# Patient Record
Sex: MALE | Employment: OTHER | URBAN - METROPOLITAN AREA
[De-identification: names, ages, dates, MRNs, and addresses within clinical notes are randomized per-mention and may not be internally consistent; named-entity substitution may affect disease eponyms.]

---

## 2018-05-15 ENCOUNTER — HOSPITAL ENCOUNTER (OUTPATIENT)
Age: 68
Discharge: HOME | End: 2018-05-17
Payer: MEDICARE

## 2018-05-15 PROCEDURE — 88342 IMHCHEM/IMCYTCHM 1ST ANTB: CPT

## 2018-05-15 PROCEDURE — 88305 TISSUE EXAM BY PATHOLOGIST: CPT

## 2018-08-10 ENCOUNTER — OFFICE VISIT (OUTPATIENT)
Dept: ORTHOPEDIC SURGERY | Age: 68
End: 2018-08-10
Payer: MEDICARE

## 2018-08-10 VITALS
WEIGHT: 250 LBS | TEMPERATURE: 97.9 F | DIASTOLIC BLOOD PRESSURE: 85 MMHG | SYSTOLIC BLOOD PRESSURE: 139 MMHG | HEART RATE: 69 BPM | HEIGHT: 72 IN | BODY MASS INDEX: 33.86 KG/M2

## 2018-08-10 DIAGNOSIS — M17.12 OSTEOARTHRITIS OF LEFT KNEE, UNSPECIFIED OSTEOARTHRITIS TYPE: Primary | ICD-10-CM

## 2018-08-10 PROCEDURE — G8427 DOCREV CUR MEDS BY ELIG CLIN: HCPCS | Performed by: ORTHOPAEDIC SURGERY

## 2018-08-10 PROCEDURE — G8417 CALC BMI ABV UP PARAM F/U: HCPCS | Performed by: ORTHOPAEDIC SURGERY

## 2018-08-10 PROCEDURE — 20610 DRAIN/INJ JOINT/BURSA W/O US: CPT | Performed by: ORTHOPAEDIC SURGERY

## 2018-08-10 PROCEDURE — 3017F COLORECTAL CA SCREEN DOC REV: CPT | Performed by: ORTHOPAEDIC SURGERY

## 2018-08-10 PROCEDURE — 4040F PNEUMOC VAC/ADMIN/RCVD: CPT | Performed by: ORTHOPAEDIC SURGERY

## 2018-08-10 PROCEDURE — 1101F PT FALLS ASSESS-DOCD LE1/YR: CPT | Performed by: ORTHOPAEDIC SURGERY

## 2018-08-10 PROCEDURE — 99203 OFFICE O/P NEW LOW 30 MIN: CPT | Performed by: ORTHOPAEDIC SURGERY

## 2018-08-10 PROCEDURE — 1123F ACP DISCUSS/DSCN MKR DOCD: CPT | Performed by: ORTHOPAEDIC SURGERY

## 2018-08-10 PROCEDURE — 1036F TOBACCO NON-USER: CPT | Performed by: ORTHOPAEDIC SURGERY

## 2018-08-10 RX ORDER — LIDOCAINE HYDROCHLORIDE 10 MG/ML
2 INJECTION, SOLUTION INFILTRATION; PERINEURAL ONCE
Status: COMPLETED | OUTPATIENT
Start: 2018-08-10 | End: 2018-08-10

## 2018-08-10 RX ORDER — BUPIVACAINE HYDROCHLORIDE 2.5 MG/ML
2 INJECTION, SOLUTION INFILTRATION; PERINEURAL ONCE
Status: COMPLETED | OUTPATIENT
Start: 2018-08-10 | End: 2018-08-10

## 2018-08-10 RX ORDER — TRIAMCINOLONE ACETONIDE 40 MG/ML
40 INJECTION, SUSPENSION INTRA-ARTICULAR; INTRAMUSCULAR ONCE
Status: COMPLETED | OUTPATIENT
Start: 2018-08-10 | End: 2018-08-10

## 2018-08-10 RX ORDER — OMEPRAZOLE 40 MG/1
CAPSULE, DELAYED RELEASE ORAL
Refills: 1 | COMMUNITY
Start: 2018-06-21 | End: 2019-11-20 | Stop reason: SDUPTHER

## 2018-08-10 RX ORDER — MONTELUKAST SODIUM 10 MG/1
TABLET ORAL
Refills: 1 | COMMUNITY
Start: 2018-06-21 | End: 2019-12-11 | Stop reason: SDUPTHER

## 2018-08-10 RX ORDER — OLMESARTAN MEDOXOMIL 40 MG/1
TABLET ORAL
Refills: 1 | COMMUNITY
Start: 2018-07-19 | End: 2019-11-20 | Stop reason: SDUPTHER

## 2018-08-10 RX ORDER — CARVEDILOL 6.25 MG/1
TABLET ORAL
Refills: 1 | COMMUNITY
Start: 2018-06-21 | End: 2018-12-18

## 2018-08-10 RX ORDER — MELOXICAM 7.5 MG/1
TABLET ORAL
Refills: 2 | COMMUNITY
Start: 2018-08-03 | End: 2019-12-11

## 2018-08-10 RX ADMIN — LIDOCAINE HYDROCHLORIDE 2 ML: 10 INJECTION, SOLUTION INFILTRATION; PERINEURAL at 10:31

## 2018-08-10 RX ADMIN — TRIAMCINOLONE ACETONIDE 40 MG: 40 INJECTION, SUSPENSION INTRA-ARTICULAR; INTRAMUSCULAR at 10:31

## 2018-08-10 RX ADMIN — BUPIVACAINE HYDROCHLORIDE 5 MG: 2.5 INJECTION, SOLUTION INFILTRATION; PERINEURAL at 10:31

## 2018-08-10 NOTE — PROGRESS NOTES
Assisted Dr. Jamal Cameron with injection of 2 cc marcaine/2 cc lidocaine/1 cc kenalog in L knee. Patient tolerated procedure well. Verbal instructions were given.
the left knee, there is point tenderness along the medial joint line. Range of motion is about 5-100° with pain at extremes. The knee is grossly stable to varus and valgus stress     Procedure Note: Knee Cortisone injection     The left knee was identified as the injection site. The risk and benefits of a cortisone injection were explained and the patient consented to the injection. Under sterile conditions, the knee was injected with a mixture of 40 mg of Kenalog and 2 mL of 0.25% Marcaine and 2 mL of 1% Lidocaine without complication. A sterile bandage was applied. Administrations This Visit     bupivacaine (MARCAINE) 0.25 % injection 5 mg     Admin Date  08/10/2018 Action  Given Dose  5 mg Route  Intra-articular Administered By  Jose Schmidt RN          lidocaine 1 % injection 2 mL     Admin Date  08/10/2018 Action  Given Dose  2 mL Route  Intra-articular Administered By  Jose Schmidt RN          triamcinolone acetonide VIA [facility]) injection 40 mg     Admin Date  08/10/2018 Action  Given Dose  40 mg Route  Intra-articular Administered By  Jose Schmidt RN                         IMAGING:    XR: 4 views of the left Knee reviewed on a disc from [facility] show mild evidence of patellofemoral and probable medial and lateral compartment degenerative changes with some bone spurring. Joint space is grossly maintained      ASSESSMENT  Left knee Osteoarthritis    PLAN  We discussed his knee pain today. I offered him a steroid shot into his joint. He accepted. We will get him going on some physical therapy. We will see him back in about 6 weeks to reassess. We also discussed continuation of his anti-inflammatories.         Chiquita Bah MD  Orthopaedic Surgery   8/10/18  10:30 AM

## 2018-09-21 ENCOUNTER — OFFICE VISIT (OUTPATIENT)
Dept: ORTHOPEDIC SURGERY | Age: 68
End: 2018-09-21
Payer: MEDICARE

## 2018-09-21 VITALS
SYSTOLIC BLOOD PRESSURE: 117 MMHG | HEART RATE: 70 BPM | WEIGHT: 250 LBS | HEIGHT: 72 IN | DIASTOLIC BLOOD PRESSURE: 74 MMHG | BODY MASS INDEX: 33.86 KG/M2

## 2018-09-21 DIAGNOSIS — M17.12 OSTEOARTHRITIS OF LEFT KNEE, UNSPECIFIED OSTEOARTHRITIS TYPE: Primary | ICD-10-CM

## 2018-09-21 PROCEDURE — 1123F ACP DISCUSS/DSCN MKR DOCD: CPT | Performed by: ORTHOPAEDIC SURGERY

## 2018-09-21 PROCEDURE — 1101F PT FALLS ASSESS-DOCD LE1/YR: CPT | Performed by: ORTHOPAEDIC SURGERY

## 2018-09-21 PROCEDURE — 3017F COLORECTAL CA SCREEN DOC REV: CPT | Performed by: ORTHOPAEDIC SURGERY

## 2018-09-21 PROCEDURE — 99213 OFFICE O/P EST LOW 20 MIN: CPT | Performed by: ORTHOPAEDIC SURGERY

## 2018-09-21 PROCEDURE — G8417 CALC BMI ABV UP PARAM F/U: HCPCS | Performed by: ORTHOPAEDIC SURGERY

## 2018-09-21 PROCEDURE — 1036F TOBACCO NON-USER: CPT | Performed by: ORTHOPAEDIC SURGERY

## 2018-09-21 PROCEDURE — G8427 DOCREV CUR MEDS BY ELIG CLIN: HCPCS | Performed by: ORTHOPAEDIC SURGERY

## 2018-09-21 PROCEDURE — 4040F PNEUMOC VAC/ADMIN/RCVD: CPT | Performed by: ORTHOPAEDIC SURGERY

## 2018-09-25 ENCOUNTER — HOSPITAL ENCOUNTER (OUTPATIENT)
Dept: MRI IMAGING | Age: 68
Discharge: HOME | End: 2018-09-27
Payer: MEDICARE

## 2018-09-25 DIAGNOSIS — M17.12 OSTEOARTHRITIS OF LEFT KNEE, UNSPECIFIED OSTEOARTHRITIS TYPE: ICD-10-CM

## 2018-09-25 PROCEDURE — 73721 MRI JNT OF LWR EXTRE W/O DYE: CPT

## 2018-10-08 ENCOUNTER — OFFICE VISIT (OUTPATIENT)
Dept: ORTHOPEDIC SURGERY | Age: 68
End: 2018-10-08
Payer: MEDICARE

## 2018-10-08 VITALS
WEIGHT: 255 LBS | HEART RATE: 79 BPM | BODY MASS INDEX: 34.54 KG/M2 | SYSTOLIC BLOOD PRESSURE: 130 MMHG | TEMPERATURE: 97 F | HEIGHT: 72 IN | DIASTOLIC BLOOD PRESSURE: 73 MMHG

## 2018-10-08 DIAGNOSIS — M17.12 OSTEOARTHRITIS OF LEFT KNEE, UNSPECIFIED OSTEOARTHRITIS TYPE: Primary | ICD-10-CM

## 2018-10-08 PROCEDURE — 4040F PNEUMOC VAC/ADMIN/RCVD: CPT | Performed by: ORTHOPAEDIC SURGERY

## 2018-10-08 PROCEDURE — G8417 CALC BMI ABV UP PARAM F/U: HCPCS | Performed by: ORTHOPAEDIC SURGERY

## 2018-10-08 PROCEDURE — 1036F TOBACCO NON-USER: CPT | Performed by: ORTHOPAEDIC SURGERY

## 2018-10-08 PROCEDURE — G8427 DOCREV CUR MEDS BY ELIG CLIN: HCPCS | Performed by: ORTHOPAEDIC SURGERY

## 2018-10-08 PROCEDURE — 99213 OFFICE O/P EST LOW 20 MIN: CPT | Performed by: ORTHOPAEDIC SURGERY

## 2018-10-08 PROCEDURE — G8484 FLU IMMUNIZE NO ADMIN: HCPCS | Performed by: ORTHOPAEDIC SURGERY

## 2018-10-08 PROCEDURE — 20610 DRAIN/INJ JOINT/BURSA W/O US: CPT | Performed by: ORTHOPAEDIC SURGERY

## 2018-10-08 PROCEDURE — 1101F PT FALLS ASSESS-DOCD LE1/YR: CPT | Performed by: ORTHOPAEDIC SURGERY

## 2018-10-08 PROCEDURE — 1123F ACP DISCUSS/DSCN MKR DOCD: CPT | Performed by: ORTHOPAEDIC SURGERY

## 2018-10-08 PROCEDURE — 3017F COLORECTAL CA SCREEN DOC REV: CPT | Performed by: ORTHOPAEDIC SURGERY

## 2018-10-08 RX ORDER — TRIAMCINOLONE ACETONIDE 40 MG/ML
40 INJECTION, SUSPENSION INTRA-ARTICULAR; INTRAMUSCULAR ONCE
Status: COMPLETED | OUTPATIENT
Start: 2018-10-08 | End: 2018-10-08

## 2018-10-08 RX ORDER — BUPIVACAINE HYDROCHLORIDE 2.5 MG/ML
2 INJECTION, SOLUTION INFILTRATION; PERINEURAL ONCE
Status: COMPLETED | OUTPATIENT
Start: 2018-10-08 | End: 2018-10-08

## 2018-10-08 RX ORDER — LIDOCAINE HYDROCHLORIDE 10 MG/ML
2 INJECTION, SOLUTION INFILTRATION; PERINEURAL ONCE
Status: COMPLETED | OUTPATIENT
Start: 2018-10-08 | End: 2018-10-08

## 2018-10-08 RX ADMIN — TRIAMCINOLONE ACETONIDE 40 MG: 40 INJECTION, SUSPENSION INTRA-ARTICULAR; INTRAMUSCULAR at 14:21

## 2018-10-08 RX ADMIN — BUPIVACAINE HYDROCHLORIDE 5 MG: 2.5 INJECTION, SOLUTION INFILTRATION; PERINEURAL at 14:20

## 2018-10-08 RX ADMIN — LIDOCAINE HYDROCHLORIDE 2 ML: 10 INJECTION, SOLUTION INFILTRATION; PERINEURAL at 14:21

## 2018-10-08 NOTE — PROGRESS NOTES
Assisted Dr. Angel Carlson with injection of 2 cc marcaine/2 cc lidocaine/1 cc kenalog in L knee. Patient tolerated procedure well. Verbal instructions were given.

## 2018-10-11 ENCOUNTER — TELEPHONE (OUTPATIENT)
Dept: ORTHOPEDIC SURGERY | Age: 68
End: 2018-10-11

## 2018-11-06 ENCOUNTER — OFFICE VISIT (OUTPATIENT)
Dept: ORTHOPEDIC SURGERY | Age: 68
End: 2018-11-06
Payer: MEDICARE

## 2018-11-06 VITALS
WEIGHT: 255 LBS | DIASTOLIC BLOOD PRESSURE: 93 MMHG | HEART RATE: 69 BPM | BODY MASS INDEX: 34.54 KG/M2 | TEMPERATURE: 97.9 F | SYSTOLIC BLOOD PRESSURE: 147 MMHG | HEIGHT: 72 IN

## 2018-11-06 DIAGNOSIS — M17.12 OSTEOARTHRITIS OF LEFT KNEE, UNSPECIFIED OSTEOARTHRITIS TYPE: Primary | ICD-10-CM

## 2018-11-06 PROCEDURE — G8427 DOCREV CUR MEDS BY ELIG CLIN: HCPCS | Performed by: ORTHOPAEDIC SURGERY

## 2018-11-06 PROCEDURE — 1123F ACP DISCUSS/DSCN MKR DOCD: CPT | Performed by: ORTHOPAEDIC SURGERY

## 2018-11-06 PROCEDURE — 4040F PNEUMOC VAC/ADMIN/RCVD: CPT | Performed by: ORTHOPAEDIC SURGERY

## 2018-11-06 PROCEDURE — 99213 OFFICE O/P EST LOW 20 MIN: CPT | Performed by: ORTHOPAEDIC SURGERY

## 2018-11-06 PROCEDURE — 1036F TOBACCO NON-USER: CPT | Performed by: ORTHOPAEDIC SURGERY

## 2018-11-06 PROCEDURE — 1101F PT FALLS ASSESS-DOCD LE1/YR: CPT | Performed by: ORTHOPAEDIC SURGERY

## 2018-11-06 PROCEDURE — G8484 FLU IMMUNIZE NO ADMIN: HCPCS | Performed by: ORTHOPAEDIC SURGERY

## 2018-11-06 PROCEDURE — 20610 DRAIN/INJ JOINT/BURSA W/O US: CPT | Performed by: ORTHOPAEDIC SURGERY

## 2018-11-06 PROCEDURE — G8417 CALC BMI ABV UP PARAM F/U: HCPCS | Performed by: ORTHOPAEDIC SURGERY

## 2018-11-06 PROCEDURE — 3017F COLORECTAL CA SCREEN DOC REV: CPT | Performed by: ORTHOPAEDIC SURGERY

## 2018-11-06 RX ORDER — FAMOTIDINE 20 MG
TABLET ORAL
Refills: 0 | COMMUNITY
Start: 2018-10-22 | End: 2019-12-11

## 2018-11-06 NOTE — PROGRESS NOTES
Follow Up Visit for Injection    Tad Callaway returns for follow up visit for Orthovisc injection 1. He reports not changed pain in the knee. Physical Exam: unchanged from previous    Imaging: Reviewed     Procedure Note: Knee viscosupplementation injection     The left knee was identified as the injection site. The risk and benefits of injection were explained and the patient consented to the injection. Under sterile conditions, the knee was injected with a full dose of Orthovisc. A sterile bandage was applied.     Administrations This Visit     hyaluronan (ORTHOVISC) 30 MG/2ML injection 30 mg     Admin Date  11/06/2018 Action  Given Dose  30 mg Route  Intra-articular Administered By  Kylee Prescott RN                  Assessment/Plan: S/p left knee Orthovisc injection #1  -Continue activity modification/NSAIDS/ICE prn  -f/u next week for Orthovisc injection #2    Maribel Fall MD  Orthopaedic Surgery   11/6/18  9:39 AM

## 2018-11-13 ENCOUNTER — OFFICE VISIT (OUTPATIENT)
Dept: ORTHOPEDIC SURGERY | Age: 68
End: 2018-11-13
Payer: MEDICARE

## 2018-11-13 VITALS
HEART RATE: 73 BPM | SYSTOLIC BLOOD PRESSURE: 140 MMHG | DIASTOLIC BLOOD PRESSURE: 85 MMHG | WEIGHT: 255 LBS | HEIGHT: 72 IN | BODY MASS INDEX: 34.54 KG/M2

## 2018-11-13 DIAGNOSIS — M17.12 OSTEOARTHRITIS OF LEFT KNEE, UNSPECIFIED OSTEOARTHRITIS TYPE: Primary | ICD-10-CM

## 2018-11-13 PROCEDURE — G8427 DOCREV CUR MEDS BY ELIG CLIN: HCPCS | Performed by: ORTHOPAEDIC SURGERY

## 2018-11-13 PROCEDURE — G8484 FLU IMMUNIZE NO ADMIN: HCPCS | Performed by: ORTHOPAEDIC SURGERY

## 2018-11-13 PROCEDURE — 20610 DRAIN/INJ JOINT/BURSA W/O US: CPT | Performed by: ORTHOPAEDIC SURGERY

## 2018-11-13 PROCEDURE — 1123F ACP DISCUSS/DSCN MKR DOCD: CPT | Performed by: ORTHOPAEDIC SURGERY

## 2018-11-13 PROCEDURE — 4040F PNEUMOC VAC/ADMIN/RCVD: CPT | Performed by: ORTHOPAEDIC SURGERY

## 2018-11-13 PROCEDURE — 99999 PR OFFICE/OUTPT VISIT,PROCEDURE ONLY: CPT | Performed by: ORTHOPAEDIC SURGERY

## 2018-11-13 PROCEDURE — G8417 CALC BMI ABV UP PARAM F/U: HCPCS | Performed by: ORTHOPAEDIC SURGERY

## 2018-11-13 PROCEDURE — 3017F COLORECTAL CA SCREEN DOC REV: CPT | Performed by: ORTHOPAEDIC SURGERY

## 2018-11-13 PROCEDURE — 1101F PT FALLS ASSESS-DOCD LE1/YR: CPT | Performed by: ORTHOPAEDIC SURGERY

## 2018-11-13 PROCEDURE — 1036F TOBACCO NON-USER: CPT | Performed by: ORTHOPAEDIC SURGERY

## 2018-11-13 RX ORDER — COVID-19 ANTIGEN TEST
KIT MISCELLANEOUS
COMMUNITY
End: 2019-12-11

## 2018-11-20 ENCOUNTER — OFFICE VISIT (OUTPATIENT)
Dept: ORTHOPEDIC SURGERY | Age: 68
End: 2018-11-20
Payer: MEDICARE

## 2018-11-20 VITALS
DIASTOLIC BLOOD PRESSURE: 85 MMHG | BODY MASS INDEX: 34.54 KG/M2 | SYSTOLIC BLOOD PRESSURE: 144 MMHG | WEIGHT: 255 LBS | TEMPERATURE: 97.7 F | HEART RATE: 70 BPM | HEIGHT: 72 IN

## 2018-11-20 DIAGNOSIS — M17.12 OSTEOARTHRITIS OF LEFT KNEE, UNSPECIFIED OSTEOARTHRITIS TYPE: Primary | ICD-10-CM

## 2018-11-20 PROCEDURE — 1101F PT FALLS ASSESS-DOCD LE1/YR: CPT | Performed by: ORTHOPAEDIC SURGERY

## 2018-11-20 PROCEDURE — G8484 FLU IMMUNIZE NO ADMIN: HCPCS | Performed by: ORTHOPAEDIC SURGERY

## 2018-11-20 PROCEDURE — 1036F TOBACCO NON-USER: CPT | Performed by: ORTHOPAEDIC SURGERY

## 2018-11-20 PROCEDURE — 3017F COLORECTAL CA SCREEN DOC REV: CPT | Performed by: ORTHOPAEDIC SURGERY

## 2018-11-20 PROCEDURE — 20610 DRAIN/INJ JOINT/BURSA W/O US: CPT | Performed by: ORTHOPAEDIC SURGERY

## 2018-11-20 PROCEDURE — G8427 DOCREV CUR MEDS BY ELIG CLIN: HCPCS | Performed by: ORTHOPAEDIC SURGERY

## 2018-11-20 PROCEDURE — 99999 PR OFFICE/OUTPT VISIT,PROCEDURE ONLY: CPT | Performed by: ORTHOPAEDIC SURGERY

## 2018-11-20 PROCEDURE — 1123F ACP DISCUSS/DSCN MKR DOCD: CPT | Performed by: ORTHOPAEDIC SURGERY

## 2018-11-20 PROCEDURE — 4040F PNEUMOC VAC/ADMIN/RCVD: CPT | Performed by: ORTHOPAEDIC SURGERY

## 2018-11-20 PROCEDURE — G8417 CALC BMI ABV UP PARAM F/U: HCPCS | Performed by: ORTHOPAEDIC SURGERY

## 2018-12-18 ENCOUNTER — OFFICE VISIT (OUTPATIENT)
Dept: ORTHOPEDIC SURGERY | Age: 68
End: 2018-12-18
Payer: MEDICARE

## 2018-12-18 VITALS
HEIGHT: 72 IN | TEMPERATURE: 98.2 F | DIASTOLIC BLOOD PRESSURE: 88 MMHG | WEIGHT: 260 LBS | HEART RATE: 75 BPM | BODY MASS INDEX: 35.21 KG/M2 | SYSTOLIC BLOOD PRESSURE: 130 MMHG

## 2018-12-18 DIAGNOSIS — M17.12 OSTEOARTHRITIS OF LEFT KNEE, UNSPECIFIED OSTEOARTHRITIS TYPE: Primary | ICD-10-CM

## 2018-12-18 PROCEDURE — 3017F COLORECTAL CA SCREEN DOC REV: CPT | Performed by: ORTHOPAEDIC SURGERY

## 2018-12-18 PROCEDURE — G8417 CALC BMI ABV UP PARAM F/U: HCPCS | Performed by: ORTHOPAEDIC SURGERY

## 2018-12-18 PROCEDURE — 4040F PNEUMOC VAC/ADMIN/RCVD: CPT | Performed by: ORTHOPAEDIC SURGERY

## 2018-12-18 PROCEDURE — 1101F PT FALLS ASSESS-DOCD LE1/YR: CPT | Performed by: ORTHOPAEDIC SURGERY

## 2018-12-18 PROCEDURE — G8427 DOCREV CUR MEDS BY ELIG CLIN: HCPCS | Performed by: ORTHOPAEDIC SURGERY

## 2018-12-18 PROCEDURE — G8484 FLU IMMUNIZE NO ADMIN: HCPCS | Performed by: ORTHOPAEDIC SURGERY

## 2018-12-18 PROCEDURE — 1123F ACP DISCUSS/DSCN MKR DOCD: CPT | Performed by: ORTHOPAEDIC SURGERY

## 2018-12-18 PROCEDURE — 99213 OFFICE O/P EST LOW 20 MIN: CPT | Performed by: ORTHOPAEDIC SURGERY

## 2018-12-18 PROCEDURE — 20610 DRAIN/INJ JOINT/BURSA W/O US: CPT | Performed by: ORTHOPAEDIC SURGERY

## 2018-12-18 PROCEDURE — 1036F TOBACCO NON-USER: CPT | Performed by: ORTHOPAEDIC SURGERY

## 2018-12-18 RX ORDER — TRIAMCINOLONE ACETONIDE 40 MG/ML
40 INJECTION, SUSPENSION INTRA-ARTICULAR; INTRAMUSCULAR ONCE
Status: COMPLETED | OUTPATIENT
Start: 2018-12-18 | End: 2018-12-18

## 2018-12-18 RX ORDER — BUPIVACAINE HYDROCHLORIDE 2.5 MG/ML
2 INJECTION, SOLUTION INFILTRATION; PERINEURAL ONCE
Status: COMPLETED | OUTPATIENT
Start: 2018-12-18 | End: 2018-12-18

## 2018-12-18 RX ORDER — CARVEDILOL 12.5 MG/1
TABLET ORAL
Refills: 1 | COMMUNITY
Start: 2018-12-01 | End: 2019-12-11 | Stop reason: SDUPTHER

## 2018-12-18 RX ORDER — LIDOCAINE HYDROCHLORIDE 10 MG/ML
2 INJECTION, SOLUTION INFILTRATION; PERINEURAL ONCE
Status: COMPLETED | OUTPATIENT
Start: 2018-12-18 | End: 2018-12-18

## 2018-12-18 RX ADMIN — LIDOCAINE HYDROCHLORIDE 2 ML: 10 INJECTION, SOLUTION INFILTRATION; PERINEURAL at 09:28

## 2018-12-18 RX ADMIN — TRIAMCINOLONE ACETONIDE 40 MG: 40 INJECTION, SUSPENSION INTRA-ARTICULAR; INTRAMUSCULAR at 09:28

## 2018-12-18 RX ADMIN — BUPIVACAINE HYDROCHLORIDE 5 MG: 2.5 INJECTION, SOLUTION INFILTRATION; PERINEURAL at 09:27

## 2019-04-16 ENCOUNTER — OFFICE VISIT (OUTPATIENT)
Dept: ORTHOPEDIC SURGERY | Age: 69
End: 2019-04-16
Payer: MEDICARE

## 2019-04-16 VITALS
WEIGHT: 255 LBS | HEIGHT: 72 IN | HEART RATE: 75 BPM | DIASTOLIC BLOOD PRESSURE: 87 MMHG | SYSTOLIC BLOOD PRESSURE: 136 MMHG | BODY MASS INDEX: 34.54 KG/M2

## 2019-04-16 DIAGNOSIS — M17.12 OSTEOARTHRITIS OF LEFT KNEE, UNSPECIFIED OSTEOARTHRITIS TYPE: Primary | ICD-10-CM

## 2019-04-16 PROCEDURE — 3017F COLORECTAL CA SCREEN DOC REV: CPT | Performed by: ORTHOPAEDIC SURGERY

## 2019-04-16 PROCEDURE — 4040F PNEUMOC VAC/ADMIN/RCVD: CPT | Performed by: ORTHOPAEDIC SURGERY

## 2019-04-16 PROCEDURE — 1036F TOBACCO NON-USER: CPT | Performed by: ORTHOPAEDIC SURGERY

## 2019-04-16 PROCEDURE — G8417 CALC BMI ABV UP PARAM F/U: HCPCS | Performed by: ORTHOPAEDIC SURGERY

## 2019-04-16 PROCEDURE — 99213 OFFICE O/P EST LOW 20 MIN: CPT | Performed by: ORTHOPAEDIC SURGERY

## 2019-04-16 PROCEDURE — 1123F ACP DISCUSS/DSCN MKR DOCD: CPT | Performed by: ORTHOPAEDIC SURGERY

## 2019-04-16 PROCEDURE — G8428 CUR MEDS NOT DOCUMENT: HCPCS | Performed by: ORTHOPAEDIC SURGERY

## 2019-04-19 LAB
CHOLESTEROL, TOTAL: NORMAL MG/DL
CHOLESTEROL/HDL RATIO: NORMAL
CREATININE: NORMAL MG/DL
HDLC SERPL-MCNC: NORMAL MG/DL (ref 35–70)
LDL CHOLESTEROL CALCULATED: NORMAL MG/DL (ref 0–160)
POTASSIUM (K+): NORMAL
TRIGL SERPL-MCNC: NORMAL MG/DL
VLDLC SERPL CALC-MCNC: NORMAL MG/DL

## 2019-04-22 ENCOUNTER — TELEPHONE (OUTPATIENT)
Dept: ORTHOPEDIC SURGERY | Age: 69
End: 2019-04-22

## 2019-04-22 NOTE — TELEPHONE ENCOUNTER
No pre Kathrine Cast is required for outpt L knee arthroscopy w/ partial medial meniscectomy. ([id number]) Follow Medicare guidelines.

## 2019-04-22 NOTE — TELEPHONE ENCOUNTER
Spoke w Garth in [address] [place] [address] scheduling to schedule outpt procedure for 05/02/19 in [address] [place] [address].

## 2019-04-25 NOTE — PROGRESS NOTES
[facility] PRE-ADMISSION TESTING INSTRUCTIONS    The Preadmission Testing patient is instructed accordingly using the following criteria (check applicable):    ARRIVAL INSTRUCTIONS:  [x] Parking the day of Surgery is located in the Main Entrance lot. Upon entering the door, make an immediate right to the surgery reception desk    [] [id number] is available Monday through Friday 6 am to 6 pm    [x] Bring photo ID and insurance card    [] Bring in a copy of Living will or Durable Power of  papers. [x] Please be sure to arrange for responsible adult to provide transportation to and from the hospital    [x] Please arrange for responsible adult to be with you for the 24 hour period post procedure due to having anesthesia      GENERAL INSTRUCTIONS:    [x] Nothing by mouth after midnight, including gum, candy, mints or water    [x] You may brush your teeth, but do not swallow any water    [x] Take medications as instructed with 1-2 oz of water    [x] Stop herbal supplements and vitamins 5 days prior to procedure    [x] Follow preop dosing of blood thinners per physician instructions    [] Take 1/2 dose of evening insulin, but no insulin after midnight    [] No oral diabetic medications after midnight    [] If diabetic and have low blood sugar or feel symptomatic, take 1-2oz apple juice only    [] Bring inhalers day of surgery    [] Bring C-PAP/ Bi-Pap day of surgery    [] Bring urine specimen day of surgery    [x] Shower or bath with soap, lather and rinse well, AM of Surgery, no lotion, powders or creams to surgical site    [] Follow bowel prep as instructed per surgeon    [] No tobacco products within 24 hours of surgery     [x] No alcohol or illegal drug use within 24 hours of surgery.     [x] Jewelry, body piercing's, eyeglasses, contact lenses and dentures are not permitted into surgery (bring cases)      [] Please do not wear any nail polish, make up or hair products on the day of surgery    [x] If not already done, you can expect a call from registration    [x] You can expect a call the business day prior to procedure to notify you if your arrival time changes    [x] If you receive a survey after surgery we would greatly appreciate your comments    [] Parent/guardian of a minor must accompany their child and remain on the premises  the entire time they are under our care     [] Pediatric patients may bring favorite toy, blanket or comfort item with them    [] A caregiver or family member must remain with the patient during their stay if they are mentally handicapped, have dementia, disoriented or unable to use a call light or would be a safety concern if left unattended    [x] Please notify surgeon if you develop any illness between now and time of surgery (cold, cough, sore throat, fever, nausea, vomiting) or any signs of infections  including skin, wounds, and dental.    [x]  The Outpatient Pharmacy is available to fill your prescription here on your day of surgery, ask your preop nurse for details    [] Other instructions  EDUCATIONAL MATERIALS PROVIDED:    [] PAT Preoperative Education Packet/Booklet     [] Medication List    [] Fluoroscopy Information Pamphlet    [] Transfusion bracelet applied with instructions    [] Joint replacement video reviewed    [] Shower with soap, lather and rinse well, and use CHG wipes provided the evening before surgery as instructed

## 2019-04-30 ENCOUNTER — OFFICE VISIT (OUTPATIENT)
Dept: ORTHOPEDIC SURGERY | Age: 69
End: 2019-04-30
Payer: MEDICARE

## 2019-04-30 VITALS
HEART RATE: 66 BPM | DIASTOLIC BLOOD PRESSURE: 91 MMHG | HEIGHT: 72 IN | TEMPERATURE: 97.8 F | SYSTOLIC BLOOD PRESSURE: 147 MMHG | BODY MASS INDEX: 34.54 KG/M2 | WEIGHT: 255 LBS

## 2019-04-30 DIAGNOSIS — Z01.818 PRE-OP EXAM: Primary | ICD-10-CM

## 2019-04-30 DIAGNOSIS — S83.242A TEAR OF MEDIAL MENISCUS OF LEFT KNEE, UNSPECIFIED TEAR TYPE, UNSPECIFIED WHETHER OLD OR CURRENT TEAR, INITIAL ENCOUNTER: ICD-10-CM

## 2019-04-30 PROCEDURE — 1123F ACP DISCUSS/DSCN MKR DOCD: CPT | Performed by: ORTHOPAEDIC SURGERY

## 2019-04-30 PROCEDURE — 1036F TOBACCO NON-USER: CPT | Performed by: ORTHOPAEDIC SURGERY

## 2019-04-30 PROCEDURE — G8427 DOCREV CUR MEDS BY ELIG CLIN: HCPCS | Performed by: ORTHOPAEDIC SURGERY

## 2019-04-30 PROCEDURE — G8417 CALC BMI ABV UP PARAM F/U: HCPCS | Performed by: ORTHOPAEDIC SURGERY

## 2019-04-30 PROCEDURE — 4040F PNEUMOC VAC/ADMIN/RCVD: CPT | Performed by: ORTHOPAEDIC SURGERY

## 2019-04-30 PROCEDURE — 3017F COLORECTAL CA SCREEN DOC REV: CPT | Performed by: ORTHOPAEDIC SURGERY

## 2019-04-30 PROCEDURE — 99213 OFFICE O/P EST LOW 20 MIN: CPT | Performed by: ORTHOPAEDIC SURGERY

## 2019-04-30 RX ORDER — HYDROCODONE BITARTRATE AND ACETAMINOPHEN 5; 325 MG/1; MG/1
1 TABLET ORAL EVERY 6 HOURS PRN
Qty: 20 TABLET | Refills: 0 | Status: SHIPPED | OUTPATIENT
Start: 2019-04-30 | End: 2019-05-05

## 2019-04-30 RX ORDER — KETOROLAC TROMETHAMINE 10 MG/1
10 TABLET, FILM COATED ORAL EVERY 6 HOURS PRN
Qty: 8 TABLET | Refills: 0 | Status: SHIPPED | OUTPATIENT
Start: 2019-04-30 | End: 2019-05-10

## 2019-04-30 NOTE — PROGRESS NOTES
Department of Orthopedic Surgery  Attending Pre-operative History and Physical        DIAGNOSIS:  Left knee degenerative medial meniscus tear     INDICATION:  Failed conservative management     PROCEDURE:  LEFT KNEE ARTHROSCOPY WITH PARTIAL MEDIAL MENISECTOMY     CHIEF COMPLAINT:  Left knee pain    History Obtained From:  patient    HISTORY OF PRESENT ILLNESS:      The patient is a 78 y.o. male  Who has suffered from left knee pain for over one year. He has been treated conservatively including injections of steroid and Orthovisc, as well as physical therapy and activity modification. His pain has progressed. It is mainly along the medial joint line. He does have x-rays showing maintained joint space and MRI showing a degenerative medial meniscus tear with only minimal signs of chondral malacia of the knee. For these reasons he is interested in surgical management. We discussed that this would involve left knee arthroscopy, partial medial meniscectomy. We discussed risks of surgery including but not limited to infection, neurovascular injury, persistent pain, more extensive arthritis than noted on imaging, postoperative stiffness, DVT/pulmonary embolus, unforeseen risks. He understands and would like To proceed    Past Medical History:        Diagnosis Date    Acid reflux     Arthritis     Cancer ([facility] [address] 11/2018    skin    Hypertension     Left knee pain     for OR 5/2/2019       Past Surgical History:        Procedure Laterality Date    BACK SURGERY      COLONOSCOPY  2018    HERNIA REPAIR      SKIN BIOPSY  11/2018    UPPER GASTROINTESTINAL ENDOSCOPY  2018       Medications Prior to Admission:   Not in a hospital admission. Allergies:  Augmentin [amoxicillin-pot clavulanate]    History of allergic reaction to anesthesia:  No    Social History:   TOBACCO:   reports that he has never smoked. He has never used smokeless tobacco.  ETOH:   reports that he drinks alcohol.   DRUGS:   reports that he does not use drugs. Family History:       Problem Relation Age of Onset    Stroke Mother     Heart Disease Father         Bypass Surgery, Pacemaker, Carotids       REVIEW OF SYSTEMS:  CONSTITUTIONAL:  negative  RESPIRATORY:  negative  CARDIOVASCULAR:  negative  MUSCULOSKELETAL:  negative except for  HPI  NEUROLOGICAL:  negative    PHYSICAL EXAM:     VITALS:  BP (!) 147/91 (Site: Right Upper Arm, Position: Sitting, Cuff Size: Medium Adult) Comment: no s/s, did not sleep well  Pulse 66   Temp 97.8 °F (36.6 °C) (Oral)   Ht 6' (1.829 m)   Wt 255 lb (115.7 kg)   BMI 34.58 kg/m²     Gen: AOx3, NAD    Heart:  normal apical pulses, normal S1 and S2 and no edema    Lungs:  No increased work of breathing, good air exchange     Abdomen:  no scars, non-distended and non-tender    Extremities:  No clubbing, cyanosis, or edema    Musculoskeletal:  On exam of the knee, there is full range of motion. There is tenderness along the medial joint line. Robert's is positive for pain. Lachman and posterior drawer stable. The knee is stable to varus and valgus stress throughout range of motion      DATA:  CBC: No results found for: WBC, RBC, HGB, HCT, MCV, MCH, MCHC, RDW, PLT, MPV  BMP:  No results found for: NA, K, CL, CO2, BUN, LABALBU, CREATININE, CALCIUM, GFRAA, LABGLOM, GLUCOSE    Radiology Review:  X-rays and MRI reviewed. MRI shows medial meniscus degenerative tear    ASSESSMENT AND PLAN:    1. Patient is a 78 y.o. male with above specified procedure planned Left knee arthroscopy, partial medial meniscectomy with anesthesia    2. Procedure options, risks and benefits reviewed with patient. Patient expresses understanding and has signed consent form to proceed.     3.  Patient and family were provided with pre-op and post-op instructions, prescription medications, and any other supplied needed post op (slings, braces, etc.)    Controlled Substances Monitoring:            Salome Cheng MD  Orthopaedic Surgery 4/30/19  8:54 AM

## 2019-05-02 ENCOUNTER — ANESTHESIA (OUTPATIENT)
Dept: OPERATING ROOM | Age: 69
End: 2019-05-02
Payer: MEDICARE

## 2019-05-02 ENCOUNTER — ANESTHESIA EVENT (OUTPATIENT)
Dept: OPERATING ROOM | Age: 69
End: 2019-05-02
Payer: MEDICARE

## 2019-05-02 ENCOUNTER — HOSPITAL ENCOUNTER (OUTPATIENT)
Age: 69
Setting detail: OUTPATIENT SURGERY
Discharge: HOME | End: 2019-05-02
Attending: ORTHOPAEDIC SURGERY | Admitting: ORTHOPAEDIC SURGERY
Payer: MEDICARE

## 2019-05-02 VITALS
HEIGHT: 72 IN | SYSTOLIC BLOOD PRESSURE: 148 MMHG | OXYGEN SATURATION: 97 % | WEIGHT: 255 LBS | DIASTOLIC BLOOD PRESSURE: 74 MMHG | RESPIRATION RATE: 20 BRPM | BODY MASS INDEX: 34.54 KG/M2 | HEART RATE: 59 BPM | TEMPERATURE: 97.1 F

## 2019-05-02 VITALS
OXYGEN SATURATION: 99 % | SYSTOLIC BLOOD PRESSURE: 89 MMHG | DIASTOLIC BLOOD PRESSURE: 54 MMHG | RESPIRATION RATE: 1 BRPM

## 2019-05-02 PROCEDURE — 6370000000 HC RX 637 (ALT 250 FOR IP)

## 2019-05-02 PROCEDURE — 7100000010 HC PHASE II RECOVERY - FIRST 15 MIN: Performed by: ORTHOPAEDIC SURGERY

## 2019-05-02 PROCEDURE — 7100000000 HC PACU RECOVERY - FIRST 15 MIN: Performed by: ORTHOPAEDIC SURGERY

## 2019-05-02 PROCEDURE — 2500000003 HC RX 250 WO HCPCS: Performed by: ORTHOPAEDIC SURGERY

## 2019-05-02 PROCEDURE — 2720000010 HC SURG SUPPLY STERILE: Performed by: ORTHOPAEDIC SURGERY

## 2019-05-02 PROCEDURE — 2709999900 HC NON-CHARGEABLE SUPPLY: Performed by: ORTHOPAEDIC SURGERY

## 2019-05-02 PROCEDURE — 2580000003 HC RX 258: Performed by: NURSE ANESTHETIST, CERTIFIED REGISTERED

## 2019-05-02 PROCEDURE — 7100000001 HC PACU RECOVERY - ADDTL 15 MIN: Performed by: ORTHOPAEDIC SURGERY

## 2019-05-02 PROCEDURE — 3700000000 HC ANESTHESIA ATTENDED CARE: Performed by: ORTHOPAEDIC SURGERY

## 2019-05-02 PROCEDURE — 7100000011 HC PHASE II RECOVERY - ADDTL 15 MIN: Performed by: ORTHOPAEDIC SURGERY

## 2019-05-02 PROCEDURE — 3700000001 HC ADD 15 MINUTES (ANESTHESIA): Performed by: ORTHOPAEDIC SURGERY

## 2019-05-02 PROCEDURE — 29881 ARTHRS KNE SRG MNISECTMY M/L: CPT | Performed by: ORTHOPAEDIC SURGERY

## 2019-05-02 PROCEDURE — 3600000014 HC SURGERY LEVEL 4 ADDTL 15MIN: Performed by: ORTHOPAEDIC SURGERY

## 2019-05-02 PROCEDURE — 29875 ARTHRS KNEE SURG SYNVCT LMTD: CPT | Performed by: ORTHOPAEDIC SURGERY

## 2019-05-02 PROCEDURE — G0289 ARTHRO, LOOSE BODY + CHONDRO: HCPCS | Performed by: ORTHOPAEDIC SURGERY

## 2019-05-02 PROCEDURE — 6360000002 HC RX W HCPCS: Performed by: NURSE ANESTHETIST, CERTIFIED REGISTERED

## 2019-05-02 PROCEDURE — 3600000004 HC SURGERY LEVEL 4 BASE: Performed by: ORTHOPAEDIC SURGERY

## 2019-05-02 PROCEDURE — 6360000002 HC RX W HCPCS: Performed by: ANESTHESIOLOGY

## 2019-05-02 PROCEDURE — 2500000003 HC RX 250 WO HCPCS: Performed by: NURSE ANESTHETIST, CERTIFIED REGISTERED

## 2019-05-02 RX ORDER — SODIUM CHLORIDE 0.9 % (FLUSH) 0.9 %
10 SYRINGE (ML) INJECTION PRN
Status: DISCONTINUED | OUTPATIENT
Start: 2019-05-02 | End: 2019-05-02 | Stop reason: HOSPADM

## 2019-05-02 RX ORDER — FENTANYL CITRATE 50 UG/ML
25 INJECTION, SOLUTION INTRAMUSCULAR; INTRAVENOUS EVERY 5 MIN PRN
Status: DISCONTINUED | OUTPATIENT
Start: 2019-05-02 | End: 2019-05-02 | Stop reason: HOSPADM

## 2019-05-02 RX ORDER — HYDROCODONE BITARTRATE AND ACETAMINOPHEN 10; 325 MG/1; MG/1
1 TABLET ORAL EVERY 6 HOURS PRN
Status: DISCONTINUED | OUTPATIENT
Start: 2019-05-02 | End: 2019-05-02

## 2019-05-02 RX ORDER — SODIUM CHLORIDE 0.9 % (FLUSH) 0.9 %
10 SYRINGE (ML) INJECTION EVERY 12 HOURS SCHEDULED
Status: DISCONTINUED | OUTPATIENT
Start: 2019-05-02 | End: 2019-05-02 | Stop reason: SDUPTHER

## 2019-05-02 RX ORDER — FENTANYL CITRATE 50 UG/ML
50 INJECTION, SOLUTION INTRAMUSCULAR; INTRAVENOUS EVERY 5 MIN PRN
Status: DISCONTINUED | OUTPATIENT
Start: 2019-05-02 | End: 2019-05-02 | Stop reason: HOSPADM

## 2019-05-02 RX ORDER — SODIUM CHLORIDE 9 MG/ML
INJECTION, SOLUTION INTRAVENOUS CONTINUOUS
Status: DISCONTINUED | OUTPATIENT
Start: 2019-05-02 | End: 2019-05-02 | Stop reason: SDUPTHER

## 2019-05-02 RX ORDER — MIDAZOLAM HYDROCHLORIDE 1 MG/ML
INJECTION INTRAMUSCULAR; INTRAVENOUS PRN
Status: DISCONTINUED | OUTPATIENT
Start: 2019-05-02 | End: 2019-05-02 | Stop reason: SDUPTHER

## 2019-05-02 RX ORDER — HYDROCODONE BITARTRATE AND ACETAMINOPHEN 5; 325 MG/1; MG/1
1 TABLET ORAL EVERY 6 HOURS PRN
Status: CANCELLED | OUTPATIENT
Start: 2019-05-02

## 2019-05-02 RX ORDER — HYDROCODONE BITARTRATE AND ACETAMINOPHEN 5; 325 MG/1; MG/1
TABLET ORAL
Status: COMPLETED
Start: 2019-05-02 | End: 2019-05-02

## 2019-05-02 RX ORDER — GLYCOPYRROLATE 1 MG/5 ML
SYRINGE (ML) INTRAVENOUS PRN
Status: DISCONTINUED | OUTPATIENT
Start: 2019-05-02 | End: 2019-05-02 | Stop reason: SDUPTHER

## 2019-05-02 RX ORDER — SODIUM CHLORIDE 0.9 % (FLUSH) 0.9 %
10 SYRINGE (ML) INJECTION PRN
Status: DISCONTINUED | OUTPATIENT
Start: 2019-05-02 | End: 2019-05-02 | Stop reason: SDUPTHER

## 2019-05-02 RX ORDER — SODIUM CHLORIDE 9 MG/ML
INJECTION, SOLUTION INTRAVENOUS CONTINUOUS PRN
Status: DISCONTINUED | OUTPATIENT
Start: 2019-05-02 | End: 2019-05-02 | Stop reason: SDUPTHER

## 2019-05-02 RX ORDER — DEXAMETHASONE SODIUM PHOSPHATE 4 MG/ML
INJECTION, SOLUTION INTRA-ARTICULAR; INTRALESIONAL; INTRAMUSCULAR; INTRAVENOUS; SOFT TISSUE PRN
Status: DISCONTINUED | OUTPATIENT
Start: 2019-05-02 | End: 2019-05-02 | Stop reason: SDUPTHER

## 2019-05-02 RX ORDER — FENTANYL CITRATE 50 UG/ML
INJECTION, SOLUTION INTRAMUSCULAR; INTRAVENOUS PRN
Status: DISCONTINUED | OUTPATIENT
Start: 2019-05-02 | End: 2019-05-02 | Stop reason: SDUPTHER

## 2019-05-02 RX ORDER — MEPERIDINE HYDROCHLORIDE 25 MG/ML
12.5 INJECTION INTRAMUSCULAR; INTRAVENOUS; SUBCUTANEOUS EVERY 5 MIN PRN
Status: DISCONTINUED | OUTPATIENT
Start: 2019-05-02 | End: 2019-05-02 | Stop reason: HOSPADM

## 2019-05-02 RX ORDER — ONDANSETRON 2 MG/ML
INJECTION INTRAMUSCULAR; INTRAVENOUS PRN
Status: DISCONTINUED | OUTPATIENT
Start: 2019-05-02 | End: 2019-05-02 | Stop reason: SDUPTHER

## 2019-05-02 RX ORDER — ONDANSETRON 2 MG/ML
4 INJECTION INTRAMUSCULAR; INTRAVENOUS
Status: DISCONTINUED | OUTPATIENT
Start: 2019-05-02 | End: 2019-05-02 | Stop reason: HOSPADM

## 2019-05-02 RX ORDER — SODIUM CHLORIDE 9 MG/ML
INJECTION, SOLUTION INTRAVENOUS CONTINUOUS
Status: DISCONTINUED | OUTPATIENT
Start: 2019-05-02 | End: 2019-05-02 | Stop reason: HOSPADM

## 2019-05-02 RX ORDER — PROPOFOL 10 MG/ML
INJECTION, EMULSION INTRAVENOUS PRN
Status: DISCONTINUED | OUTPATIENT
Start: 2019-05-02 | End: 2019-05-02 | Stop reason: SDUPTHER

## 2019-05-02 RX ORDER — SODIUM CHLORIDE 0.9 % (FLUSH) 0.9 %
10 SYRINGE (ML) INJECTION EVERY 12 HOURS SCHEDULED
Status: DISCONTINUED | OUTPATIENT
Start: 2019-05-02 | End: 2019-05-02 | Stop reason: HOSPADM

## 2019-05-02 RX ADMIN — ONDANSETRON HYDROCHLORIDE 4 MG: 2 INJECTION, SOLUTION INTRAMUSCULAR; INTRAVENOUS at 08:02

## 2019-05-02 RX ADMIN — SODIUM CHLORIDE: 9 INJECTION, SOLUTION INTRAVENOUS at 07:54

## 2019-05-02 RX ADMIN — FENTANYL CITRATE 100 MCG: 50 INJECTION, SOLUTION INTRAMUSCULAR; INTRAVENOUS at 07:58

## 2019-05-02 RX ADMIN — MIDAZOLAM HYDROCHLORIDE 2 MG: 1 INJECTION, SOLUTION INTRAMUSCULAR; INTRAVENOUS at 07:54

## 2019-05-02 RX ADMIN — HYDROCODONE BITARTRATE AND ACETAMINOPHEN 1 TABLET: 5; 325 TABLET ORAL at 10:47

## 2019-05-02 RX ADMIN — PROPOFOL 200 MG: 10 INJECTION, EMULSION INTRAVENOUS at 07:58

## 2019-05-02 RX ADMIN — FENTANYL CITRATE 50 MCG: 50 INJECTION, SOLUTION INTRAMUSCULAR; INTRAVENOUS at 08:56

## 2019-05-02 RX ADMIN — FENTANYL CITRATE 50 MCG: 50 INJECTION, SOLUTION INTRAMUSCULAR; INTRAVENOUS at 08:15

## 2019-05-02 RX ADMIN — DEXAMETHASONE SODIUM PHOSPHATE 10 MG: 4 INJECTION, SOLUTION INTRAMUSCULAR; INTRAVENOUS at 08:02

## 2019-05-02 RX ADMIN — Medication 0.2 MG: at 08:06

## 2019-05-02 ASSESSMENT — PAIN DESCRIPTION - PAIN TYPE
TYPE: SURGICAL PAIN

## 2019-05-02 ASSESSMENT — PULMONARY FUNCTION TESTS
PIF_VALUE: 5
PIF_VALUE: 15
PIF_VALUE: 12
PIF_VALUE: 14
PIF_VALUE: 4
PIF_VALUE: 15
PIF_VALUE: 17
PIF_VALUE: 4
PIF_VALUE: 5
PIF_VALUE: 4
PIF_VALUE: 11
PIF_VALUE: 15
PIF_VALUE: 18
PIF_VALUE: 12
PIF_VALUE: 15
PIF_VALUE: 23
PIF_VALUE: 14
PIF_VALUE: 16
PIF_VALUE: 16
PIF_VALUE: 3
PIF_VALUE: 11
PIF_VALUE: 17
PIF_VALUE: 0
PIF_VALUE: 16
PIF_VALUE: 1
PIF_VALUE: 17
PIF_VALUE: 11
PIF_VALUE: 11
PIF_VALUE: 17
PIF_VALUE: 15
PIF_VALUE: 15
PIF_VALUE: 11
PIF_VALUE: 15
PIF_VALUE: 2
PIF_VALUE: 18
PIF_VALUE: 2
PIF_VALUE: 17
PIF_VALUE: 1
PIF_VALUE: 11
PIF_VALUE: 13
PIF_VALUE: 0
PIF_VALUE: 5

## 2019-05-02 ASSESSMENT — PAIN DESCRIPTION - PROGRESSION
CLINICAL_PROGRESSION: GRADUALLY IMPROVING
CLINICAL_PROGRESSION: GRADUALLY IMPROVING
CLINICAL_PROGRESSION: GRADUALLY WORSENING

## 2019-05-02 ASSESSMENT — PAIN DESCRIPTION - ORIENTATION
ORIENTATION: LEFT

## 2019-05-02 ASSESSMENT — PAIN SCALES - GENERAL
PAINLEVEL_OUTOF10: 0
PAINLEVEL_OUTOF10: 3
PAINLEVEL_OUTOF10: 2
PAINLEVEL_OUTOF10: 0
PAINLEVEL_OUTOF10: 7
PAINLEVEL_OUTOF10: 4
PAINLEVEL_OUTOF10: 3

## 2019-05-02 ASSESSMENT — PAIN DESCRIPTION - LOCATION
LOCATION: KNEE

## 2019-05-02 ASSESSMENT — PAIN DESCRIPTION - ONSET
ONSET: ON-GOING
ONSET: ON-GOING

## 2019-05-02 ASSESSMENT — PAIN DESCRIPTION - DESCRIPTORS
DESCRIPTORS: ACHING
DESCRIPTORS: DISCOMFORT

## 2019-05-02 NOTE — H&P
Department of Orthopedic Surgery  Attending Pre-operative History and Physical           DIAGNOSIS:  Left knee degenerative medial meniscus tear      INDICATION:  Failed conservative management      PROCEDURE:  LEFT KNEE ARTHROSCOPY WITH PARTIAL MEDIAL MENISECTOMY      CHIEF COMPLAINT:  Left knee pain     History Obtained From:  patient     HISTORY OF PRESENT ILLNESS:       The patient is a 78 y.o. male  Who has suffered from left knee pain for over one year. He has been treated conservatively including injections of steroid and Orthovisc, as well as physical therapy and activity modification. His pain has progressed. It is mainly along the medial joint line. He does have x-rays showing maintained joint space and MRI showing a degenerative medial meniscus tear with only minimal signs of chondral malacia of the knee. For these reasons he is interested in surgical management. We discussed that this would involve left knee arthroscopy, partial medial meniscectomy. We discussed risks of surgery including but not limited to infection, neurovascular injury, persistent pain, more extensive arthritis than noted on imaging, postoperative stiffness, DVT/pulmonary embolus, unforeseen risks.    He understands and would like To proceed     Past Medical History:    Past Medical History             Diagnosis Date    Acid reflux      Arthritis      Cancer ([facility] [address] 11/2018     skin    Hypertension      Left knee pain       for OR 5/2/2019            Past Surgical History:    Past Surgical History             Procedure Laterality Date    BACK SURGERY        COLONOSCOPY   2018    HERNIA REPAIR        SKIN BIOPSY   11/2018    UPPER GASTROINTESTINAL ENDOSCOPY   2018            Medications Prior to Admission:   [place]   Not in a hospital admission.        Allergies:  Augmentin [amoxicillin-pot clavulanate]     History of allergic reaction to anesthesia:  No     Social History:   TOBACCO:   reports

## 2019-05-02 NOTE — ANESTHESIA POSTPROCEDURE EVALUATION
Department of Anesthesiology  Postprocedure Note    Patient: Lorne Bamberger  MRN: [id number]  YOB: 1950  Date of evaluation: 5/2/2019  Time:  12:19 PM     Procedure Summary     Date:  05/02/19 Room / Location:  [facility] OR  / [facility] OR    Anesthesia Start:  0985 Anesthesia Stop:  5207    Procedure:  LEFT KNEE ARTHROSCOPY WITH PARTIAL MEDIAL MENISECTOMY (Left ) Diagnosis:  (LEFT MEDIAL MENISCUS TEAR)    Surgeon:  Arianna Gonzalez MD Responsible Provider:  Dee Burnett MD    Anesthesia Type:  general ASA Status:  3          Anesthesia Type: general    Kane Phase I: Kane Score: 10    Kane Phase II: Kane Score: 10    Last vitals: Reviewed and per EMR flowsheets.        Anesthesia Post Evaluation    Patient location during evaluation: PACU  Patient participation: complete - patient participated  Level of consciousness: awake  Pain score: 3  Airway patency: patent  Nausea & Vomiting: no nausea  Complications: no  Cardiovascular status: blood pressure returned to baseline  Respiratory status: acceptable  Hydration status: euvolemic

## 2019-05-02 NOTE — OP NOTE
was placed. A pad was placed below the non operative leg. The leg was then prepped and draped in sterile fashion. Prior to incision, it was confirmed that 2 g Ancef was given for prophylactic antibiotics. A time out was performed involving surgeon, anesthesia team, and operating room nurses and techs confirming the patient, procedure, and site of surgery. Planned surgical incisions and portals were then again pre-injected. The knee was flexed approximately 45 degrees. A 15 blade was used to make the lateral portal and medial portal incisions in a lateral parapatellar location, just inferior to the patella and just lateral to the patellar tendon. A hemostat was used to open up the portal, and the knee was extended as the scope trocar was inserted into the suprapatellar pouch. The trocar was removed and the cameral inserted. Diagnostic arthroscopy ensued: The patella was notable for some global grade 2 changes which were stable    The trochlea was notable for some central grade 4 changes    The medial gutter was free of debris     The Medial meniscus was remarkable for a complex tear involving zone 2/3. This was debrided with arthroscopic biters and motorized shaver back to a stable edge. The meniscus was probed following this and found to be stable. The medial femoral condyle was notable for global grade 3 changes    The medial tibial plateau notable for some focal grade 4 changes at the medial aspect underneath the meniscus. Articular cartilage defects of the medial compartment were debrided with motorized shaver to stable edges    The ACL was intact    The PCL was intact.       The lateral meniscus was unremarkable     The lateral femoral condyle was notable for a full-thickness fissure at the central portion with small area of grade 4 changes which was debrided with motorized shaver    The lateral tibial plateau was notable for a small area of full-thickness defect in the central plateau debrided with motorized shaver    The lateral gutter was free of debris. There was a large synovial plica running across the patellofemoral joint which was debrided with motorized shaver which cleared out the gutters. The knee was then thoroughly irrigated until irrigant was free of debris and was clear. Canulas were then removed from the knee, as was excess fluid. The portals were closed with 4-O nylon. A mixture of ropivicaine and toradol was injected into the knee joint. Leftover local anesthetic from pre-injection was injected around the incisions. Steri strips were applied to all wounds, and a sterile dressing and macy hose were applied. The patient was awoken from anesthesia and transferred to the hospital bed. He was taken to the recovery room in stable condition. IMPLANTS: none    TOURNIQUET TIME: n/a     ESTIMATED BLOOD LOSS: 10 mL    COMPLICATIONS: none    POST OPERATIVE PLAN: The patient will be discharged home from same day surgery was stable. He will ice and take prescribed medications this evening. Weight bearing will be as tolerated with crutches. Dressing is to remain on. He will be seen for post operative visit POD 1 or 2.          Rhonda Conrad MD  Orthopaedic Surgery   5/2/19  8:42 AM

## 2019-05-02 NOTE — ANESTHESIA PRE PROCEDURE
Department of Anesthesiology  Preprocedure Note       Name:  Tiny Soto   Age:  78 y.o.  :  1950                                          MRN:  [id number]         Date:  2019      Surgeon: Brittney Alcala):  Belkis Faulkner MD    Procedure: LEFT KNEE ARTHROSCOPY WITH PARTIAL MEDIAL MENISECTOMY (Left )    Medications prior to admission:   Prior to Admission medications    Medication Sig Start Date End Date Taking? Authorizing Provider   carvedilol (COREG) 12.5 MG tablet TK 1 T PO  BID    take am dos 18  Yes Historical Provider, MD   Naproxen Sodium (ALEVE) 220 MG CAPS Take by mouth   Yes Historical Provider, MD   olmesartan (BENICAR) 40 MG tablet TK 1 T PO QD 18  Yes Historical Provider, MD   omeprazole (PRILOSEC) 40 MG delayed release capsule TK 1 C PO QD    take am dos. 18  Yes Historical Provider, MD   meloxicam (MOBIC) 7.5 MG tablet TK 1 T PO QD WF PRF PAIN    patient checking if to hold. 8/3/18  Yes Historical Provider, MD   montelukast (SINGULAIR) 10 MG tablet TK 1 T PO  QHS   takes prn 18  Yes Historical Provider, MD   aspirin 81 MG tablet Take 81 mg by mouth daily No hold/Dr Yashira Newton   Yes Historical Provider, MD   Omega-3 Fatty Acids (FISH OIL PO) Take by mouth daily   Yes Historical Provider, MD   Multiple Vitamin (MULTI-VITAMIN DAILY PO) Take by mouth daily   Yes Historical Provider, MD   HYDROcodone-acetaminophen (NORCO) 5-325 MG per tablet Take 1 tablet by mouth every 6 hours as needed for Pain for up to 5 days.  19  Belkis Faulkner MD   ketorolac (TORADOL) 10 MG tablet Take 1 tablet by mouth every 6 hours as needed for Pain 19  Belkis Faulkner MD   WAL-FINATE 4 MG tablet TK 1 T PO  Q 8 H PRF SINUS DRAINAGE 10/22/18   Historical Provider, MD       Current medications:    Current Facility-Administered Medications   Medication Dose Route Frequency Provider Last Rate Last Dose    0.9 % sodium chloride infusion   Intravenous Continuous Vicente Gauze Fernandez Ball MD        sodium chloride flush 0.9 % injection 10 mL  10 mL Intravenous 2 times per day Omari Beard MD        sodium chloride flush 0.9 % injection 10 mL  10 mL Intravenous PRN Omari Beard MD        ceFAZolin (ANCEF) 2 g in dextrose 5 % 100 mL IVPB  2 g Intravenous On Call to MD Velma        CEFAZOLIN 2 G D5W 100 ML IVPB IVPB                Allergies:     Allergies   Allergen Reactions    Augmentin [Amoxicillin-Pot Clavulanate] Diarrhea       Problem List:    Patient Active Problem List   Diagnosis Code    Osteoarthritis of left knee M17.12       Past Medical History:        Diagnosis Date    Acid reflux     Arthritis     Cancer ([address] 11/2018    skin    Hypertension     Left knee pain     for OR 5/2/2019       Past Surgical History:        Procedure Laterality Date    BACK SURGERY      COLONOSCOPY  2018    HERNIA REPAIR      SKIN BIOPSY  11/2018    UPPER GASTROINTESTINAL ENDOSCOPY  2018       Social History:    Social History     Tobacco Use    Smoking status: Never Smoker    Smokeless tobacco: Never Used   Substance Use Topics    Alcohol use: Yes     Comment: 3 times weekly                                Counseling given: Not Answered      Vital Signs (Current):   Vitals:    04/25/19 1521 05/02/19 0625   BP:  (!) 140/79   Pulse:  70   Resp:  16   Temp:  97 °F (36.1 °C)   TempSrc:  Temporal   SpO2:  95%   Weight: 255 lb (115.7 kg)    Height: 6' (1.829 m)                                               BP Readings from Last 3 Encounters:   05/02/19 (!) 140/79   04/30/19 (!) 147/91   04/16/19 136/87       NPO Status: Time of last liquid consumption: 2200                        Time of last solid consumption: 2000                        Date of last liquid consumption: 05/01/19                        Date of last solid food consumption: 05/01/19    BMI:   Wt Readings from Last 3 Encounters:   04/25/19 255 lb (115.7 kg)   04/30/19 255 lb (115.7 kg)   04/16/19

## 2019-05-03 ENCOUNTER — OFFICE VISIT (OUTPATIENT)
Dept: ORTHOPEDIC SURGERY | Age: 69
End: 2019-05-03

## 2019-05-03 VITALS
BODY MASS INDEX: 34.54 KG/M2 | DIASTOLIC BLOOD PRESSURE: 88 MMHG | TEMPERATURE: 97.4 F | SYSTOLIC BLOOD PRESSURE: 171 MMHG | HEART RATE: 67 BPM | WEIGHT: 255 LBS | HEIGHT: 72 IN

## 2019-05-03 DIAGNOSIS — Z98.890 STATUS POST ARTHROSCOPIC SURGERY OF LEFT KNEE: ICD-10-CM

## 2019-05-03 DIAGNOSIS — S83.242A TEAR OF MEDIAL MENISCUS OF LEFT KNEE, UNSPECIFIED TEAR TYPE, UNSPECIFIED WHETHER OLD OR CURRENT TEAR, INITIAL ENCOUNTER: Primary | ICD-10-CM

## 2019-05-03 PROCEDURE — 99024 POSTOP FOLLOW-UP VISIT: CPT | Performed by: ORTHOPAEDIC SURGERY

## 2019-05-03 NOTE — PROGRESS NOTES
Surgical dressings were removed and incision sites were cleaned with alcohol prep pads. Minimal swelling and bleeding near scope sites. Steri stripes and Tegaderm placed over the area to cover surgical sites x 1 week.

## 2019-05-03 NOTE — PROGRESS NOTES
Post Operative Visit     Surgery:  1. Medial meniscus tear, Left knee. 2. Synovial plica, left knee  3. Osteoarthritis, left knee   Date: 5/2/19    Subjective:    Devon Diaz is here for follow up visit s/p above procedure. He is doing well. He has been icing and taking medications. He reports minimal pain    Controlled Substances Monitoring:        Physical Exam:    Height: 6' (1.829 m), Weight: 255 lb (115.7 kg), BP: (!) 148/74    On exam, his incisions are intact. There is a small amount of bruising. There is minimal effusion. Range of motion is near full      Imaging: No new imaging today    Assessment and Plan:  Status post above procedure  He is doing well. He will begin basic exercises. He can ride a stationary bike. Sterile dressing was placed.   We will see him back next week to remove sutures    Gema Thompson MD  Orthopaedic Surgery   5/3/19  10:27 AM

## 2019-05-10 ENCOUNTER — OFFICE VISIT (OUTPATIENT)
Dept: ORTHOPEDIC SURGERY | Age: 69
End: 2019-05-10

## 2019-05-10 VITALS
DIASTOLIC BLOOD PRESSURE: 79 MMHG | SYSTOLIC BLOOD PRESSURE: 127 MMHG | HEART RATE: 67 BPM | BODY MASS INDEX: 34.54 KG/M2 | HEIGHT: 72 IN | WEIGHT: 255 LBS

## 2019-05-10 DIAGNOSIS — M17.12 OSTEOARTHRITIS OF LEFT KNEE, UNSPECIFIED OSTEOARTHRITIS TYPE: ICD-10-CM

## 2019-05-10 DIAGNOSIS — S83.242A TEAR OF MEDIAL MENISCUS OF LEFT KNEE, UNSPECIFIED TEAR TYPE, UNSPECIFIED WHETHER OLD OR CURRENT TEAR, INITIAL ENCOUNTER: ICD-10-CM

## 2019-05-10 DIAGNOSIS — Z98.890 STATUS POST ARTHROSCOPIC SURGERY OF LEFT KNEE: Primary | ICD-10-CM

## 2019-05-10 PROCEDURE — 99024 POSTOP FOLLOW-UP VISIT: CPT | Performed by: ORTHOPAEDIC SURGERY

## 2019-05-10 NOTE — PROGRESS NOTES
Sutures removed from left knee s/p 1. Medial meniscus tear, Left knee. 2. Synovial plica, left knee 3.  Osteoarthritis, left knee  Date: 5/2/19    EGG

## 2019-05-10 NOTE — PROGRESS NOTES
Post Operative Visit     Surgery:  1. Medial meniscus tear, Left knee. 2. Synovial plica, left knee  3. Osteoarthritis, left knee   Date: 5/2/19    Subjective:    Sheron Kelsey is here for follow up visit s/p above procedure. He is doing well. He has been working on basic exercises. His pain is improved    Controlled Substances Monitoring:        Physical Exam:    Height: 6' (1.829 m), Weight: 255 lb (115.7 kg), BP: 127/79    On exam, his incisions are intact. There is no swelling. Range of motion is 0-130°. Knee is stable      Imaging: No new imaging today    Assessment and Plan:  Status post left knee arthroscopy, partial medial meniscectomy  He is doing well. He will continue to progress with activities. We will see him back in 6 weeks.     Avinash Paredes MD  Orthopaedic Surgery   5/10/19  10:16 AM

## 2019-06-18 ENCOUNTER — OFFICE VISIT (OUTPATIENT)
Dept: ORTHOPEDIC SURGERY | Age: 69
End: 2019-06-18

## 2019-06-18 VITALS
WEIGHT: 255 LBS | DIASTOLIC BLOOD PRESSURE: 79 MMHG | SYSTOLIC BLOOD PRESSURE: 144 MMHG | HEART RATE: 63 BPM | HEIGHT: 72 IN | BODY MASS INDEX: 34.54 KG/M2

## 2019-06-18 DIAGNOSIS — S83.242A TEAR OF MEDIAL MENISCUS OF LEFT KNEE, UNSPECIFIED TEAR TYPE, UNSPECIFIED WHETHER OLD OR CURRENT TEAR, INITIAL ENCOUNTER: ICD-10-CM

## 2019-06-18 DIAGNOSIS — M17.12 OSTEOARTHRITIS OF LEFT KNEE, UNSPECIFIED OSTEOARTHRITIS TYPE: ICD-10-CM

## 2019-06-18 DIAGNOSIS — Z98.890 STATUS POST ARTHROSCOPIC SURGERY OF LEFT KNEE: Primary | ICD-10-CM

## 2019-06-18 PROCEDURE — 99024 POSTOP FOLLOW-UP VISIT: CPT | Performed by: ORTHOPAEDIC SURGERY

## 2019-06-18 RX ORDER — SODIUM, POTASSIUM,MAG SULFATES 17.5-3.13G
SOLUTION, RECONSTITUTED, ORAL ORAL
Refills: 0 | COMMUNITY
Start: 2019-06-13 | End: 2019-12-11

## 2019-06-18 NOTE — PROGRESS NOTES
Post Operative Visit     Surgery: Left knee arthroscopy, partial medial meniscectomy, resection of synovial plica, chondroplasty medial femoral condyle, medial tibial plateau, lateral tibial plateau, lateral femoral condyle, trochlea  Date: 5/2/19    Subjective:    Alfonzo Jerry is here for follow up visit s/p above procedure. He is doing well. He has been back to normal activities. He reports he is about 90% improved    Controlled Substances Monitoring:        Physical Exam:    No data recorded    On exam, his incisions are healed. He has no swelling. He has full range of motion. There is very mild tenderness along the medial joint line      Imaging: No new imaging today. Previous images reviewed    Assessment and Plan: He is about 6 weeks out from left knee arthroscopy, partial medial meniscectomy  We discussed his knee today. He will continue to progress with activities. We will see him back in early August to reassess.   We discussed potential injection if his pain returns, as it may due to the early degenerative changes noted at time of arthroscopy    Maame Agee MD  Orthopaedic Surgery   6/18/19  9:38 AM

## 2019-07-24 ENCOUNTER — HOSPITAL ENCOUNTER (OUTPATIENT)
Age: 69
Discharge: HOME | End: 2019-07-26

## 2019-07-24 PROCEDURE — 88305 TISSUE EXAM BY PATHOLOGIST: CPT

## 2019-08-19 ENCOUNTER — OFFICE VISIT (OUTPATIENT)
Dept: FAMILY MEDICINE CLINIC | Age: 69
End: 2019-08-19
Payer: MEDICARE

## 2019-08-19 VITALS
WEIGHT: 252.13 LBS | HEART RATE: 68 BPM | BODY MASS INDEX: 35.3 KG/M2 | HEIGHT: 71 IN | SYSTOLIC BLOOD PRESSURE: 130 MMHG | TEMPERATURE: 97.9 F | OXYGEN SATURATION: 94 % | DIASTOLIC BLOOD PRESSURE: 70 MMHG

## 2019-08-19 DIAGNOSIS — S61.201A OPEN WOUND OF LEFT INDEX FINGER WITHOUT DAMAGE TO NAIL, INITIAL ENCOUNTER: Primary | ICD-10-CM

## 2019-08-19 PROCEDURE — 99213 OFFICE O/P EST LOW 20 MIN: CPT | Performed by: FAMILY MEDICINE

## 2019-08-19 ASSESSMENT — ENCOUNTER SYMPTOMS
COUGH: 0
DIARRHEA: 0
SINUS PAIN: 0
VOMITING: 0
SHORTNESS OF BREATH: 0
NAUSEA: 0
WHEEZING: 0
ABDOMINAL PAIN: 0
CONSTIPATION: 0
EYE PAIN: 0
SORE THROAT: 0
BACK PAIN: 0

## 2019-08-19 NOTE — PROGRESS NOTES
8/19/19    Name: Doreen Daniels  EX:3/96/2075   Sex:male   Age:69 y.o. Subjective:  No chief complaint on file. Patient cut left middle finger this morning when cutting a piece of leather. Last tetanus was in 2015. About 1/2\" laceration on tip of left middle finger. Review of Systems   Constitutional: Negative for appetite change, fatigue and fever. HENT: Negative for congestion, ear pain, hearing loss, sinus pain and sore throat. Eyes: Negative for pain. Respiratory: Negative for cough, shortness of breath and wheezing. Cardiovascular: Negative for chest pain and leg swelling. Gastrointestinal: Negative for abdominal pain, constipation, diarrhea, nausea and vomiting. Endocrine: Negative for cold intolerance and heat intolerance. Genitourinary: Negative for difficulty urinating, hematuria, scrotal swelling, testicular pain and urgency. Musculoskeletal: Negative for arthralgias, back pain, joint swelling and myalgias. Skin: Positive for wound. Negative for rash. Neurological: Negative for dizziness, syncope and light-headedness. Hematological: Negative for adenopathy. Psychiatric/Behavioral: Negative for confusion and sleep disturbance. The patient is not nervous/anxious.             Current Outpatient Medications:     SUPREP BOWEL PREP KIT 17.5-3.13-1.6 GM/177ML SOLN, MX AND DRK UTD, Disp: , Rfl: 0    carvedilol (COREG) 12.5 MG tablet, TK 1 T PO  BID    take am dos, Disp: , Rfl: 1    Naproxen Sodium (ALEVE) 220 MG CAPS, Take by mouth, Disp: , Rfl:     WAL-FINATE 4 MG tablet, TK 1 T PO  Q 8 H PRF SINUS DRAINAGE, Disp: , Rfl: 0    olmesartan (BENICAR) 40 MG tablet, TK 1 T PO QD, Disp: , Rfl: 1    omeprazole (PRILOSEC) 40 MG delayed release capsule, TK 1 C PO QD    take am dos., Disp: , Rfl: 1    meloxicam (MOBIC) 7.5 MG tablet, TK 1 T PO QD WF PRF PAIN    patient checking if to hold., Disp: , Rfl: 2    montelukast (SINGULAIR) 10 MG tablet, TK 1 T PO  QHS   takes prn, Disp: ,

## 2019-10-11 ENCOUNTER — OFFICE VISIT (OUTPATIENT)
Dept: FAMILY MEDICINE CLINIC | Age: 69
End: 2019-10-11
Payer: MEDICARE

## 2019-10-11 VITALS
SYSTOLIC BLOOD PRESSURE: 120 MMHG | HEART RATE: 72 BPM | DIASTOLIC BLOOD PRESSURE: 78 MMHG | HEIGHT: 71 IN | WEIGHT: 250 LBS | BODY MASS INDEX: 35 KG/M2 | TEMPERATURE: 98 F | OXYGEN SATURATION: 95 %

## 2019-10-11 DIAGNOSIS — R09.82 POSTNASAL DRIP: ICD-10-CM

## 2019-10-11 DIAGNOSIS — J20.9 ACUTE BRONCHITIS, UNSPECIFIED ORGANISM: Primary | ICD-10-CM

## 2019-10-11 DIAGNOSIS — J01.90 ACUTE NON-RECURRENT SINUSITIS, UNSPECIFIED LOCATION: ICD-10-CM

## 2019-10-11 PROCEDURE — 1123F ACP DISCUSS/DSCN MKR DOCD: CPT | Performed by: PHYSICIAN ASSISTANT

## 2019-10-11 PROCEDURE — 4040F PNEUMOC VAC/ADMIN/RCVD: CPT | Performed by: PHYSICIAN ASSISTANT

## 2019-10-11 PROCEDURE — G8417 CALC BMI ABV UP PARAM F/U: HCPCS | Performed by: PHYSICIAN ASSISTANT

## 2019-10-11 PROCEDURE — 99213 OFFICE O/P EST LOW 20 MIN: CPT | Performed by: PHYSICIAN ASSISTANT

## 2019-10-11 PROCEDURE — G8484 FLU IMMUNIZE NO ADMIN: HCPCS | Performed by: PHYSICIAN ASSISTANT

## 2019-10-11 PROCEDURE — G8427 DOCREV CUR MEDS BY ELIG CLIN: HCPCS | Performed by: PHYSICIAN ASSISTANT

## 2019-10-11 PROCEDURE — 1036F TOBACCO NON-USER: CPT | Performed by: PHYSICIAN ASSISTANT

## 2019-10-11 PROCEDURE — 3017F COLORECTAL CA SCREEN DOC REV: CPT | Performed by: PHYSICIAN ASSISTANT

## 2019-10-11 RX ORDER — CLARITHROMYCIN 250 MG/1
500 TABLET, FILM COATED ORAL 2 TIMES DAILY
Qty: 40 TABLET | Refills: 0 | Status: SHIPPED | OUTPATIENT
Start: 2019-10-11 | End: 2019-10-21

## 2019-11-20 RX ORDER — OLMESARTAN MEDOXOMIL 40 MG/1
TABLET ORAL
Qty: 30 TABLET | Refills: 0 | Status: SHIPPED | OUTPATIENT
Start: 2019-11-20 | End: 2019-11-20 | Stop reason: SDUPTHER

## 2019-11-20 RX ORDER — OMEPRAZOLE 40 MG/1
CAPSULE, DELAYED RELEASE ORAL
Qty: 30 CAPSULE | Refills: 0 | Status: SHIPPED | OUTPATIENT
Start: 2019-11-20 | End: 2019-11-20 | Stop reason: SDUPTHER

## 2019-11-21 RX ORDER — OLMESARTAN MEDOXOMIL 40 MG/1
TABLET ORAL
Qty: 90 TABLET | Refills: 0 | Status: SHIPPED | OUTPATIENT
Start: 2019-11-21 | End: 2019-12-11 | Stop reason: SDUPTHER

## 2019-11-21 RX ORDER — OMEPRAZOLE 40 MG/1
CAPSULE, DELAYED RELEASE ORAL
Qty: 90 CAPSULE | Refills: 0 | Status: SHIPPED | OUTPATIENT
Start: 2019-11-21 | End: 2019-12-11 | Stop reason: SDUPTHER

## 2019-12-11 ENCOUNTER — OFFICE VISIT (OUTPATIENT)
Dept: FAMILY MEDICINE CLINIC | Age: 69
End: 2019-12-11
Payer: MEDICARE

## 2019-12-11 VITALS
WEIGHT: 252.2 LBS | DIASTOLIC BLOOD PRESSURE: 86 MMHG | BODY MASS INDEX: 34.16 KG/M2 | HEART RATE: 69 BPM | OXYGEN SATURATION: 95 % | HEIGHT: 72 IN | TEMPERATURE: 97.3 F | SYSTOLIC BLOOD PRESSURE: 132 MMHG

## 2019-12-11 DIAGNOSIS — Z23 IMMUNIZATION DUE: Primary | ICD-10-CM

## 2019-12-11 DIAGNOSIS — J01.90 ACUTE BACTERIAL SINUSITIS: ICD-10-CM

## 2019-12-11 DIAGNOSIS — I10 ESSENTIAL HYPERTENSION: ICD-10-CM

## 2019-12-11 DIAGNOSIS — I05.9 MITRAL VALVE DISORDER: ICD-10-CM

## 2019-12-11 DIAGNOSIS — K21.00 GASTROESOPHAGEAL REFLUX DISEASE WITH ESOPHAGITIS: ICD-10-CM

## 2019-12-11 DIAGNOSIS — B96.89 ACUTE BACTERIAL SINUSITIS: ICD-10-CM

## 2019-12-11 DIAGNOSIS — N42.9 DISORDER OF PROSTATE: ICD-10-CM

## 2019-12-11 DIAGNOSIS — M17.12 OSTEOARTHRITIS OF LEFT KNEE, UNSPECIFIED OSTEOARTHRITIS TYPE: ICD-10-CM

## 2019-12-11 PROCEDURE — 4040F PNEUMOC VAC/ADMIN/RCVD: CPT | Performed by: FAMILY MEDICINE

## 2019-12-11 PROCEDURE — G8427 DOCREV CUR MEDS BY ELIG CLIN: HCPCS | Performed by: FAMILY MEDICINE

## 2019-12-11 PROCEDURE — 96372 THER/PROPH/DIAG INJ SC/IM: CPT | Performed by: FAMILY MEDICINE

## 2019-12-11 PROCEDURE — 1123F ACP DISCUSS/DSCN MKR DOCD: CPT | Performed by: FAMILY MEDICINE

## 2019-12-11 PROCEDURE — 90653 IIV ADJUVANT VACCINE IM: CPT | Performed by: FAMILY MEDICINE

## 2019-12-11 PROCEDURE — G8482 FLU IMMUNIZE ORDER/ADMIN: HCPCS | Performed by: FAMILY MEDICINE

## 2019-12-11 PROCEDURE — 1036F TOBACCO NON-USER: CPT | Performed by: FAMILY MEDICINE

## 2019-12-11 PROCEDURE — 99214 OFFICE O/P EST MOD 30 MIN: CPT | Performed by: FAMILY MEDICINE

## 2019-12-11 PROCEDURE — G0008 ADMIN INFLUENZA VIRUS VAC: HCPCS | Performed by: FAMILY MEDICINE

## 2019-12-11 PROCEDURE — G8417 CALC BMI ABV UP PARAM F/U: HCPCS | Performed by: FAMILY MEDICINE

## 2019-12-11 PROCEDURE — 3017F COLORECTAL CA SCREEN DOC REV: CPT | Performed by: FAMILY MEDICINE

## 2019-12-11 RX ORDER — GUAIFENESIN 600 MG/1
600 TABLET, EXTENDED RELEASE ORAL 2 TIMES DAILY
Qty: 30 TABLET | Refills: 0 | Status: SHIPPED | OUTPATIENT
Start: 2019-12-11 | End: 2019-12-26

## 2019-12-11 RX ORDER — OLMESARTAN MEDOXOMIL 40 MG/1
TABLET ORAL
Qty: 90 TABLET | Refills: 1 | Status: SHIPPED | OUTPATIENT
Start: 2019-12-11 | End: 2020-06-11 | Stop reason: SDUPTHER

## 2019-12-11 RX ORDER — METHYLPREDNISOLONE ACETATE 40 MG/ML
40 INJECTION, SUSPENSION INTRA-ARTICULAR; INTRALESIONAL; INTRAMUSCULAR; SOFT TISSUE ONCE
Status: COMPLETED | OUTPATIENT
Start: 2019-12-11 | End: 2019-12-11

## 2019-12-11 RX ORDER — CARVEDILOL 12.5 MG/1
TABLET ORAL
Qty: 180 TABLET | Refills: 1 | Status: SHIPPED | OUTPATIENT
Start: 2019-12-11 | End: 2020-06-11 | Stop reason: SDUPTHER

## 2019-12-11 RX ORDER — OMEPRAZOLE 40 MG/1
CAPSULE, DELAYED RELEASE ORAL
Qty: 90 CAPSULE | Refills: 1 | Status: SHIPPED | OUTPATIENT
Start: 2019-12-11 | End: 2020-06-11 | Stop reason: SDUPTHER

## 2019-12-11 RX ORDER — MONTELUKAST SODIUM 10 MG/1
TABLET ORAL
Qty: 90 TABLET | Refills: 1 | Status: SHIPPED | OUTPATIENT
Start: 2019-12-11 | End: 2020-06-11 | Stop reason: SDUPTHER

## 2019-12-11 RX ORDER — AZITHROMYCIN 250 MG/1
250 TABLET, FILM COATED ORAL SEE ADMIN INSTRUCTIONS
Qty: 6 TABLET | Refills: 0 | Status: SHIPPED | OUTPATIENT
Start: 2019-12-11 | End: 2019-12-16

## 2019-12-11 RX ADMIN — METHYLPREDNISOLONE ACETATE 40 MG: 40 INJECTION, SUSPENSION INTRA-ARTICULAR; INTRALESIONAL; INTRAMUSCULAR; SOFT TISSUE at 10:52

## 2019-12-11 SDOH — HEALTH STABILITY: MENTAL HEALTH: HOW OFTEN DO YOU HAVE A DRINK CONTAINING ALCOHOL?: 2-3 TIMES A WEEK

## 2019-12-11 SDOH — HEALTH STABILITY: MENTAL HEALTH: HOW MANY STANDARD DRINKS CONTAINING ALCOHOL DO YOU HAVE ON A TYPICAL DAY?: 1 OR 2

## 2019-12-11 ASSESSMENT — ENCOUNTER SYMPTOMS
SORE THROAT: 0
PHOTOPHOBIA: 0
SHORTNESS OF BREATH: 0
COUGH: 1
DIARRHEA: 0
SINUS PAIN: 1
BACK PAIN: 0
VOMITING: 0
WHEEZING: 0
CONSTIPATION: 0
TROUBLE SWALLOWING: 0
BLOOD IN STOOL: 0
SINUS PRESSURE: 1
EYE REDNESS: 0
ABDOMINAL PAIN: 0

## 2019-12-11 ASSESSMENT — PATIENT HEALTH QUESTIONNAIRE - PHQ9
SUM OF ALL RESPONSES TO PHQ9 QUESTIONS 1 & 2: 0
SUM OF ALL RESPONSES TO PHQ QUESTIONS 1-9: 0
2. FEELING DOWN, DEPRESSED OR HOPELESS: 0
SUM OF ALL RESPONSES TO PHQ QUESTIONS 1-9: 0
1. LITTLE INTEREST OR PLEASURE IN DOING THINGS: 0

## 2020-02-06 ENCOUNTER — OFFICE VISIT (OUTPATIENT)
Dept: FAMILY MEDICINE CLINIC | Age: 70
End: 2020-02-06
Payer: MEDICARE

## 2020-02-06 VITALS
OXYGEN SATURATION: 96 % | TEMPERATURE: 97.5 F | BODY MASS INDEX: 33.87 KG/M2 | HEIGHT: 72 IN | WEIGHT: 250.04 LBS | HEART RATE: 85 BPM | SYSTOLIC BLOOD PRESSURE: 132 MMHG | DIASTOLIC BLOOD PRESSURE: 78 MMHG

## 2020-02-06 PROCEDURE — 96372 THER/PROPH/DIAG INJ SC/IM: CPT | Performed by: NURSE PRACTITIONER

## 2020-02-06 PROCEDURE — 99213 OFFICE O/P EST LOW 20 MIN: CPT | Performed by: NURSE PRACTITIONER

## 2020-02-06 RX ORDER — METHYLPREDNISOLONE ACETATE 40 MG/ML
40 INJECTION, SUSPENSION INTRA-ARTICULAR; INTRALESIONAL; INTRAMUSCULAR; SOFT TISSUE ONCE
Status: COMPLETED | OUTPATIENT
Start: 2020-02-06 | End: 2020-02-06

## 2020-02-06 RX ORDER — METHYLPREDNISOLONE ACETATE 40 MG/ML
40 INJECTION, SUSPENSION INTRA-ARTICULAR; INTRALESIONAL; INTRAMUSCULAR; SOFT TISSUE ONCE
Qty: 1 ML | Refills: 0
Start: 2020-02-06 | End: 2020-02-06

## 2020-02-06 RX ORDER — AMOXICILLIN 500 MG/1
500 CAPSULE ORAL 3 TIMES DAILY
Qty: 30 CAPSULE | Refills: 0 | Status: SHIPPED | OUTPATIENT
Start: 2020-02-06 | End: 2020-02-16

## 2020-02-06 RX ADMIN — METHYLPREDNISOLONE ACETATE 40 MG: 40 INJECTION, SUSPENSION INTRA-ARTICULAR; INTRALESIONAL; INTRAMUSCULAR; SOFT TISSUE at 11:40

## 2020-02-06 NOTE — PROGRESS NOTES
History: family history includes Coronary Art Dis in his father; Heart Disease in his father; Other in his son; Stroke in his mother. Allergies: Augmentin [amoxicillin-pot clavulanate]    Physical Exam   Vital Signs:  /78   Pulse 85   Temp 97.5 °F (36.4 °C) (Temporal)   Ht 6' (1.829 m)   Wt 250 lb 0.6 oz (113.4 kg)   SpO2 96%   BMI 33.91 kg/m²    Oxygen Saturation Interpretation: Normal.    Constitutional:  Alert, development consistent with age. Head: Moderate TTP over the frontal and maxillary sinuses. Ears: Bilateral pinna normal. TMs are without erythema or perforation bilaterally. Canals normal bilaterally without swelling or exudate  Nose:  Mild congestion of the nasal mucosa. There is mild injection to middle turbinates bilaterally. Throat: There is posterior pharyngeal erythema with mild post nasal drip present. No exudate or tonsillar hypertrophy noted. Neck:  Supple. There is no anterior cervical adenopathy. Lungs: CTAB without wheezes, rales, or rhonchi  Heart:  Regular rate and rhythm, normal heart sounds, without pathological murmurs, ectopy, gallops, or rubs. Skin:  Normal turgor. Warm, dry, without visible rash. Neurological:  Alert and oriented. Motor functions intact. Responds to verbal commands. Test Results Section   (All laboratory and radiology results have been personally reviewed by myself)  Labs:  No results found for this visit on 02/06/20. Assessment / Plan     Impression(s):  Remi Fink was seen today for sinus problem. Diagnoses and all orders for this visit:    Acute non-recurrent pansinusitis  -     amoxicillin (AMOXIL) 500 MG capsule;  Take 1 capsule by mouth 3 times daily for 10 days  -     methylPREDNISolone acetate (DEPO-MEDROL) injection 40 mg    Administrations This Visit     methylPREDNISolone acetate (DEPO-MEDROL) injection 40 mg     Admin Date  02/06/2020 Action  Given Dose  40 mg Route  Intramuscular Administered By  Chantelle Aceves MA

## 2020-02-22 ENCOUNTER — OFFICE VISIT (OUTPATIENT)
Dept: FAMILY MEDICINE CLINIC | Age: 70
End: 2020-02-22
Payer: MEDICARE

## 2020-02-22 VITALS
HEART RATE: 62 BPM | SYSTOLIC BLOOD PRESSURE: 112 MMHG | OXYGEN SATURATION: 94 % | HEIGHT: 72 IN | WEIGHT: 250.6 LBS | TEMPERATURE: 97.5 F | BODY MASS INDEX: 33.94 KG/M2 | DIASTOLIC BLOOD PRESSURE: 60 MMHG

## 2020-02-22 PROCEDURE — 99213 OFFICE O/P EST LOW 20 MIN: CPT | Performed by: INTERNAL MEDICINE

## 2020-02-22 RX ORDER — DOXYCYCLINE HYCLATE 100 MG
100 TABLET ORAL 2 TIMES DAILY
Qty: 20 TABLET | Refills: 0 | Status: SHIPPED | OUTPATIENT
Start: 2020-02-22 | End: 2020-03-03

## 2020-02-22 ASSESSMENT — ENCOUNTER SYMPTOMS
SINUS PRESSURE: 1
COUGH: 1
CHEST TIGHTNESS: 0
SORE THROAT: 0
WHEEZING: 0
EYES NEGATIVE: 1
ABDOMINAL PAIN: 0
SHORTNESS OF BREATH: 0
SINUS PAIN: 0

## 2020-02-23 NOTE — PROGRESS NOTES
[address] [place]     20  Aubrie Jeffrey : 1950 Sex: male  Age: 78 y.o. Chief Complaint   Patient presents with    Cough    Congestion       HPI  Patient presents to express care today accompanied by his wife and basically states he came in only because he had to bring her in any way and just wanted to be checked. He was treated recently by 1 of the nurse practitioners with Augmentin for sinusitis and just finished. Within the past for 5 days. He is much better but still having some residual cough and little bit of right maxillary area discomfort. Taking nothing over-the-counter currently [place] any shortness of breath or chills. Review of Systems   Constitutional: Negative for chills and fever. HENT: Positive for congestion, postnasal drip and sinus pressure. Negative for ear pain, sinus pain and sore throat. Eyes: Negative. Respiratory: Positive for cough. Negative for chest tightness, shortness of breath and wheezing. Cardiovascular: Negative for chest pain. Gastrointestinal: Negative for abdominal pain. REST OF PERTINENT ROS GONE OVER AND WAS NEGATIVE.        Current Outpatient Medications:     doxycycline hyclate (VIBRA-TABS) 100 MG tablet, Take 1 tablet by mouth 2 times daily for 10 days, Disp: 20 tablet, Rfl: 0    montelukast (SINGULAIR) 10 MG tablet, TK 1 T PO  QHS   takes prn, Disp: 90 tablet, Rfl: 1    carvedilol (COREG) 12.5 MG tablet, TK 1 T PO  BID    take am dos, Disp: 180 tablet, Rfl: 1    omeprazole (PRILOSEC) 40 MG delayed release capsule, TAKE 1 CAPSULE BY MOUTH EVERY DAY, Disp: 90 capsule, Rfl: 1    olmesartan (BENICAR) 40 MG tablet, TAKE 1 TABLET BY MOUTH EVERY DAY, Disp: 90 tablet, Rfl: 1    aspirin 81 MG tablet, Take 81 mg by mouth daily No hold/Dr Aminah Rangel, Disp: , Rfl:     Omega-3 Fatty Acids (FISH OIL PO), Take by mouth daily, Disp: , Rfl:     Multiple Vitamin (MULTI-VITAMIN DAILY PO), Take by mouth daily, Disp: , Rfl:   Allergies   Allergen Reactions    Augmentin [Amoxicillin-Pot Clavulanate] Diarrhea       Past Medical History:   Diagnosis Date    Acid reflux     Arthritis     BPH (benign prostatic hyperplasia)     Cancer ([facility] [address] 11/2018    skin    Chest pain     Disorder of prostate     Diverticulosis     Heart murmur     Hypertension     Left knee pain     for OR 5/2/2019    Mitral valve disorder     Palpitations      Past Surgical History:   Procedure Laterality Date    BACK SURGERY      COLONOSCOPY  2018    HERNIA REPAIR      KNEE ARTHROSCOPY Left 5/2/2019    LEFT KNEE ARTHROSCOPY WITH PARTIAL MEDIAL MENISECTOMY performed by Jonnie Burnham MD at [address] [facility]  11/2018    UPPER GASTROINTESTINAL ENDOSCOPY  2018     Family History   Problem Relation Age of Onset    Stroke Mother     Heart Disease Father         Bypass Surgery, Pacemaker, Carotids    Coronary Art Dis Father     Other Son         procoagulant disorder     Social History     Socioeconomic History    Marital status:      Spouse name: Not on file    Number of children: Not on file    Years of education: Not on file    Highest education level: Not on file   Occupational History    Not on file   Social Needs    Financial resource strain: Not on file    Food insecurity:     Worry: Not on file     Inability: Not on file    Transportation needs:     Medical: Not on file     Non-medical: Not on file   Tobacco Use    Smoking status: Never Smoker    Smokeless tobacco: Never Used   Substance and Sexual Activity    Alcohol use: Yes     Frequency: 2-3 times a week     Drinks per session: 1 or 2     Binge frequency: Never     Comment: 3 times weekly    Drug use: No    Sexual activity: Yes     Partners: Female   Lifestyle    Physical activity:     Days per week: Not on file     Minutes per session: Not on file    Stress: Not on file   Relationships    Social connections:     Talks on phone: Not on file     Gets together: Not on file     Attends (VIBRA-TABS) 100 MG tablet; Take 1 tablet by mouth 2 times daily for 10 days    Plan: Doxycycline was given to the patient but I asked him to hold it. At this time he needs no further antibiotics. Told him some this residual cough may last for a little while yet did not look sick. If in the next 3 or 4 days he is not continue to improve we may start antibiotic. If he does he needs to use probiotic. I asked him to push fluids. Follow-up with his PCP. Notify us if not improving. Return fu pcp. Seen By:  Jessica Kimball MD      *Document was created using voice recognition software. Note was reviewed however may contain grammatical errors.

## 2020-06-11 ENCOUNTER — HOSPITAL ENCOUNTER (OUTPATIENT)
Age: 70
Discharge: HOME | End: 2020-06-13
Payer: MEDICARE

## 2020-06-11 ENCOUNTER — OFFICE VISIT (OUTPATIENT)
Dept: FAMILY MEDICINE CLINIC | Age: 70
End: 2020-06-11
Payer: MEDICARE

## 2020-06-11 VITALS
BODY MASS INDEX: 32.12 KG/M2 | HEART RATE: 76 BPM | WEIGHT: 236.8 LBS | OXYGEN SATURATION: 96 % | SYSTOLIC BLOOD PRESSURE: 138 MMHG | TEMPERATURE: 97.3 F | DIASTOLIC BLOOD PRESSURE: 84 MMHG

## 2020-06-11 LAB
ALBUMIN SERPL-MCNC: 4.3 G/DL (ref 3.5–5.2)
ALP BLD-CCNC: 63 U/L (ref 40–129)
ALT SERPL-CCNC: 22 U/L (ref 0–40)
ANION GAP SERPL CALCULATED.3IONS-SCNC: 8 MMOL/L (ref 7–16)
AST SERPL-CCNC: 25 U/L (ref 0–39)
BASOPHILS ABSOLUTE: 0.04 E9/L (ref 0–0.2)
BASOPHILS RELATIVE PERCENT: 1 % (ref 0–2)
BILIRUB SERPL-MCNC: 0.5 MG/DL (ref 0–1.2)
BILIRUBIN URINE: NEGATIVE
BLOOD, URINE: NEGATIVE
BUN BLDV-MCNC: 17 MG/DL (ref 8–23)
CALCIUM SERPL-MCNC: 10.1 MG/DL (ref 8.6–10.2)
CHLORIDE BLD-SCNC: 101 MMOL/L (ref 98–107)
CHOLESTEROL, TOTAL: 138 MG/DL (ref 0–199)
CLARITY: CLEAR
CO2: 26 MMOL/L (ref 22–29)
COLOR: YELLOW
CREAT SERPL-MCNC: 0.9 MG/DL (ref 0.7–1.2)
EOSINOPHILS ABSOLUTE: 0.32 E9/L (ref 0.05–0.5)
EOSINOPHILS RELATIVE PERCENT: 7.9 % (ref 0–6)
GFR AFRICAN AMERICAN: >60
GFR NON-AFRICAN AMERICAN: >60 ML/MIN/1.73
GLUCOSE BLD-MCNC: 137 MG/DL (ref 74–99)
GLUCOSE URINE: NEGATIVE MG/DL
HCT VFR BLD CALC: 44.2 % (ref 37–54)
HDLC SERPL-MCNC: 53 MG/DL
HEMOGLOBIN: 14.7 G/DL (ref 12.5–16.5)
IMMATURE GRANULOCYTES #: 0.01 E9/L
IMMATURE GRANULOCYTES %: 0.2 % (ref 0–5)
KETONES, URINE: NEGATIVE MG/DL
LDL CHOLESTEROL CALCULATED: 78 MG/DL (ref 0–99)
LEUKOCYTE ESTERASE, URINE: NEGATIVE
LYMPHOCYTES ABSOLUTE: 1.16 E9/L (ref 1.5–4)
LYMPHOCYTES RELATIVE PERCENT: 28.6 % (ref 20–42)
MCH RBC QN AUTO: 32.7 PG (ref 26–35)
MCHC RBC AUTO-ENTMCNC: 33.3 % (ref 32–34.5)
MCV RBC AUTO: 98.4 FL (ref 80–99.9)
MONOCYTES ABSOLUTE: 0.51 E9/L (ref 0.1–0.95)
MONOCYTES RELATIVE PERCENT: 12.6 % (ref 2–12)
NEUTROPHILS ABSOLUTE: 2.01 E9/L (ref 1.8–7.3)
NEUTROPHILS RELATIVE PERCENT: 49.7 % (ref 43–80)
NITRITE, URINE: NEGATIVE
PDW BLD-RTO: 13.4 FL (ref 11.5–15)
PH UA: 7 (ref 5–9)
PLATELET # BLD: 196 E9/L (ref 130–450)
PMV BLD AUTO: 9.8 FL (ref 7–12)
POTASSIUM SERPL-SCNC: 5.1 MMOL/L (ref 3.5–5)
PROSTATE SPECIFIC ANTIGEN: 0.36 NG/ML (ref 0–4)
PROTEIN UA: NEGATIVE MG/DL
RBC # BLD: 4.49 E12/L (ref 3.8–5.8)
SODIUM BLD-SCNC: 135 MMOL/L (ref 132–146)
SPECIFIC GRAVITY UA: 1.02 (ref 1–1.03)
TOTAL PROTEIN: 7.8 G/DL (ref 6.4–8.3)
TRIGL SERPL-MCNC: 35 MG/DL (ref 0–149)
TSH SERPL DL<=0.05 MIU/L-ACNC: 2.9 UIU/ML (ref 0.27–4.2)
UROBILINOGEN, URINE: 0.2 E.U./DL
VLDLC SERPL CALC-MCNC: 7 MG/DL
WBC # BLD: 4.1 E9/L (ref 4.5–11.5)

## 2020-06-11 PROCEDURE — 99214 OFFICE O/P EST MOD 30 MIN: CPT | Performed by: FAMILY MEDICINE

## 2020-06-11 PROCEDURE — 4040F PNEUMOC VAC/ADMIN/RCVD: CPT | Performed by: FAMILY MEDICINE

## 2020-06-11 PROCEDURE — G8417 CALC BMI ABV UP PARAM F/U: HCPCS | Performed by: FAMILY MEDICINE

## 2020-06-11 PROCEDURE — 36415 COLL VENOUS BLD VENIPUNCTURE: CPT

## 2020-06-11 PROCEDURE — 1036F TOBACCO NON-USER: CPT | Performed by: FAMILY MEDICINE

## 2020-06-11 PROCEDURE — 85025 COMPLETE CBC W/AUTO DIFF WBC: CPT

## 2020-06-11 PROCEDURE — 84443 ASSAY THYROID STIM HORMONE: CPT

## 2020-06-11 PROCEDURE — 3017F COLORECTAL CA SCREEN DOC REV: CPT | Performed by: FAMILY MEDICINE

## 2020-06-11 PROCEDURE — 93000 ELECTROCARDIOGRAM COMPLETE: CPT | Performed by: FAMILY MEDICINE

## 2020-06-11 PROCEDURE — 1123F ACP DISCUSS/DSCN MKR DOCD: CPT | Performed by: FAMILY MEDICINE

## 2020-06-11 PROCEDURE — G8427 DOCREV CUR MEDS BY ELIG CLIN: HCPCS | Performed by: FAMILY MEDICINE

## 2020-06-11 PROCEDURE — G0103 PSA SCREENING: HCPCS

## 2020-06-11 PROCEDURE — 80053 COMPREHEN METABOLIC PANEL: CPT

## 2020-06-11 PROCEDURE — 81003 URINALYSIS AUTO W/O SCOPE: CPT

## 2020-06-11 PROCEDURE — 80061 LIPID PANEL: CPT

## 2020-06-11 RX ORDER — OLMESARTAN MEDOXOMIL 40 MG/1
TABLET ORAL
Qty: 90 TABLET | Refills: 1 | Status: SHIPPED
Start: 2020-06-11 | End: 2020-12-01

## 2020-06-11 RX ORDER — CARVEDILOL 12.5 MG/1
TABLET ORAL
Qty: 180 TABLET | Refills: 1 | Status: SHIPPED
Start: 2020-06-11 | End: 2020-12-01

## 2020-06-11 RX ORDER — OMEPRAZOLE 40 MG/1
CAPSULE, DELAYED RELEASE ORAL
Qty: 90 CAPSULE | Refills: 1 | Status: SHIPPED
Start: 2020-06-11 | End: 2020-11-23

## 2020-06-11 RX ORDER — MONTELUKAST SODIUM 10 MG/1
TABLET ORAL
Qty: 90 TABLET | Refills: 1 | Status: SHIPPED
Start: 2020-06-11 | End: 2020-11-23

## 2020-06-11 ASSESSMENT — PATIENT HEALTH QUESTIONNAIRE - PHQ9
1. LITTLE INTEREST OR PLEASURE IN DOING THINGS: 0
SUM OF ALL RESPONSES TO PHQ QUESTIONS 1-9: 0
2. FEELING DOWN, DEPRESSED OR HOPELESS: 0
SUM OF ALL RESPONSES TO PHQ9 QUESTIONS 1 & 2: 0
SUM OF ALL RESPONSES TO PHQ QUESTIONS 1-9: 0

## 2020-06-11 ASSESSMENT — ENCOUNTER SYMPTOMS
SINUS PAIN: 0
BACK PAIN: 1
BLOOD IN STOOL: 0
CONSTIPATION: 0
SHORTNESS OF BREATH: 0
COUGH: 0
PHOTOPHOBIA: 0
EYE REDNESS: 0
VOMITING: 0
TROUBLE SWALLOWING: 0
SORE THROAT: 0
ABDOMINAL PAIN: 0
DIARRHEA: 0
WHEEZING: 0

## 2020-06-11 NOTE — PROGRESS NOTES
 Comprehensive Metabolic Panel     Standing Status:   Future     Number of Occurrences:   1     Standing Expiration Date:   6/11/2021    Lipid Panel     Standing Status:   Future     Number of Occurrences:   1     Standing Expiration Date:   6/11/2021     Order Specific Question:   Is Patient Fasting?/# of Hours     Answer:   fasting    TSH without Reflex     Standing Status:   Future     Number of Occurrences:   1     Standing Expiration Date:   6/11/2021    PSA SCREENING     Standing Status:   Future     Number of Occurrences:   1     Standing Expiration Date:   6/11/2021    Urinalysis     Standing Status:   Future     Number of Occurrences:   1     Standing Expiration Date:   6/11/2021     Order Specific Question:   SPECIFY(EX-CATH,MIDSTREAM,CYSTO,ETC)? Answer:   midstream    EKG 12 Lead     Standing Status:   Future     Number of Occurrences:   1     Standing Expiration Date:   6/11/2021     Order Specific Question:   Reason for Exam?     Answer:   Hypertension        EXAM   Physical Exam  Vitals signs and nursing note reviewed. Constitutional:       Appearance: Normal appearance. He is well-developed. Comments: overweight   HENT:      Right Ear: Tympanic membrane, ear canal and external ear normal.      Left Ear: Tympanic membrane, ear canal and external ear normal.      Nose: Nose normal. No congestion. Mouth/Throat:      Pharynx: Oropharynx is clear. Eyes:      General: No scleral icterus. Conjunctiva/sclera: Conjunctivae normal.      Pupils: Pupils are equal, round, and reactive to light. Neck:      Musculoskeletal: Normal range of motion and neck supple. Thyroid: No thyroid mass or thyromegaly. Vascular: No carotid bruit or JVD. Trachea: Trachea normal.   Cardiovascular:      Rate and Rhythm: Normal rate and regular rhythm. Heart sounds: Normal heart sounds. No murmur. No gallop.     Pulmonary:      Effort: Pulmonary effort is normal.      Breath sounds:

## 2020-10-05 ENCOUNTER — NURSE ONLY (OUTPATIENT)
Dept: FAMILY MEDICINE CLINIC | Age: 70
End: 2020-10-05
Payer: MEDICARE

## 2020-10-05 PROCEDURE — 90694 VACC AIIV4 NO PRSRV 0.5ML IM: CPT | Performed by: FAMILY MEDICINE

## 2020-10-05 PROCEDURE — G0008 ADMIN INFLUENZA VIRUS VAC: HCPCS | Performed by: FAMILY MEDICINE

## 2020-11-23 RX ORDER — MONTELUKAST SODIUM 10 MG/1
TABLET ORAL
Qty: 90 TABLET | Refills: 1 | Status: SHIPPED
Start: 2020-11-23 | End: 2020-12-14 | Stop reason: SDUPTHER

## 2020-11-23 RX ORDER — OMEPRAZOLE 40 MG/1
CAPSULE, DELAYED RELEASE ORAL
Qty: 90 CAPSULE | Refills: 1 | Status: SHIPPED
Start: 2020-11-23 | End: 2020-12-14 | Stop reason: SDUPTHER

## 2020-12-01 RX ORDER — CARVEDILOL 12.5 MG/1
TABLET ORAL
Qty: 180 TABLET | Refills: 1 | Status: SHIPPED
Start: 2020-12-01 | End: 2020-12-14 | Stop reason: SDUPTHER

## 2020-12-01 RX ORDER — OLMESARTAN MEDOXOMIL 40 MG/1
TABLET ORAL
Qty: 90 TABLET | Refills: 1 | Status: SHIPPED
Start: 2020-12-01 | End: 2020-12-14 | Stop reason: SDUPTHER

## 2020-12-01 NOTE — TELEPHONE ENCOUNTER
Last Appointment:  6/11/2020  Future Appointments   Date Time Provider Ashvin Paulino   12/14/2020  8:45 AM Darren Morales  [address]

## 2020-12-03 ENCOUNTER — OFFICE VISIT (OUTPATIENT)
Dept: FAMILY MEDICINE CLINIC | Age: 70
End: 2020-12-03
Payer: MEDICARE

## 2020-12-03 VITALS
SYSTOLIC BLOOD PRESSURE: 142 MMHG | OXYGEN SATURATION: 99 % | HEIGHT: 72 IN | WEIGHT: 248 LBS | HEART RATE: 81 BPM | DIASTOLIC BLOOD PRESSURE: 78 MMHG | TEMPERATURE: 97.2 F | BODY MASS INDEX: 33.59 KG/M2

## 2020-12-03 PROCEDURE — G8427 DOCREV CUR MEDS BY ELIG CLIN: HCPCS | Performed by: FAMILY MEDICINE

## 2020-12-03 PROCEDURE — 1123F ACP DISCUSS/DSCN MKR DOCD: CPT | Performed by: FAMILY MEDICINE

## 2020-12-03 PROCEDURE — 99213 OFFICE O/P EST LOW 20 MIN: CPT | Performed by: FAMILY MEDICINE

## 2020-12-03 PROCEDURE — 4040F PNEUMOC VAC/ADMIN/RCVD: CPT | Performed by: FAMILY MEDICINE

## 2020-12-03 PROCEDURE — 1036F TOBACCO NON-USER: CPT | Performed by: FAMILY MEDICINE

## 2020-12-03 PROCEDURE — 3017F COLORECTAL CA SCREEN DOC REV: CPT | Performed by: FAMILY MEDICINE

## 2020-12-03 PROCEDURE — G8484 FLU IMMUNIZE NO ADMIN: HCPCS | Performed by: FAMILY MEDICINE

## 2020-12-03 PROCEDURE — G8417 CALC BMI ABV UP PARAM F/U: HCPCS | Performed by: FAMILY MEDICINE

## 2020-12-03 RX ORDER — GUAIFENESIN AND DEXTROMETHORPHAN HYDROBROMIDE 1200; 60 MG/1; MG/1
1 TABLET, EXTENDED RELEASE ORAL 2 TIMES DAILY
Qty: 14 TABLET | Refills: 1 | Status: SHIPPED
Start: 2020-12-03 | End: 2021-07-10

## 2020-12-03 RX ORDER — CETIRIZINE HYDROCHLORIDE 10 MG/1
10 TABLET ORAL DAILY
Qty: 30 TABLET | Refills: 1 | Status: SHIPPED | OUTPATIENT
Start: 2020-12-03

## 2020-12-03 RX ORDER — DOXYCYCLINE HYCLATE 100 MG
100 TABLET ORAL 2 TIMES DAILY
Qty: 30 TABLET | Refills: 0 | Status: SHIPPED | OUTPATIENT
Start: 2020-12-03 | End: 2020-12-17

## 2020-12-03 ASSESSMENT — ENCOUNTER SYMPTOMS
COUGH: 1
EYES NEGATIVE: 1
SINUS PRESSURE: 1
SINUS PAIN: 1
CHOKING: 0
WHEEZING: 0
SORE THROAT: 0
RHINORRHEA: 1
SHORTNESS OF BREATH: 0
CHEST TIGHTNESS: 0

## 2020-12-03 NOTE — PROGRESS NOTES
12/3/20  Saloni Solis : 1950 Sex: male  Age: 69 y.o. Chief Complaint   Patient presents with    Sinus Problem     on and off for 2-3 days.  Chest Congestion    Drainage    Nasal Congestion       This patient is a 69-year-old white male with chief complaint of cough frontal sinus pain and headache no earache no fevers chills cough is minimal no shortness of breath no loss of taste or smell. Review of Systems   Constitutional: Negative. HENT: Positive for congestion, postnasal drip, rhinorrhea, sinus pressure, sinus pain and sneezing. Negative for sore throat. Eyes: Negative. Respiratory: Positive for cough. Negative for choking, chest tightness, shortness of breath and wheezing. Cardiovascular: Negative.           Current Outpatient Medications:     cetirizine (ZYRTEC) 10 MG tablet, Take 1 tablet by mouth daily, Disp: 30 tablet, Rfl: 1    Dextromethorphan-guaiFENesin (MUCINEX DM MAXIMUM STRENGTH)  MG TB12, Take 1 tablet by mouth 2 times daily, Disp: 14 tablet, Rfl: 1    doxycycline hyclate (VIBRA-TABS) 100 MG tablet, Take 1 tablet by mouth 2 times daily for 14 days, Disp: 30 tablet, Rfl: 0    olmesartan (BENICAR) 40 MG tablet, take 1 tablet by mouth once daily, Disp: 90 tablet, Rfl: 1    carvedilol (COREG) 12.5 MG tablet, take 1 tablet by mouth every morning then take 1 tablet every evening, Disp: 180 tablet, Rfl: 1    montelukast (SINGULAIR) 10 MG tablet, take 1 tablet by mouth at bedtime if needed, Disp: 90 tablet, Rfl: 1    omeprazole (PRILOSEC) 40 MG delayed release capsule, take 1 capsule by mouth once daily, Disp: 90 capsule, Rfl: 1    aspirin 81 MG tablet, Take 81 mg by mouth daily No hold/Dr Walt Rodriguez, Disp: , Rfl:     Omega-3 Fatty Acids (FISH OIL PO), Take by mouth daily, Disp: , Rfl:     Multiple Vitamin (MULTI-VITAMIN DAILY PO), Take by mouth daily, Disp: , Rfl:   Allergies   Allergen Reactions    Augmentin [Amoxicillin-Pot Clavulanate] Diarrhea Past Medical History:   Diagnosis Date    Acid reflux     Arthritis     BPH (benign prostatic hyperplasia)     Cancer ([address] 11/2018    skin    Chest pain     Disorder of prostate     Diverticulosis     Heart murmur     Hypertension     Left knee pain     for OR 5/2/2019    Mitral valve disorder     Palpitations      Past Surgical History:   Procedure Laterality Date    BACK SURGERY      COLONOSCOPY  2018    HERNIA REPAIR      KNEE ARTHROSCOPY Left 5/2/2019    LEFT KNEE ARTHROSCOPY WITH PARTIAL MEDIAL MENISECTOMY performed by Karina Nash MD at [address] [place] [address]  11/2018    UPPER GASTROINTESTINAL ENDOSCOPY  2018     Family History   Problem Relation Age of Onset    Stroke Mother     Heart Disease Father         Bypass Surgery, Pacemaker, Carotids    Coronary Art Dis Father     Other Son         procoagulant disorder     Social History     Tobacco Use    Smoking status: Never Smoker    Smokeless tobacco: Never Used   Substance Use Topics    Alcohol use: Yes     Frequency: 2-3 times a week     Drinks per session: 1 or 2     Binge frequency: Never     Comment: 3 times weekly    Drug use: No        Vitals:    12/03/20 0836   BP: (!) 142/78   Pulse: 81   Temp: 97.2 °F (36.2 °C)   TempSrc: Temporal   SpO2: 99%   Weight: 248 lb (112.5 kg)   Height: 6' (1.829 m)       Physical Exam  Vitals signs reviewed. Constitutional:       Appearance: Normal appearance. HENT:      Head: Normocephalic and atraumatic. Right Ear: Tympanic membrane, ear canal and external ear normal.      Left Ear: Tympanic membrane, ear canal and external ear normal. There is no impacted cerumen. Nose: Mucosal edema, congestion and rhinorrhea present. Right Turbinates: Enlarged and swollen. Left Turbinates: Swollen. Right Sinus: Frontal sinus tenderness present. Left Sinus: Frontal sinus tenderness present.       Mouth/Throat:      Mouth: Mucous membranes are moist.      Pharynx: No oropharyngeal exudate or posterior oropharyngeal erythema. Eyes:      Conjunctiva/sclera: Conjunctivae normal.   Neck:      Musculoskeletal: No muscular tenderness. Cardiovascular:      Rate and Rhythm: Regular rhythm. Heart sounds: No murmur. Pulmonary:      Effort: Pulmonary effort is normal.      Breath sounds: Normal breath sounds. Lymphadenopathy:      Cervical: No cervical adenopathy. Neurological:      Mental Status: He is alert. Assessment and Plan:  Angel Gastelum was seen today for sinus problem, chest congestion, drainage and nasal congestion. Diagnoses and all orders for this visit:    Acute non-recurrent frontal sinusitis    Other orders  -     cetirizine (ZYRTEC) 10 MG tablet; Take 1 tablet by mouth daily  -     Dextromethorphan-guaiFENesin (MUCINEX DM MAXIMUM STRENGTH)  MG TB12; Take 1 tablet by mouth 2 times daily  -     doxycycline hyclate (VIBRA-TABS) 100 MG tablet; Take 1 tablet by mouth 2 times daily for 14 days        Orders Placed This Encounter   Medications    cetirizine (ZYRTEC) 10 MG tablet     Sig: Take 1 tablet by mouth daily     Dispense:  30 tablet     Refill:  1    Dextromethorphan-guaiFENesin (MUCINEX DM MAXIMUM STRENGTH)  MG TB12     Sig: Take 1 tablet by mouth 2 times daily     Dispense:  14 tablet     Refill:  1    doxycycline hyclate (VIBRA-TABS) 100 MG tablet     Sig: Take 1 tablet by mouth 2 times daily for 14 days     Dispense:  30 tablet     Refill:  0        Patient advised to follow up with PCP as needed.         Seen By:  Carla Mondragon DO

## 2020-12-14 ENCOUNTER — OFFICE VISIT (OUTPATIENT)
Dept: FAMILY MEDICINE CLINIC | Age: 70
End: 2020-12-14
Payer: MEDICARE

## 2020-12-14 VITALS
OXYGEN SATURATION: 97 % | WEIGHT: 248.8 LBS | HEART RATE: 90 BPM | DIASTOLIC BLOOD PRESSURE: 78 MMHG | BODY MASS INDEX: 33.74 KG/M2 | TEMPERATURE: 97.8 F | SYSTOLIC BLOOD PRESSURE: 132 MMHG

## 2020-12-14 PROCEDURE — 4040F PNEUMOC VAC/ADMIN/RCVD: CPT | Performed by: FAMILY MEDICINE

## 2020-12-14 PROCEDURE — G8427 DOCREV CUR MEDS BY ELIG CLIN: HCPCS | Performed by: FAMILY MEDICINE

## 2020-12-14 PROCEDURE — 20610 DRAIN/INJ JOINT/BURSA W/O US: CPT | Performed by: FAMILY MEDICINE

## 2020-12-14 PROCEDURE — G8417 CALC BMI ABV UP PARAM F/U: HCPCS | Performed by: FAMILY MEDICINE

## 2020-12-14 PROCEDURE — 1123F ACP DISCUSS/DSCN MKR DOCD: CPT | Performed by: FAMILY MEDICINE

## 2020-12-14 PROCEDURE — 1036F TOBACCO NON-USER: CPT | Performed by: FAMILY MEDICINE

## 2020-12-14 PROCEDURE — 99214 OFFICE O/P EST MOD 30 MIN: CPT | Performed by: FAMILY MEDICINE

## 2020-12-14 PROCEDURE — G8484 FLU IMMUNIZE NO ADMIN: HCPCS | Performed by: FAMILY MEDICINE

## 2020-12-14 PROCEDURE — 3017F COLORECTAL CA SCREEN DOC REV: CPT | Performed by: FAMILY MEDICINE

## 2020-12-14 RX ORDER — CARVEDILOL 12.5 MG/1
TABLET ORAL
Qty: 180 TABLET | Refills: 1 | Status: SHIPPED | OUTPATIENT
Start: 2020-12-14 | End: 2021-06-15 | Stop reason: SDUPTHER

## 2020-12-14 RX ORDER — OMEPRAZOLE 40 MG/1
CAPSULE, DELAYED RELEASE ORAL
Qty: 90 CAPSULE | Refills: 1 | Status: SHIPPED | OUTPATIENT
Start: 2020-12-14 | End: 2021-06-15 | Stop reason: SDUPTHER

## 2020-12-14 RX ORDER — OLMESARTAN MEDOXOMIL 40 MG/1
TABLET ORAL
Qty: 90 TABLET | Refills: 1 | Status: SHIPPED | OUTPATIENT
Start: 2020-12-14 | End: 2021-06-15 | Stop reason: SDUPTHER

## 2020-12-14 RX ORDER — METHYLPREDNISOLONE ACETATE 40 MG/ML
40 INJECTION, SUSPENSION INTRA-ARTICULAR; INTRALESIONAL; INTRAMUSCULAR; SOFT TISSUE ONCE
Status: COMPLETED | OUTPATIENT
Start: 2020-12-14 | End: 2020-12-14

## 2020-12-14 RX ORDER — LIDOCAINE HYDROCHLORIDE 10 MG/ML
20 INJECTION, SOLUTION INFILTRATION; PERINEURAL ONCE
Status: COMPLETED | OUTPATIENT
Start: 2020-12-14 | End: 2020-12-14

## 2020-12-14 RX ORDER — MONTELUKAST SODIUM 10 MG/1
TABLET ORAL
Qty: 90 TABLET | Refills: 1 | Status: SHIPPED | OUTPATIENT
Start: 2020-12-14 | End: 2021-06-15 | Stop reason: SDUPTHER

## 2020-12-14 RX ADMIN — METHYLPREDNISOLONE ACETATE 40 MG: 40 INJECTION, SUSPENSION INTRA-ARTICULAR; INTRALESIONAL; INTRAMUSCULAR; SOFT TISSUE at 09:43

## 2020-12-14 RX ADMIN — LIDOCAINE HYDROCHLORIDE 20 ML: 10 INJECTION, SOLUTION INFILTRATION; PERINEURAL at 09:43

## 2020-12-14 ASSESSMENT — ENCOUNTER SYMPTOMS
TROUBLE SWALLOWING: 0
VOMITING: 0
SHORTNESS OF BREATH: 0
EYE REDNESS: 0
COUGH: 0
SINUS PRESSURE: 1
PHOTOPHOBIA: 0
WHEEZING: 0
ABDOMINAL PAIN: 0
SORE THROAT: 0
BACK PAIN: 0
SINUS PAIN: 0
BLOOD IN STOOL: 0
DIARRHEA: 0
CONSTIPATION: 0

## 2020-12-14 ASSESSMENT — PATIENT HEALTH QUESTIONNAIRE - PHQ9
SUM OF ALL RESPONSES TO PHQ QUESTIONS 1-9: 0
SUM OF ALL RESPONSES TO PHQ QUESTIONS 1-9: 0
SUM OF ALL RESPONSES TO PHQ9 QUESTIONS 1 & 2: 0
SUM OF ALL RESPONSES TO PHQ QUESTIONS 1-9: 0
2. FEELING DOWN, DEPRESSED OR HOPELESS: 0
1. LITTLE INTEREST OR PLEASURE IN DOING THINGS: 0

## 2020-12-14 NOTE — PROGRESS NOTES
OFFICE NOTE    12/14/20  Name: Anne Marie Maurice  QYS:[id number]   Sex:male   Age:70 y.o. SUBJECTIVE  Chief Complaint   Patient presents with    Hypertension       Patient presents for routine follow up. Denies new complaints or concerns. Requires routine medication refills which he would like printed. Feels he is slowing down some. Arthritis more significant. Review of Systems   Constitutional: Positive for fatigue. Negative for appetite change, fever and unexpected weight change. HENT: Positive for congestion, postnasal drip and sinus pressure. Negative for ear pain, hearing loss, sinus pain, sore throat and trouble swallowing. Eyes: Negative for photophobia, redness and visual disturbance. Respiratory: Negative for cough, shortness of breath and wheezing. Cardiovascular: Negative for chest pain, palpitations and leg swelling. Gastrointestinal: Negative for abdominal pain, blood in stool, constipation, diarrhea and vomiting. Endocrine: Negative for cold intolerance, polydipsia and polyuria. Genitourinary: Negative for difficulty urinating, genital sores, hematuria and urgency. Musculoskeletal: Positive for arthralgias. Negative for back pain and joint swelling. Allergic/Immunologic: Negative for food allergies. Neurological: Negative for dizziness, tremors, seizures, syncope, numbness and headaches. Hematological: Negative for adenopathy. Does not bruise/bleed easily. Psychiatric/Behavioral: Negative for agitation, dysphoric mood, hallucinations and sleep disturbance. The patient is not nervous/anxious. All other systems reviewed and are negative.            Current Outpatient Medications:     carvedilol (COREG) 12.5 MG tablet, take 1 tablet by mouth every morning then take 1 tablet every evening, Disp: 180 tablet, Rfl: 1    montelukast (SINGULAIR) 10 MG tablet, take 1 tablet by mouth at bedtime if needed, Disp: 90 tablet, Rfl: 1   olmesartan (BENICAR) 40 MG tablet, take 1 tablet by mouth once daily, Disp: 90 tablet, Rfl: 1    omeprazole (PRILOSEC) 40 MG delayed release capsule, take 1 capsule by mouth once daily, Disp: 90 capsule, Rfl: 1    zoster recombinant adjuvanted vaccine (SHINGRIX) 50 MCG/0.5ML SUSR injection, Inject 0.5 mLs into the muscle once for 1 dose, Disp: 0.5 mL, Rfl: 0    cetirizine (ZYRTEC) 10 MG tablet, Take 1 tablet by mouth daily, Disp: 30 tablet, Rfl: 1    Dextromethorphan-guaiFENesin (MUCINEX DM MAXIMUM STRENGTH)  MG TB12, Take 1 tablet by mouth 2 times daily, Disp: 14 tablet, Rfl: 1    doxycycline hyclate (VIBRA-TABS) 100 MG tablet, Take 1 tablet by mouth 2 times daily for 14 days, Disp: 30 tablet, Rfl: 0    aspirin 81 MG tablet, Take 81 mg by mouth daily No hold/Dr Inessa Floyd, Disp: , Rfl:     Omega-3 Fatty Acids (FISH OIL PO), Take by mouth daily, Disp: , Rfl:     Multiple Vitamin (MULTI-VITAMIN DAILY PO), Take by mouth daily, Disp: , Rfl:   Allergies   Allergen Reactions    Augmentin [Amoxicillin-Pot Clavulanate] Diarrhea       Past Medical History:   Diagnosis Date    Acid reflux     Arthritis     BPH (benign prostatic hyperplasia)     Cancer ([facility] [address] 11/2018    skin    Chest pain     Disorder of prostate     Diverticulosis     Heart murmur     Hypertension     Left knee pain     for OR 5/2/2019    Mitral valve disorder     Palpitations      Past Surgical History:   Procedure Laterality Date    BACK SURGERY      COLONOSCOPY  2018    HERNIA REPAIR      KNEE ARTHROSCOPY Left 5/2/2019    LEFT KNEE ARTHROSCOPY WITH PARTIAL MEDIAL MENISECTOMY performed by Shaggy Sebastian MD at [address] [place] [address]  11/2018    UPPER GASTROINTESTINAL ENDOSCOPY  2018     Family History   Problem Relation Age of Onset    Stroke Mother     Heart Disease Father         Bypass Surgery, Pacemaker, Carotids    Coronary Art Dis Father     Other Son         procoagulant disorder     Social History Tobacco History     Smoking Status  Never Smoker    Smokeless Tobacco Use  Never Used          Alcohol History     Alcohol Use Status  Yes Comment  3 times weekly          Drug Use     Drug Use Status  No          Sexual Activity     Sexually Active  Yes Partners  Female              OBJECTIVE  Vitals:    12/14/20 0850   BP: 132/78   Site: Left Upper Arm   Position: Sitting   Pulse: 90   Temp: 97.8 °F (36.6 °C)   TempSrc: Temporal   SpO2: 97%   Weight: 248 lb 12.8 oz (112.9 kg)        Body mass index is 33.74 kg/m². Patient is obese    Orders Placed This Encounter   Procedures    [place] ARTHROCENTESIS ASPIR&/INJ MAJOR JT/BURSA W/O US        EXAM   Physical Exam  Vitals signs and nursing note reviewed. Constitutional:       Appearance: Normal appearance. He is well-developed. He is obese. HENT:      Right Ear: Tympanic membrane, ear canal and external ear normal.      Left Ear: Tympanic membrane, ear canal and external ear normal.      Nose: Congestion present. Mouth/Throat:      Pharynx: Oropharynx is clear. Posterior oropharyngeal erythema present. Eyes:      General: No scleral icterus. Conjunctiva/sclera: Conjunctivae normal.      Pupils: Pupils are equal, round, and reactive to light. Neck:      Musculoskeletal: Normal range of motion and neck supple. Thyroid: No thyroid mass or thyromegaly. Vascular: No carotid bruit or JVD. Trachea: Trachea normal.   Cardiovascular:      Rate and Rhythm: Normal rate and regular rhythm. Pulses: Normal pulses. Heart sounds: Normal heart sounds. No murmur. No gallop. Pulmonary:      Effort: Pulmonary effort is normal.      Breath sounds: Normal breath sounds. No wheezing, rhonchi or rales. Abdominal:      General: Bowel sounds are normal. There is no distension. Palpations: Abdomen is soft. There is no mass. Tenderness: There is no abdominal tenderness. There is no guarding. Hernia: No hernia is present. Musculoskeletal: Normal range of motion. General: No swelling or tenderness. Right lower leg: No edema. Left lower leg: No edema. Comments: Tender left trochanteric bursa, less so left SI joint   Lymphadenopathy:      Cervical: No cervical adenopathy. Skin:     General: Skin is warm and dry. Capillary Refill: Capillary refill takes less than 2 seconds. Coloration: Skin is not jaundiced. Findings: No bruising or rash. Neurological:      General: No focal deficit present. Mental Status: He is alert and oriented to person, place, and time. Sensory: No sensory deficit. Motor: No weakness or abnormal muscle tone. Coordination: Coordination normal.      Gait: Gait normal.   Psychiatric:         Mood and Affect: Mood normal.         Behavior: Behavior normal.           Joselyn Wheeler was seen today for hypertension. Diagnoses and all orders for this visit:    Bursitis of other bursa of left hip  -     [place] ARTHROCENTESIS ASPIR&/INJ MAJOR JT/BURSA W/O US  -     methylPREDNISolone acetate (DEPO-MEDROL) injection 40 mg  -     lidocaine 1 % injection 20 mL  Prepped and draped left greater trochanter with betadine and ETOH. injectd above solution into bursal area. No bleeding and bandaid applied  Essential hypertension  -     carvedilol (COREG) 12.5 MG tablet; take 1 tablet by mouth every morning then take 1 tablet every evening  -     olmesartan (BENICAR) 40 MG tablet; take 1 tablet by mouth once daily  Well controlled, no changes made  Gastroesophageal reflux disease with esophagitis  -     omeprazole (PRILOSEC) 40 MG delayed release capsule; take 1 capsule by mouth once daily    Immunization due  -     Cancel: Pneumococcal polysaccharide vaccine 23-valent greater than or equal to 3yo subcutaneous/IM  -     zoster recombinant adjuvanted vaccine (SHINGRIX) 50 MCG/0.5ML SUSR injection;  Inject 0.5 mLs into the muscle once for 1 dose Immunizations refreshed. Had had pneumovax within past 3 years  Other orders  -     montelukast (SINGULAIR) 10 MG tablet; take 1 tablet by mouth at bedtime if needed          No follow-ups on file. Electronically signed by Lance Vaughn MD on 12/14/20 at 9:04 AM EST    I have personally reviewed and updated the chief complaint, HPI, Past Medical, Family and Social History, as well as the above Review of Systems.

## 2020-12-31 ENCOUNTER — OFFICE VISIT (OUTPATIENT)
Dept: PRIMARY CARE CLINIC | Age: 70
End: 2020-12-31
Payer: MEDICARE

## 2020-12-31 VITALS
SYSTOLIC BLOOD PRESSURE: 128 MMHG | BODY MASS INDEX: 33.59 KG/M2 | TEMPERATURE: 97.6 F | HEIGHT: 72 IN | WEIGHT: 248 LBS | OXYGEN SATURATION: 98 % | HEART RATE: 73 BPM | DIASTOLIC BLOOD PRESSURE: 88 MMHG | RESPIRATION RATE: 18 BRPM

## 2020-12-31 DIAGNOSIS — Z20.822 COVID-19 RULED OUT BY LABORATORY TESTING: ICD-10-CM

## 2020-12-31 LAB
Lab: NORMAL
QC PASS/FAIL: NORMAL
SARS-COV-2, POC: NORMAL

## 2020-12-31 PROCEDURE — G8417 CALC BMI ABV UP PARAM F/U: HCPCS | Performed by: FAMILY MEDICINE

## 2020-12-31 PROCEDURE — 1036F TOBACCO NON-USER: CPT | Performed by: FAMILY MEDICINE

## 2020-12-31 PROCEDURE — G8427 DOCREV CUR MEDS BY ELIG CLIN: HCPCS | Performed by: FAMILY MEDICINE

## 2020-12-31 PROCEDURE — 4040F PNEUMOC VAC/ADMIN/RCVD: CPT | Performed by: FAMILY MEDICINE

## 2020-12-31 PROCEDURE — G8484 FLU IMMUNIZE NO ADMIN: HCPCS | Performed by: FAMILY MEDICINE

## 2020-12-31 PROCEDURE — 87426 SARSCOV CORONAVIRUS AG IA: CPT | Performed by: FAMILY MEDICINE

## 2020-12-31 PROCEDURE — 99213 OFFICE O/P EST LOW 20 MIN: CPT | Performed by: FAMILY MEDICINE

## 2020-12-31 PROCEDURE — 1123F ACP DISCUSS/DSCN MKR DOCD: CPT | Performed by: FAMILY MEDICINE

## 2020-12-31 PROCEDURE — 3017F COLORECTAL CA SCREEN DOC REV: CPT | Performed by: FAMILY MEDICINE

## 2020-12-31 NOTE — PROGRESS NOTES
Nemo Sterling presents to the office today for   Chief Complaint   Patient presents with    Dizziness    Concern For COVID-19     Son +.  Fatigue     Fatigue  Dizziness  Son is positive for COVID 1 week ago  He was with him a few days prior  Face flushed this morning  Goose bumps  Sinus congestion  No fever  No loss of taste or smell  No diarrhea  Admits significant anxiety about getting COVID as he cares for his wife and daughter with MS lives with him    Review of Systems     /88   Pulse 73   Temp 97.6 °F (36.4 °C)   Resp 18   Ht 6' (1.829 m)   Wt 248 lb (112.5 kg)   SpO2 98%   BMI 33.63 kg/m²   Physical Exam  Constitutional:       Appearance: Normal appearance. HENT:      Head: Normocephalic and atraumatic. Eyes:      Extraocular Movements: Extraocular movements intact. Conjunctiva/sclera: Conjunctivae normal.   Cardiovascular:      Rate and Rhythm: Normal rate. Heart sounds: Normal heart sounds. Pulmonary:      Effort: Pulmonary effort is normal.      Breath sounds: Normal breath sounds. Skin:     General: Skin is warm. Neurological:      Mental Status: He is alert and oriented to person, place, and time.    Psychiatric:         Mood and Affect: Mood normal.         Behavior: Behavior normal.            Current Outpatient Medications:     carvedilol (COREG) 12.5 MG tablet, take 1 tablet by mouth every morning then take 1 tablet every evening, Disp: 180 tablet, Rfl: 1    montelukast (SINGULAIR) 10 MG tablet, take 1 tablet by mouth at bedtime if needed, Disp: 90 tablet, Rfl: 1    olmesartan (BENICAR) 40 MG tablet, take 1 tablet by mouth once daily, Disp: 90 tablet, Rfl: 1    omeprazole (PRILOSEC) 40 MG delayed release capsule, take 1 capsule by mouth once daily, Disp: 90 capsule, Rfl: 1    cetirizine (ZYRTEC) 10 MG tablet, Take 1 tablet by mouth daily, Disp: 30 tablet, Rfl: 1   Dextromethorphan-guaiFENesin (MUCINEX DM MAXIMUM STRENGTH)  MG TB12, Take 1 tablet by mouth 2 times daily, Disp: 14 tablet, Rfl: 1    aspirin 81 MG tablet, Take 81 mg by mouth daily No hold/Dr Austin Burnham, Disp: , Rfl:     Omega-3 Fatty Acids (FISH OIL PO), Take by mouth daily, Disp: , Rfl:     Multiple Vitamin (MULTI-VITAMIN DAILY PO), Take by mouth daily, Disp: , Rfl:      Past Medical History:   Diagnosis Date    Acid reflux     Arthritis     BPH (benign prostatic hyperplasia)     Cancer ([facility] [address] 11/2018    skin    Chest pain     Disorder of prostate     Diverticulosis     Heart murmur     Hypertension     Left knee pain     for OR 5/2/2019    Mitral valve disorder     Palpitations        Marie Lantigua was seen today for dizziness, concern for covid-19 and fatigue. Diagnoses and all orders for this visit:    Other fatigue    COVID-19 ruled out by laboratory testing  -     POCT COVID-19, Antigen  -     COVID-19 Ambulatory;  Future       Send PCR  Reassurance  Unlikely COVID  Rest/fluids      Jessy Quintana MD

## 2021-01-01 LAB
SARS-COV-2: NOT DETECTED
SOURCE: NORMAL

## 2021-06-14 ASSESSMENT — LIFESTYLE VARIABLES
AUDIT TOTAL SCORE: 0
HOW OFTEN DO YOU HAVE SIX OR MORE DRINKS ON ONE OCCASION: NEVER
HOW MANY STANDARD DRINKS CONTAINING ALCOHOL DO YOU HAVE ON A TYPICAL DAY: ONE OR TWO
AUDIT-C TOTAL SCORE: 0
HAS A RELATIVE, FRIEND, DOCTOR, OR ANOTHER HEALTH PROFESSIONAL EXPRESSED CONCERN ABOUT YOUR DRINKING OR SUGGESTED YOU CUT DOWN: NO
HOW OFTEN DURING THE LAST YEAR HAVE YOU BEEN UNABLE TO REMEMBER WHAT HAPPENED THE NIGHT BEFORE BECAUSE YOU HAD BEEN DRINKING: 0
HAVE YOU OR SOMEONE ELSE BEEN INJURED AS A RESULT OF YOUR DRINKING: 0
HOW MANY STANDARD DRINKS CONTAINING ALCOHOL DO YOU HAVE ON A TYPICAL DAY: 0
HAVE YOU OR SOMEONE ELSE BEEN INJURED AS A RESULT OF YOUR DRINKING: NO
HOW OFTEN DURING THE LAST YEAR HAVE YOU FAILED TO DO WHAT WAS NORMALLY EXPECTED FROM YOU BECAUSE OF DRINKING: NEVER
HOW OFTEN DO YOU HAVE A DRINK CONTAINING ALCOHOL: FOUR OR MORE TIMES A WEEK
HAS A RELATIVE, FRIEND, DOCTOR, OR ANOTHER HEALTH PROFESSIONAL EXPRESSED CONCERN ABOUT YOUR DRINKING OR SUGGESTED YOU CUT DOWN: 0
HOW OFTEN DURING THE LAST YEAR HAVE YOU NEEDED AN ALCOHOLIC DRINK FIRST THING IN THE MORNING TO GET YOURSELF GOING AFTER A NIGHT OF HEAVY DRINKING: NEVER
HOW OFTEN DURING THE LAST YEAR HAVE YOU FOUND THAT YOU WERE NOT ABLE TO STOP DRINKING ONCE YOU HAD STARTED: NEVER
HOW OFTEN DO YOU HAVE SIX OR MORE DRINKS ON ONE OCCASION: 0
HOW OFTEN DURING THE LAST YEAR HAVE YOU HAD A FEELING OF GUILT OR REMORSE AFTER DRINKING: 0
HOW OFTEN DURING THE LAST YEAR HAVE YOU NEEDED AN ALCOHOLIC DRINK FIRST THING IN THE MORNING TO GET YOURSELF GOING AFTER A NIGHT OF HEAVY DRINKING: 0
AUDIT-C TOTAL SCORE: 4
HOW OFTEN DURING THE LAST YEAR HAVE YOU BEEN UNABLE TO REMEMBER WHAT HAPPENED THE NIGHT BEFORE BECAUSE YOU HAD BEEN DRINKING: NEVER
HOW OFTEN DURING THE LAST YEAR HAVE YOU FOUND THAT YOU WERE NOT ABLE TO STOP DRINKING ONCE YOU HAD STARTED: 0
AUDIT TOTAL SCORE: 4
HOW OFTEN DURING THE LAST YEAR HAVE YOU HAD A FEELING OF GUILT OR REMORSE AFTER DRINKING: NEVER
HOW OFTEN DURING THE LAST YEAR HAVE YOU FAILED TO DO WHAT WAS NORMALLY EXPECTED FROM YOU BECAUSE OF DRINKING: 0
HOW OFTEN DO YOU HAVE A DRINK CONTAINING ALCOHOL: 4

## 2021-06-14 ASSESSMENT — PATIENT HEALTH QUESTIONNAIRE - PHQ9
SUM OF ALL RESPONSES TO PHQ9 QUESTIONS 1 & 2: 0
SUM OF ALL RESPONSES TO PHQ QUESTIONS 1-9: 0
1. LITTLE INTEREST OR PLEASURE IN DOING THINGS: 0
2. FEELING DOWN, DEPRESSED OR HOPELESS: 0
SUM OF ALL RESPONSES TO PHQ QUESTIONS 1-9: 0
SUM OF ALL RESPONSES TO PHQ QUESTIONS 1-9: 0

## 2021-06-15 ENCOUNTER — OFFICE VISIT (OUTPATIENT)
Dept: FAMILY MEDICINE CLINIC | Age: 71
End: 2021-06-15
Payer: MEDICARE

## 2021-06-15 VITALS
TEMPERATURE: 97.4 F | SYSTOLIC BLOOD PRESSURE: 124 MMHG | BODY MASS INDEX: 33.18 KG/M2 | HEART RATE: 69 BPM | OXYGEN SATURATION: 97 % | RESPIRATION RATE: 20 BRPM | WEIGHT: 245 LBS | DIASTOLIC BLOOD PRESSURE: 80 MMHG | HEIGHT: 72 IN

## 2021-06-15 DIAGNOSIS — Z12.5 PROSTATE CANCER SCREENING: ICD-10-CM

## 2021-06-15 DIAGNOSIS — Z86.010 H/O COLONOSCOPY WITH POLYPECTOMY: ICD-10-CM

## 2021-06-15 DIAGNOSIS — Z98.890 H/O COLONOSCOPY WITH POLYPECTOMY: ICD-10-CM

## 2021-06-15 DIAGNOSIS — K21.00 GASTROESOPHAGEAL REFLUX DISEASE WITH ESOPHAGITIS WITHOUT HEMORRHAGE: ICD-10-CM

## 2021-06-15 DIAGNOSIS — Z00.00 ROUTINE GENERAL MEDICAL EXAMINATION AT A HEALTH CARE FACILITY: Primary | ICD-10-CM

## 2021-06-15 DIAGNOSIS — Z00.00 ROUTINE GENERAL MEDICAL EXAMINATION AT A HEALTH CARE FACILITY: ICD-10-CM

## 2021-06-15 DIAGNOSIS — M17.10 ARTHRITIS OF KNEE: ICD-10-CM

## 2021-06-15 DIAGNOSIS — I10 ESSENTIAL HYPERTENSION: ICD-10-CM

## 2021-06-15 PROBLEM — Z86.0100 H/O COLONOSCOPY WITH POLYPECTOMY: Status: ACTIVE | Noted: 2021-06-15

## 2021-06-15 LAB
ALBUMIN SERPL-MCNC: 4.4 G/DL (ref 3.5–5.2)
ALP BLD-CCNC: 50 U/L (ref 40–129)
ALT SERPL-CCNC: 24 U/L (ref 0–40)
ANION GAP SERPL CALCULATED.3IONS-SCNC: 12 MMOL/L (ref 7–16)
AST SERPL-CCNC: 26 U/L (ref 0–39)
BASOPHILS ABSOLUTE: 0.02 E9/L (ref 0–0.2)
BASOPHILS RELATIVE PERCENT: 0.5 % (ref 0–2)
BILIRUB SERPL-MCNC: 0.4 MG/DL (ref 0–1.2)
BILIRUBIN URINE: NEGATIVE
BLOOD, URINE: NEGATIVE
BUN BLDV-MCNC: 19 MG/DL (ref 6–23)
CALCIUM SERPL-MCNC: 10.3 MG/DL (ref 8.6–10.2)
CHLORIDE BLD-SCNC: 106 MMOL/L (ref 98–107)
CHOLESTEROL, TOTAL: 136 MG/DL (ref 0–199)
CLARITY: CLEAR
CO2: 21 MMOL/L (ref 22–29)
COLOR: YELLOW
CREAT SERPL-MCNC: 0.9 MG/DL (ref 0.7–1.2)
EOSINOPHILS ABSOLUTE: 0.18 E9/L (ref 0.05–0.5)
EOSINOPHILS RELATIVE PERCENT: 4.8 % (ref 0–6)
GFR AFRICAN AMERICAN: >60
GFR NON-AFRICAN AMERICAN: >60 ML/MIN/1.73
GLUCOSE BLD-MCNC: 127 MG/DL (ref 74–99)
GLUCOSE URINE: NEGATIVE MG/DL
HCT VFR BLD CALC: 42.8 % (ref 37–54)
HDLC SERPL-MCNC: 51 MG/DL
HEMOGLOBIN: 14.2 G/DL (ref 12.5–16.5)
IMMATURE GRANULOCYTES #: 0.01 E9/L
IMMATURE GRANULOCYTES %: 0.3 % (ref 0–5)
KETONES, URINE: NEGATIVE MG/DL
LDL CHOLESTEROL CALCULATED: 76 MG/DL (ref 0–99)
LEUKOCYTE ESTERASE, URINE: NEGATIVE
LYMPHOCYTES ABSOLUTE: 1.18 E9/L (ref 1.5–4)
LYMPHOCYTES RELATIVE PERCENT: 31.6 % (ref 20–42)
MCH RBC QN AUTO: 32.3 PG (ref 26–35)
MCHC RBC AUTO-ENTMCNC: 33.2 % (ref 32–34.5)
MCV RBC AUTO: 97.3 FL (ref 80–99.9)
MONOCYTES ABSOLUTE: 0.45 E9/L (ref 0.1–0.95)
MONOCYTES RELATIVE PERCENT: 12.1 % (ref 2–12)
NEUTROPHILS ABSOLUTE: 1.89 E9/L (ref 1.8–7.3)
NEUTROPHILS RELATIVE PERCENT: 50.7 % (ref 43–80)
NITRITE, URINE: NEGATIVE
PDW BLD-RTO: 13.2 FL (ref 11.5–15)
PH UA: 6.5 (ref 5–9)
PLATELET # BLD: 191 E9/L (ref 130–450)
PMV BLD AUTO: 9.4 FL (ref 7–12)
POTASSIUM SERPL-SCNC: 5.1 MMOL/L (ref 3.5–5)
PROSTATE SPECIFIC ANTIGEN: 0.4 NG/ML (ref 0–4)
PROTEIN UA: NEGATIVE MG/DL
RBC # BLD: 4.4 E12/L (ref 3.8–5.8)
SODIUM BLD-SCNC: 139 MMOL/L (ref 132–146)
SPECIFIC GRAVITY UA: 1.02 (ref 1–1.03)
TOTAL PROTEIN: 7.4 G/DL (ref 6.4–8.3)
TRIGL SERPL-MCNC: 44 MG/DL (ref 0–149)
TSH SERPL DL<=0.05 MIU/L-ACNC: 2.36 UIU/ML (ref 0.27–4.2)
UROBILINOGEN, URINE: 0.2 E.U./DL
VLDLC SERPL CALC-MCNC: 9 MG/DL
WBC # BLD: 3.7 E9/L (ref 4.5–11.5)

## 2021-06-15 PROCEDURE — 4040F PNEUMOC VAC/ADMIN/RCVD: CPT | Performed by: FAMILY MEDICINE

## 2021-06-15 PROCEDURE — G0439 PPPS, SUBSEQ VISIT: HCPCS | Performed by: FAMILY MEDICINE

## 2021-06-15 PROCEDURE — 3017F COLORECTAL CA SCREEN DOC REV: CPT | Performed by: FAMILY MEDICINE

## 2021-06-15 PROCEDURE — 1123F ACP DISCUSS/DSCN MKR DOCD: CPT | Performed by: FAMILY MEDICINE

## 2021-06-15 PROCEDURE — 20610 DRAIN/INJ JOINT/BURSA W/O US: CPT | Performed by: FAMILY MEDICINE

## 2021-06-15 PROCEDURE — 93000 ELECTROCARDIOGRAM COMPLETE: CPT | Performed by: FAMILY MEDICINE

## 2021-06-15 RX ORDER — OLMESARTAN MEDOXOMIL 40 MG/1
TABLET ORAL
Qty: 90 TABLET | Refills: 1 | Status: SHIPPED
Start: 2021-06-15 | End: 2021-12-15 | Stop reason: SDUPTHER

## 2021-06-15 RX ORDER — METHYLPREDNISOLONE ACETATE 40 MG/ML
40 INJECTION, SUSPENSION INTRA-ARTICULAR; INTRALESIONAL; INTRAMUSCULAR; SOFT TISSUE ONCE
Status: COMPLETED | OUTPATIENT
Start: 2021-06-15 | End: 2021-06-15

## 2021-06-15 RX ORDER — OMEPRAZOLE 40 MG/1
CAPSULE, DELAYED RELEASE ORAL
Qty: 90 CAPSULE | Refills: 1 | Status: SHIPPED
Start: 2021-06-15 | End: 2021-08-23

## 2021-06-15 RX ORDER — CARVEDILOL 12.5 MG/1
TABLET ORAL
Qty: 180 TABLET | Refills: 1 | Status: SHIPPED
Start: 2021-06-15 | End: 2021-12-15 | Stop reason: SDUPTHER

## 2021-06-15 RX ORDER — LIDOCAINE HYDROCHLORIDE 10 MG/ML
20 INJECTION, SOLUTION INFILTRATION; PERINEURAL ONCE
Status: COMPLETED | OUTPATIENT
Start: 2021-06-15 | End: 2021-06-15

## 2021-06-15 RX ORDER — MONTELUKAST SODIUM 10 MG/1
TABLET ORAL
Qty: 90 TABLET | Refills: 1 | Status: SHIPPED
Start: 2021-06-15 | End: 2021-12-15 | Stop reason: SDUPTHER

## 2021-06-15 RX ADMIN — METHYLPREDNISOLONE ACETATE 40 MG: 40 INJECTION, SUSPENSION INTRA-ARTICULAR; INTRALESIONAL; INTRAMUSCULAR; SOFT TISSUE at 10:01

## 2021-06-15 RX ADMIN — LIDOCAINE HYDROCHLORIDE 20 ML: 10 INJECTION, SOLUTION INFILTRATION; PERINEURAL at 10:01

## 2021-06-15 SDOH — ECONOMIC STABILITY: FOOD INSECURITY: WITHIN THE PAST 12 MONTHS, YOU WORRIED THAT YOUR FOOD WOULD RUN OUT BEFORE YOU GOT MONEY TO BUY MORE.: NEVER TRUE

## 2021-06-15 SDOH — ECONOMIC STABILITY: TRANSPORTATION INSECURITY
IN THE PAST 12 MONTHS, HAS LACK OF TRANSPORTATION KEPT YOU FROM MEETINGS, WORK, OR FROM GETTING THINGS NEEDED FOR DAILY LIVING?: NO

## 2021-06-15 SDOH — ECONOMIC STABILITY: TRANSPORTATION INSECURITY
IN THE PAST 12 MONTHS, HAS THE LACK OF TRANSPORTATION KEPT YOU FROM MEDICAL APPOINTMENTS OR FROM GETTING MEDICATIONS?: NO

## 2021-06-15 SDOH — ECONOMIC STABILITY: FOOD INSECURITY: WITHIN THE PAST 12 MONTHS, THE FOOD YOU BOUGHT JUST DIDN'T LAST AND YOU DIDN'T HAVE MONEY TO GET MORE.: NEVER TRUE

## 2021-06-15 ASSESSMENT — ENCOUNTER SYMPTOMS
TROUBLE SWALLOWING: 0
PHOTOPHOBIA: 0
SINUS PAIN: 0
SHORTNESS OF BREATH: 0
EYE REDNESS: 0
VOMITING: 0
SORE THROAT: 0
ABDOMINAL PAIN: 0
WHEEZING: 0
COUGH: 0
CONSTIPATION: 0
BLOOD IN STOOL: 0
DIARRHEA: 0
BACK PAIN: 0

## 2021-06-15 ASSESSMENT — SOCIAL DETERMINANTS OF HEALTH (SDOH): HOW HARD IS IT FOR YOU TO PAY FOR THE VERY BASICS LIKE FOOD, HOUSING, MEDICAL CARE, AND HEATING?: NOT HARD AT ALL

## 2021-06-15 NOTE — PATIENT INSTRUCTIONS
Personalized Preventive Plan for Yashira Terrell - 6/15/2021  Medicare offers a range of preventive health benefits. Some of the tests and screenings are paid in full while other may be subject to a deductible, co-insurance, and/or copay. Some of these benefits include a comprehensive review of your medical history including lifestyle, illnesses that may run in your family, and various assessments and screenings as appropriate. After reviewing your medical record and screening and assessments performed today your provider may have ordered immunizations, labs, imaging, and/or referrals for you. A list of these orders (if applicable) as well as your Preventive Care list are included within your After Visit Summary for your review. Other Preventive Recommendations:    · A preventive eye exam performed by an eye specialist is recommended every 1-2 years to screen for glaucoma; cataracts, macular degeneration, and other eye disorders. · A preventive dental visit is recommended every 6 months. · Try to get at least 150 minutes of exercise per week or 10,000 steps per day on a pedometer . · Order or download the FREE \"Exercise & Physical Activity: Your Everyday Guide\" from [employer]. Call 9-420.971.7896 or search [employer] online. · You need 4480-8288 mg of calcium and 5614-9269 IU of vitamin D per day. It is possible to meet your calcium requirement with diet alone, but a vitamin D supplement is usually necessary to meet this goal.  · When exposed to the sun, use a sunscreen that protects against both UVA and UVB radiation with an SPF of 30 or greater. Reapply every 2 to 3 hours or after sweating, drying off with a towel, or swimming. · Always wear a seat belt when traveling in a car. Always wear a helmet when riding a bicycle or motorcycle.

## 2021-06-15 NOTE — PROGRESS NOTES
Medicare Annual Wellness Visit  Name: Ileana Moncada Date: 6/15/2021   MRN: [id number] Sex: Male   Age: 66 y.o. Ethnicity: Non-/Non    : 1950 Race: Torie Lemons is here for Medicare AWV and Hypertension    Screenings for behavioral, psychosocial and functional/safety risks, and cognitive dysfunction are all negative except as indicated below. These results, as well as other patient data from the [address] form, are documented in Flowsheets linked to this Encounter. Allergies   Allergen Reactions    Augmentin [Amoxicillin-Pot Clavulanate] Diarrhea         Prior to Visit Medications    Medication Sig Taking?  Authorizing Provider   omeprazole (PRILOSEC) 40 MG delayed release capsule take 1 capsule by mouth once daily Yes Michelle Hernandez MD   olmesartan (BENICAR) 40 MG tablet take 1 tablet by mouth once daily Yes Michelle Hernandez MD   montelukast (SINGULAIR) 10 MG tablet take 1 tablet by mouth at bedtime if needed Yes Michelle Hernandez MD   carvedilol (COREG) 12.5 MG tablet take 1 tablet by mouth every morning then take 1 tablet every evening Yes Michelle Hernandez MD   cetirizine (ZYRTEC) 10 MG tablet Take 1 tablet by mouth daily Yes Roderick Shanks, DO   Dextromethorphan-guaiFENesin (MUCINEX DM MAXIMUM STRENGTH)  MG TB12 Take 1 tablet by mouth 2 times daily Yes Valerie Barriga, DO   aspirin 81 MG tablet Take 81 mg by mouth daily No hold/Dr Harriet Mims Yes Historical Provider, MD   Omega-3 Fatty Acids (FISH OIL PO) Take by mouth daily Yes Historical Provider, MD   Multiple Vitamin (MULTI-VITAMIN DAILY PO) Take by mouth daily Yes Historical Provider, MD         Past Medical History:   Diagnosis Date    Acid reflux     Arthritis     BPH (benign prostatic hyperplasia)     Cancer ([facility] [address] 2018    skin    Chest pain     Disorder of prostate     Diverticulosis     Heart murmur     Hypertension     Left knee pain     for OR 2019    Mitral valve disorder Living Will ACP-Advance Directive ACP-Power of     Not on File Not on File Not on File Not on File      General Health Risk Interventions:  · some stress related to wife's health and family issues   · Has workshop. Admits to drinking 2-3 beers per day encouraged to limit to 1-2, get exercise.  Contact his friends some    Health Habits/Nutrition:  Health Habits/Nutrition  Do you exercise for at least 20 minutes 2-3 times per week?: Yes  Have you lost any weight without trying in the past 3 months?: No  Do you eat only one meal per day?: (!) Yes  Have you seen the dentist within the past year?: Yes  Body mass index: (!) 33.22  Health Habits/Nutrition Interventions:  · encouraged to balance 2-3 meals rather than 1 per day   · Mildly overweight, down a few lbs over last 6 mos    Hearing/Vision:  No exam data present  Hearing/Vision  Do you or your family notice any trouble with your hearing that hasn't been managed with hearing aids?: (!) Yes  Do you have difficulty driving, watching TV, or doing any of your daily activities because of your eyesight?: No  Have you had an eye exam within the past year?: (!) No  Hearing/Vision Interventions:  · encouraged to see optometrist yearly    Safety:  Safety  Do you have working smoke detectors?: Yes  Have all throw rugs been removed or fastened?: Yes  Do you have non-slip mats or surfaces in all bathtubs/showers?: Yes  Do all of your stairways have a railing or banister?: Yes  Are your doorways, halls and stairs free of clutter?: Yes  Do you always fasten your seatbelt when you are in a car?: (!) No  Safety Interventions:  · Home safety tips provided     Personalized Preventive Plan   Current Health Maintenance Status  Immunization History   Administered Date(s) Administered    COVID-19, Moderna, PF, 100mcg/0.5mL 02/02/2021, 03/01/2021    Influenza Vaccine, unspecified formulation 09/20/2011, 09/17/2013, 09/12/2017    Influenza, High Dose (Fluzone 65 yrs and older) 09/28/2015, 08/29/2016, 09/12/2017, 11/21/2018    Influenza, [place], IM, (6 mo and older Fluzone, Flulaval, Fluarix and 3 yrs and older Afluria) 09/20/2011, 09/17/2013    Influenza, Quadv, adjuvanted, 65 yrs +, IM, PF (Fluad) 10/05/2020    Influenza, Triv, inactivated, subunit, adjuvanted, IM (Fluad 65 yrs and older) 12/11/2019    Pneumococcal Conjugate 13-valent ([id number]) 10/21/2015    Pneumococcal Polysaccharide ([id number]) 06/23/2003, 10/13/2010, 05/11/2017    Td vaccine (adult) 08/16/2005    Td, unspecified formulation 08/16/2005    Tdap (Boostrix, Adacel) 02/12/2015    Zoster Recombinant (Shingrix) 04/19/2021        Health Maintenance   Topic Date Due    Hepatitis C screen  Never done    Annual Wellness Visit (AWV)  Never done    Shingles Vaccine (2 of 2) 06/14/2021    Potassium monitoring  06/15/2022    Creatinine monitoring  06/15/2022    DTaP/Tdap/Td vaccine (2 - Td or Tdap) 02/12/2025    Lipid screen  06/15/2026    Colon cancer screen colonoscopy  07/24/2029    Flu vaccine  Completed    Pneumococcal 65+ years Vaccine  Completed    COVID-19 Vaccine  Completed    Hepatitis A vaccine  Aged Out    Hepatitis B vaccine  Aged Out    Hib vaccine  Aged Out    Meningococcal (ACWY) vaccine  Aged Out     Recommendations for Locationary Due: see orders and patient instructions/AVS.  . Recommended screening schedule for the next 5-10 years is provided to the patient in written form: see Patient Instructions/AVS.    Emma Chaney was seen today for medicare awv and hypertension. Diagnoses and all orders for this visit:    Routine general medical examination at a health care facility  -     CBC Auto Differential; Future  -     Comprehensive Metabolic Panel; Future  -     Lipid Panel; Future  -     TSH without Reflex; Future  -     PSA screening; Future  -     Urinalysis;  Future  -     EKG 12 lead    Gastroesophageal reflux disease with esophagitis without hemorrhage  -     omeprazole Laterality Date    BACK SURGERY      COLONOSCOPY  2018    HERNIA REPAIR      KNEE ARTHROSCOPY Left 5/2/2019    LEFT KNEE ARTHROSCOPY WITH PARTIAL MEDIAL MENISECTOMY performed by Antwan Matos MD at [address] [place] [address]  11/2018    UPPER GASTROINTESTINAL ENDOSCOPY  2018     Family History   Problem Relation Age of Onset    Stroke Mother     Heart Disease Father         Bypass Surgery, Pacemaker, Carotids    Coronary Art Dis Father     Other Son         procoagulant disorder     Social History     Tobacco History     Smoking Status  Never Smoker    Smokeless Tobacco Use  Never Used          Alcohol History     Alcohol Use Status  Yes Comment  3 times weekly          Drug Use     Drug Use Status  No          Sexual Activity     Sexually Active  Yes Partners  Female                OBJECTIVE  Vitals:    06/15/21 0832   BP: 124/80   Pulse: 69   Resp: 20   Temp: 97.4 °F (36.3 °C)   TempSrc: Temporal   SpO2: 97%   Weight: 245 lb (111.1 kg)   Height: 6' (1.829 m)        Body mass index is 33.23 kg/m².     Orders Placed This Encounter   Procedures    CBC Auto Differential     Standing Status:   Future     Number of Occurrences:   1     Standing Expiration Date:   6/15/2022    Comprehensive Metabolic Panel     Standing Status:   Future     Number of Occurrences:   1     Standing Expiration Date:   6/15/2022    Lipid Panel     Standing Status:   Future     Number of Occurrences:   1     Standing Expiration Date:   6/15/2022     Order Specific Question:   Is Patient Fasting?/# of Hours     Answer:   8    TSH without Reflex     Standing Status:   Future     Number of Occurrences:   1     Standing Expiration Date:   6/15/2022    PSA screening     Standing Status:   Future     Number of Occurrences:   1     Standing Expiration Date:   6/15/2022    Urinalysis     Standing Status:   Future     Number of Occurrences:   1     Standing Expiration Date:   6/15/2022     Order Specific Question: SPECIFY(EX-CATH,MIDSTREAM,CYSTO,ETC)? Answer:   midstream    EKG 12 lead     Order Specific Question:   Reason for Exam?     Answer:   Hypertension    FL ARTHROCENTESIS ASPIR&/INJ MAJOR JT/BURSA W/O US        EXAM   Physical Exam  Vitals and nursing note reviewed. Constitutional:       Appearance: Normal appearance. He is well-developed. He is obese. HENT:      Right Ear: Tympanic membrane, ear canal and external ear normal.      Left Ear: Tympanic membrane, ear canal and external ear normal.      Nose: Nose normal.      Mouth/Throat:      Pharynx: Oropharynx is clear. No posterior oropharyngeal erythema. Eyes:      General: No scleral icterus. Conjunctiva/sclera: Conjunctivae normal.      Pupils: Pupils are equal, round, and reactive to light. Neck:      Thyroid: No thyroid mass or thyromegaly. Vascular: No carotid bruit or JVD. Trachea: Trachea normal.   Cardiovascular:      Rate and Rhythm: Normal rate and regular rhythm. Pulses: Normal pulses. Heart sounds: Normal heart sounds. No murmur heard. No gallop. Pulmonary:      Effort: Pulmonary effort is normal.      Breath sounds: Normal breath sounds. No wheezing, rhonchi or rales. Abdominal:      General: Bowel sounds are normal. There is no distension. Palpations: Abdomen is soft. There is no mass. Tenderness: There is no abdominal tenderness. There is no guarding. Hernia: No hernia is present. Musculoskeletal:         General: Normal range of motion. Cervical back: Normal range of motion and neck supple. Comments: Some OA right hip and left ankle   Lymphadenopathy:      Cervical: No cervical adenopathy. Skin:     General: Skin is warm and dry. Capillary Refill: Capillary refill takes less than 2 seconds. Coloration: Skin is not jaundiced. Findings: No bruising or rash. Neurological:      General: No focal deficit present.       Mental Status: He is alert and oriented to person, place, and time. Sensory: No sensory deficit. Motor: No weakness or abnormal muscle tone. Coordination: Coordination normal.      Gait: Gait normal.   Psychiatric:         Mood and Affect: Mood normal.         Behavior: Behavior normal.           Syeda Rogel was seen today for medicare awv and hypertension. Diagnoses and all orders for this visit:    Routine general medical examination at a health care facility  -     CBC Auto Differential; Future  -     Comprehensive Metabolic Panel; Future  -     Lipid Panel; Future  -     TSH without Reflex; Future  -     PSA screening; Future  -     Urinalysis; Future  -     EKG 12 lead  AWV reviewed and smart blocks addressed  Gastroesophageal reflux disease with esophagitis without hemorrhage  -     omeprazole (PRILOSEC) 40 MG delayed release capsule; take 1 capsule by mouth once daily  Limit ETOH to 1 or 2 per day  Essential hypertension  -     olmesartan (BENICAR) 40 MG tablet; take 1 tablet by mouth once daily  -     carvedilol (COREG) 12.5 MG tablet; take 1 tablet by mouth every morning then take 1 tablet every evening  -     CBC Auto Differential; Future  -     Comprehensive Metabolic Panel; Future  -     Lipid Panel; Future  -     TSH without Reflex; Future  -     PSA screening; Future  -     Urinalysis; Future  -     EKG 12 lead  Normotensive, no changes made  Prostate cancer screening  PSA ordered  H/O colonoscopy with polypectomy  7/24/19. 3 years this time  Arthritis of knee  -     [place] ARTHROCENTESIS ASPIR&/INJ MAJOR JT/BURSA W/O US  Was c/o pain in right hip (not bursa}, right knee and left ankle  Knee  Flexed 90 degrees with foot planted, anterolateral portion prepped with betadine and injected anterolateral approach without complications.  Requested this said we had injected hip bursa couple of years back and he found that enormously helpful  Other orders  -     montelukast (SINGULAIR) 10 MG tablet; take 1 tablet by mouth at bedtime if needed  -

## 2021-07-02 ENCOUNTER — OFFICE VISIT (OUTPATIENT)
Dept: FAMILY MEDICINE CLINIC | Age: 71
End: 2021-07-02
Payer: MEDICARE

## 2021-07-02 VITALS
HEART RATE: 88 BPM | TEMPERATURE: 98.3 F | SYSTOLIC BLOOD PRESSURE: 162 MMHG | OXYGEN SATURATION: 98 % | DIASTOLIC BLOOD PRESSURE: 76 MMHG

## 2021-07-02 DIAGNOSIS — Z20.822 SUSPECTED COVID-19 VIRUS INFECTION: ICD-10-CM

## 2021-07-02 DIAGNOSIS — R50.9 FEVER, UNSPECIFIED FEVER CAUSE: ICD-10-CM

## 2021-07-02 DIAGNOSIS — R50.9 FEVER, UNSPECIFIED FEVER CAUSE: Primary | ICD-10-CM

## 2021-07-02 LAB
ALBUMIN SERPL-MCNC: 4.2 G/DL (ref 3.5–5.2)
ALP BLD-CCNC: 60 U/L (ref 40–129)
ALT SERPL-CCNC: 68 U/L (ref 0–40)
ANION GAP SERPL CALCULATED.3IONS-SCNC: 11 MMOL/L (ref 7–16)
AST SERPL-CCNC: 70 U/L (ref 0–39)
BASOPHILS ABSOLUTE: 0 E9/L (ref 0–0.2)
BASOPHILS RELATIVE PERCENT: 0.3 % (ref 0–2)
BILIRUB SERPL-MCNC: 0.6 MG/DL (ref 0–1.2)
BILIRUBIN, POC: NEGATIVE
BLOOD URINE, POC: NEGATIVE
BUN BLDV-MCNC: 14 MG/DL (ref 6–23)
CALCIUM SERPL-MCNC: 9.3 MG/DL (ref 8.6–10.2)
CHLORIDE BLD-SCNC: 104 MMOL/L (ref 98–107)
CLARITY, POC: CLEAR
CO2: 23 MMOL/L (ref 22–29)
COLOR, POC: ABNORMAL
CREAT SERPL-MCNC: 1.1 MG/DL (ref 0.7–1.2)
EOSINOPHILS ABSOLUTE: 0 E9/L (ref 0.05–0.5)
EOSINOPHILS RELATIVE PERCENT: 0 % (ref 0–6)
GFR AFRICAN AMERICAN: >60
GFR NON-AFRICAN AMERICAN: >60 ML/MIN/1.73
GLUCOSE BLD-MCNC: 106 MG/DL (ref 74–99)
GLUCOSE URINE, POC: NEGATIVE
HCT VFR BLD CALC: 42.4 % (ref 37–54)
HEMOGLOBIN: 14.2 G/DL (ref 12.5–16.5)
INFLUENZA A ANTIBODY: NEGATIVE
INFLUENZA B ANTIBODY: NEGATIVE
KETONES, POC: NEGATIVE
LEUKOCYTE EST, POC: NEGATIVE
LYMPHOCYTES ABSOLUTE: 0.27 E9/L (ref 1.5–4)
LYMPHOCYTES RELATIVE PERCENT: 7 % (ref 20–42)
Lab: NORMAL
MCH RBC QN AUTO: 32.6 PG (ref 26–35)
MCHC RBC AUTO-ENTMCNC: 33.5 % (ref 32–34.5)
MCV RBC AUTO: 97.5 FL (ref 80–99.9)
MONOCYTES ABSOLUTE: 0.23 E9/L (ref 0.1–0.95)
MONOCYTES RELATIVE PERCENT: 6.1 % (ref 2–12)
NEUTROPHILS ABSOLUTE: 3.39 E9/L (ref 1.8–7.3)
NEUTROPHILS RELATIVE PERCENT: 86.8 % (ref 43–80)
NITRITE, POC: NEGATIVE
PDW BLD-RTO: 13.6 FL (ref 11.5–15)
PERFORMING INSTRUMENT: NORMAL
PH, POC: 6.5
PLATELET # BLD: 126 E9/L (ref 130–450)
PMV BLD AUTO: 9.5 FL (ref 7–12)
POIKILOCYTES: ABNORMAL
POTASSIUM SERPL-SCNC: 4.3 MMOL/L (ref 3.5–5)
PROTEIN, POC: ABNORMAL
QC PASS/FAIL: NORMAL
RBC # BLD: 4.35 E12/L (ref 3.8–5.8)
SARS-COV-2, POC: NORMAL
SODIUM BLD-SCNC: 138 MMOL/L (ref 132–146)
SPECIFIC GRAVITY, POC: 1.02
TEAR DROP CELLS: ABNORMAL
TOTAL PROTEIN: 7.3 G/DL (ref 6.4–8.3)
UROBILINOGEN, POC: ABNORMAL
WBC # BLD: 3.9 E9/L (ref 4.5–11.5)

## 2021-07-02 PROCEDURE — 1123F ACP DISCUSS/DSCN MKR DOCD: CPT | Performed by: FAMILY MEDICINE

## 2021-07-02 PROCEDURE — 99213 OFFICE O/P EST LOW 20 MIN: CPT | Performed by: FAMILY MEDICINE

## 2021-07-02 PROCEDURE — 87804 INFLUENZA ASSAY W/OPTIC: CPT | Performed by: FAMILY MEDICINE

## 2021-07-02 PROCEDURE — G8427 DOCREV CUR MEDS BY ELIG CLIN: HCPCS | Performed by: FAMILY MEDICINE

## 2021-07-02 PROCEDURE — 3017F COLORECTAL CA SCREEN DOC REV: CPT | Performed by: FAMILY MEDICINE

## 2021-07-02 PROCEDURE — G8417 CALC BMI ABV UP PARAM F/U: HCPCS | Performed by: FAMILY MEDICINE

## 2021-07-02 PROCEDURE — 87426 SARSCOV CORONAVIRUS AG IA: CPT | Performed by: FAMILY MEDICINE

## 2021-07-02 PROCEDURE — 4040F PNEUMOC VAC/ADMIN/RCVD: CPT | Performed by: FAMILY MEDICINE

## 2021-07-02 PROCEDURE — 81002 URINALYSIS NONAUTO W/O SCOPE: CPT | Performed by: FAMILY MEDICINE

## 2021-07-02 PROCEDURE — 1036F TOBACCO NON-USER: CPT | Performed by: FAMILY MEDICINE

## 2021-07-02 ASSESSMENT — ENCOUNTER SYMPTOMS
PHOTOPHOBIA: 0
WHEEZING: 0
TROUBLE SWALLOWING: 0
SINUS PAIN: 0
EYE REDNESS: 0
COUGH: 1
BACK PAIN: 0
BLOOD IN STOOL: 0
ABDOMINAL PAIN: 0
CONSTIPATION: 0
VOMITING: 0
SINUS PRESSURE: 1
SORE THROAT: 0
DIARRHEA: 0
SHORTNESS OF BREATH: 0

## 2021-07-02 NOTE — PROGRESS NOTES
OFFICE NOTE    7/2/21  Name: Tucker Thompson  ZXQ:[id number]   Sex:male   Age:71 y.o. SUBJECTIVE  Chief Complaint   Patient presents with    Fever     onset 3 days    Chills    Generalized Body Aches       HPI intermittent fever up to 102 over past 3 days. No rash, productive cough, dysuria    Review of Systems   Constitutional: Positive for fatigue and fever. Negative for appetite change and unexpected weight change. HENT: Positive for congestion, postnasal drip and sinus pressure. Negative for ear pain, hearing loss, sinus pain, sore throat and trouble swallowing. Eyes: Negative for photophobia, redness and visual disturbance. Respiratory: Positive for cough. Negative for shortness of breath and wheezing. Cardiovascular: Negative for chest pain, palpitations and leg swelling. Gastrointestinal: Negative for abdominal pain, blood in stool, constipation, diarrhea and vomiting. Endocrine: Negative for cold intolerance, polydipsia and polyuria. Genitourinary: Negative for difficulty urinating, dysuria, genital sores, hematuria and urgency. Musculoskeletal: Positive for myalgias. Negative for arthralgias, back pain and joint swelling. Allergic/Immunologic: Negative for food allergies. Neurological: Negative for dizziness, tremors, syncope, light-headedness, numbness and headaches. Hematological: Negative for adenopathy. Does not bruise/bleed easily. Psychiatric/Behavioral: Negative for agitation, dysphoric mood, hallucinations and sleep disturbance. The patient is not nervous/anxious.              Current Outpatient Medications:     omeprazole (PRILOSEC) 40 MG delayed release capsule, take 1 capsule by mouth once daily, Disp: 90 capsule, Rfl: 1    olmesartan (BENICAR) 40 MG tablet, take 1 tablet by mouth once daily, Disp: 90 tablet, Rfl: 1    montelukast (SINGULAIR) 10 MG tablet, take 1 tablet by mouth at bedtime if needed, Disp: 90 tablet, Rfl: 1    carvedilol (COREG) 12.5 MG tablet, take 1 tablet by mouth every morning then take 1 tablet every evening, Disp: 180 tablet, Rfl: 1    cetirizine (ZYRTEC) 10 MG tablet, Take 1 tablet by mouth daily, Disp: 30 tablet, Rfl: 1    Dextromethorphan-guaiFENesin (MUCINEX DM MAXIMUM STRENGTH)  MG TB12, Take 1 tablet by mouth 2 times daily, Disp: 14 tablet, Rfl: 1    aspirin 81 MG tablet, Take 81 mg by mouth daily No hold/Dr Matthew Quintanilla, Disp: , Rfl:     Omega-3 Fatty Acids (FISH OIL PO), Take by mouth daily, Disp: , Rfl:     Multiple Vitamin (MULTI-VITAMIN DAILY PO), Take by mouth daily, Disp: , Rfl:   Allergies   Allergen Reactions    Augmentin [Amoxicillin-Pot Clavulanate] Diarrhea       Past Medical History:   Diagnosis Date    Acid reflux     Arthritis     BPH (benign prostatic hyperplasia)     Cancer ([facility] [address] 11/2018    skin    Chest pain     Disorder of prostate     Diverticulosis     Heart murmur     Hypertension     Left knee pain     for OR 5/2/2019    Mitral valve disorder     Palpitations      Past Surgical History:   Procedure Laterality Date    BACK SURGERY      COLONOSCOPY  2018    HERNIA REPAIR      KNEE ARTHROSCOPY Left 5/2/2019    LEFT KNEE ARTHROSCOPY WITH PARTIAL MEDIAL MENISECTOMY performed by Sonido Griffiths MD at [address] [place] [address]  11/2018    UPPER GASTROINTESTINAL ENDOSCOPY  2018     Family History   Problem Relation Age of Onset    Stroke Mother     Heart Disease Father         Bypass Surgery, Pacemaker, Carotids    Coronary Art Dis Father     Other Son         procoagulant disorder     Social History     Tobacco History     Smoking Status  Never Smoker    Smokeless Tobacco Use  Never Used          Alcohol History     Alcohol Use Status  Yes Comment  3 times weekly          Drug Use     Drug Use Status  No          Sexual Activity     Sexually Active  Yes Partners  Female                OBJECTIVE  Vitals:    07/02/21 1605   BP: (!) 162/76   Site: Left Upper Arm   Position: Sitting   Pulse: 88 There is no mass. Tenderness: There is no abdominal tenderness. Lymphadenopathy:      Cervical: No cervical adenopathy. Skin:     Coloration: Skin is not jaundiced. Findings: No bruising or rash. Neurological:      General: No focal deficit present. Mental Status: He is alert. Psychiatric:         Behavior: Behavior normal.           Renaldo Hdz was seen today for fever, chills and generalized body aches. Diagnoses and all orders for this visit:    Fever, unspecified fever cause  -     XR CHEST (2 VW); Future  -     POCT Urinalysis no Micro  -     POCT Influenza A/B  -     Culture, Urine; Future  -     CBC Auto Differential; Future  -     Comprehensive Metabolic Panel; Future    Suspected COVID-19 virus infection  -     POCT COVID-19, Antigen    Seemed odd and a bit concerning. Flu and covid test negative. Urine high urobilinogen, otherwise clean. CXR looked ok. Labs pending. Will go home check temps several times a day and hydrate. Will come in when I am in [facility] tomorrow if worse      Return if symptoms worsen or fail to improve.     Electronically signed by Connie Patrick MD on 7/2/21 at 4:20 PM EDT

## 2021-07-05 LAB — URINE CULTURE, ROUTINE: NORMAL

## 2021-07-06 DIAGNOSIS — J01.90 ACUTE BACTERIAL SINUSITIS: Primary | ICD-10-CM

## 2021-07-06 DIAGNOSIS — B96.89 ACUTE BACTERIAL SINUSITIS: Primary | ICD-10-CM

## 2021-07-06 RX ORDER — LEVOFLOXACIN 500 MG/1
500 TABLET, FILM COATED ORAL DAILY
Qty: 10 TABLET | Refills: 0 | Status: SHIPPED | OUTPATIENT
Start: 2021-07-06 | End: 2021-07-16

## 2021-07-10 ENCOUNTER — TELEPHONE (OUTPATIENT)
Dept: FAMILY MEDICINE CLINIC | Age: 71
End: 2021-07-10

## 2021-07-10 ENCOUNTER — OFFICE VISIT (OUTPATIENT)
Dept: FAMILY MEDICINE CLINIC | Age: 71
End: 2021-07-10
Payer: MEDICARE

## 2021-07-10 ENCOUNTER — HOSPITAL ENCOUNTER (OUTPATIENT)
Age: 71
Discharge: HOME | End: 2021-07-10
Payer: MEDICARE

## 2021-07-10 VITALS
BODY MASS INDEX: 33.59 KG/M2 | SYSTOLIC BLOOD PRESSURE: 156 MMHG | HEIGHT: 72 IN | HEART RATE: 71 BPM | WEIGHT: 248 LBS | TEMPERATURE: 98.9 F | OXYGEN SATURATION: 96 % | DIASTOLIC BLOOD PRESSURE: 84 MMHG

## 2021-07-10 DIAGNOSIS — R50.9 FEVER, UNSPECIFIED FEVER CAUSE: ICD-10-CM

## 2021-07-10 DIAGNOSIS — R51.9 NONINTRACTABLE HEADACHE, UNSPECIFIED CHRONICITY PATTERN, UNSPECIFIED HEADACHE TYPE: ICD-10-CM

## 2021-07-10 DIAGNOSIS — R61 NIGHT SWEATS: ICD-10-CM

## 2021-07-10 DIAGNOSIS — R50.9 FEVER, UNSPECIFIED FEVER CAUSE: Primary | ICD-10-CM

## 2021-07-10 LAB
ALBUMIN SERPL-MCNC: 3.5 G/DL (ref 3.5–5.2)
ALP BLD-CCNC: 151 U/L (ref 40–129)
ALT SERPL-CCNC: 119 U/L (ref 0–40)
ANION GAP SERPL CALCULATED.3IONS-SCNC: 10 MMOL/L (ref 7–16)
AST SERPL-CCNC: 40 U/L (ref 0–39)
BASOPHILS ABSOLUTE: 0.04 E9/L (ref 0–0.2)
BASOPHILS RELATIVE PERCENT: 0.9 % (ref 0–2)
BILIRUB SERPL-MCNC: 0.6 MG/DL (ref 0–1.2)
BUN BLDV-MCNC: 14 MG/DL (ref 6–23)
CALCIUM SERPL-MCNC: 9.4 MG/DL (ref 8.6–10.2)
CHLORIDE BLD-SCNC: 102 MMOL/L (ref 98–107)
CO2: 24 MMOL/L (ref 22–29)
CREAT SERPL-MCNC: 0.8 MG/DL (ref 0.7–1.2)
EOSINOPHILS ABSOLUTE: 0.04 E9/L (ref 0.05–0.5)
EOSINOPHILS RELATIVE PERCENT: 0.9 % (ref 0–6)
GFR AFRICAN AMERICAN: >60
GFR NON-AFRICAN AMERICAN: >60 ML/MIN/1.73
GLUCOSE BLD-MCNC: 119 MG/DL (ref 74–99)
HCT VFR BLD CALC: 34.6 % (ref 37–54)
HEMOGLOBIN: 11.6 G/DL (ref 12.5–16.5)
LYMPHOCYTES ABSOLUTE: 1.56 E9/L (ref 1.5–4)
LYMPHOCYTES RELATIVE PERCENT: 33.6 % (ref 20–42)
MCH RBC QN AUTO: 32.4 PG (ref 26–35)
MCHC RBC AUTO-ENTMCNC: 33.5 % (ref 32–34.5)
MCV RBC AUTO: 96.6 FL (ref 80–99.9)
MONOCYTES ABSOLUTE: 0.32 E9/L (ref 0.1–0.95)
MONOCYTES RELATIVE PERCENT: 6.9 % (ref 2–12)
NEUTROPHILS ABSOLUTE: 2.67 E9/L (ref 1.8–7.3)
NEUTROPHILS RELATIVE PERCENT: 57.7 % (ref 43–80)
NUCLEATED RED BLOOD CELLS: 0 /100 WBC
PDW BLD-RTO: 14.4 FL (ref 11.5–15)
PLATELET # BLD: 264 E9/L (ref 130–450)
PMV BLD AUTO: 9.2 FL (ref 7–12)
POLYCHROMASIA: ABNORMAL
POTASSIUM SERPL-SCNC: 4.7 MMOL/L (ref 3.5–5)
RBC # BLD: 3.58 E12/L (ref 3.8–5.8)
SEDIMENTATION RATE, ERYTHROCYTE: 86 MM/HR (ref 0–15)
SODIUM BLD-SCNC: 136 MMOL/L (ref 132–146)
TOTAL PROTEIN: 7.3 G/DL (ref 6.4–8.3)
WBC # BLD: 4.6 E9/L (ref 4.5–11.5)

## 2021-07-10 PROCEDURE — G8417 CALC BMI ABV UP PARAM F/U: HCPCS | Performed by: INTERNAL MEDICINE

## 2021-07-10 PROCEDURE — 3017F COLORECTAL CA SCREEN DOC REV: CPT | Performed by: INTERNAL MEDICINE

## 2021-07-10 PROCEDURE — 1036F TOBACCO NON-USER: CPT | Performed by: INTERNAL MEDICINE

## 2021-07-10 PROCEDURE — 99214 OFFICE O/P EST MOD 30 MIN: CPT | Performed by: INTERNAL MEDICINE

## 2021-07-10 PROCEDURE — 1123F ACP DISCUSS/DSCN MKR DOCD: CPT | Performed by: INTERNAL MEDICINE

## 2021-07-10 PROCEDURE — 85025 COMPLETE CBC W/AUTO DIFF WBC: CPT

## 2021-07-10 PROCEDURE — 86618 LYME DISEASE ANTIBODY: CPT

## 2021-07-10 PROCEDURE — 85651 RBC SED RATE NONAUTOMATED: CPT

## 2021-07-10 PROCEDURE — 4040F PNEUMOC VAC/ADMIN/RCVD: CPT | Performed by: INTERNAL MEDICINE

## 2021-07-10 PROCEDURE — G8427 DOCREV CUR MEDS BY ELIG CLIN: HCPCS | Performed by: INTERNAL MEDICINE

## 2021-07-10 PROCEDURE — 36415 COLL VENOUS BLD VENIPUNCTURE: CPT

## 2021-07-10 PROCEDURE — 87040 BLOOD CULTURE FOR BACTERIA: CPT

## 2021-07-10 PROCEDURE — 80053 COMPREHEN METABOLIC PANEL: CPT

## 2021-07-10 NOTE — TELEPHONE ENCOUNTER
[facility] seen in [facility] by Dr. Susan Metzger on Saturday 07/10. Dr. Susan Metzger advises that pt follow up with PCP within the next week. No availability, please advise. Headaches, night sweats, possible insect bite.

## 2021-07-11 ENCOUNTER — TELEPHONE (OUTPATIENT)
Dept: FAMILY MEDICINE CLINIC | Age: 71
End: 2021-07-11

## 2021-07-11 ASSESSMENT — ENCOUNTER SYMPTOMS
SORE THROAT: 0
CHEST TIGHTNESS: 0
ABDOMINAL PAIN: 0
COUGH: 0
NAUSEA: 0
SINUS PRESSURE: 0
PHOTOPHOBIA: 0
EYE PAIN: 0
DIARRHEA: 0
VOMITING: 0
SHORTNESS OF BREATH: 0
EYE REDNESS: 0
SINUS PAIN: 0
WHEEZING: 0
CONSTIPATION: 0

## 2021-07-11 NOTE — TELEPHONE ENCOUNTER
This patient was not rescheduled like I wanted at recent [facility] visit. He needs to see his PCP Dr. Geno Fox this week.

## 2021-07-11 NOTE — PROGRESS NOTES
[id number] [facility]     21  Conrad Vides : 1950 Sex: male  Age: 66 y.o. Chief Complaint   Patient presents with    Insect Bite     Possible insect bite - Spot on Right lower leg    Fever     1 week ago. Saw Dr. Janette Pool on [facility] and had multiple tests ran. Pt has been on levaquin    Headache       HPI  Patient presents to [facility] with several complaints. He saw Dr. Janette Pool his PCP about 8 days ago. I reviewed his note. Patient at that time was having fever chills body aches. He was tested for Covid and flu and was negative. Blood work revealed mildly elevated transaminases and mildly decreased white blood cell count  Presents today with ongoing symptoms including headache and had a small scabbed abrasive lesion to his right lower leg that he wanted me to look at. He was concerned about an insect bite. States his daughter was bitten by a tick within the past 2 weeks. He is outside on a regular basis in [facility] area. He had some concern about tick bite. In reviewing the chart Dr. Janette Pool did place him on Levaquin 4 days ago and patient does state that he is slightly improved from when he saw his PCP. He does describe chills and night sweats. No fever for the last couple days. Describes the headache he has is that toward the top of the head and back and neck area. Denies any temporal area pain. Chest x-ray that was done at last visit was unremarkable. Review of Systems   Constitutional: Positive for chills, diaphoresis, fatigue and fever. HENT: Negative for congestion, ear pain, postnasal drip, sinus pressure, sinus pain and sore throat. Eyes: Negative for photophobia, pain, redness and visual disturbance. Respiratory: Negative for cough, chest tightness, shortness of breath and wheezing. Cardiovascular: Negative for chest pain. Gastrointestinal: Negative for abdominal pain, constipation, diarrhea, nausea and vomiting.    Genitourinary: Negative for difficulty urinating, dysuria, flank pain, frequency and hematuria. Musculoskeletal: Positive for myalgias. Skin: Negative for rash. Neurological: Positive for headaches. Negative for dizziness, syncope, weakness and light-headedness. REST OF PERTINENT ROS GONE OVER AND WAS NEGATIVE.                Current Outpatient Medications:     levoFLOXacin (LEVAQUIN) 500 MG tablet, Take 1 tablet by mouth daily for 10 days, Disp: 10 tablet, Rfl: 0    omeprazole (PRILOSEC) 40 MG delayed release capsule, take 1 capsule by mouth once daily, Disp: 90 capsule, Rfl: 1    olmesartan (BENICAR) 40 MG tablet, take 1 tablet by mouth once daily, Disp: 90 tablet, Rfl: 1    montelukast (SINGULAIR) 10 MG tablet, take 1 tablet by mouth at bedtime if needed, Disp: 90 tablet, Rfl: 1    carvedilol (COREG) 12.5 MG tablet, take 1 tablet by mouth every morning then take 1 tablet every evening, Disp: 180 tablet, Rfl: 1    cetirizine (ZYRTEC) 10 MG tablet, Take 1 tablet by mouth daily, Disp: 30 tablet, Rfl: 1    aspirin 81 MG tablet, Take 81 mg by mouth daily No hold/Dr Kay Odor, Disp: , Rfl:     Omega-3 Fatty Acids (FISH OIL PO), Take by mouth daily, Disp: , Rfl:     Multiple Vitamin (MULTI-VITAMIN DAILY PO), Take by mouth daily, Disp: , Rfl:   Allergies   Allergen Reactions    Augmentin [Amoxicillin-Pot Clavulanate] Diarrhea       Past Medical History:   Diagnosis Date    Acid reflux     Arthritis     BPH (benign prostatic hyperplasia)     Cancer ([address] 11/2018    skin    Chest pain     Disorder of prostate     Diverticulosis     Heart murmur     Hypertension     Left knee pain     for OR 5/2/2019    Mitral valve disorder     Palpitations      Past Surgical History:   Procedure Laterality Date    BACK SURGERY      COLONOSCOPY  2018    HERNIA REPAIR      KNEE ARTHROSCOPY Left 5/2/2019    LEFT KNEE ARTHROSCOPY WITH PARTIAL MEDIAL MENISECTOMY performed by Lei Estes MD at [address] [place] [address]  11/2018    UPPER GASTROINTESTINAL ENDOSCOPY  2018     Family History   Problem Relation Age of Onset    Stroke Mother     Heart Disease Father         Bypass Surgery, Pacemaker, Carotids    Coronary Art Dis Father     Other Son         procoagulant disorder     Social History     Socioeconomic History    Marital status:      Spouse name: Not on file    Number of children: Not on file    Years of education: Not on file    Highest education level: Not on file   Occupational History    Not on file   Tobacco Use    Smoking status: Never Smoker    Smokeless tobacco: Never Used   Vaping Use    Vaping Use: Never used   Substance and Sexual Activity    Alcohol use: Yes     Comment: 3 times weekly    Drug use: No    Sexual activity: Yes     Partners: Female   Other Topics Concern    Not on file   Social History Narrative    Not on file     Social Determinants of Health     Financial Resource Strain: Low Risk     Difficulty of Paying Living Expenses: Not hard at all   Food Insecurity: No Food Insecurity    Worried About [employer] [employer] in the Last Year: Never true    [facility] in the Last Year: Never true   Transportation Needs: No Transportation Needs    Lack of Transportation (Medical): No    Lack of Transportation (Non-Medical):  No   Physical Activity:     Days of Exercise per Week:     Minutes of Exercise per Session:    Stress:     Feeling of Stress :    Social Connections:     Frequency of Communication with Friends and Family:     Frequency of Social Gatherings with Friends and Family:     Attends Jewish Services:     Active Member of Clubs or Organizations:     Attends Club or Organization Meetings:     Marital Status:    Intimate Partner Violence:     Fear of Current or Ex-Partner:     Emotionally Abused:     Physically Abused:     Sexually Abused:        Vitals:    07/10/21 0934   BP: (!) 156/84   Site: Right Upper Arm   Position: Sitting   Cuff Size: Large Adult   Pulse: 71   Temp: 98.9 °F (37.2 °C)   TempSrc: Temporal   SpO2: 96%   Weight: 248 lb (112.5 kg)   Height: 6' (1.829 m)       Physical Exam  Vitals and nursing note reviewed. Constitutional:       General: He is not in acute distress. Appearance: He is not toxic-appearing or diaphoretic. HENT:      Head: Normocephalic and atraumatic. Comments: No reproducible temporal area pain to palpation     Right Ear: Tympanic membrane, ear canal and external ear normal.      Left Ear: Tympanic membrane, ear canal and external ear normal.      Nose: Nose normal.      Mouth/Throat:      Mouth: Mucous membranes are moist.      Pharynx: Oropharynx is clear. No posterior oropharyngeal erythema. Comments: Clear mucus draining posterior pharynx  Eyes:      General: No scleral icterus. Extraocular Movements: Extraocular movements intact. Pupils: Pupils are equal, round, and reactive to light. Neck:      Vascular: No carotid bruit. Cardiovascular:      Rate and Rhythm: Normal rate and regular rhythm. Heart sounds: Murmur heard. Pulmonary:      Effort: Pulmonary effort is normal. No respiratory distress. Breath sounds: No wheezing, rhonchi or rales. Abdominal:      General: Bowel sounds are normal. There is no distension. Palpations: Abdomen is soft. There is no mass. Tenderness: There is no abdominal tenderness. There is no right CVA tenderness, left CVA tenderness, guarding or rebound. Musculoskeletal:         General: No tenderness. Normal range of motion. Cervical back: Normal range of motion and neck supple. No rigidity or tenderness. Lymphadenopathy:      Cervical: No cervical adenopathy. Skin:     General: Skin is warm and dry. Coloration: Skin is not jaundiced. Findings: No bruising or rash. Comments: Did have a small excoriated area to his right lower leg where it looks like he had scratched and scabbed the area. Does not look infected at this time. MD      *Document was created using voice recognition software. Note was reviewed however may contain grammatical errors.

## 2021-07-11 NOTE — TELEPHONE ENCOUNTER
[facility], see my note and labs on this gentleman from [facility]. He needs seen again this week.

## 2021-07-12 ENCOUNTER — TELEPHONE (OUTPATIENT)
Dept: FAMILY MEDICINE CLINIC | Age: 71
End: 2021-07-12

## 2021-07-12 NOTE — TELEPHONE ENCOUNTER
Patient has become anemic. Liver enzymes are up further. Sed rate is very high. He needs to see Dr. Yvan Kaur this week.

## 2021-07-13 ENCOUNTER — OFFICE VISIT (OUTPATIENT)
Dept: FAMILY MEDICINE CLINIC | Age: 71
End: 2021-07-13
Payer: MEDICARE

## 2021-07-13 ENCOUNTER — TELEPHONE (OUTPATIENT)
Dept: FAMILY MEDICINE CLINIC | Age: 71
End: 2021-07-13

## 2021-07-13 VITALS
OXYGEN SATURATION: 96 % | BODY MASS INDEX: 32.69 KG/M2 | WEIGHT: 241 LBS | DIASTOLIC BLOOD PRESSURE: 80 MMHG | HEART RATE: 103 BPM | TEMPERATURE: 97 F | SYSTOLIC BLOOD PRESSURE: 120 MMHG

## 2021-07-13 DIAGNOSIS — D47.9 LYMPHOPROLIFERATIVE DISORDER (HCC): Primary | ICD-10-CM

## 2021-07-13 LAB
LYME AB IGM BY WB:: POSITIVE
LYME, EIA: 3.76 LIV (ref 0–1.2)

## 2021-07-13 PROCEDURE — 99214 OFFICE O/P EST MOD 30 MIN: CPT | Performed by: FAMILY MEDICINE

## 2021-07-13 PROCEDURE — G8417 CALC BMI ABV UP PARAM F/U: HCPCS | Performed by: FAMILY MEDICINE

## 2021-07-13 PROCEDURE — 1123F ACP DISCUSS/DSCN MKR DOCD: CPT | Performed by: FAMILY MEDICINE

## 2021-07-13 PROCEDURE — 3017F COLORECTAL CA SCREEN DOC REV: CPT | Performed by: FAMILY MEDICINE

## 2021-07-13 PROCEDURE — 1036F TOBACCO NON-USER: CPT | Performed by: FAMILY MEDICINE

## 2021-07-13 PROCEDURE — 4040F PNEUMOC VAC/ADMIN/RCVD: CPT | Performed by: FAMILY MEDICINE

## 2021-07-13 PROCEDURE — G8427 DOCREV CUR MEDS BY ELIG CLIN: HCPCS | Performed by: FAMILY MEDICINE

## 2021-07-13 RX ORDER — DOXYCYCLINE HYCLATE 100 MG
100 TABLET ORAL 2 TIMES DAILY
Qty: 28 TABLET | Refills: 1 | Status: SHIPPED | OUTPATIENT
Start: 2021-07-13 | End: 2021-07-27

## 2021-07-13 ASSESSMENT — ENCOUNTER SYMPTOMS
CONSTIPATION: 0
SINUS PAIN: 0
ABDOMINAL PAIN: 0
PHOTOPHOBIA: 0
BLOOD IN STOOL: 0
COUGH: 0
WHEEZING: 0
SHORTNESS OF BREATH: 0
VOMITING: 0
SORE THROAT: 0
EYE REDNESS: 0
DIARRHEA: 0
TROUBLE SWALLOWING: 0
BACK PAIN: 0

## 2021-07-13 NOTE — PROGRESS NOTES
OFFICE NOTE    7/13/21  Name: Chiara Powell  VRR:4/96/0870   Sex:male   Age:71 y.o. SUBJECTIVE  Chief Complaint   Patient presents with    Results     discuss lab results       HPI Saw us 2 weeks ago for what sounded like viral illness, then Dr. Monica Lawler. BW done for him showed abnromalities. Ours 2 weeks before not so much. Fevers have resolved to be replaced by night sweats which are also getting better. Had apparent bite jazlyn on left lower leg with surrounding flare. Review of Systems   Constitutional: Positive for fatigue. Negative for appetite change, fever and unexpected weight change. HENT: Positive for congestion. Negative for ear pain, hearing loss, sinus pain, sore throat and trouble swallowing. Eyes: Negative for photophobia, redness and visual disturbance. Respiratory: Negative for cough, shortness of breath and wheezing. Cough had resolved   Cardiovascular: Negative for chest pain, palpitations and leg swelling. Gastrointestinal: Negative for abdominal pain, blood in stool, constipation, diarrhea and vomiting. Endocrine: Negative for cold intolerance, polydipsia and polyuria. Genitourinary: Positive for urgency. Negative for difficulty urinating, genital sores and hematuria. Musculoskeletal: Positive for arthralgias and myalgias. Negative for back pain and joint swelling. Allergic/Immunologic: Negative for food allergies. Neurological: Negative for dizziness, tremors, seizures, syncope, numbness and headaches. Hematological: Negative for adenopathy. Does not bruise/bleed easily. Psychiatric/Behavioral: Negative for agitation, dysphoric mood, hallucinations and sleep disturbance. The patient is nervous/anxious.              Current Outpatient Medications:     doxycycline hyclate (VIBRA-TABS) 100 MG tablet, Take 1 tablet by mouth 2 times daily for 14 days, Disp: 28 tablet, Rfl: 1    levoFLOXacin (LEVAQUIN) 500 MG tablet, Take 1 tablet by mouth daily for 10 days, Disp: 10 tablet, Rfl: 0    omeprazole (PRILOSEC) 40 MG delayed release capsule, take 1 capsule by mouth once daily, Disp: 90 capsule, Rfl: 1    olmesartan (BENICAR) 40 MG tablet, take 1 tablet by mouth once daily, Disp: 90 tablet, Rfl: 1    montelukast (SINGULAIR) 10 MG tablet, take 1 tablet by mouth at bedtime if needed, Disp: 90 tablet, Rfl: 1    carvedilol (COREG) 12.5 MG tablet, take 1 tablet by mouth every morning then take 1 tablet every evening, Disp: 180 tablet, Rfl: 1    cetirizine (ZYRTEC) 10 MG tablet, Take 1 tablet by mouth daily, Disp: 30 tablet, Rfl: 1    aspirin 81 MG tablet, Take 81 mg by mouth daily No hold/Dr Fannie Hernandez, Disp: , Rfl:     Omega-3 Fatty Acids (FISH OIL PO), Take by mouth daily, Disp: , Rfl:     Multiple Vitamin (MULTI-VITAMIN DAILY PO), Take by mouth daily, Disp: , Rfl:   Allergies   Allergen Reactions    Augmentin [Amoxicillin-Pot Clavulanate] Diarrhea       Past Medical History:   Diagnosis Date    Acid reflux     Arthritis     BPH (benign prostatic hyperplasia)     Cancer ([facility] [address] 11/2018    skin    Chest pain     Disorder of prostate     Diverticulosis     Heart murmur     Hypertension     Left knee pain     for OR 5/2/2019    Mitral valve disorder     Palpitations      Past Surgical History:   Procedure Laterality Date    BACK SURGERY      COLONOSCOPY  2018    HERNIA REPAIR      KNEE ARTHROSCOPY Left 5/2/2019    LEFT KNEE ARTHROSCOPY WITH PARTIAL MEDIAL MENISECTOMY performed by Florencio Oconnor MD at [address] [place] [address]  11/2018    UPPER GASTROINTESTINAL ENDOSCOPY  2018     Family History   Problem Relation Age of Onset    Stroke Mother     Heart Disease Father         Bypass Surgery, Pacemaker, Carotids    Coronary Art Dis Father     Other Son         procoagulant disorder     Social History     Tobacco History     Smoking Status  Never Smoker    Smokeless Tobacco Use  Never Used          Alcohol History     Alcohol Use Status  Yes Comment  3 times weekly          Drug Use     Drug Use Status  No          Sexual Activity     Sexually Active  Yes Partners  Female                OBJECTIVE  Vitals:    07/13/21 1400   BP: 120/80   Pulse: 103   Temp: 97 °F (36.1 °C)   SpO2: 96%   Weight: 241 lb (109.3 kg)        Body mass index is 32.69 kg/m². Orders Placed This Encounter   Procedures   Sav Thibodeaux MD, Hematology and Oncology, [facility]     Referral Priority:   Routine     Referral Type:   Eval and Treat     Referral Reason:   Specialty Services Required     Referred to Provider:   Shay Payne MD     Requested Specialty:   Hematology and Oncology     Number of Visits Requested:   1        EXAM   Physical Exam  Vitals and nursing note reviewed. Constitutional:       Appearance: Normal appearance. He is obese. HENT:      Right Ear: Tympanic membrane normal.      Left Ear: Tympanic membrane normal.      Nose: Congestion present. Mouth/Throat:      Pharynx: No posterior oropharyngeal erythema. Eyes:      General: No scleral icterus. Conjunctiva/sclera: Conjunctivae normal.      Pupils: Pupils are equal, round, and reactive to light. Neck:      Vascular: No carotid bruit. Cardiovascular:      Rate and Rhythm: Normal rate and regular rhythm. Pulses: Normal pulses. Heart sounds: No murmur heard. Pulmonary:      Effort: Pulmonary effort is normal.      Breath sounds: Normal breath sounds. No wheezing or rales. Abdominal:      General: Bowel sounds are normal.      Palpations: There is no mass. Tenderness: There is no abdominal tenderness. Musculoskeletal:         General: No swelling or tenderness. Right lower leg: No edema. Left lower leg: No edema. Lymphadenopathy:      Cervical: No cervical adenopathy. Skin:     Coloration: Skin is not jaundiced. Findings: No bruising. Neurological:      General: No focal deficit present.       Mental Status: He is alert and oriented to person, place, and time.      Sensory: No sensory deficit. Motor: No weakness. Coordination: Coordination normal.      Gait: Gait normal.   Psychiatric:         Mood and Affect: Mood normal.         Behavior: Behavior normal.           Siva Case was seen today for results. Diagnoses and all orders for this visit:    Lymphoproliferative disorder ([facility] [address]  -     Verla Eisenmenger, MD, Hematology and Oncology, [place] [facility]    Other orders  -     doxycycline hyclate (VIBRA-TABS) 100 MG tablet; Take 1 tablet by mouth 2 times daily for 14 days    Initial CBC showed neutropenia, thrombocytopenia, both mild. 2nd CBC less than 2 weeks later showed drop in HGB but normalization of WBC and pleatelets. Transaminase levels up both tests. ESR low 80,s  Acute Lyme test positive. ECHO was ordered, Insurance initially refused, still hasn't had. Will treat as acute Lyme but some of tjhis doesn't seem to fit. No evience on exam or BW of hematologic malignancy, but still possible as are several rheumatologic conditions      Return if symptoms worsen or fail to improve.     Electronically signed by Karol Brooks MD on 7/13/21 at 2:36 PM EDT

## 2021-07-13 NOTE — TELEPHONE ENCOUNTER
[facility] [employer] is aware of the positive Lyme study and is seeing patient today

## 2021-07-14 ENCOUNTER — TELEPHONE (OUTPATIENT)
Dept: FAMILY MEDICINE CLINIC | Age: 71
End: 2021-07-14

## 2021-07-14 NOTE — TELEPHONE ENCOUNTER
Quirino Billingsley called today about his visit with you yesterday. He knows it was discussed about the fever he got after a tooth cleaning. He just wanted to make sure that the antibiotics you have put him on will treat the kind of bacteria that developers after a tooth cleaning.

## 2021-07-14 NOTE — TELEPHONE ENCOUNTER
The danger with tooth cleaning is subacute bacterial endocarditis, picked up by ECHO which was never done due to obstructions from his insurance. I believe it there was any chance he had this I would of heard a murmur yesterday. In answer to his question, no doxy would be insufficient and he would need IV antibiotics. I am reasonably confident he does not have this but his insurance should not have interfered.  His confirmatory test for Lyme \"acute\" antibodies came back positive so I am confident he has Lyme disease, just not sure yet if it explains everything here

## 2021-07-14 NOTE — TELEPHONE ENCOUNTER
Was seen yesterday. Seems to be getting better. Switched to doxycycline 100 mg bid for 2 weeks with 1 refill. Puzzled by some aspects of this case consulted Dr Margo Valdez to offer another look.  Thanks for a good

## 2021-07-15 LAB — CULTURE, BLOOD 2: NORMAL

## 2021-08-22 DIAGNOSIS — K21.00 GASTROESOPHAGEAL REFLUX DISEASE WITH ESOPHAGITIS WITHOUT HEMORRHAGE: ICD-10-CM

## 2021-08-23 RX ORDER — OMEPRAZOLE 40 MG/1
CAPSULE, DELAYED RELEASE ORAL
Qty: 90 CAPSULE | Refills: 1 | Status: SHIPPED
Start: 2021-08-23 | End: 2021-12-15 | Stop reason: SDUPTHER

## 2021-08-23 NOTE — TELEPHONE ENCOUNTER
Last Appointment:  7/13/2021  Future Appointments   Date Time Provider Ashvin Paulino   12/15/2021  8:30 AM Nancy Crandall  [address]

## 2021-09-15 ENCOUNTER — NURSE ONLY (OUTPATIENT)
Dept: FAMILY MEDICINE CLINIC | Age: 71
End: 2021-09-15
Payer: MEDICARE

## 2021-09-15 DIAGNOSIS — Z23 IMMUNIZATION DUE: Primary | ICD-10-CM

## 2021-09-15 PROCEDURE — 90694 VACC AIIV4 NO PRSRV 0.5ML IM: CPT | Performed by: FAMILY MEDICINE

## 2021-09-15 PROCEDURE — G0008 ADMIN INFLUENZA VIRUS VAC: HCPCS | Performed by: FAMILY MEDICINE

## 2021-12-15 ENCOUNTER — OFFICE VISIT (OUTPATIENT)
Dept: FAMILY MEDICINE CLINIC | Age: 71
End: 2021-12-15
Payer: MEDICARE

## 2021-12-15 VITALS
WEIGHT: 252 LBS | HEART RATE: 81 BPM | BODY MASS INDEX: 34.18 KG/M2 | DIASTOLIC BLOOD PRESSURE: 78 MMHG | TEMPERATURE: 97.3 F | OXYGEN SATURATION: 97 % | SYSTOLIC BLOOD PRESSURE: 138 MMHG

## 2021-12-15 DIAGNOSIS — I10 ESSENTIAL HYPERTENSION: ICD-10-CM

## 2021-12-15 DIAGNOSIS — Z86.19 HISTORY OF LYME DISEASE: Primary | ICD-10-CM

## 2021-12-15 DIAGNOSIS — K21.00 GASTROESOPHAGEAL REFLUX DISEASE WITH ESOPHAGITIS WITHOUT HEMORRHAGE: ICD-10-CM

## 2021-12-15 DIAGNOSIS — I05.9 MITRAL VALVE DISORDER: ICD-10-CM

## 2021-12-15 DIAGNOSIS — R10.11 RUQ ABDOMINAL PAIN: ICD-10-CM

## 2021-12-15 DIAGNOSIS — M46.1 SACROILIITIS (HCC): ICD-10-CM

## 2021-12-15 PROCEDURE — G8417 CALC BMI ABV UP PARAM F/U: HCPCS | Performed by: FAMILY MEDICINE

## 2021-12-15 PROCEDURE — 1036F TOBACCO NON-USER: CPT | Performed by: FAMILY MEDICINE

## 2021-12-15 PROCEDURE — 3017F COLORECTAL CA SCREEN DOC REV: CPT | Performed by: FAMILY MEDICINE

## 2021-12-15 PROCEDURE — 4040F PNEUMOC VAC/ADMIN/RCVD: CPT | Performed by: FAMILY MEDICINE

## 2021-12-15 PROCEDURE — 99214 OFFICE O/P EST MOD 30 MIN: CPT | Performed by: FAMILY MEDICINE

## 2021-12-15 PROCEDURE — 27096 INJECT SACROILIAC JOINT: CPT | Performed by: FAMILY MEDICINE

## 2021-12-15 PROCEDURE — G8427 DOCREV CUR MEDS BY ELIG CLIN: HCPCS | Performed by: FAMILY MEDICINE

## 2021-12-15 PROCEDURE — 1123F ACP DISCUSS/DSCN MKR DOCD: CPT | Performed by: FAMILY MEDICINE

## 2021-12-15 PROCEDURE — G8484 FLU IMMUNIZE NO ADMIN: HCPCS | Performed by: FAMILY MEDICINE

## 2021-12-15 RX ORDER — OMEPRAZOLE 40 MG/1
40 CAPSULE, DELAYED RELEASE ORAL DAILY
Qty: 90 CAPSULE | Refills: 1 | Status: SHIPPED
Start: 2021-12-15 | End: 2022-06-22 | Stop reason: SDUPTHER

## 2021-12-15 RX ORDER — CARVEDILOL 12.5 MG/1
TABLET ORAL
Qty: 180 TABLET | Refills: 1 | Status: SHIPPED
Start: 2021-12-15 | End: 2022-06-22 | Stop reason: SDUPTHER

## 2021-12-15 RX ORDER — LIDOCAINE HYDROCHLORIDE 10 MG/ML
20 INJECTION, SOLUTION INFILTRATION; PERINEURAL ONCE
Status: SHIPPED | OUTPATIENT
Start: 2021-12-15

## 2021-12-15 RX ORDER — METHYLPREDNISOLONE ACETATE 80 MG/ML
40 INJECTION, SUSPENSION INTRA-ARTICULAR; INTRALESIONAL; INTRAMUSCULAR; SOFT TISSUE ONCE
Status: COMPLETED | OUTPATIENT
Start: 2021-12-15 | End: 2021-12-15

## 2021-12-15 RX ORDER — LIDOCAINE HYDROCHLORIDE 20 MG/ML
5 INJECTION, SOLUTION INFILTRATION; PERINEURAL ONCE
Status: COMPLETED | OUTPATIENT
Start: 2021-12-15 | End: 2021-12-15

## 2021-12-15 RX ORDER — MONTELUKAST SODIUM 10 MG/1
TABLET ORAL
Qty: 90 TABLET | Refills: 1 | Status: SHIPPED | OUTPATIENT
Start: 2021-12-15

## 2021-12-15 RX ORDER — OLMESARTAN MEDOXOMIL 40 MG/1
TABLET ORAL
Qty: 90 TABLET | Refills: 1 | Status: SHIPPED
Start: 2021-12-15 | End: 2022-06-22 | Stop reason: SDUPTHER

## 2021-12-15 RX ADMIN — METHYLPREDNISOLONE ACETATE 40 MG: 80 INJECTION, SUSPENSION INTRA-ARTICULAR; INTRALESIONAL; INTRAMUSCULAR; SOFT TISSUE at 10:30

## 2021-12-15 RX ADMIN — LIDOCAINE HYDROCHLORIDE 5 ML: 20 INJECTION, SOLUTION INFILTRATION; PERINEURAL at 15:27

## 2021-12-15 ASSESSMENT — ENCOUNTER SYMPTOMS
VOMITING: 0
DIARRHEA: 0
SORE THROAT: 0
BACK PAIN: 1
EYE REDNESS: 0
WHEEZING: 0
BLOOD IN STOOL: 0
TROUBLE SWALLOWING: 0
PHOTOPHOBIA: 0
ABDOMINAL PAIN: 1
SINUS PRESSURE: 1
SHORTNESS OF BREATH: 0
COUGH: 0
CONSTIPATION: 0
SINUS PAIN: 0

## 2021-12-15 NOTE — PROGRESS NOTES
OFFICE NOTE    12/15/21  Name: Amy Resendez  VZI:[id number]   Sex:male   Age:71 y.o. SUBJECTIVE  Chief Complaint   Patient presents with    Hypertension    Lyme Disease     follow up       HPI Had a list of issues he was concerned about. Wondered about adequacy of Lyme treatment. Did see Dr. Calixto Regan who ran some blood work and was pleased. He was treated for 1 mos with Doxycycline. Allergies bother him at this time of year. Takes Certrazine in AM along with montelukast at hs when this happens. Has an new dog which he feels he and his wife needed. Having RUQ pain which is persistent at times. And low back pain which sometimes wakes him up if he sleeps on his side    Review of Systems   Constitutional: Positive for fatigue. Negative for appetite change, fever and unexpected weight change. Rides horses sometimes   HENT: Positive for sinus pressure. Negative for congestion, ear pain, hearing loss, sinus pain, sore throat and trouble swallowing. Eyes: Negative for photophobia, redness and visual disturbance. Respiratory: Negative for cough, shortness of breath and wheezing. Cardiovascular: Negative for chest pain, palpitations and leg swelling. Gastrointestinal: Positive for abdominal pain. Negative for blood in stool, constipation, diarrhea and vomiting. Endocrine: Negative for cold intolerance, polydipsia and polyuria. Genitourinary: Positive for urgency. Negative for difficulty urinating, genital sores and hematuria. Musculoskeletal: Positive for back pain. Negative for arthralgias and joint swelling. Skin: Negative for pallor and rash. Allergic/Immunologic: Negative for food allergies. Neurological: Negative for dizziness, tremors, seizures, syncope, numbness and headaches. Hematological: Negative for adenopathy. Does not bruise/bleed easily. Psychiatric/Behavioral: Negative for agitation, dysphoric mood, hallucinations and sleep disturbance. The patient is nervous/anxious. All other systems reviewed and are negative.            Current Outpatient Medications:     carvedilol (COREG) 12.5 MG tablet, take 1 tablet by mouth every morning then take 1 tablet every evening, Disp: 180 tablet, Rfl: 1    montelukast (SINGULAIR) 10 MG tablet, take 1 tablet by mouth at bedtime if needed, Disp: 90 tablet, Rfl: 1    olmesartan (BENICAR) 40 MG tablet, take 1 tablet by mouth once daily, Disp: 90 tablet, Rfl: 1    omeprazole (PRILOSEC) 40 MG delayed release capsule, Take 1 capsule by mouth daily, Disp: 90 capsule, Rfl: 1    cetirizine (ZYRTEC) 10 MG tablet, Take 1 tablet by mouth daily, Disp: 30 tablet, Rfl: 1    aspirin 81 MG tablet, Take 81 mg by mouth daily No hold/Dr Mary Cardoso, Disp: , Rfl:     Omega-3 Fatty Acids (FISH OIL PO), Take by mouth daily, Disp: , Rfl:     Multiple Vitamin (MULTI-VITAMIN DAILY PO), Take by mouth daily, Disp: , Rfl:   Allergies   Allergen Reactions    Augmentin [Amoxicillin-Pot Clavulanate] Diarrhea       Past Medical History:   Diagnosis Date    Acid reflux     Arthritis     BPH (benign prostatic hyperplasia)     Cancer ([facility] [address] 11/2018    skin    Chest pain     Disorder of prostate     Diverticulosis     Heart murmur     Hypertension     Left knee pain     for OR 5/2/2019    Mitral valve disorder     Palpitations      Past Surgical History:   Procedure Laterality Date    BACK SURGERY      COLONOSCOPY  2018    HERNIA REPAIR      KNEE ARTHROSCOPY Left 5/2/2019    LEFT KNEE ARTHROSCOPY WITH PARTIAL MEDIAL MENISECTOMY performed by Olena Ugalde MD at [address] [place] [address]  11/2018    UPPER GASTROINTESTINAL ENDOSCOPY  2018     Family History   Problem Relation Age of Onset    Stroke Mother     Heart Disease Father         Bypass Surgery, Pacemaker, Carotids    Coronary Art Dis Father     Other Son         procoagulant disorder     Social History     Tobacco History     Smoking Status  Never Smoker    Smokeless Tobacco Use  Never Used Alcohol History     Alcohol Use Status  Yes Comment  3 times weekly          Drug Use     Drug Use Status  No          Sexual Activity     Sexually Active  Yes Partners  Female                OBJECTIVE  Vitals:    12/15/21 0836   BP: 138/78   Pulse: 81   Temp: 97.3 °F (36.3 °C)   TempSrc: Temporal   SpO2: 97%   Weight: 252 lb (114.3 kg)        Body mass index is 34.18 kg/m². Orders Placed This Encounter   Procedures    US GALLBLADDER RUQ     Standing Status:   Future     Standing Expiration Date:   12/15/2022    OK ARTHROCENTESIS ASPIR&/INJ INTERM JT/BURS W/O US        EXAM   Physical Exam  Vitals and nursing note reviewed. Constitutional:       Appearance: Normal appearance. He is well-developed. He is obese. HENT:      Right Ear: Tympanic membrane, ear canal and external ear normal.      Left Ear: Tympanic membrane, ear canal and external ear normal.      Nose: Congestion present. Mouth/Throat:      Pharynx: Oropharynx is clear. Posterior oropharyngeal erythema present. Eyes:      General: No scleral icterus. Conjunctiva/sclera: Conjunctivae normal.      Pupils: Pupils are equal, round, and reactive to light. Neck:      Thyroid: No thyroid mass or thyromegaly. Vascular: No carotid bruit or JVD. Trachea: Trachea normal.   Cardiovascular:      Rate and Rhythm: Normal rate and regular rhythm. Pulses: Normal pulses. Heart sounds: Murmur heard. No gallop. Pulmonary:      Effort: Pulmonary effort is normal.      Breath sounds: Normal breath sounds. No wheezing, rhonchi or rales. Abdominal:      General: Bowel sounds are normal. There is no distension. Palpations: Abdomen is soft. There is no mass. Tenderness: There is no abdominal tenderness. There is no guarding. Comments: Tender without guarding RUQ. Ortega's sign equivocal   Musculoskeletal:         General: No swelling or tenderness. Normal range of motion. Cervical back: Neck supple.       Right lower leg: No edema. Left lower leg: No edema. Comments: Right sided SI pain, moderate   Lymphadenopathy:      Cervical: No cervical adenopathy. Skin:     General: Skin is warm and dry. Capillary Refill: Capillary refill takes less than 2 seconds. Coloration: Skin is not jaundiced. Findings: No bruising or rash. Neurological:      General: No focal deficit present. Mental Status: He is alert and oriented to person, place, and time. Motor: No abnormal muscle tone. Psychiatric:         Mood and Affect: Mood normal.         Behavior: Behavior normal.           Leigh Mcpherson was seen today for hypertension and lyme disease. Diagnoses and all orders for this visit:    History of Lyme disease  Should consider himself cured at this time. Checks himself more frequently  Essential hypertension  -     carvedilol (COREG) 12.5 MG tablet; take 1 tablet by mouth every morning then take 1 tablet every evening  -     olmesartan (BENICAR) 40 MG tablet; take 1 tablet by mouth once daily  Well controlled no changes made  Gastroesophageal reflux disease with esophagitis without hemorrhage  -     omeprazole (PRILOSEC) 40 MG delayed release capsule; Take 1 capsule by mouth daily    Mitral valve disorder  Denies tachycardia, syncope, orthopnea. Appears mild at this time  RUQ abdominal pain  -     US GALLBLADDER RUQ; Future  EGD and colonoscopy would be considered current  Sacroiliitis (HCC)  -     AL ARTHROCENTESIS ASPIR&/INJ INTERM JT/BURS W/O US    Other orders  -     montelukast (SINGULAIR) 10 MG tablet; take 1 tablet by mouth at bedtime if needed  -     lidocaine 1 % injection 20 mL  -     methylPREDNISolone acetate (DEPO-MEDROL) injection 40 mg  -     lidocaine 2 % injection 5 mL  Pt seated and leaning over exam table. Right side of sacrum cleaned with betadine and ETOH. Injected lower half of right SI joint. Bandaid applied.  Wear salon pas patches during day for several days        Return in about 6 months (around 6/15/2022).     Electronically signed by Kate Strange MD on 12/15/21 at 9:23 AM EST

## 2022-01-03 ENCOUNTER — OFFICE VISIT (OUTPATIENT)
Dept: FAMILY MEDICINE CLINIC | Age: 72
End: 2022-01-03
Payer: MEDICARE

## 2022-01-03 VITALS
RESPIRATION RATE: 18 BRPM | SYSTOLIC BLOOD PRESSURE: 128 MMHG | BODY MASS INDEX: 34.01 KG/M2 | HEIGHT: 72 IN | DIASTOLIC BLOOD PRESSURE: 84 MMHG | WEIGHT: 251.1 LBS | HEART RATE: 59 BPM | OXYGEN SATURATION: 96 % | TEMPERATURE: 99.1 F

## 2022-01-03 DIAGNOSIS — B96.89 ACUTE BACTERIAL SINUSITIS: ICD-10-CM

## 2022-01-03 DIAGNOSIS — J01.90 ACUTE BACTERIAL SINUSITIS: Primary | ICD-10-CM

## 2022-01-03 DIAGNOSIS — J40 BRONCHITIS WITH WHEEZING: ICD-10-CM

## 2022-01-03 DIAGNOSIS — B96.89 ACUTE BACTERIAL SINUSITIS: Primary | ICD-10-CM

## 2022-01-03 DIAGNOSIS — J01.90 ACUTE BACTERIAL SINUSITIS: ICD-10-CM

## 2022-01-03 PROCEDURE — 1123F ACP DISCUSS/DSCN MKR DOCD: CPT | Performed by: FAMILY MEDICINE

## 2022-01-03 PROCEDURE — G8427 DOCREV CUR MEDS BY ELIG CLIN: HCPCS | Performed by: FAMILY MEDICINE

## 2022-01-03 PROCEDURE — G8484 FLU IMMUNIZE NO ADMIN: HCPCS | Performed by: FAMILY MEDICINE

## 2022-01-03 PROCEDURE — 4040F PNEUMOC VAC/ADMIN/RCVD: CPT | Performed by: FAMILY MEDICINE

## 2022-01-03 PROCEDURE — 96372 THER/PROPH/DIAG INJ SC/IM: CPT | Performed by: FAMILY MEDICINE

## 2022-01-03 PROCEDURE — 99213 OFFICE O/P EST LOW 20 MIN: CPT | Performed by: FAMILY MEDICINE

## 2022-01-03 PROCEDURE — 1036F TOBACCO NON-USER: CPT | Performed by: FAMILY MEDICINE

## 2022-01-03 PROCEDURE — G8417 CALC BMI ABV UP PARAM F/U: HCPCS | Performed by: FAMILY MEDICINE

## 2022-01-03 PROCEDURE — 3017F COLORECTAL CA SCREEN DOC REV: CPT | Performed by: FAMILY MEDICINE

## 2022-01-03 RX ORDER — METHYLPREDNISOLONE ACETATE 40 MG/ML
40 INJECTION, SUSPENSION INTRA-ARTICULAR; INTRALESIONAL; INTRAMUSCULAR; SOFT TISSUE ONCE
Status: COMPLETED | OUTPATIENT
Start: 2022-01-03 | End: 2022-01-03

## 2022-01-03 RX ORDER — ALBUTEROL SULFATE 90 UG/1
2 AEROSOL, METERED RESPIRATORY (INHALATION) 4 TIMES DAILY PRN
Qty: 18 G | Refills: 5 | Status: SHIPPED
Start: 2022-01-03 | End: 2022-06-22

## 2022-01-03 RX ORDER — DOXYCYCLINE HYCLATE 100 MG
100 TABLET ORAL 2 TIMES DAILY
Qty: 20 TABLET | Refills: 0 | Status: SHIPPED | OUTPATIENT
Start: 2022-01-03 | End: 2022-01-13

## 2022-01-03 RX ORDER — GUAIFENESIN 600 MG/1
600 TABLET, EXTENDED RELEASE ORAL 2 TIMES DAILY
Qty: 30 TABLET | Refills: 0 | Status: SHIPPED | OUTPATIENT
Start: 2022-01-03 | End: 2022-01-18

## 2022-01-03 RX ADMIN — METHYLPREDNISOLONE ACETATE 40 MG: 40 INJECTION, SUSPENSION INTRA-ARTICULAR; INTRALESIONAL; INTRAMUSCULAR; SOFT TISSUE at 08:46

## 2022-01-03 ASSESSMENT — ENCOUNTER SYMPTOMS
SHORTNESS OF BREATH: 1
ABDOMINAL PAIN: 0
ABDOMINAL DISTENTION: 0
WHEEZING: 1
COUGH: 1
SINUS PRESSURE: 1
BACK PAIN: 1

## 2022-01-03 NOTE — PROGRESS NOTES
OFFICE NOTE    1/3/22  Name: Eloy Shell  HZE:[id number]   Sex:male   Age:71 y.o. SUBJECTIVE  Chief Complaint   Patient presents with    Chest Congestion     x 3 days     Wheezing     x 3 days     Sinusitis     x 3 days     Cough     x 3 days     Nasal Congestion     x 3 days        HPI comes in to today for above problems. Denies fever  Review of Systems   Constitutional: Positive for appetite change and fatigue. HENT: Positive for congestion, postnasal drip and sinus pressure. Respiratory: Positive for cough, shortness of breath and wheezing. Cardiovascular: Negative for chest pain and palpitations. Gastrointestinal: Negative for abdominal distention and abdominal pain. Genitourinary: Negative for urgency. Musculoskeletal: Positive for back pain. Negative for arthralgias. Skin: Negative for pallor. Neurological: Negative for seizures and numbness. Psychiatric/Behavioral: The patient is nervous/anxious.              Current Outpatient Medications:     doxycycline hyclate (VIBRA-TABS) 100 MG tablet, Take 1 tablet by mouth 2 times daily for 10 days, Disp: 20 tablet, Rfl: 0    guaiFENesin (MUCINEX) 600 MG extended release tablet, Take 1 tablet by mouth 2 times daily for 15 days, Disp: 30 tablet, Rfl: 0    albuterol sulfate HFA (VENTOLIN HFA) 108 (90 Base) MCG/ACT inhaler, Inhale 2 puffs into the lungs 4 times daily as needed for Wheezing, Disp: 18 g, Rfl: 5    carvedilol (COREG) 12.5 MG tablet, take 1 tablet by mouth every morning then take 1 tablet every evening, Disp: 180 tablet, Rfl: 1    montelukast (SINGULAIR) 10 MG tablet, take 1 tablet by mouth at bedtime if needed, Disp: 90 tablet, Rfl: 1    olmesartan (BENICAR) 40 MG tablet, take 1 tablet by mouth once daily, Disp: 90 tablet, Rfl: 1    omeprazole (PRILOSEC) 40 MG delayed release capsule, Take 1 capsule by mouth daily, Disp: 90 capsule, Rfl: 1    cetirizine (ZYRTEC) 10 MG tablet, Take 1 tablet by mouth daily, Disp: 30 tablet, Rfl: 1    aspirin 81 MG tablet, Take 81 mg by mouth daily No hold/Dr Gee Sandhu, Disp: , Rfl:     Omega-3 Fatty Acids (FISH OIL PO), Take by mouth daily, Disp: , Rfl:     Multiple Vitamin (MULTI-VITAMIN DAILY PO), Take by mouth daily, Disp: , Rfl:   Allergies   Allergen Reactions    Augmentin [Amoxicillin-Pot Clavulanate] Diarrhea       Past Medical History:   Diagnosis Date    Acid reflux     Arthritis     BPH (benign prostatic hyperplasia)     Cancer ([address] 11/2018    skin    Chest pain     Disorder of prostate     Diverticulosis     Heart murmur     Hypertension     Left knee pain     for OR 5/2/2019    Mitral valve disorder     Palpitations      Past Surgical History:   Procedure Laterality Date    BACK SURGERY      COLONOSCOPY  2018    HERNIA REPAIR      KNEE ARTHROSCOPY Left 5/2/2019    LEFT KNEE ARTHROSCOPY WITH PARTIAL MEDIAL MENISECTOMY performed by Mary Schroeder MD at [place] [address] SKIN BIOPSY  11/2018    UPPER GASTROINTESTINAL ENDOSCOPY  2018     Family History   Problem Relation Age of Onset    Stroke Mother     Heart Disease Father         Bypass Surgery, Pacemaker, Carotids    Coronary Art Dis Father     Other Son         procoagulant disorder     Social History     Tobacco History     Smoking Status  Never Smoker    Smokeless Tobacco Use  Never Used          Alcohol History     Alcohol Use Status  Yes Comment  3 times weekly          Drug Use     Drug Use Status  No          Sexual Activity     Sexually Active  Yes Partners  Female                OBJECTIVE  Vitals:    01/03/22 0802   BP: 128/84   Pulse: 59   Resp: 18   Temp: 99.1 °F (37.3 °C)   TempSrc: Temporal   SpO2: 96%   Weight: 251 lb 1.6 oz (113.9 kg)   Height: 6' (1.829 m)        Body mass index is 34.06 kg/m².     Orders Placed This Encounter   Procedures    COVID-19 Ambulatory     Standing Status:   Future     Standing Expiration Date:   1/3/2023     Scheduling Instructions:      Saline media preferred given current shortage of viral transport media but both acceptable     Order Specific Question:   Is this test for diagnosis or screening? Answer:   Diagnosis of ill patient     Order Specific Question:   Symptomatic for COVID-19 as defined by CDC? Answer:   Yes     Order Specific Question:   Date of Symptom Onset     Answer:   1/30/2020     Order Specific Question:   Hospitalized for COVID-19? Answer:   No     Order Specific Question:   Admitted to ICU for COVID-19? Answer:   No     Order Specific Question:   Pregnant: Answer:   No     Order Specific Question:   Previously tested for COVID-19? Answer:   Yes        EXAM   Physical Exam  Vitals and nursing note reviewed. Constitutional:       Appearance: Normal appearance. He is obese. HENT:      Right Ear: Tympanic membrane normal.      Left Ear: Tympanic membrane normal.      Nose: No congestion. Mouth/Throat:      Pharynx: Oropharynx is clear. Posterior oropharyngeal erythema present. Eyes:      Conjunctiva/sclera: Conjunctivae normal.   Musculoskeletal:         General: Normal range of motion. Cervical back: Tenderness present. Right lower leg: No edema. Left lower leg: No edema. Lymphadenopathy:      Cervical: No cervical adenopathy. Skin:     Coloration: Skin is not jaundiced. Findings: No bruising or rash. Neurological:      General: No focal deficit present. Mental Status: He is alert and oriented to person, place, and time. Psychiatric:         Mood and Affect: Mood normal.         Behavior: Behavior normal.           Emma Chaney was seen today for chest congestion, wheezing, sinusitis, cough and nasal congestion. Diagnoses and all orders for this visit:    Acute bacterial sinusitis  -     doxycycline hyclate (VIBRA-TABS) 100 MG tablet; Take 1 tablet by mouth 2 times daily for 10 days  -     guaiFENesin (MUCINEX) 600 MG extended release tablet;  Take 1 tablet by mouth 2 times daily for 15 days  - albuterol sulfate HFA (VENTOLIN HFA) 108 (90 Base) MCG/ACT inhaler; Inhale 2 puffs into the lungs 4 times daily as needed for Wheezing  -     methylPREDNISolone acetate (DEPO-MEDROL) injection 40 mg  -     COVID-19 Ambulatory; Future    Bronchitis with wheezing  -     doxycycline hyclate (VIBRA-TABS) 100 MG tablet; Take 1 tablet by mouth 2 times daily for 10 days  -     guaiFENesin (MUCINEX) 600 MG extended release tablet; Take 1 tablet by mouth 2 times daily for 15 days  -     albuterol sulfate HFA (VENTOLIN HFA) 108 (90 Base) MCG/ACT inhaler; Inhale 2 puffs into the lungs 4 times daily as needed for Wheezing  -     methylPREDNISolone acetate (DEPO-MEDROL) injection 40 mg  -     COVID-19 Ambulatory; Future          Return if symptoms worsen or fail to improve.     Electronically signed by Ana Christy MD on 1/3/22 at 8:02 AM EST

## 2022-01-05 LAB
SARS-COV-2: NOT DETECTED
SOURCE: NORMAL

## 2022-03-25 ENCOUNTER — OFFICE VISIT (OUTPATIENT)
Dept: ORTHOPEDIC SURGERY | Age: 72
End: 2022-03-25
Payer: MEDICARE

## 2022-03-25 VITALS — WEIGHT: 250 LBS | BODY MASS INDEX: 33.86 KG/M2 | HEIGHT: 72 IN

## 2022-03-25 DIAGNOSIS — M25.562 LEFT KNEE PAIN, UNSPECIFIED CHRONICITY: ICD-10-CM
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
## 2022-03-25 PROCEDURE — 4040F PNEUMOC VAC/ADMIN/RCVD: CPT | Performed by: NURSE PRACTITIONER

## 2022-03-25 PROCEDURE — G8427 DOCREV CUR MEDS BY ELIG CLIN: HCPCS | Performed by: NURSE PRACTITIONER

## 2022-03-25 PROCEDURE — 1036F TOBACCO NON-USER: CPT | Performed by: NURSE PRACTITIONER

## 2022-03-25 PROCEDURE — 3017F COLORECTAL CA SCREEN DOC REV: CPT | Performed by: NURSE PRACTITIONER

## 2022-03-25 PROCEDURE — 1123F ACP DISCUSS/DSCN MKR DOCD: CPT | Performed by: NURSE PRACTITIONER

## 2022-03-25 PROCEDURE — 20610 DRAIN/INJ JOINT/BURSA W/O US: CPT | Performed by: NURSE PRACTITIONER

## 2022-03-25 PROCEDURE — G8484 FLU IMMUNIZE NO ADMIN: HCPCS | Performed by: NURSE PRACTITIONER

## 2022-03-25 PROCEDURE — 99213 OFFICE O/P EST LOW 20 MIN: CPT | Performed by: NURSE PRACTITIONER

## 2022-03-25 PROCEDURE — G8417 CALC BMI ABV UP PARAM F/U: HCPCS | Performed by: NURSE PRACTITIONER

## 2022-03-25 RX ORDER — LIDOCAINE HYDROCHLORIDE 10 MG/ML
4 INJECTION, SOLUTION INFILTRATION; PERINEURAL ONCE
Status: COMPLETED | OUTPATIENT
Start: 2022-03-25 | End: 2022-03-25

## 2022-03-25 RX ORDER — TRIAMCINOLONE ACETONIDE 40 MG/ML
40 INJECTION, SUSPENSION INTRA-ARTICULAR; INTRAMUSCULAR ONCE
Status: COMPLETED | OUTPATIENT
Start: 2022-03-25 | End: 2022-03-25

## 2022-03-25 RX ADMIN — TRIAMCINOLONE ACETONIDE 40 MG: 40 INJECTION, SUSPENSION INTRA-ARTICULAR; INTRAMUSCULAR at 10:08

## 2022-03-25 RX ADMIN — LIDOCAINE HYDROCHLORIDE 4 ML: 10 INJECTION, SOLUTION INFILTRATION; PERINEURAL at 10:07

## 2022-03-25 NOTE — PROGRESS NOTES
Follow Up Visit     Lisa Uriostegui returns today for follow up visit regarding left knee pain. Patient underwent left knee arthroscopic surgery in 2019 by Dr. Christen Hernandes. He reports symptoms have been improved until 1 month ago when he slipped and fell on ice and hyperflexed his knee. States he has had persistent pain to the medial aspect of his knee for the last month that has not improved with OTC medications. REVIEW OF SYSTEMS:     General: Negative Fever, chills, fatigue   Cardiovascular: Negative chest pain, palpitations  Respiratory: Equal chest expansion, negative shortness of breath   Skin: Negative rash, open wounds  Psych: Normal affect, mood stable  Neurologic: sensation grossly intact in extremities   Musculoskeletal: See HPI      Physical Exam:     Height: 6' (1.829 m), Weight: 250 lb (113.4 kg) (per pt)    General: Alert and oriented x3, no acute distress  Cardiovascular/pulmonary: No labored breathing, peripheral perfusion intact  Musculoskeletal:    Left knee exam range of motion 0-120, medial joint line tenderness with palpation. No swelling deformity or palpable effusion. Stable valgus and varus exams. Stable patella tracked midline    Controlled Substances Monitoring:      Imaging:  X-rays including 4 views of the left knee shows advanced degenerative changes bone-on-bone to the medial compartment    Impression: Left knee osteoarthritis    Procedure Note: Knee Cortisone injection     The left knee was identified as the injection site. The risk and benefits of a cortisone injection were explained and the patient consented to the injection. Under sterile conditions, the knee was injected with a mixture of 40 mg of Kenalog and 4 mL of 1% Lidocaine without complication. A sterile bandage was applied.     Administrations This Visit     lidocaine 1 % injection 4 mL     Admin Date  03/25/2022 Action  Given Dose  4 mL Route  Intra-artICUlar Administered By  Brigida Shelton, ATC          triamcinolone

## 2022-03-25 NOTE — PATIENT INSTRUCTIONS
Patient Education        Knee Arthritis: Exercises  Introduction  Here are some examples of exercises for you to try. The exercises may be suggested for a condition or for rehabilitation. Start each exercise slowly. Ease off the exercises if you start to have pain. You will be told when to start these exercises and which ones will work best for you. How to do the exercises  Knee flexion with heel slide    1. Lie on your back with your knees bent. 2. Slide your heel back by bending your affected knee as far as you can. Then hook your other foot around your ankle to help pull your heel even farther back. 3. Hold for about 6 seconds, then rest for up to 10 seconds. 4. Repeat 8 to 12 times. 5. Switch legs and repeat steps 1 through 4, even if only one knee is sore. Quad sets    1. Sit with your affected leg straight and supported on the floor or a firm bed. Place a small, rolled-up towel under your knee. Your other leg should be bent, with that foot flat on the floor. 2. Tighten the thigh muscles of your affected leg by pressing the back of your knee down into the towel. 3. Hold for about 6 seconds, then rest for up to 10 seconds. 4. Repeat 8 to 12 times. 5. Switch legs and repeat steps 1 through 4, even if only one knee is sore. Straight-leg raises to the front    1. Lie on your back with your good knee bent so that your foot rests flat on the floor. Your affected leg should be straight. Make sure that your low back has a normal curve. You should be able to slip your hand in between the floor and the small of your back, with your palm touching the floor and your back touching the back of your hand. 2. Tighten the thigh muscles in your affected leg by pressing the back of your knee flat down to the floor. Hold your knee straight. 3. Keeping the thigh muscles tight and your leg straight, lift your affected leg up so that your heel is about 12 inches off the floor.  Hold for about 6 seconds, then lower slowly. 4. Relax for up to 10 seconds between repetitions. 5. Repeat 8 to 12 times. 6. Switch legs and repeat steps 1 through 5, even if only one knee is sore. Active knee flexion    1. Lie on your stomach with your knees straight. If your kneecap is uncomfortable, roll up a washcloth and put it under your leg just above your kneecap. 2. Lift the foot of your affected leg by bending the knee so that you bring the foot up toward your buttock. If this motion hurts, try it without bending your knee quite as far. This may help you avoid any painful motion. 3. Slowly move your leg up and down. 4. Repeat 8 to 12 times. 5. Switch legs and repeat steps 1 through 4, even if only one knee is sore. Quadriceps stretch (facedown)    1. Lie flat on your stomach, and rest your face on the floor. 2. Wrap a towel or belt strap around the lower part of your affected leg. Then use the towel or belt strap to slowly pull your heel toward your buttock until you feel a stretch. 3. Hold for about 15 to 30 seconds, then relax your leg against the towel or belt strap. 4. Repeat 2 to 4 times. 5. Switch legs and repeat steps 1 through 4, even if only one knee is sore. Stationary exercise bike    1. If you do not have a stationary exercise bike at home, you can find one to ride at your local health club or community center. 2. Adjust the height of the bike seat so that your knee is slightly bent when your leg is extended downward. If your knee hurts when the pedal reaches the top, you can raise the seat so that your knee does not bend as much. 3. Start slowly. At first, try to do 5 to 10 minutes of cycling with little to no resistance. Then increase your time and the resistance bit by bit until you can do 20 to 30 minutes without pain. 4. If you start to have pain, rest your knee until your pain gets back to the level that is normal for you. Or cycle for less time or with less effort.   Follow-up care is a key part of your treatment and safety. Be sure to make and go to all appointments, and call your doctor if you are having problems. It's also a good idea to know your test results and keep a list of the medicines you take. Where can you learn more? Go to https://[email or web address].[email or web address]. org and sign in to your [employer] account. Enter C159 in the Moviestorm box to learn more about \"Knee Arthritis: Exercises. \"     If you do not have an account, please click on the \"Sign Up Now\" link. Current as of: July 1, 2021               Content Version: 13.1  © 7826-6429 [employer]. Care instructions adapted under license by [facility] ([facility]). If you have questions about a medical condition or this instruction, always ask your healthcare professional. [address] any warranty or liability for your use of this information.

## 2022-05-17 ENCOUNTER — OFFICE VISIT (OUTPATIENT)
Dept: FAMILY MEDICINE CLINIC | Age: 72
End: 2022-05-17
Payer: MEDICARE

## 2022-05-17 VITALS
BODY MASS INDEX: 34.13 KG/M2 | HEIGHT: 72 IN | RESPIRATION RATE: 18 BRPM | SYSTOLIC BLOOD PRESSURE: 138 MMHG | WEIGHT: 252 LBS | TEMPERATURE: 95.9 F | DIASTOLIC BLOOD PRESSURE: 84 MMHG | OXYGEN SATURATION: 97 % | HEART RATE: 65 BPM

## 2022-05-17 DIAGNOSIS — Z86.19 HISTORY OF LYME DISEASE: Primary | ICD-10-CM

## 2022-05-17 DIAGNOSIS — T78.40XA ALLERGIC REACTION, INITIAL ENCOUNTER: ICD-10-CM

## 2022-05-17 DIAGNOSIS — D47.9 LYMPHOPROLIFERATIVE DISORDER (HCC): ICD-10-CM

## 2022-05-17 DIAGNOSIS — M46.1 SACROILIITIS (HCC): ICD-10-CM

## 2022-05-17 DIAGNOSIS — Z86.19 HISTORY OF LYME DISEASE: ICD-10-CM

## 2022-05-17 DIAGNOSIS — I10 ESSENTIAL HYPERTENSION: ICD-10-CM

## 2022-05-17 DIAGNOSIS — K21.00 GASTROESOPHAGEAL REFLUX DISEASE WITH ESOPHAGITIS WITHOUT HEMORRHAGE: ICD-10-CM

## 2022-05-17 LAB
ALBUMIN SERPL-MCNC: 4.7 G/DL (ref 3.5–5.2)
ALP BLD-CCNC: 51 U/L (ref 40–129)
ALT SERPL-CCNC: 30 U/L (ref 0–40)
ANION GAP SERPL CALCULATED.3IONS-SCNC: 8 MMOL/L (ref 7–16)
AST SERPL-CCNC: 32 U/L (ref 0–39)
BASOPHILS ABSOLUTE: 0.03 E9/L (ref 0–0.2)
BASOPHILS RELATIVE PERCENT: 0.7 % (ref 0–2)
BILIRUB SERPL-MCNC: 0.5 MG/DL (ref 0–1.2)
BUN BLDV-MCNC: 18 MG/DL (ref 6–23)
CALCIUM SERPL-MCNC: 9.9 MG/DL (ref 8.6–10.2)
CHLORIDE BLD-SCNC: 101 MMOL/L (ref 98–107)
CO2: 26 MMOL/L (ref 22–29)
CREAT SERPL-MCNC: 0.9 MG/DL (ref 0.7–1.2)
EOSINOPHILS ABSOLUTE: 0.14 E9/L (ref 0.05–0.5)
EOSINOPHILS RELATIVE PERCENT: 3.3 % (ref 0–6)
GFR AFRICAN AMERICAN: >60
GFR NON-AFRICAN AMERICAN: >60 ML/MIN/1.73
GLUCOSE BLD-MCNC: 108 MG/DL (ref 74–99)
HCT VFR BLD CALC: 42.4 % (ref 37–54)
HEMOGLOBIN: 14.2 G/DL (ref 12.5–16.5)
IMMATURE GRANULOCYTES #: 0.02 E9/L
IMMATURE GRANULOCYTES %: 0.5 % (ref 0–5)
LYMPHOCYTES ABSOLUTE: 1.1 E9/L (ref 1.5–4)
LYMPHOCYTES RELATIVE PERCENT: 26.3 % (ref 20–42)
MCH RBC QN AUTO: 33.1 PG (ref 26–35)
MCHC RBC AUTO-ENTMCNC: 33.5 % (ref 32–34.5)
MCV RBC AUTO: 98.8 FL (ref 80–99.9)
MONOCYTES ABSOLUTE: 0.51 E9/L (ref 0.1–0.95)
MONOCYTES RELATIVE PERCENT: 12.2 % (ref 2–12)
NEUTROPHILS ABSOLUTE: 2.38 E9/L (ref 1.8–7.3)
NEUTROPHILS RELATIVE PERCENT: 57 % (ref 43–80)
PDW BLD-RTO: 14.3 FL (ref 11.5–15)
PLATELET # BLD: 181 E9/L (ref 130–450)
PMV BLD AUTO: 9.3 FL (ref 7–12)
POTASSIUM SERPL-SCNC: 5.3 MMOL/L (ref 3.5–5)
RBC # BLD: 4.29 E12/L (ref 3.8–5.8)
SEDIMENTATION RATE, ERYTHROCYTE: 5 MM/HR (ref 0–15)
SODIUM BLD-SCNC: 135 MMOL/L (ref 132–146)
TOTAL PROTEIN: 7.8 G/DL (ref 6.4–8.3)
WBC # BLD: 4.2 E9/L (ref 4.5–11.5)

## 2022-05-17 PROCEDURE — 96372 THER/PROPH/DIAG INJ SC/IM: CPT | Performed by: FAMILY MEDICINE

## 2022-05-17 PROCEDURE — G8427 DOCREV CUR MEDS BY ELIG CLIN: HCPCS | Performed by: FAMILY MEDICINE

## 2022-05-17 PROCEDURE — 1123F ACP DISCUSS/DSCN MKR DOCD: CPT | Performed by: FAMILY MEDICINE

## 2022-05-17 PROCEDURE — 4040F PNEUMOC VAC/ADMIN/RCVD: CPT | Performed by: FAMILY MEDICINE

## 2022-05-17 PROCEDURE — 3017F COLORECTAL CA SCREEN DOC REV: CPT | Performed by: FAMILY MEDICINE

## 2022-05-17 PROCEDURE — 99214 OFFICE O/P EST MOD 30 MIN: CPT | Performed by: FAMILY MEDICINE

## 2022-05-17 PROCEDURE — 1036F TOBACCO NON-USER: CPT | Performed by: FAMILY MEDICINE

## 2022-05-17 PROCEDURE — G8417 CALC BMI ABV UP PARAM F/U: HCPCS | Performed by: FAMILY MEDICINE

## 2022-05-17 RX ORDER — METHYLPREDNISOLONE ACETATE 80 MG/ML
40 INJECTION, SUSPENSION INTRA-ARTICULAR; INTRALESIONAL; INTRAMUSCULAR; SOFT TISSUE ONCE
Status: DISCONTINUED | OUTPATIENT
Start: 2022-05-17 | End: 2022-05-17

## 2022-05-17 RX ORDER — TRIAMCINOLONE ACETONIDE 40 MG/ML
40 INJECTION, SUSPENSION INTRA-ARTICULAR; INTRAMUSCULAR ONCE
Status: COMPLETED | OUTPATIENT
Start: 2022-05-17 | End: 2022-05-17

## 2022-05-17 RX ADMIN — TRIAMCINOLONE ACETONIDE 40 MG: 40 INJECTION, SUSPENSION INTRA-ARTICULAR; INTRAMUSCULAR at 12:51

## 2022-05-17 ASSESSMENT — ENCOUNTER SYMPTOMS
PHOTOPHOBIA: 0
BACK PAIN: 1
COUGH: 0
BLOOD IN STOOL: 0
SINUS PRESSURE: 1
CONSTIPATION: 0
SHORTNESS OF BREATH: 0
EYE REDNESS: 0
ABDOMINAL PAIN: 0
VOMITING: 0
WHEEZING: 0
SORE THROAT: 0
SINUS PAIN: 0
DIARRHEA: 0
RHINORRHEA: 1
TROUBLE SWALLOWING: 0

## 2022-05-17 NOTE — PROGRESS NOTES
OFFICE NOTE    5/17/22  Name: Conrad Vides  GKB:[id number]   Sex:male   Age:72 y.o. SUBJECTIVE  Chief Complaint   Patient presents with    Rash     all over the body, itchy x 2 days     Dental Pain       HPI Was advised he might have infected tooth but did not show up on x-ray. Concerned about Lyme disease he had earlier. Was sent to Heme/Onc but was not satisfied that they ruled out ongoing problem    Review of Systems   Constitutional: Positive for fatigue. Negative for appetite change, fever and unexpected weight change. HENT: Positive for rhinorrhea and sinus pressure. Negative for congestion, ear pain, hearing loss, sinus pain, sore throat and trouble swallowing. Eyes: Negative for photophobia, redness and visual disturbance. Respiratory: Negative for cough, shortness of breath and wheezing. Cardiovascular: Negative for chest pain, palpitations and leg swelling. Gastrointestinal: Negative for abdominal pain, blood in stool, constipation, diarrhea and vomiting. Endocrine: Negative for cold intolerance, polydipsia and polyuria. Genitourinary: Positive for urgency. Negative for difficulty urinating, genital sores and hematuria. Musculoskeletal: Positive for arthralgias and back pain. Negative for joint swelling. Allergic/Immunologic: Negative for food allergies. Neurological: Negative for dizziness, tremors, seizures, syncope, numbness and headaches. Hematological: Negative for adenopathy. Does not bruise/bleed easily. Psychiatric/Behavioral: Negative for agitation, dysphoric mood, hallucinations and sleep disturbance. The patient is nervous/anxious. All other systems reviewed and are negative.            Current Outpatient Medications:     carvedilol (COREG) 12.5 MG tablet, take 1 tablet by mouth every morning then take 1 tablet every evening, Disp: 180 tablet, Rfl: 1    montelukast (SINGULAIR) 10 MG tablet, take 1 tablet by mouth at bedtime if needed, Disp: 90 tablet, Rfl: 1    olmesartan (BENICAR) 40 MG tablet, take 1 tablet by mouth once daily, Disp: 90 tablet, Rfl: 1    omeprazole (PRILOSEC) 40 MG delayed release capsule, Take 1 capsule by mouth daily, Disp: 90 capsule, Rfl: 1    cetirizine (ZYRTEC) 10 MG tablet, Take 1 tablet by mouth daily, Disp: 30 tablet, Rfl: 1    aspirin 81 MG tablet, Take 81 mg by mouth daily No hold/Dr Abigail Puente, Disp: , Rfl:     Omega-3 Fatty Acids (FISH OIL PO), Take by mouth daily, Disp: , Rfl:     Multiple Vitamin (MULTI-VITAMIN DAILY PO), Take by mouth daily, Disp: , Rfl:     albuterol sulfate HFA (VENTOLIN HFA) 108 (90 Base) MCG/ACT inhaler, Inhale 2 puffs into the lungs 4 times daily as needed for Wheezing (Patient not taking: Reported on 5/17/2022), Disp: 18 g, Rfl: 5  Allergies   Allergen Reactions    Augmentin [Amoxicillin-Pot Clavulanate] Diarrhea    Clindamycin/Lincomycin Hives and Rash       Past Medical History:   Diagnosis Date    Acid reflux     Arthritis     BPH (benign prostatic hyperplasia)     Cancer ([facility] [address] 11/2018    skin    Chest pain     Disorder of prostate     Diverticulosis     Heart murmur     Hypertension     Left knee pain     for OR 5/2/2019    Mitral valve disorder     Palpitations      Past Surgical History:   Procedure Laterality Date    BACK SURGERY      COLONOSCOPY  2018    HERNIA REPAIR      KNEE ARTHROSCOPY Left 5/2/2019    LEFT KNEE ARTHROSCOPY WITH PARTIAL MEDIAL MENISECTOMY performed by Herbert Silva MD at [address] [place] [address]  11/2018    UPPER GASTROINTESTINAL ENDOSCOPY  2018     Family History   Problem Relation Age of Onset    Stroke Mother     Heart Disease Father         Bypass Surgery, Pacemaker, Carotids    Coronary Art Dis Father     Other Son         procoagulant disorder     Social History     Tobacco History     Smoking Status  Never Smoker    Smokeless Tobacco Use  Never Used          Alcohol History     Alcohol Use Status  Yes Comment  3 times weekly          Drug Use Drug Use Status  No          Sexual Activity     Sexually Active  Yes Partners  Female                OBJECTIVE  Vitals:    05/17/22 1130 05/17/22 1135   BP: (!) 160/90 (!) 150/86   Pulse: 65    Resp: 18    Temp: 95.9 °F (35.5 °C)    TempSrc: Temporal    SpO2: 97%    Weight: 252 lb (114.3 kg)    Height: 6' (1.829 m)         Body mass index is 34.18 kg/m². Orders Placed This Encounter   Procedures    CBC with Auto Differential     Standing Status:   Future     Number of Occurrences:   1     Standing Expiration Date:   5/17/2023    Comprehensive Metabolic Panel     Standing Status:   Future     Number of Occurrences:   1     Standing Expiration Date:   5/17/2023    Sedimentation Rate     Standing Status:   Future     Number of Occurrences:   1     Standing Expiration Date:   5/17/2023    Lyme Disease Acute Reflexive Panel     Standing Status:   Future     Number of Occurrences:   1     Standing Expiration Date:   5/17/2023        EXAM   Physical Exam  Vitals and nursing note reviewed. Constitutional:       Appearance: Normal appearance. He is well-developed. He is obese. HENT:      Right Ear: Tympanic membrane, ear canal and external ear normal.      Left Ear: Tympanic membrane, ear canal and external ear normal.      Nose: Congestion present. Mouth/Throat:      Pharynx: Oropharynx is clear. Posterior oropharyngeal erythema present. Eyes:      General: No scleral icterus. Conjunctiva/sclera: Conjunctivae normal.      Pupils: Pupils are equal, round, and reactive to light. Neck:      Thyroid: No thyroid mass or thyromegaly. Vascular: No carotid bruit or JVD. Trachea: Trachea normal.   Cardiovascular:      Rate and Rhythm: Normal rate and regular rhythm. Heart sounds: Normal heart sounds. No murmur heard. No gallop. Pulmonary:      Effort: Pulmonary effort is normal.      Breath sounds: Normal breath sounds. No wheezing, rhonchi or rales.    Abdominal:      General: Bowel sounds are normal. There is no distension. Palpations: Abdomen is soft. There is no mass. Tenderness: There is no abdominal tenderness. There is no guarding. Musculoskeletal:         General: No swelling or tenderness. Normal range of motion. Cervical back: Neck supple. Right lower leg: No edema. Left lower leg: Edema present. Lymphadenopathy:      Cervical: No cervical adenopathy. Skin:     General: Skin is warm and dry. Coloration: Skin is jaundiced. Findings: Rash present. No bruising. Neurological:      Mental Status: He is alert and oriented to person, place, and time. Sensory: No sensory deficit. Motor: No weakness or abnormal muscle tone. Coordination: Coordination normal.      Gait: Gait normal.   Psychiatric:         Mood and Affect: Mood normal.         Behavior: Behavior normal.           Meredith Delgado was seen today for rash and dental pain. Diagnoses and all orders for this visit:    History of Lyme disease  -     Sedimentation Rate; Future  -     Lyme Disease Acute Reflexive Panel; Future  Will check for IGM anitbodies. Doubt we will find them  Gastroesophageal reflux disease with esophagitis without hemorrhage  On Omeprazol 40 mg qd. Working well for him  Essential hypertension  -     CBC with Auto Differential; Future  -     Comprehensive Metabolic Panel; Future  Came down on third try. Checks at home always mid 130s over 70s. Allergic reaction, initial encounter  -     Discontinue: methylPREDNISolone acetate (DEPO-MEDROL) injection 40 mg  Maculopapular eruption after taking clindamycin. Will stop. Given Kenalog 40 mg RA  Sacroiliitis (HCC)  Largely resolved  Lymphoproliferative disorder ([address]  Do not believe he has this, see labs  Other orders  -     triamcinolone acetonide (KENALOG-40) injection 40 mg          No follow-ups on file.     Electronically signed by Justine Carrillo MD on 5/17/22 at 12:18 PM EDT

## 2022-05-22 LAB
LYME (B. BURGDORFERI) AB IGG WB: NEGATIVE
LYME AB IGM BY WB:: NEGATIVE
LYME, EIA: 1.4 LIV (ref 0–1.2)

## 2022-06-22 ENCOUNTER — OFFICE VISIT (OUTPATIENT)
Dept: FAMILY MEDICINE CLINIC | Age: 72
End: 2022-06-22
Payer: MEDICARE

## 2022-06-22 VITALS
DIASTOLIC BLOOD PRESSURE: 80 MMHG | SYSTOLIC BLOOD PRESSURE: 118 MMHG | BODY MASS INDEX: 33.32 KG/M2 | OXYGEN SATURATION: 97 % | WEIGHT: 246 LBS | RESPIRATION RATE: 18 BRPM | TEMPERATURE: 97.3 F | HEIGHT: 72 IN | HEART RATE: 93 BPM

## 2022-06-22 DIAGNOSIS — M47.816 LOCALIZED OSTEOARTHRITIS OF LUMBAR SPINE: ICD-10-CM

## 2022-06-22 DIAGNOSIS — E78.49 OTHER HYPERLIPIDEMIA: ICD-10-CM

## 2022-06-22 DIAGNOSIS — Z12.5 SCREENING FOR PROSTATE CANCER: ICD-10-CM

## 2022-06-22 DIAGNOSIS — E78.49 OTHER HYPERLIPIDEMIA: Primary | ICD-10-CM

## 2022-06-22 DIAGNOSIS — K21.00 GASTROESOPHAGEAL REFLUX DISEASE WITH ESOPHAGITIS WITHOUT HEMORRHAGE: ICD-10-CM

## 2022-06-22 DIAGNOSIS — I10 ESSENTIAL HYPERTENSION: ICD-10-CM

## 2022-06-22 LAB
CHOLESTEROL, TOTAL: 152 MG/DL (ref 0–199)
HDLC SERPL-MCNC: 56 MG/DL
LDL CHOLESTEROL CALCULATED: 87 MG/DL (ref 0–99)
PROSTATE SPECIFIC ANTIGEN: 0.47 NG/ML (ref 0–4)
TRIGL SERPL-MCNC: 47 MG/DL (ref 0–149)
TSH SERPL DL<=0.05 MIU/L-ACNC: 2.77 UIU/ML (ref 0.27–4.2)
VLDLC SERPL CALC-MCNC: 9 MG/DL

## 2022-06-22 PROCEDURE — 1036F TOBACCO NON-USER: CPT | Performed by: FAMILY MEDICINE

## 2022-06-22 PROCEDURE — 3017F COLORECTAL CA SCREEN DOC REV: CPT | Performed by: FAMILY MEDICINE

## 2022-06-22 PROCEDURE — 99214 OFFICE O/P EST MOD 30 MIN: CPT | Performed by: FAMILY MEDICINE

## 2022-06-22 PROCEDURE — 81003 URINALYSIS AUTO W/O SCOPE: CPT | Performed by: FAMILY MEDICINE

## 2022-06-22 PROCEDURE — 93000 ELECTROCARDIOGRAM COMPLETE: CPT | Performed by: FAMILY MEDICINE

## 2022-06-22 PROCEDURE — 1123F ACP DISCUSS/DSCN MKR DOCD: CPT | Performed by: FAMILY MEDICINE

## 2022-06-22 PROCEDURE — G8417 CALC BMI ABV UP PARAM F/U: HCPCS | Performed by: FAMILY MEDICINE

## 2022-06-22 PROCEDURE — G8427 DOCREV CUR MEDS BY ELIG CLIN: HCPCS | Performed by: FAMILY MEDICINE

## 2022-06-22 RX ORDER — CLOTRIMAZOLE AND BETAMETHASONE DIPROPIONATE 10; .64 MG/G; MG/G
CREAM TOPICAL
Qty: 45 G | Refills: 2 | Status: SHIPPED | OUTPATIENT
Start: 2022-06-22

## 2022-06-22 RX ORDER — OMEPRAZOLE 40 MG/1
40 CAPSULE, DELAYED RELEASE ORAL DAILY
Qty: 90 CAPSULE | Refills: 1 | Status: SHIPPED | OUTPATIENT
Start: 2022-06-22

## 2022-06-22 RX ORDER — OLMESARTAN MEDOXOMIL 40 MG/1
TABLET ORAL
Qty: 90 TABLET | Refills: 1 | Status: SHIPPED | OUTPATIENT
Start: 2022-06-22

## 2022-06-22 RX ORDER — CARVEDILOL 12.5 MG/1
TABLET ORAL
Qty: 180 TABLET | Refills: 1 | Status: SHIPPED | OUTPATIENT
Start: 2022-06-22

## 2022-06-22 SDOH — ECONOMIC STABILITY: FOOD INSECURITY: WITHIN THE PAST 12 MONTHS, YOU WORRIED THAT YOUR FOOD WOULD RUN OUT BEFORE YOU GOT MONEY TO BUY MORE.: NEVER TRUE

## 2022-06-22 SDOH — ECONOMIC STABILITY: FOOD INSECURITY: WITHIN THE PAST 12 MONTHS, THE FOOD YOU BOUGHT JUST DIDN'T LAST AND YOU DIDN'T HAVE MONEY TO GET MORE.: NEVER TRUE

## 2022-06-22 ASSESSMENT — ENCOUNTER SYMPTOMS
CONSTIPATION: 0
SHORTNESS OF BREATH: 0
TROUBLE SWALLOWING: 0
SORE THROAT: 0
COUGH: 0
EYE REDNESS: 0
WHEEZING: 0
BLOOD IN STOOL: 0
PHOTOPHOBIA: 0
VOMITING: 0
BACK PAIN: 1
SINUS PAIN: 0
DIARRHEA: 0
ABDOMINAL PAIN: 0

## 2022-06-22 ASSESSMENT — LIFESTYLE VARIABLES
HOW OFTEN DO YOU HAVE A DRINK CONTAINING ALCOHOL: 4 OR MORE TIMES A WEEK
HOW MANY STANDARD DRINKS CONTAINING ALCOHOL DO YOU HAVE ON A TYPICAL DAY: 1 OR 2

## 2022-06-22 ASSESSMENT — PATIENT HEALTH QUESTIONNAIRE - PHQ9
SUM OF ALL RESPONSES TO PHQ QUESTIONS 1-9: 0
2. FEELING DOWN, DEPRESSED OR HOPELESS: 0
SUM OF ALL RESPONSES TO PHQ QUESTIONS 1-9: 0
1. LITTLE INTEREST OR PLEASURE IN DOING THINGS: 0
SUM OF ALL RESPONSES TO PHQ9 QUESTIONS 1 & 2: 0

## 2022-06-22 ASSESSMENT — SOCIAL DETERMINANTS OF HEALTH (SDOH): HOW HARD IS IT FOR YOU TO PAY FOR THE VERY BASICS LIKE FOOD, HOUSING, MEDICAL CARE, AND HEATING?: NOT HARD AT ALL

## 2022-06-22 NOTE — PROGRESS NOTES
OFFICE NOTE    6/22/22  Name: Rashi Talbert  OIX:[id number]   Sex:male   Age:72 y.o. SUBJECTIVE  Chief Complaint   Patient presents with    Discuss Medications    Medication Refill       HPI Seems more anxious these days. Has to do much for wife who is disabled from her fall. Review of Systems   Constitutional: Positive for fatigue. Negative for activity change, appetite change, fever and unexpected weight change. HENT: Negative for congestion, ear pain, hearing loss, sinus pain, sore throat and trouble swallowing. Eyes: Negative for photophobia, redness and visual disturbance. Respiratory: Negative for cough, shortness of breath and wheezing. Cardiovascular: Positive for palpitations. Negative for chest pain and leg swelling. Gastrointestinal: Negative for abdominal pain, blood in stool, constipation, diarrhea and vomiting. Endocrine: Negative for cold intolerance, polydipsia and polyuria. Genitourinary: Positive for urgency. Negative for difficulty urinating, genital sores and hematuria. Musculoskeletal: Positive for arthralgias and back pain. Negative for joint swelling. Skin: Negative for pallor and rash. Allergic/Immunologic: Negative for food allergies. Neurological: Negative for dizziness, tremors, seizures, syncope, numbness and headaches. Hematological: Negative for adenopathy. Does not bruise/bleed easily. Psychiatric/Behavioral: Negative for agitation, confusion, dysphoric mood, hallucinations and sleep disturbance. The patient is nervous/anxious.              Current Outpatient Medications:     olmesartan (BENICAR) 40 MG tablet, take 1 tablet by mouth once daily, Disp: 90 tablet, Rfl: 1    carvedilol (COREG) 12.5 MG tablet, take 1 tablet by mouth every morning then take 1 tablet every evening, Disp: 180 tablet, Rfl: 1    omeprazole (PRILOSEC) 40 MG delayed release capsule, Take 1 capsule by mouth daily, Disp: 90 capsule, Rfl: 1    clotrimazole-betamethasone (LOTRISONE) 1-0.05 % cream, Apply topically 1 times daily. , Disp: 45 g, Rfl: 2    cetirizine (ZYRTEC) 10 MG tablet, Take 1 tablet by mouth daily, Disp: 30 tablet, Rfl: 1    aspirin 81 MG tablet, Take 81 mg by mouth daily No hold/Dr Mccarthy, Disp: , Rfl:     Omega-3 Fatty Acids (FISH OIL PO), Take by mouth daily, Disp: , Rfl:     Multiple Vitamin (MULTI-VITAMIN DAILY PO), Take by mouth daily, Disp: , Rfl:     montelukast (SINGULAIR) 10 MG tablet, take 1 tablet by mouth at bedtime if needed (Patient not taking: Reported on 6/22/2022), Disp: 90 tablet, Rfl: 1  Allergies   Allergen Reactions    Augmentin [Amoxicillin-Pot Clavulanate] Diarrhea    Clindamycin/Lincomycin Hives and Rash       Past Medical History:   Diagnosis Date    Acid reflux     Arthritis     BPH (benign prostatic hyperplasia)     Cancer ([facility] [address] 11/2018    skin    Chest pain     Disorder of prostate     Diverticulosis     Heart murmur     Hypertension     Left knee pain     for OR 5/2/2019    Mitral valve disorder     Palpitations      Past Surgical History:   Procedure Laterality Date    BACK SURGERY      COLONOSCOPY  2018    HERNIA REPAIR      KNEE ARTHROSCOPY Left 5/2/2019    LEFT KNEE ARTHROSCOPY WITH PARTIAL MEDIAL MENISECTOMY performed by Arun Geiger MD at [address] [place] [address]  11/2018    UPPER GASTROINTESTINAL ENDOSCOPY  2018     Family History   Problem Relation Age of Onset    Stroke Mother     Heart Disease Father         Bypass Surgery, Pacemaker, Carotids    Coronary Art Dis Father     Other Son         procoagulant disorder     Social History     Tobacco History     Smoking Status  Never Smoker    Smokeless Tobacco Use  Never Used          Alcohol History     Alcohol Use Status  Yes Comment  3 times weekly          Drug Use     Drug Use Status  No          Sexual Activity     Sexually Active  Yes Partners  Female                OBJECTIVE  Vitals:    06/22/22 0921   BP: 118/80   Pulse: 93   Resp: 18 Temp: 97.3 °F (36.3 °C)   TempSrc: Temporal   SpO2: 97%   Weight: 246 lb (111.6 kg)   Height: 6' (1.829 m)        Body mass index is 33.36 kg/m². Orders Placed This Encounter   Procedures    XR LUMBAR SPINE (MIN 4 VIEWS)     Standing Status:   Future     Standing Expiration Date:   6/22/2023    Lipid Panel     Standing Status:   Future     Standing Expiration Date:   6/22/2023    TSH     Standing Status:   Future     Standing Expiration Date:   6/22/2023    PSA Screening     Standing Status:   Future     Standing Expiration Date:   6/22/2023    Urinalysis     Standing Status:   Future     Standing Expiration Date:   6/22/2023   [facility], [address], [facility]     Referral Priority:   Routine     Referral Type:   Eval and Treat     Referral Reason:   Specialty Services Required     Referred to Provider:   Michelle Buenrostro DC     Requested Specialty:   Chiropractic Medicine     Number of Visits Requested:   1    EKG 12 lead     Standing Status:   Future     Number of Occurrences:   1     Standing Expiration Date:   8/21/2022     Order Specific Question:   Reason for Exam?     Answer: Other        EXAM   Physical Exam  Vitals and nursing note reviewed. Constitutional:       Appearance: Normal appearance. He is well-developed. He is obese. HENT:      Right Ear: Tympanic membrane, ear canal and external ear normal.      Left Ear: Tympanic membrane, ear canal and external ear normal.      Nose: Congestion present. Mouth/Throat:      Pharynx: Oropharynx is clear. No posterior oropharyngeal erythema. Eyes:      General: No scleral icterus. Conjunctiva/sclera: Conjunctivae normal.      Pupils: Pupils are equal, round, and reactive to light. Neck:      Thyroid: No thyroid mass or thyromegaly. Vascular: No carotid bruit or JVD. Trachea: Trachea normal.   Cardiovascular:      Rate and Rhythm: Normal rate and regular rhythm. Pulses: Normal pulses. Heart sounds: Normal heart sounds. No murmur heard. No gallop. Pulmonary:      Effort: Pulmonary effort is normal.      Breath sounds: Normal breath sounds. No wheezing, rhonchi or rales. Abdominal:      General: Bowel sounds are normal. There is no distension. Palpations: Abdomen is soft. There is no mass. Tenderness: There is no abdominal tenderness. There is no guarding. Hernia: No hernia is present. Musculoskeletal:         General: No swelling or tenderness. Normal range of motion. Cervical back: Neck supple. No tenderness. Right lower leg: Edema present. Left lower leg: Edema present. Lymphadenopathy:      Cervical: No cervical adenopathy. Skin:     General: Skin is warm and dry. Coloration: Skin is not jaundiced. Findings: No bruising or rash. Neurological:      General: No focal deficit present. Mental Status: He is alert and oriented to person, place, and time. Motor: No abnormal muscle tone. Psychiatric:         Mood and Affect: Mood normal.         Behavior: Behavior normal.           Angel Puentes was seen today for discuss medications and medication refill. Diagnoses and all orders for this visit:    Other hyperlipidemia  -     Lipid Panel; Future  -     TSH; Future  [place] [facility] no statin at this time  Essential hypertension  -     olmesartan (BENICAR) 40 MG tablet; take 1 tablet by mouth once daily  -     carvedilol (COREG) 12.5 MG tablet; take 1 tablet by mouth every morning then take 1 tablet every evening  -     EKG 12 lead; Future  -     EKG 12 lead  -     Urinalysis; Future  Well controlled, no changes made  Gastroesophageal reflux disease with esophagitis without hemorrhage  -     omeprazole (PRILOSEC) 40 MG delayed release capsule; Take 1 capsule by mouth daily    Screening for prostate cancer  -     PSA Screening; Future    Localized osteoarthritis of lumbar spine  -     XR LUMBAR SPINE (MIN 4 VIEWS);  Future  -     Kenny Natalia Kidney, [address]  Pain musculoskeletal maybe some left SI pain, negative radicular symptoms. Will refer to Natalia Miller  Other orders  -     clotrimazole-betamethasone (LOTRISONE) 1-0.05 % cream; Apply topically 1 times daily. Dry patch right distal gluteal area      No follow-ups on file.     Electronically signed by Belinda Reinoso MD on 6/22/22 at 9:50 AM EDT

## 2022-06-27 ENCOUNTER — OFFICE VISIT (OUTPATIENT)
Dept: ORTHOPEDIC SURGERY | Age: 72
End: 2022-06-27
Payer: MEDICARE

## 2022-06-27 DIAGNOSIS — M25.562 LEFT KNEE PAIN, UNSPECIFIED CHRONICITY: Primary | ICD-10-CM

## 2022-06-27 PROCEDURE — 20610 DRAIN/INJ JOINT/BURSA W/O US: CPT | Performed by: NURSE PRACTITIONER

## 2022-06-27 PROCEDURE — 1036F TOBACCO NON-USER: CPT | Performed by: NURSE PRACTITIONER

## 2022-06-27 PROCEDURE — 3017F COLORECTAL CA SCREEN DOC REV: CPT | Performed by: NURSE PRACTITIONER

## 2022-06-27 RX ORDER — TRIAMCINOLONE ACETONIDE 40 MG/ML
40 INJECTION, SUSPENSION INTRA-ARTICULAR; INTRAMUSCULAR ONCE
Status: COMPLETED | OUTPATIENT
Start: 2022-06-27 | End: 2022-06-27

## 2022-06-27 RX ORDER — LIDOCAINE HYDROCHLORIDE 10 MG/ML
4 INJECTION, SOLUTION INFILTRATION; PERINEURAL ONCE
Status: COMPLETED | OUTPATIENT
Start: 2022-06-27 | End: 2022-06-27

## 2022-06-27 RX ADMIN — TRIAMCINOLONE ACETONIDE 40 MG: 40 INJECTION, SUSPENSION INTRA-ARTICULAR; INTRAMUSCULAR at 09:18

## 2022-06-27 RX ADMIN — LIDOCAINE HYDROCHLORIDE 4 ML: 10 INJECTION, SOLUTION INFILTRATION; PERINEURAL at 09:18

## 2022-06-27 NOTE — PROGRESS NOTES
Follow Up Visit     Mortimer Flowers returns today for follow up visit regarding Left Knee pain. Treatment thus far has included Cortisone injection 3/20/2022 with good improvement. He reports symptoms have returned over the past month. He is requesting a cortisone injection for symptom relief. REVIEW OF SYSTEMS:     Constitutional:  Negative for weight loss, fevers, chills, fatigue  Cardiovascular: Negative for chest pain, palpitations  Pulmonary: Negative for shortness of breath, labored breathing, cough  GI: negative for abdominal pain, nausea, vomitting   MSK: per HPI  Skin: negative for rash, open wounds    All other systems reviewed and are negative       Physical Exam:     No data recorded    General: Alert and oriented x3, no acute distress  Cardiovascular/pulmonary: No labored breathing, peripheral perfusion intact  Musculoskeletal:    Left knee exam medial joint line tenderness on palpation. Range of motion 0110, stable patella tracked midline. Valgus varus test were stable. Controlled Substances Monitoring:      Imaging:  No new imaging obtained imaging. Previous imaging reviewed    Procedure Note: Knee Cortisone injection     The left knee was identified as the injection site. The risk and benefits of a cortisone injection were explained and the patient consented to the injection. Under sterile conditions, the knee was injected with a mixture of 40 mg of Kenalog and 4 mL of 1% Lidocaine without complication. A sterile bandage was applied. Administrations This Visit     lidocaine 1 % injection 4 mL     Admin Date  06/27/2022 Action  Given Dose  4 mL Route  Intra-artICUlar Administered By  Faith Boxer, ATC          triamcinolone acetonide (KENALOG-40) injection 40 mg     Admin Date  06/27/2022 Action  Given Dose  40 mg Route  Intra-artICUlar Administered By  Faith Boxer, ATC                  Assessment: Left knee osteoarthritis      Plan: Today we discussed his left knee.   He reports previous injection 3 months ago provided over 2 months relief of pain. He reports knee pain has been steadily returning to baseline over the last several weeks. On exam he has good range of motion with medial joint line tenderness. He would like to repeat cortisone injection for symptom relief today. This was agreed to and performed. He will follow-up in 3 months.       DANYEL Richmond-CNP  Orthopedic Surgery   06/27/22  9:25 AM

## 2022-06-29 ENCOUNTER — OFFICE VISIT (OUTPATIENT)
Dept: CHIROPRACTIC MEDICINE | Age: 72
End: 2022-06-29
Payer: MEDICARE

## 2022-06-29 VITALS — OXYGEN SATURATION: 96 % | TEMPERATURE: 97.8 F | HEART RATE: 77 BPM

## 2022-06-29 DIAGNOSIS — M51.36 LUMBAR DEGENERATIVE DISC DISEASE: ICD-10-CM

## 2022-06-29 DIAGNOSIS — M99.03 SEGMENTAL AND SOMATIC DYSFUNCTION OF LUMBAR REGION: Primary | ICD-10-CM

## 2022-06-29 PROCEDURE — 1036F TOBACCO NON-USER: CPT | Performed by: CHIROPRACTOR

## 2022-06-29 PROCEDURE — 99203 OFFICE O/P NEW LOW 30 MIN: CPT | Performed by: CHIROPRACTOR

## 2022-06-29 PROCEDURE — 3017F COLORECTAL CA SCREEN DOC REV: CPT | Performed by: CHIROPRACTOR

## 2022-06-29 PROCEDURE — 1123F ACP DISCUSS/DSCN MKR DOCD: CPT | Performed by: CHIROPRACTOR

## 2022-06-29 PROCEDURE — G8417 CALC BMI ABV UP PARAM F/U: HCPCS | Performed by: CHIROPRACTOR

## 2022-06-29 PROCEDURE — 98940 CHIROPRACT MANJ 1-2 REGIONS: CPT | Performed by: CHIROPRACTOR

## 2022-06-29 PROCEDURE — G8427 DOCREV CUR MEDS BY ELIG CLIN: HCPCS | Performed by: CHIROPRACTOR

## 2022-06-29 ASSESSMENT — ENCOUNTER SYMPTOMS
BACK PAIN: 1
BOWEL INCONTINENCE: 0

## 2022-06-29 NOTE — PROGRESS NOTES
Patient is here for low back pain.  Ramya Bernard MD  Electronically signed by Cynthia Patricia LPN on 1/57/1594 at 32:51 AM

## 2022-06-29 NOTE — PROGRESS NOTES
[facility]    22  Naresh Duncan : 1950 Sex: male  Age: 67 y.o. Patient was referred by Jamison Jiang MD    Chief Complaint   Patient presents with    Lower Back Pain       L5-S1 disc removal around   Dr. Mesha Galloway had xrays recently  Chronic LBP  Knee arthroscopy left, had an injection a few days ago  Knee good, back good. When bad, its bad too  Dr. Trav Sanchez did sx  Saw chiropractor in the past.  Helped      Back Pain  This is a chronic problem. The current episode started more than 1 year ago. The problem occurs constantly. The problem has been waxing and waning since onset. The pain is present in the lumbar spine and sacro-iliac. The pain radiates to the left thigh and left foot. The pain is moderate. The symptoms are aggravated by sitting and position. Associated symptoms include leg pain and numbness. Pertinent negatives include no bladder incontinence or bowel incontinence. He has tried NSAIDs for the symptoms. The treatment provided mild relief. Red Flags:  none    Review of Systems   Gastrointestinal: Negative for bowel incontinence. Genitourinary: Negative for bladder incontinence. Musculoskeletal: Positive for back pain. Neurological: Positive for numbness. Current Outpatient Medications:     olmesartan (BENICAR) 40 MG tablet, take 1 tablet by mouth once daily, Disp: 90 tablet, Rfl: 1    carvedilol (COREG) 12.5 MG tablet, take 1 tablet by mouth every morning then take 1 tablet every evening, Disp: 180 tablet, Rfl: 1    omeprazole (PRILOSEC) 40 MG delayed release capsule, Take 1 capsule by mouth daily, Disp: 90 capsule, Rfl: 1    clotrimazole-betamethasone (LOTRISONE) 1-0.05 % cream, Apply topically 1 times daily. , Disp: 45 g, Rfl: 2    montelukast (SINGULAIR) 10 MG tablet, take 1 tablet by mouth at bedtime if needed, Disp: 90 tablet, Rfl: 1    cetirizine (ZYRTEC) 10 MG tablet, Take 1 tablet by mouth daily, Disp: 30 tablet, Rfl: 1    aspirin 81 MG tablet, Take 81 mg by mouth daily No hold/Dr Agusto Barber, Disp: , Rfl:     Omega-3 Fatty Acids (FISH OIL PO), Take by mouth daily, Disp: , Rfl:     Multiple Vitamin (MULTI-VITAMIN DAILY PO), Take by mouth daily, Disp: , Rfl:     Allergies   Allergen Reactions    Augmentin [Amoxicillin-Pot Clavulanate] Diarrhea    Clindamycin/Lincomycin Hives and Rash       Past Medical History:   Diagnosis Date    Acid reflux     Arthritis     BPH (benign prostatic hyperplasia)     Cancer ([facility] [address] 11/2018    skin    Chest pain     Disorder of prostate     Diverticulosis     Heart murmur     Hypertension     Left knee pain     for OR 5/2/2019    Mitral valve disorder     Palpitations      Family History   Problem Relation Age of Onset    Stroke Mother     Heart Disease Father         Bypass Surgery, Pacemaker, Carotids    Coronary Art Dis Father     Other Son         procoagulant disorder     Past Surgical History:   Procedure Laterality Date    BACK SURGERY      COLONOSCOPY  2018    HERNIA REPAIR      KNEE ARTHROSCOPY Left 5/2/2019    LEFT KNEE ARTHROSCOPY WITH PARTIAL MEDIAL MENISECTOMY performed by Mario Caruso MD at [address] [place] [address]  11/2018    UPPER GASTROINTESTINAL ENDOSCOPY  2018     Social History     Socioeconomic History    Marital status:      Spouse name: Not on file    Number of children: Not on file    Years of education: Not on file    Highest education level: Not on file   Occupational History    Not on file   Tobacco Use    Smoking status: Never Smoker    Smokeless tobacco: Never Used   Vaping Use    Vaping Use: Never used   Substance and Sexual Activity    Alcohol use: Yes     Comment: 3 times weekly    Drug use: No    Sexual activity: Yes     Partners: Female   Other Topics Concern    Not on file   Social History Narrative    Not on file     Social Determinants of Health     Financial Resource Strain: Low Risk     Difficulty of Paying Living Expenses: Not hard at all   Food Insecurity: No Food Insecurity    Worried About Running Out of Food in the Last Year: Never true    Ran Out of Food in the Last Year: Never true   Transportation Needs:     Lack of Transportation (Medical): Not on file    Lack of Transportation (Non-Medical): Not on file   Physical Activity:     Days of Exercise per Week: Not on file    Minutes of Exercise per Session: Not on file   Stress:     Feeling of Stress : Not on file   Social Connections:     Frequency of Communication with Friends and Family: Not on file    Frequency of Social Gatherings with Friends and Family: Not on file    Attends Zoroastrianism Services: Not on file    Active Member of [address] [place] [address] [employer] or Organizations: Not on file    Attends Club or Organization Meetings: Not on file    Marital Status: Not on file   Intimate Partner Violence:     Fear of Current or Ex-Partner: Not on file    Emotionally Abused: Not on file    Physically Abused: Not on file    Sexually Abused: Not on file   Housing Stability:     Unable to Pay for Housing in the Last Year: Not on file    Number of [place] in the Last Year: Not on file    Unstable Housing in the Last Year: Not on file       Vitals:    06/29/22 1025   Pulse: 77   Temp: 97.8 °F (36.6 °C)   SpO2: 96%       EXAM:  Appearance: alert, well appearing, and in no distress. Ambulates without difficulty. No antalgia or list.     Lumbar flexion: 40/60  Lumbar extension: 15/25  Right lateral flexion: 15/25  Left lateral flexion: 15/25  Mild endrange pain reported in all planes  Reflexes are +1/5 and symmetrical at the Patella and Achilles. Manual muscle testing reveals: 5/5 strength to the LE indicator muscles. Sensation to light touch is WNL to the distal lower extremity dermatomes. Posterior tibial pulses 2/4 B/L. Valsalva's: Negative   Seated SLR's: Negative Bilateral  Hernandez's:  Negative Bilateral  Slump:  Negative Bilateral    Midline pain: Mildly positive L5-S1 interspace.   He does have a him to continue at home. He should stop should symptoms worsen. I will see him back next week for continued care. I spoke to him regarding posttreatment soreness. Should any arise, he  may use ice for 10-15 minutes, over-the-counter NSAIDs or topical gels. Seen By:  Michelle Buenrostro DC    * This note was created using voice recognition software.   The note was reviewed, however grammatical errors may exist.

## 2022-07-06 ENCOUNTER — OFFICE VISIT (OUTPATIENT)
Dept: CHIROPRACTIC MEDICINE | Age: 72
End: 2022-07-06
Payer: MEDICARE

## 2022-07-06 VITALS — HEART RATE: 72 BPM | TEMPERATURE: 97.2 F | OXYGEN SATURATION: 97 %

## 2022-07-06 DIAGNOSIS — M51.36 LUMBAR DEGENERATIVE DISC DISEASE: ICD-10-CM

## 2022-07-06 DIAGNOSIS — M99.03 SEGMENTAL AND SOMATIC DYSFUNCTION OF LUMBAR REGION: Primary | ICD-10-CM

## 2022-07-06 PROCEDURE — 99999 PR OFFICE/OUTPT VISIT,PROCEDURE ONLY: CPT | Performed by: CHIROPRACTOR

## 2022-07-06 PROCEDURE — 98940 CHIROPRACT MANJ 1-2 REGIONS: CPT | Performed by: CHIROPRACTOR

## 2022-07-06 NOTE — PROGRESS NOTES
22  Bebe Girard : 1950 Sex: male  Age: 67 y.o. Chief Complaint   Patient presents with    Back Pain       HPI:   Pain has slightly improved. On average, pain is perceived as mild (1-3  pain scale). Patient denies new numbness, new weakness, new tingling  Some left foot numbness today. Some left sided LBP too. Varies day to day. Current Outpatient Medications:     olmesartan (BENICAR) 40 MG tablet, take 1 tablet by mouth once daily, Disp: 90 tablet, Rfl: 1    carvedilol (COREG) 12.5 MG tablet, take 1 tablet by mouth every morning then take 1 tablet every evening, Disp: 180 tablet, Rfl: 1    omeprazole (PRILOSEC) 40 MG delayed release capsule, Take 1 capsule by mouth daily, Disp: 90 capsule, Rfl: 1    clotrimazole-betamethasone (LOTRISONE) 1-0.05 % cream, Apply topically 1 times daily. , Disp: 45 g, Rfl: 2    montelukast (SINGULAIR) 10 MG tablet, take 1 tablet by mouth at bedtime if needed, Disp: 90 tablet, Rfl: 1    cetirizine (ZYRTEC) 10 MG tablet, Take 1 tablet by mouth daily, Disp: 30 tablet, Rfl: 1    aspirin 81 MG tablet, Take 81 mg by mouth daily No hold/Dr Idalia Hauser, Disp: , Rfl:     Omega-3 Fatty Acids (FISH OIL PO), Take by mouth daily, Disp: , Rfl:     Multiple Vitamin (MULTI-VITAMIN DAILY PO), Take by mouth daily, Disp: , Rfl:     Exam:   Vitals:    22 0810   Pulse: 72   Temp: 97.2 °F (36.2 °C)   SpO2: 97%     Point left quadratus lumborum. Some mild pain in the left of midline at L5-S1 today. Tenderness along the left SI joint  There are hypertonic and tender fibers noted with palpation in the paraspinal muscles of the lumbar region. Joint fixation is noted with motion screening at left SI joint, L3-4. Merry Malave was seen today for back pain. Diagnoses and all orders for this visit:    Segmental and somatic dysfunction of lumbar region    Lumbar degenerative disc disease        Treatment Plan: Continued with Chris flexion distraction manipulation at L3, protocol 1.

## 2022-07-06 NOTE — PROGRESS NOTES
Patient states his back pain comes and goes. Patient states his left foot is numb.  iPlar Valdez MD  Electronically signed by Carolann Dia LPN on 4/5/2776 at 8:81 AM

## 2022-07-13 ENCOUNTER — OFFICE VISIT (OUTPATIENT)
Dept: CHIROPRACTIC MEDICINE | Age: 72
End: 2022-07-13
Payer: MEDICARE

## 2022-07-13 VITALS — OXYGEN SATURATION: 97 % | TEMPERATURE: 97.5 F | HEART RATE: 71 BPM

## 2022-07-13 DIAGNOSIS — M51.36 LUMBAR DEGENERATIVE DISC DISEASE: ICD-10-CM

## 2022-07-13 DIAGNOSIS — M99.03 SEGMENTAL AND SOMATIC DYSFUNCTION OF LUMBAR REGION: Primary | ICD-10-CM

## 2022-07-13 PROCEDURE — 98940 CHIROPRACT MANJ 1-2 REGIONS: CPT | Performed by: CHIROPRACTOR

## 2022-07-13 PROCEDURE — 99999 PR OFFICE/OUTPT VISIT,PROCEDURE ONLY: CPT | Performed by: CHIROPRACTOR

## 2022-07-13 NOTE — PROGRESS NOTES
Patient is here for a follow up. Patient states his back is a little better today.  Shon Malloy MD  Electronically signed by Darya Francis LPN on 2/57/2818 at 9:47 AM

## 2022-07-13 NOTE — PROGRESS NOTES
22  [facility] : 1950 Sex: male  Age: 67 y.o. No chief complaint on file. HPI:   Pain has improved. On average, pain is perceived as mild (1-3  pain scale). Patient denies new numbness, new weakness, new tingling  He is happy with his progress. Remains active at home-yard work, painting, gardening. No exacerbation with these activities      Current Outpatient Medications:     olmesartan (BENICAR) 40 MG tablet, take 1 tablet by mouth once daily, Disp: 90 tablet, Rfl: 1    carvedilol (COREG) 12.5 MG tablet, take 1 tablet by mouth every morning then take 1 tablet every evening, Disp: 180 tablet, Rfl: 1    omeprazole (PRILOSEC) 40 MG delayed release capsule, Take 1 capsule by mouth daily, Disp: 90 capsule, Rfl: 1    clotrimazole-betamethasone (LOTRISONE) 1-0.05 % cream, Apply topically 1 times daily. , Disp: 45 g, Rfl: 2    montelukast (SINGULAIR) 10 MG tablet, take 1 tablet by mouth at bedtime if needed, Disp: 90 tablet, Rfl: 1    cetirizine (ZYRTEC) 10 MG tablet, Take 1 tablet by mouth daily, Disp: 30 tablet, Rfl: 1    aspirin 81 MG tablet, Take 81 mg by mouth daily No hold/Dr Leelee Horn, Disp: , Rfl:     Omega-3 Fatty Acids (FISH OIL PO), Take by mouth daily, Disp: , Rfl:     Multiple Vitamin (MULTI-VITAMIN DAILY PO), Take by mouth daily, Disp: , Rfl:     Exam:   There were no vitals filed for this visit. There are hypertonic and tender fibers noted with palpation in the paraspinal muscles of the lumbar region. Joint fixation is noted with motion screening at left SI joint, L3-4. There is tenderness with palpation to the left SI joint today as well. Diagnoses and all orders for this visit:    Segmental and somatic dysfunction of lumbar region    Lumbar degenerative disc disease        Treatment Plan: I continued with Chris flexion distraction manipulation at L3, protocol 2. Diversified manipulation of left SI joint.   Passive stretching of the left hip was also performed today not meeting the time requirement for care. Tolerated well. Continue with home-based self-care.   I will see him back next week      Seen By:  Jose Antonio Lopez DC

## 2022-07-20 ENCOUNTER — OFFICE VISIT (OUTPATIENT)
Dept: CHIROPRACTIC MEDICINE | Age: 72
End: 2022-07-20
Payer: MEDICARE

## 2022-07-20 VITALS — HEART RATE: 79 BPM | TEMPERATURE: 97.3 F | OXYGEN SATURATION: 97 %

## 2022-07-20 DIAGNOSIS — M99.03 SEGMENTAL AND SOMATIC DYSFUNCTION OF LUMBAR REGION: Primary | ICD-10-CM

## 2022-07-20 DIAGNOSIS — M51.36 LUMBAR DEGENERATIVE DISC DISEASE: ICD-10-CM

## 2022-07-20 PROCEDURE — 99999 PR OFFICE/OUTPT VISIT,PROCEDURE ONLY: CPT | Performed by: CHIROPRACTOR

## 2022-07-20 PROCEDURE — 98940 CHIROPRACT MANJ 1-2 REGIONS: CPT | Performed by: CHIROPRACTOR

## 2022-07-20 NOTE — PROGRESS NOTES
22  Amy Mal : 1950 Sex: male  Age: 67 y.o. Chief Complaint   Patient presents with    Lower Back Pain       HPI:   Pain has improved. On average, pain is perceived as moderate (4 pain scale). Patient denies new numbness, new weakness, new tingling  Did a lot of painting trim near the floor yesterday, little extra aching today. But, overall improving. Current Outpatient Medications:     olmesartan (BENICAR) 40 MG tablet, take 1 tablet by mouth once daily, Disp: 90 tablet, Rfl: 1    carvedilol (COREG) 12.5 MG tablet, take 1 tablet by mouth every morning then take 1 tablet every evening, Disp: 180 tablet, Rfl: 1    omeprazole (PRILOSEC) 40 MG delayed release capsule, Take 1 capsule by mouth daily, Disp: 90 capsule, Rfl: 1    clotrimazole-betamethasone (LOTRISONE) 1-0.05 % cream, Apply topically 1 times daily. , Disp: 45 g, Rfl: 2    montelukast (SINGULAIR) 10 MG tablet, take 1 tablet by mouth at bedtime if needed, Disp: 90 tablet, Rfl: 1    cetirizine (ZYRTEC) 10 MG tablet, Take 1 tablet by mouth daily, Disp: 30 tablet, Rfl: 1    aspirin 81 MG tablet, Take 81 mg by mouth daily No hold/Dr Shaji Chew, Disp: , Rfl:     Omega-3 Fatty Acids (FISH OIL PO), Take by mouth daily, Disp: , Rfl:     Multiple Vitamin (MULTI-VITAMIN DAILY PO), Take by mouth daily, Disp: , Rfl:     Exam:   Vitals:    22 0819   Pulse: 79   Temp: 97.3 °F (36.3 °C)   SpO2: 97%       There are hypertonic and tender fibers noted with palpation in the paraspinal muscles of the lumbar region. Joint fixation is noted with motion screening at left SI joint, L3-4. There is tenderness over the left SI joint today. Hip motions are well-preserved. Myriam Rendon was seen today for lower back pain. Diagnoses and all orders for this visit:    Segmental and somatic dysfunction of lumbar region    Lumbar degenerative disc disease      Treatment Plan: Continued Chris flexion distraction manipulation at L3, protocol 2.   Diversified manipulation of left SI joint. Passive stretching of the left hip as well not meeting the time requirement for care. Tolerated well.   I will see him back next week for further treatment      Seen By:  Steph Perez

## 2022-07-27 ENCOUNTER — OFFICE VISIT (OUTPATIENT)
Dept: CHIROPRACTIC MEDICINE | Age: 72
End: 2022-07-27
Payer: MEDICARE

## 2022-07-27 VITALS
TEMPERATURE: 97.7 F | OXYGEN SATURATION: 96 % | WEIGHT: 246 LBS | HEART RATE: 88 BPM | BODY MASS INDEX: 33.32 KG/M2 | HEIGHT: 72 IN

## 2022-07-27 DIAGNOSIS — M99.03 SEGMENTAL AND SOMATIC DYSFUNCTION OF LUMBAR REGION: Primary | ICD-10-CM

## 2022-07-27 DIAGNOSIS — M51.36 LUMBAR DEGENERATIVE DISC DISEASE: ICD-10-CM

## 2022-07-27 PROCEDURE — 98940 CHIROPRACT MANJ 1-2 REGIONS: CPT | Performed by: CHIROPRACTOR

## 2022-07-27 PROCEDURE — 99999 PR OFFICE/OUTPT VISIT,PROCEDURE ONLY: CPT | Performed by: CHIROPRACTOR

## 2022-07-27 NOTE — PROGRESS NOTES
22  Evelyn Sawyer : 1950 Sex: male  Age: 67 y.o. Chief Complaint   Patient presents with    Lower Back Pain     Pt presents this AM for a follow up concerning LBP. States that he is having knee pain, which affects his walking. This causes him increased LBP. Felt good after last visit. HPI:     Caught left foot on fence yesterday, went down on his bad left knee. Flared it up  Back was doing better after last visit, but by Saturday or  it worsened for no reason. No new issues today otherwise    Current Outpatient Medications:     olmesartan (BENICAR) 40 MG tablet, take 1 tablet by mouth once daily, Disp: 90 tablet, Rfl: 1    carvedilol (COREG) 12.5 MG tablet, take 1 tablet by mouth every morning then take 1 tablet every evening, Disp: 180 tablet, Rfl: 1    omeprazole (PRILOSEC) 40 MG delayed release capsule, Take 1 capsule by mouth daily, Disp: 90 capsule, Rfl: 1    clotrimazole-betamethasone (LOTRISONE) 1-0.05 % cream, Apply topically 1 times daily. , Disp: 45 g, Rfl: 2    montelukast (SINGULAIR) 10 MG tablet, take 1 tablet by mouth at bedtime if needed, Disp: 90 tablet, Rfl: 1    cetirizine (ZYRTEC) 10 MG tablet, Take 1 tablet by mouth daily, Disp: 30 tablet, Rfl: 1    aspirin 81 MG tablet, Take 81 mg by mouth daily No hold/Dr Alana Roberts, Disp: , Rfl:     Omega-3 Fatty Acids (FISH OIL PO), Take by mouth daily, Disp: , Rfl:     Multiple Vitamin (MULTI-VITAMIN DAILY PO), Take by mouth daily, Disp: , Rfl:     Exam:   Vitals:    22 0819   Pulse: 88   Temp: 97.7 °F (36.5 °C)   SpO2: 96%       There are hypertonic and tender fibers noted with palpation in the paraspinal muscles of the lumbar region. Joint fixation is noted with motion screening at bilateral SI joints, L3-4. Chaz Valladares was seen today for lower back pain.     Diagnoses and all orders for this visit:    Segmental and somatic dysfunction of lumbar region    Lumbar degenerative disc disease      Treatment Plan: Chris hermilo distraction manipulation at L3 today, protocol 2. Mechanically assisted manipulation of the SI joints. Tolerated well. I will see him back in approximately 3 weeks for further care-he will be on vacation.   With respect to the knee if symptoms persist or worsen, he should not hesitate to contact his PCP, Dr. Janice Coates or orthopedic express care      Seen By:  Cami Mccall

## 2022-08-17 ENCOUNTER — OFFICE VISIT (OUTPATIENT)
Dept: CHIROPRACTIC MEDICINE | Age: 72
End: 2022-08-17
Payer: MEDICARE

## 2022-08-17 ENCOUNTER — OFFICE VISIT (OUTPATIENT)
Dept: FAMILY MEDICINE CLINIC | Age: 72
End: 2022-08-17
Payer: MEDICARE

## 2022-08-17 VITALS
TEMPERATURE: 97.5 F | WEIGHT: 246 LBS | OXYGEN SATURATION: 97 % | HEIGHT: 72 IN | HEART RATE: 71 BPM | BODY MASS INDEX: 33.32 KG/M2

## 2022-08-17 VITALS
DIASTOLIC BLOOD PRESSURE: 80 MMHG | SYSTOLIC BLOOD PRESSURE: 138 MMHG | BODY MASS INDEX: 33.83 KG/M2 | HEART RATE: 65 BPM | TEMPERATURE: 97.6 F | WEIGHT: 249.8 LBS | HEIGHT: 72 IN | OXYGEN SATURATION: 96 %

## 2022-08-17 DIAGNOSIS — Z23 NEED FOR TETANUS BOOSTER: ICD-10-CM

## 2022-08-17 DIAGNOSIS — M99.03 SEGMENTAL AND SOMATIC DYSFUNCTION OF LUMBAR REGION: Primary | ICD-10-CM

## 2022-08-17 DIAGNOSIS — S40.812A ABRASION OF SKIN OF LEFT UPPER ARM: Primary | ICD-10-CM

## 2022-08-17 DIAGNOSIS — M51.36 LUMBAR DEGENERATIVE DISC DISEASE: ICD-10-CM

## 2022-08-17 PROCEDURE — 99999 PR OFFICE/OUTPT VISIT,PROCEDURE ONLY: CPT | Performed by: CHIROPRACTOR

## 2022-08-17 PROCEDURE — 1036F TOBACCO NON-USER: CPT | Performed by: FAMILY MEDICINE

## 2022-08-17 PROCEDURE — G8417 CALC BMI ABV UP PARAM F/U: HCPCS | Performed by: FAMILY MEDICINE

## 2022-08-17 PROCEDURE — 1123F ACP DISCUSS/DSCN MKR DOCD: CPT | Performed by: FAMILY MEDICINE

## 2022-08-17 PROCEDURE — 98940 CHIROPRACT MANJ 1-2 REGIONS: CPT | Performed by: CHIROPRACTOR

## 2022-08-17 PROCEDURE — 3017F COLORECTAL CA SCREEN DOC REV: CPT | Performed by: FAMILY MEDICINE

## 2022-08-17 PROCEDURE — G8427 DOCREV CUR MEDS BY ELIG CLIN: HCPCS | Performed by: FAMILY MEDICINE

## 2022-08-17 PROCEDURE — 90471 IMMUNIZATION ADMIN: CPT | Performed by: FAMILY MEDICINE

## 2022-08-17 PROCEDURE — 90715 TDAP VACCINE 7 YRS/> IM: CPT | Performed by: FAMILY MEDICINE

## 2022-08-17 PROCEDURE — 99212 OFFICE O/P EST SF 10 MIN: CPT | Performed by: FAMILY MEDICINE

## 2022-08-17 RX ORDER — DOXYCYCLINE 100 MG/1
100 TABLET ORAL 2 TIMES DAILY
Qty: 20 TABLET | Refills: 0 | Status: SHIPPED | OUTPATIENT
Start: 2022-08-17 | End: 2022-08-27

## 2022-08-17 RX ORDER — IBUPROFEN 200 MG
200 TABLET ORAL EVERY 6 HOURS PRN
COMMUNITY

## 2022-08-17 NOTE — PROGRESS NOTES
Elmer Araiza (:  1950) is a 67 y.o. male,Established patient, here for evaluation of the following chief complaint(s):  Laceration (L arm, Onset today. Pt states he was in the barn and was trying to get something from his tackle box and the lid closed and cut his arm.)         ASSESSMENT/PLAN:  1. Abrasion of skin of left upper arm  -     doxycycline monohydrate (ADOXA) 100 MG tablet; Take 1 tablet by mouth 2 times daily for 10 days, Disp-20 tablet, R-0Normal  -     silver sulfADIAZINE (SILVADENE) 1 % cream; Apply topically daily. , Disp-50 g, R-3, Normal  2. Need for tetanus booster  -     Tdap, BOOSTRIX, (age 8 yrs+), IM  Affected region dressed with sterile bandage. Started on topical/oral antibiotics. Updated on tetanus. Follow-up with PCP. Red flags discussed with patient if these occur he is to go directly to the nearest emergency department. No follow-ups on file. Subjective   SUBJECTIVE/OBJECTIVE:  Laceration     Patient presents today for evaluation of left posterior forearm skin abrasion. Patient states that he was in his barn when the lid of his tack box slammed shut taking a large chunk of the skin off of his arm. He did cleanse the region with hydrogen peroxide at home and get dressed for the affected region. He was told to come to the office today because he is not up-to-date on his tetanus. No other issues or concerns. Review of Systems   Skin:  Positive for wound. All other systems reviewed and are negative. Current Outpatient Medications:     ibuprofen (ADVIL;MOTRIN) 200 MG tablet, Take 200 mg by mouth every 6 hours as needed for Pain, Disp: , Rfl:     doxycycline monohydrate (ADOXA) 100 MG tablet, Take 1 tablet by mouth 2 times daily for 10 days, Disp: 20 tablet, Rfl: 0    silver sulfADIAZINE (SILVADENE) 1 % cream, Apply topically daily. , Disp: 50 g, Rfl: 3    olmesartan (BENICAR) 40 MG tablet, take 1 tablet by mouth once daily, Disp: 90 tablet, Rfl: 1 carvedilol (COREG) 12.5 MG tablet, take 1 tablet by mouth every morning then take 1 tablet every evening, Disp: 180 tablet, Rfl: 1    omeprazole (PRILOSEC) 40 MG delayed release capsule, Take 1 capsule by mouth daily, Disp: 90 capsule, Rfl: 1    clotrimazole-betamethasone (LOTRISONE) 1-0.05 % cream, Apply topically 1 times daily. , Disp: 45 g, Rfl: 2    cetirizine (ZYRTEC) 10 MG tablet, Take 1 tablet by mouth daily, Disp: 30 tablet, Rfl: 1    aspirin 81 MG tablet, Take 81 mg by mouth daily No hold/Dr Chris Matute, Disp: , Rfl:     Omega-3 Fatty Acids (FISH OIL PO), Take by mouth daily, Disp: , Rfl:     Multiple Vitamin (MULTI-VITAMIN DAILY PO), Take by mouth daily, Disp: , Rfl:     montelukast (SINGULAIR) 10 MG tablet, take 1 tablet by mouth at bedtime if needed (Patient not taking: Reported on 8/17/2022), Disp: 90 tablet, Rfl: 1   Patient Active Problem List   Diagnosis    Primary osteoarthritis of left knee    Chest pain    Disorder of prostate    Essential hypertension    Heart murmur    Mitral valve disorder    H/O colonoscopy with polypectomy    Arthritis of knee    Sacroiliitis (HCC)    Lymphoproliferative disorder ([facility] [address]     Past Medical History:   Diagnosis Date    Acid reflux     Arthritis     BPH (benign prostatic hyperplasia)     Cancer ([facility] [address] 11/2018    skin    Chest pain     Disorder of prostate     Diverticulosis     Heart murmur     Hypertension     Left knee pain     for OR 5/2/2019    Mitral valve disorder     Palpitations      Past Surgical History:   Procedure Laterality Date    BACK SURGERY      COLONOSCOPY  2018    HERNIA REPAIR      KNEE ARTHROSCOPY Left 5/2/2019    LEFT KNEE ARTHROSCOPY WITH PARTIAL MEDIAL MENISECTOMY performed by Ashok Schneider MD at [address] [facility]  11/2018    UPPER GASTROINTESTINAL ENDOSCOPY  2018     Social History     Socioeconomic History    Marital status:      Spouse name: Not on file    Number of children: Not on file    Years of education: Not on file Highest education level: Not on file   Occupational History    Not on file   Tobacco Use    Smoking status: Never    Smokeless tobacco: Never   Vaping Use    Vaping Use: Never used   Substance and Sexual Activity    Alcohol use: Yes     Comment: 3 times weekly    Drug use: No    Sexual activity: Yes     Partners: Female   Other Topics Concern    Not on file   Social History Narrative    Not on file     Social Determinants of Health     Financial Resource Strain: Low Risk     Difficulty of Paying Living Expenses: Not hard at all   Food Insecurity: No Food Insecurity    Worried About Running Out of Food in the Last Year: Never true    Ran Out of Food in the Last Year: Never true   Transportation Needs: Not on file   Physical Activity: Not on file   Stress: Not on file   Social Connections: Not on file   Intimate Partner Violence: Not on file   Housing Stability: Not on file     Family History   Problem Relation Age of Onset    Stroke Mother     Heart Disease Father         Bypass Surgery, Pacemaker, Carotids    Coronary Art Dis Father     Other Son         procoagulant disorder      There are no preventive care reminders to display for this patient. There are no preventive care reminders to display for this patient. There are no preventive care reminders to display for this patient. There are no preventive care reminders to display for this patient.    Health Maintenance   Topic Date Due    Hepatitis C screen  Never done    COVID-19 Vaccine (4 - Booster for Moderna series) 02/28/2022    Annual Wellness Visit (AWV)  06/16/2022    Flu vaccine (1) 09/01/2022    Depression Screen  06/22/2023    Lipids  06/22/2027    Colorectal Cancer Screen  08/10/2027    DTaP/Tdap/Td vaccine (3 - Td or Tdap) 08/17/2032    Shingles vaccine  Completed    Pneumococcal 65+ years Vaccine  Completed    Hepatitis A vaccine  Aged Out    Hepatitis B vaccine  Aged Out    Hib vaccine  Aged Out    Meningococcal (ACWY) vaccine  Aged Out There are no preventive care reminders to display for this patient. There are no preventive care reminders to display for this patient. /80   Pulse 65   Temp 97.6 °F (36.4 °C) (Temporal)   Ht 6' (1.829 m)   Wt 249 lb 12.8 oz (113.3 kg)   SpO2 96%   BMI 33.88 kg/m²     Objective   Physical Exam  Vitals reviewed. Constitutional:       Appearance: Normal appearance. He is obese. HENT:      Head: Normocephalic and atraumatic. Eyes:      Pupils: Pupils are equal, round, and reactive to light. Cardiovascular:      Rate and Rhythm: Normal rate. Pulmonary:      Effort: Pulmonary effort is normal.   Musculoskeletal:         General: Normal range of motion. Skin:         Neurological:      General: No focal deficit present. Mental Status: He is alert. An electronic signature was used to authenticate this note.     --Cyrus Leyden Deist, DO

## 2022-08-17 NOTE — PROGRESS NOTES
22  Zeina Kelly : 1950 Sex: male  Age: 67 y.o. Chief Complaint   Patient presents with    Back Pain    Knee Pain     Left knee        HPI:   Pain has worsened. On average, pain is perceived as moderate (4-6 pain scale). Patient denies new numbness, new weakness, new tingling. Progress slowed by knee pain, favoring leg. Sees Dr. Alex French Monday regarding TKA left knee. Difficulty sleeping at night - cant get comfortable due to knee pain, then that affects the back too. Current Outpatient Medications:     olmesartan (BENICAR) 40 MG tablet, take 1 tablet by mouth once daily, Disp: 90 tablet, Rfl: 1    carvedilol (COREG) 12.5 MG tablet, take 1 tablet by mouth every morning then take 1 tablet every evening, Disp: 180 tablet, Rfl: 1    omeprazole (PRILOSEC) 40 MG delayed release capsule, Take 1 capsule by mouth daily, Disp: 90 capsule, Rfl: 1    clotrimazole-betamethasone (LOTRISONE) 1-0.05 % cream, Apply topically 1 times daily. , Disp: 45 g, Rfl: 2    montelukast (SINGULAIR) 10 MG tablet, take 1 tablet by mouth at bedtime if needed, Disp: 90 tablet, Rfl: 1    cetirizine (ZYRTEC) 10 MG tablet, Take 1 tablet by mouth daily, Disp: 30 tablet, Rfl: 1    aspirin 81 MG tablet, Take 81 mg by mouth daily No hold/Dr Alex French, Disp: , Rfl:     Omega-3 Fatty Acids (FISH OIL PO), Take by mouth daily, Disp: , Rfl:     Multiple Vitamin (MULTI-VITAMIN DAILY PO), Take by mouth daily, Disp: , Rfl:     Exam:   Vitals:    22 0817   Pulse: 71   Temp: 97.5 °F (36.4 °C)   SpO2: 97%       There are hypertonic and tender fibers noted with palpation in the paraspinal muscles of the lumbar region. Joint fixation is noted with motion screening at L3-4, bilateral SI joints. Jennifer Acuna was seen today for back pain and knee pain.     Diagnoses and all orders for this visit:    Segmental and somatic dysfunction of lumbar region    Lumbar degenerative disc disease      Treatment Plan:    Chris flexion distraction manipulation at L3 today, protocol 2. Mechanically assisted manipulation of the SI joints. Tolerated well  He is going to see what happens with the knee, contact me for his back as needed.     Seen By:  Concepcion Crawford DC

## 2022-08-17 NOTE — PROGRESS NOTES
Pt being seen for LBP and left knee pain. Has been getting injections q 3 months in knee and not helping. Seeing ortho on Monday. Carmela Nolan MD  Electronically signed by Bianka Cardona on 8/17/2022 at 8:20 AM

## 2022-08-22 ENCOUNTER — OFFICE VISIT (OUTPATIENT)
Dept: ORTHOPEDIC SURGERY | Age: 72
End: 2022-08-22
Payer: MEDICARE

## 2022-08-22 VITALS — BODY MASS INDEX: 33.18 KG/M2 | WEIGHT: 245 LBS | HEIGHT: 72 IN

## 2022-08-22 DIAGNOSIS — M17.12 PRIMARY OSTEOARTHRITIS OF LEFT KNEE: Primary | ICD-10-CM

## 2022-08-22 PROCEDURE — 3017F COLORECTAL CA SCREEN DOC REV: CPT | Performed by: ORTHOPAEDIC SURGERY

## 2022-08-22 PROCEDURE — 1123F ACP DISCUSS/DSCN MKR DOCD: CPT | Performed by: ORTHOPAEDIC SURGERY

## 2022-08-22 PROCEDURE — 99213 OFFICE O/P EST LOW 20 MIN: CPT | Performed by: ORTHOPAEDIC SURGERY

## 2022-08-22 PROCEDURE — G8427 DOCREV CUR MEDS BY ELIG CLIN: HCPCS | Performed by: ORTHOPAEDIC SURGERY

## 2022-08-22 PROCEDURE — G8417 CALC BMI ABV UP PARAM F/U: HCPCS | Performed by: ORTHOPAEDIC SURGERY

## 2022-08-22 PROCEDURE — 1036F TOBACCO NON-USER: CPT | Performed by: ORTHOPAEDIC SURGERY

## 2022-08-22 NOTE — PROGRESS NOTES
Follow Up Visit     Jovan Christianson returns today for follow up visit regarding Left knee osteoarthritis. Treatment thus far has included conservative management, last cortisone injection on 6/27/2022. He reports symptoms are improved slightly but overall affecting ADLs and would like to proceed with TKA in coming months. Physical Exam:     Height: 6' (1.829 m), Weight: 245 lb (111.1 kg) (per pt)    General: Alert and oriented x3, no acute distress  Cardiovascular/pulmonary: No labored breathing, peripheral perfusion intact  Musculoskeletal:    Left knee exam range of motion 5-115, medial joint line tenderness with palpation. No swelling or deformity visualized. Valgus varus stress intact. Patella stable tracks midline with crepitus. Controlled Substances Monitoring:      Imaging:  Imaging obtained today. Previous imaging reviewed showing bone-on-bone degenerative changes to the medial compartment and patellofemoral joints and sunrise and tunnel views      Assessment: Left knee osteoarthritis      Plan: Today we discussed his left knee. He reports minimal relief of symptoms following cortisone injection 6 weeks ago. Previous imaging of his left knee reviewed with him today showing advanced degenerative changes. He is interested in proceeding with surgical intervention specifically a left Seth robotic assisted total knee arthroplasty. We will look to have this scheduled in the near future. We will see him back for a preoperative appointment. DANYEL Epstein-CNP  Orthopedic Surgery   08/22/22  10:59 AM    Staff Addendum    I have seen and evaluated the patient and agree with the assessment and plan as documented by Gallo Scott CNP. I have performed the key components of the history and physical examination and concur with the findings and plan, and have made changes where appropriate/necessary.           Maricarmen Ruiz MD  [address]

## 2022-08-23 ENCOUNTER — TELEPHONE (OUTPATIENT)
Dept: ORTHOPEDIC SURGERY | Age: 72
End: 2022-08-23

## 2022-09-06 ENCOUNTER — OFFICE VISIT (OUTPATIENT)
Dept: FAMILY MEDICINE CLINIC | Age: 72
End: 2022-09-06
Payer: MEDICARE

## 2022-09-06 VITALS
HEIGHT: 72 IN | OXYGEN SATURATION: 98 % | BODY MASS INDEX: 33.72 KG/M2 | TEMPERATURE: 97.4 F | HEART RATE: 73 BPM | SYSTOLIC BLOOD PRESSURE: 124 MMHG | RESPIRATION RATE: 16 BRPM | DIASTOLIC BLOOD PRESSURE: 70 MMHG | WEIGHT: 249 LBS

## 2022-09-06 DIAGNOSIS — U07.1 COVID-19: Primary | ICD-10-CM

## 2022-09-06 PROCEDURE — 99213 OFFICE O/P EST LOW 20 MIN: CPT | Performed by: FAMILY MEDICINE

## 2022-09-06 PROCEDURE — G8427 DOCREV CUR MEDS BY ELIG CLIN: HCPCS | Performed by: FAMILY MEDICINE

## 2022-09-06 PROCEDURE — 1123F ACP DISCUSS/DSCN MKR DOCD: CPT | Performed by: FAMILY MEDICINE

## 2022-09-06 PROCEDURE — 3017F COLORECTAL CA SCREEN DOC REV: CPT | Performed by: FAMILY MEDICINE

## 2022-09-06 PROCEDURE — 1036F TOBACCO NON-USER: CPT | Performed by: FAMILY MEDICINE

## 2022-09-06 PROCEDURE — G8417 CALC BMI ABV UP PARAM F/U: HCPCS | Performed by: FAMILY MEDICINE

## 2022-09-06 RX ORDER — DEXAMETHASONE 6 MG/1
6 TABLET ORAL 2 TIMES DAILY WITH MEALS
Qty: 20 TABLET | Refills: 0 | Status: SHIPPED | OUTPATIENT
Start: 2022-09-06 | End: 2022-09-16

## 2022-09-06 ASSESSMENT — ENCOUNTER SYMPTOMS
SINUS PRESSURE: 1
CHEST TIGHTNESS: 0
SINUS PAIN: 0
VOMITING: 0
DIARRHEA: 0
TROUBLE SWALLOWING: 0
SHORTNESS OF BREATH: 0
SORE THROAT: 1
PHOTOPHOBIA: 0
ABDOMINAL PAIN: 0
COUGH: 1
BLOOD IN STOOL: 0
BACK PAIN: 0
ALLERGIC/IMMUNOLOGIC NEGATIVE: 1
NAUSEA: 0
EYE REDNESS: 0
EYE DISCHARGE: 0
EYE PAIN: 0

## 2022-09-06 NOTE — PROGRESS NOTES
22  Frank Bond : 1950 Sex: male  Age: 67 y.o. Assessment and Plan:  Beatriz Turner was seen today for positive for covid-19. Diagnoses and all orders for this visit:    COVID-19  -     dexamethasone (DECADRON) 6 MG tablet; Take 1 tablet by mouth 2 times daily (with meals) for 10 days    Other orders  -     nirmatrelvir/ritonavir (PAXLOVID) 20 x 150 MG & 10 x 100MG TBPK; Take 3 tablets (two 150 mg nirmatrelvir and one 100 mg ritonavir tablets) by mouth every 12 hours for 5 days. Patient had a rapid antigen test positive for COVID at home. He will be self isolating for the next 5 days, then wearing a mask for 5 days following. Within the window for packs of the treatment and this will be accomplished at the regular dose as his GFR is greater than 60. So given 5 days of Decadron for inflammation. Dr Deedee Penny to make treatment should include Tylenol, fluids, rest, Mucinex, Claritin, coolmist.  His complaints do not improve, or worsen in any way, he should present back to the office. Return 3 to 5 days if not improved. .    Chief Complaint   Patient presents with    Positive For Covid-19       Congestion, pressure, drainage, facial tenderness, mild headache,, throat, earache, onset 2 days ago. Wife was seen  at urgent care positive for COVID. Denies fever, chills, diaphoresis, nausea, vomiting, decreased oral intake. Denies other GI or  complaints. OTC treatments minimally effective. Review of Systems   Constitutional:  Positive for fatigue. Negative for appetite change and unexpected weight change. HENT:  Positive for congestion, ear pain, postnasal drip, sinus pressure and sore throat. Negative for hearing loss, sinus pain and trouble swallowing. Eyes:  Negative for photophobia, pain, discharge and redness. Respiratory:  Positive for cough. Negative for chest tightness and shortness of breath. Cardiovascular:  Negative for chest pain, palpitations and leg swelling. Gastrointestinal:  Negative for abdominal pain, blood in stool, diarrhea, nausea and vomiting. Endocrine: Negative. Genitourinary:  Negative for dysuria, flank pain, frequency and hematuria. Musculoskeletal:  Negative for arthralgias, back pain, joint swelling and myalgias. Skin: Negative. Allergic/Immunologic: Negative. Neurological:  Negative for dizziness, seizures, syncope, weakness, light-headedness, numbness and headaches. Hematological:  Negative for adenopathy. Does not bruise/bleed easily. Psychiatric/Behavioral: Negative. Current Outpatient Medications:     dexamethasone (DECADRON) 6 MG tablet, Take 1 tablet by mouth 2 times daily (with meals) for 10 days, Disp: 20 tablet, Rfl: 0    nirmatrelvir/ritonavir (PAXLOVID) 20 x 150 MG & 10 x 100MG TBPK, Take 3 tablets (two 150 mg nirmatrelvir and one 100 mg ritonavir tablets) by mouth every 12 hours for 5 days. , Disp: 30 tablet, Rfl: 0    ibuprofen (ADVIL;MOTRIN) 200 MG tablet, Take 200 mg by mouth every 6 hours as needed for Pain, Disp: , Rfl:     olmesartan (BENICAR) 40 MG tablet, take 1 tablet by mouth once daily, Disp: 90 tablet, Rfl: 1    carvedilol (COREG) 12.5 MG tablet, take 1 tablet by mouth every morning then take 1 tablet every evening, Disp: 180 tablet, Rfl: 1    omeprazole (PRILOSEC) 40 MG delayed release capsule, Take 1 capsule by mouth daily, Disp: 90 capsule, Rfl: 1    cetirizine (ZYRTEC) 10 MG tablet, Take 1 tablet by mouth daily, Disp: 30 tablet, Rfl: 1    aspirin 81 MG tablet, Take 81 mg by mouth daily No hold/Dr Nena Taylor, Disp: , Rfl:     Omega-3 Fatty Acids (FISH OIL PO), Take by mouth daily, Disp: , Rfl:     Multiple Vitamin (MULTI-VITAMIN DAILY PO), Take by mouth daily, Disp: , Rfl:     silver sulfADIAZINE (SILVADENE) 1 % cream, Apply topically daily. (Patient not taking: Reported on 9/6/2022), Disp: 50 g, Rfl: 3    clotrimazole-betamethasone (LOTRISONE) 1-0.05 % cream, Apply topically 1 times daily.  (Patient not taking: Reported on 9/6/2022), Disp: 45 g, Rfl: 2    montelukast (SINGULAIR) 10 MG tablet, take 1 tablet by mouth at bedtime if needed (Patient not taking: Reported on 9/6/2022), Disp: 90 tablet, Rfl: 1  Allergies   Allergen Reactions    Augmentin [Amoxicillin-Pot Clavulanate] Diarrhea    Clindamycin/Lincomycin Hives and Rash       Past Medical History:   Diagnosis Date    Acid reflux     Arthritis     BPH (benign prostatic hyperplasia)     [facility]2018    skin    Chest pain     Disorder of prostate     Diverticulosis     Heart murmur     Hypertension     Left knee pain     for OR 5/2/2019    Mitral valve disorder     Palpitations      Past Surgical History:   Procedure Laterality Date    BACK SURGERY      COLONOSCOPY  2018    HERNIA REPAIR      KNEE ARTHROSCOPY Left 5/2/2019    LEFT KNEE ARTHROSCOPY WITH PARTIAL MEDIAL MENISECTOMY performed by Roxane Claude, MD at [address] [place] [address]  11/2018    UPPER GASTROINTESTINAL ENDOSCOPY  2018     Family History   Problem Relation Age of Onset    Stroke Mother     Heart Disease Father         Bypass Surgery, Pacemaker, Carotids    Coronary Art Dis Father     Other Son         procoagulant disorder     Social History     Socioeconomic History    Marital status:      Spouse name: Not on file    Number of children: Not on file    Years of education: Not on file    Highest education level: Not on file   Occupational History    Not on file   Tobacco Use    Smoking status: Never    Smokeless tobacco: Never   Vaping Use    Vaping Use: Never used   Substance and Sexual Activity    Alcohol use: Yes     Comment: 3 times weekly    Drug use: No    Sexual activity: Yes     Partners: Female   Other Topics Concern    Not on file   Social History Narrative    Not on file     Social Determinants of Health     Financial Resource Strain: Low Risk     Difficulty of Paying Living Expenses: Not hard at all   Food Insecurity: No Food Insecurity    Worried About Running Out of Food in the Last Year: Never true    Ran Out of Food in the Last Year: Never true   Transportation Needs: Not on file   Physical Activity: Not on file   Stress: Not on file   Social Connections: Not on file   Intimate Partner Violence: Not on file   Housing Stability: Not on file       Vitals:    09/06/22 0754   BP: 124/70   Pulse: 73   Resp: 16   Temp: 97.4 °F (36.3 °C)   TempSrc: Temporal   SpO2: 98%   Weight: 249 lb (112.9 kg)   Height: 6' (1.829 m)       Physical Exam  Vitals and nursing note reviewed. Constitutional:       Appearance: He is well-developed. HENT:      Head: Atraumatic. Right Ear: External ear normal.      Left Ear: External ear normal.      Nose: Congestion present. Mouth/Throat:      Pharynx: Posterior oropharyngeal erythema present. No oropharyngeal exudate. Eyes:      Conjunctiva/sclera: Conjunctivae normal.      Pupils: Pupils are equal, round, and reactive to light. Neck:      Thyroid: No thyromegaly. Trachea: No tracheal deviation. Cardiovascular:      Rate and Rhythm: Normal rate and regular rhythm. Heart sounds: No murmur heard. No friction rub. No gallop. Pulmonary:      Effort: Pulmonary effort is normal. No respiratory distress. Breath sounds: Normal breath sounds. Abdominal:      General: Bowel sounds are normal.      Palpations: Abdomen is soft. Musculoskeletal:         General: No tenderness or deformity. Normal range of motion. Cervical back: Normal range of motion and neck supple. Lymphadenopathy:      Cervical: No cervical adenopathy. Skin:     General: Skin is warm and dry. Capillary Refill: Capillary refill takes less than 2 seconds. Findings: No rash. Neurological:      Mental Status: He is alert and oriented to person, place, and time. Sensory: No sensory deficit. Motor: No abnormal muscle tone.       Coordination: Coordination normal.      Deep Tendon Reflexes: Reflexes normal.           Seen By:  Geovani Croft, DO

## 2022-09-14 ENCOUNTER — OFFICE VISIT (OUTPATIENT)
Dept: FAMILY MEDICINE CLINIC | Age: 72
End: 2022-09-14
Payer: MEDICARE

## 2022-09-14 VITALS
OXYGEN SATURATION: 96 % | SYSTOLIC BLOOD PRESSURE: 158 MMHG | HEART RATE: 69 BPM | WEIGHT: 250.6 LBS | DIASTOLIC BLOOD PRESSURE: 88 MMHG | BODY MASS INDEX: 33.94 KG/M2 | HEIGHT: 72 IN | TEMPERATURE: 98.1 F

## 2022-09-14 DIAGNOSIS — B37.0 THRUSH OF MOUTH AND ESOPHAGUS (HCC): Primary | ICD-10-CM

## 2022-09-14 DIAGNOSIS — B37.81 THRUSH OF MOUTH AND ESOPHAGUS (HCC): Primary | ICD-10-CM

## 2022-09-14 PROCEDURE — 99213 OFFICE O/P EST LOW 20 MIN: CPT | Performed by: FAMILY MEDICINE

## 2022-09-14 PROCEDURE — 3017F COLORECTAL CA SCREEN DOC REV: CPT | Performed by: FAMILY MEDICINE

## 2022-09-14 PROCEDURE — 1123F ACP DISCUSS/DSCN MKR DOCD: CPT | Performed by: FAMILY MEDICINE

## 2022-09-14 PROCEDURE — G8427 DOCREV CUR MEDS BY ELIG CLIN: HCPCS | Performed by: FAMILY MEDICINE

## 2022-09-14 PROCEDURE — G8417 CALC BMI ABV UP PARAM F/U: HCPCS | Performed by: FAMILY MEDICINE

## 2022-09-14 PROCEDURE — 1036F TOBACCO NON-USER: CPT | Performed by: FAMILY MEDICINE

## 2022-09-14 ASSESSMENT — ENCOUNTER SYMPTOMS
TROUBLE SWALLOWING: 1
SORE THROAT: 1
RESPIRATORY NEGATIVE: 1
GASTROINTESTINAL NEGATIVE: 1
EYES NEGATIVE: 1

## 2022-09-14 NOTE — PROGRESS NOTES
Zeina Kelly (:  1950) is a 67 y.o. male,Established patient, here for evaluation of the following chief complaint(s):  Post-COVID Symptoms (Pt states he was seen on  and was Covid Positive. He was placed on a steroid and Paxlovid.) and Medication Reaction (He thinks he may be having a reaction to one of the medications he was placed on to Tx his Covid. He has a think white coating in his mouth and throat.)         ASSESSMENT/PLAN:  1. Thrush of mouth and esophagus (HCC)  -     nystatin (MYCOSTATIN) 375646 UNIT/ML suspension; Take 5 mLs by mouth 4 times daily for 10 days Retain in mouth as long as possible, Oral, 4 TIMES DAILY Starting Wed 2022, Until Sat 2022, For 10 days, Disp-200 mL, R-0, Normal    We will treat symptomatically. Red flags discussed with patient. These occur he is directed to the nearest emergency department. Patient voiced understanding. Stop Decadron at this time. No follow-ups on file. Subjective   SUBJECTIVE/OBJECTIVE:  HPI  Patient presents today for worsening white coating and sore throat. Patient states he was recently diagnosed with COVID and placed on Paxlovid and 10 days of Decadron. Symptoms started shortly after starting the medication. Denies any other issues at this time. Review of Systems   Constitutional:  Negative for fever. HENT:  Positive for sore throat and trouble swallowing. Negative for postnasal drip. Eyes: Negative. Respiratory: Negative. Cardiovascular: Negative. Gastrointestinal: Negative. Musculoskeletal:  Negative for neck pain. Skin:  Negative for rash. Neurological:  Negative for headaches. Hematological:  Negative for adenopathy. All other systems reviewed and are negative.        Current Outpatient Medications:     nystatin (MYCOSTATIN) [id number] UNIT/ML suspension, Take 5 mLs by mouth 4 times daily for 10 days Retain in mouth as long as possible, Disp: 200 mL, Rfl: 0    ibuprofen (ADVIL;MOTRIN) 200 MG tablet, Take 200 mg by mouth every 6 hours as needed for Pain, Disp: , Rfl:     olmesartan (BENICAR) 40 MG tablet, take 1 tablet by mouth once daily, Disp: 90 tablet, Rfl: 1    carvedilol (COREG) 12.5 MG tablet, take 1 tablet by mouth every morning then take 1 tablet every evening, Disp: 180 tablet, Rfl: 1    omeprazole (PRILOSEC) 40 MG delayed release capsule, Take 1 capsule by mouth daily, Disp: 90 capsule, Rfl: 1    cetirizine (ZYRTEC) 10 MG tablet, Take 1 tablet by mouth daily, Disp: 30 tablet, Rfl: 1    aspirin 81 MG tablet, Take 81 mg by mouth daily No hold/Dr Josemanuel Pollard, Disp: , Rfl:     Omega-3 Fatty Acids (FISH OIL PO), Take by mouth daily, Disp: , Rfl:     Multiple Vitamin (MULTI-VITAMIN DAILY PO), Take by mouth daily, Disp: , Rfl:     dexamethasone (DECADRON) 6 MG tablet, Take 1 tablet by mouth 2 times daily (with meals) for 10 days (Patient not taking: Reported on 9/14/2022), Disp: 20 tablet, Rfl: 0    silver sulfADIAZINE (SILVADENE) 1 % cream, Apply topically daily. (Patient not taking: No sig reported), Disp: 50 g, Rfl: 3    clotrimazole-betamethasone (LOTRISONE) 1-0.05 % cream, Apply topically 1 times daily.  (Patient not taking: No sig reported), Disp: 45 g, Rfl: 2    montelukast (SINGULAIR) 10 MG tablet, take 1 tablet by mouth at bedtime if needed (Patient not taking: No sig reported), Disp: 90 tablet, Rfl: 1   Patient Active Problem List   Diagnosis    Primary osteoarthritis of left knee    Chest pain    Disorder of prostate    Essential hypertension    Heart murmur    Mitral valve disorder    H/O colonoscopy with polypectomy    Arthritis of knee    Sacroiliitis (HCC)    Lymphoproliferative disorder ([facility]     Past Medical History:   Diagnosis Date    Acid reflux     Arthritis     BPH (benign prostatic hyperplasia)     Cancer ([facility] 11/2018    skin    Chest pain     Disorder of prostate     Diverticulosis     Heart murmur     Hypertension     Left knee pain     for OR 5/2/2019    Mitral valve disorder Palpitations      Past Surgical History:   Procedure Laterality Date    BACK SURGERY      COLONOSCOPY  2018    HERNIA REPAIR      KNEE ARTHROSCOPY Left 5/2/2019    LEFT KNEE ARTHROSCOPY WITH PARTIAL MEDIAL MENISECTOMY performed by Freddy Magana MD at [address] [facility]  11/2018    UPPER GASTROINTESTINAL ENDOSCOPY  2018     Social History     Socioeconomic History    Marital status:      Spouse name: Not on file    Number of children: Not on file    Years of education: Not on file    Highest education level: Not on file   Occupational History    Not on file   Tobacco Use    Smoking status: Never    Smokeless tobacco: Never   Vaping Use    Vaping Use: Never used   Substance and Sexual Activity    Alcohol use: Yes     Comment: 3 times weekly    Drug use: No    Sexual activity: Yes     Partners: Female   Other Topics Concern    Not on file   Social History Narrative    Not on file     Social Determinants of Health     Financial Resource Strain: Low Risk     Difficulty of Paying Living Expenses: Not hard at all   Food Insecurity: No Food Insecurity    Worried About Running Out of Food in the Last Year: Never true    Ran Out of Food in the Last Year: Never true   Transportation Needs: Not on file   Physical Activity: Not on file   Stress: Not on file   Social Connections: Not on file   Intimate Partner Violence: Not on file   Housing Stability: Not on file     Family History   Problem Relation Age of Onset    Stroke Mother     Heart Disease Father         Bypass Surgery, Pacemaker, Carotids    Coronary Art Dis Father     Other Son         procoagulant disorder      There are no preventive care reminders to display for this patient. There are no preventive care reminders to display for this patient. There are no preventive care reminders to display for this patient. There are no preventive care reminders to display for this patient.    Health Maintenance   Topic Date Due    Hepatitis C screen  Never done    COVID-19 Vaccine (4 - Booster for Moderna series) 02/28/2022    Annual Wellness Visit (AWV)  06/16/2022    Flu vaccine (1) 09/01/2022    Depression Screen  06/22/2023    Lipids  06/22/2027    Colorectal Cancer Screen  08/10/2027    DTaP/Tdap/Td vaccine (3 - Td or Tdap) 08/17/2032    Shingles vaccine  Completed    Pneumococcal 65+ years Vaccine  Completed    Hepatitis A vaccine  Aged Out    Hepatitis B vaccine  Aged Out    Hib vaccine  Aged Out    Meningococcal (ACWY) vaccine  Aged Out      There are no preventive care reminders to display for this patient. There are no preventive care reminders to display for this patient. BP (!) 158/88   Pulse 69   Temp 98.1 °F (36.7 °C) (Temporal)   Ht 6' (1.829 m)   Wt 250 lb 9.6 oz (113.7 kg)   SpO2 96%   BMI 33.99 kg/m²     Objective   Physical Exam  Vitals reviewed. Constitutional:       Appearance: He is well-developed. He is ill-appearing. HENT:      Head: Normocephalic and atraumatic. Right Ear: Hearing and tympanic membrane normal.      Left Ear: Hearing and tympanic membrane normal.      Nose: Nose normal.      Mouth/Throat:      Dentition: Normal dentition. Pharynx: Posterior oropharyngeal erythema present. No oropharyngeal exudate. Tonsils: No tonsillar exudate. Comments: Diffuse whitish plaques noted throughout the tongue and oropharynx. Eyes:      Conjunctiva/sclera: Conjunctivae normal.      Pupils: Pupils are equal, round, and reactive to light. Cardiovascular:      Rate and Rhythm: Normal rate and regular rhythm. Heart sounds: Normal heart sounds. No murmur heard. Pulmonary:      Effort: Pulmonary effort is normal. No respiratory distress. Breath sounds: Normal breath sounds. No wheezing. Abdominal:      General: Bowel sounds are normal. There is no distension. Palpations: Abdomen is soft. Musculoskeletal:      Cervical back: Normal range of motion and neck supple.    Lymphadenopathy:      Cervical: No cervical adenopathy. Skin:     General: Skin is warm and dry. Findings: No rash. Neurological:      Mental Status: He is alert. Psychiatric:         Behavior: Behavior normal.                An electronic signature was used to authenticate this note.     --Reyna Larkin, DO

## 2022-09-19 ENCOUNTER — OFFICE VISIT (OUTPATIENT)
Dept: FAMILY MEDICINE CLINIC | Age: 72
End: 2022-09-19
Payer: MEDICARE

## 2022-09-19 VITALS
SYSTOLIC BLOOD PRESSURE: 136 MMHG | TEMPERATURE: 97.2 F | HEART RATE: 78 BPM | WEIGHT: 243 LBS | HEIGHT: 72 IN | BODY MASS INDEX: 32.91 KG/M2 | RESPIRATION RATE: 17 BRPM | OXYGEN SATURATION: 97 % | DIASTOLIC BLOOD PRESSURE: 82 MMHG

## 2022-09-19 DIAGNOSIS — J01.90 ACUTE BACTERIAL SINUSITIS: Primary | ICD-10-CM

## 2022-09-19 DIAGNOSIS — B96.89 ACUTE BACTERIAL SINUSITIS: Primary | ICD-10-CM

## 2022-09-19 PROCEDURE — 3017F COLORECTAL CA SCREEN DOC REV: CPT | Performed by: FAMILY MEDICINE

## 2022-09-19 PROCEDURE — 1123F ACP DISCUSS/DSCN MKR DOCD: CPT | Performed by: FAMILY MEDICINE

## 2022-09-19 PROCEDURE — 1036F TOBACCO NON-USER: CPT | Performed by: FAMILY MEDICINE

## 2022-09-19 PROCEDURE — 99213 OFFICE O/P EST LOW 20 MIN: CPT | Performed by: FAMILY MEDICINE

## 2022-09-19 PROCEDURE — G8417 CALC BMI ABV UP PARAM F/U: HCPCS | Performed by: FAMILY MEDICINE

## 2022-09-19 PROCEDURE — G8427 DOCREV CUR MEDS BY ELIG CLIN: HCPCS | Performed by: FAMILY MEDICINE

## 2022-09-19 RX ORDER — ALBUTEROL SULFATE 90 UG/1
2 AEROSOL, METERED RESPIRATORY (INHALATION) 4 TIMES DAILY PRN
Qty: 18 G | Refills: 5 | Status: SHIPPED | OUTPATIENT
Start: 2022-09-19

## 2022-09-19 RX ORDER — DOXYCYCLINE HYCLATE 100 MG
100 TABLET ORAL 2 TIMES DAILY
Qty: 20 TABLET | Refills: 0 | Status: SHIPPED | OUTPATIENT
Start: 2022-09-19 | End: 2022-09-29

## 2022-09-19 RX ORDER — GUAIFENESIN 600 MG/1
600 TABLET, EXTENDED RELEASE ORAL 2 TIMES DAILY
Qty: 30 TABLET | Refills: 0 | Status: SHIPPED | OUTPATIENT
Start: 2022-09-19 | End: 2022-10-04

## 2022-09-19 NOTE — PROGRESS NOTES
OFFICE NOTE    9/19/22  Name: Ai Zepeda  HEP:9/69/5005   Sex:male   Age:72 y.o. SUBJECTIVE  Chief Complaint   Patient presents with    Cough     Slight     Drainage    Chest Congestion       HPI comes in for sinus infection of several weeks seems to be settling into chest    Review of Systems   No fever, overt wheezing, loss of smell or taste, MARINA. Had recent Covid      Current Outpatient Medications:     doxycycline hyclate (VIBRA-TABS) 100 MG tablet, Take 1 tablet by mouth 2 times daily for 10 days, Disp: 20 tablet, Rfl: 0    guaiFENesin (MUCINEX) 600 MG extended release tablet, Take 1 tablet by mouth 2 times daily for 15 days, Disp: 30 tablet, Rfl: 0    albuterol sulfate HFA (VENTOLIN HFA) 108 (90 Base) MCG/ACT inhaler, Inhale 2 puffs into the lungs 4 times daily as needed for Wheezing, Disp: 18 g, Rfl: 5    ibuprofen (ADVIL;MOTRIN) 200 MG tablet, Take 200 mg by mouth every 6 hours as needed for Pain, Disp: , Rfl:     olmesartan (BENICAR) 40 MG tablet, take 1 tablet by mouth once daily, Disp: 90 tablet, Rfl: 1    carvedilol (COREG) 12.5 MG tablet, take 1 tablet by mouth every morning then take 1 tablet every evening, Disp: 180 tablet, Rfl: 1    omeprazole (PRILOSEC) 40 MG delayed release capsule, Take 1 capsule by mouth daily, Disp: 90 capsule, Rfl: 1    cetirizine (ZYRTEC) 10 MG tablet, Take 1 tablet by mouth daily, Disp: 30 tablet, Rfl: 1    aspirin 81 MG tablet, Take 81 mg by mouth daily No hold/Dr Sauceda Pen, Disp: , Rfl:     Omega-3 Fatty Acids (FISH OIL PO), Take by mouth daily, Disp: , Rfl:     Multiple Vitamin (MULTI-VITAMIN DAILY PO), Take by mouth daily, Disp: , Rfl:     nystatin (MYCOSTATIN) 386703 UNIT/ML suspension, Take 5 mLs by mouth 4 times daily for 10 days Retain in mouth as long as possible (Patient not taking: Reported on 9/19/2022), Disp: 200 mL, Rfl: 0    silver sulfADIAZINE (SILVADENE) 1 % cream, Apply topically daily.  (Patient not taking: No sig reported), Disp: 50 g, Rfl: 3 clotrimazole-betamethasone (LOTRISONE) 1-0.05 % cream, Apply topically 1 times daily. (Patient not taking: No sig reported), Disp: 45 g, Rfl: 2    montelukast (SINGULAIR) 10 MG tablet, take 1 tablet by mouth at bedtime if needed (Patient not taking: No sig reported), Disp: 90 tablet, Rfl: 1  Allergies   Allergen Reactions    Augmentin [Amoxicillin-Pot Clavulanate] Diarrhea    Clindamycin/Lincomycin Hives and Rash       Past Medical History:   Diagnosis Date    Acid reflux     Arthritis     BPH (benign prostatic hyperplasia)     Cancer ([facility] [address] 11/2018    skin    Chest pain     Disorder of prostate     Diverticulosis     Heart murmur     Hypertension     Left knee pain     for OR 5/2/2019    Mitral valve disorder     Palpitations      Past Surgical History:   Procedure Laterality Date    BACK SURGERY      COLONOSCOPY  2018    HERNIA REPAIR      KNEE ARTHROSCOPY Left 5/2/2019    LEFT KNEE ARTHROSCOPY WITH PARTIAL MEDIAL MENISECTOMY performed by Kristen Henry MD at [id number] [address]  11/2018    UPPER GASTROINTESTINAL ENDOSCOPY  2018     Family History   Problem Relation Age of Onset    Stroke Mother     Heart Disease Father         Bypass Surgery, Pacemaker, Carotids    Coronary Art Dis Father     Other Son         procoagulant disorder     Social History       Tobacco History       Smoking Status  Never      Smokeless Tobacco Use  Never              Alcohol History       Alcohol Use Status  Yes Comment  3 times weekly              Drug Use       Drug Use Status  No              Sexual Activity       Sexually Active  Yes Partners  Female                    OBJECTIVE  Vitals:    09/19/22 1357   BP: 136/82   Pulse: 78   Resp: 17   Temp: 97.2 °F (36.2 °C)   TempSrc: Temporal   SpO2: 97%   Weight: 243 lb (110.2 kg)   Height: 6' (1.829 m)        Body mass index is 32.96 kg/m². No orders of the defined types were placed in this encounter. EXAM   Physical Exam  Vitals and nursing note reviewed. Constitutional:       Appearance: Normal appearance. He is obese. HENT:      Right Ear: Tympanic membrane and external ear normal.      Left Ear: Tympanic membrane and external ear normal.      Nose: Congestion and rhinorrhea present. Mouth/Throat:      Pharynx: Oropharynx is clear. Posterior oropharyngeal erythema present. Eyes:      Conjunctiva/sclera: Conjunctivae normal.      Pupils: Pupils are equal, round, and reactive to light. Cardiovascular:      Rate and Rhythm: Normal rate and regular rhythm. Heart sounds: No murmur heard. Pulmonary:      Effort: Pulmonary effort is normal.      Breath sounds: Rhonchi present. No wheezing or rales. Abdominal:      General: Bowel sounds are normal.      Tenderness: There is no abdominal tenderness. Musculoskeletal:      Cervical back: Tenderness present. Right lower leg: No edema. Left lower leg: No edema. Lymphadenopathy:      Cervical: No cervical adenopathy. Skin:     Coloration: Skin is not jaundiced. Findings: No bruising or rash. Neurological:      General: No focal deficit present. Mental Status: He is alert and oriented to person, place, and time. Tanika Fernandes was seen today for cough, drainage and chest congestion. Diagnoses and all orders for this visit:    Acute bacterial sinusitis  -     doxycycline hyclate (VIBRA-TABS) 100 MG tablet; Take 1 tablet by mouth 2 times daily for 10 days  -     guaiFENesin (MUCINEX) 600 MG extended release tablet; Take 1 tablet by mouth 2 times daily for 15 days  -     albuterol sulfate HFA (VENTOLIN HFA) 108 (90 Base) MCG/ACT inhaler; Inhale 2 puffs into the lungs 4 times daily as needed for Wheezing  Feel this is Covid 19 sequlae, not active Covid      No follow-ups on file.     Electronically signed by Kate Strange MD on 9/19/22 at 3:00 PM EDT

## 2022-09-28 ENCOUNTER — OFFICE VISIT (OUTPATIENT)
Dept: FAMILY MEDICINE CLINIC | Age: 72
End: 2022-09-28
Payer: MEDICARE

## 2022-09-28 VITALS
BODY MASS INDEX: 33.89 KG/M2 | OXYGEN SATURATION: 98 % | HEIGHT: 72 IN | WEIGHT: 250.2 LBS | SYSTOLIC BLOOD PRESSURE: 146 MMHG | DIASTOLIC BLOOD PRESSURE: 86 MMHG | TEMPERATURE: 98.2 F | HEART RATE: 81 BPM

## 2022-09-28 DIAGNOSIS — U07.1 COVID-19: ICD-10-CM

## 2022-09-28 DIAGNOSIS — R05.9 COUGH: ICD-10-CM

## 2022-09-28 DIAGNOSIS — U07.1 COVID-19: Primary | ICD-10-CM

## 2022-09-28 PROCEDURE — 3017F COLORECTAL CA SCREEN DOC REV: CPT | Performed by: FAMILY MEDICINE

## 2022-09-28 PROCEDURE — G8417 CALC BMI ABV UP PARAM F/U: HCPCS | Performed by: FAMILY MEDICINE

## 2022-09-28 PROCEDURE — 1123F ACP DISCUSS/DSCN MKR DOCD: CPT | Performed by: FAMILY MEDICINE

## 2022-09-28 PROCEDURE — G8427 DOCREV CUR MEDS BY ELIG CLIN: HCPCS | Performed by: FAMILY MEDICINE

## 2022-09-28 PROCEDURE — 1036F TOBACCO NON-USER: CPT | Performed by: FAMILY MEDICINE

## 2022-09-28 PROCEDURE — 99213 OFFICE O/P EST LOW 20 MIN: CPT | Performed by: FAMILY MEDICINE

## 2022-09-28 RX ORDER — AZELASTINE 1 MG/ML
1 SPRAY, METERED NASAL 2 TIMES DAILY
Qty: 60 ML | Refills: 1 | Status: SHIPPED | OUTPATIENT
Start: 2022-09-28

## 2022-09-28 RX ORDER — GUAIFENESIN AND CODEINE PHOSPHATE 100; 10 MG/5ML; MG/5ML
5 SOLUTION ORAL EVERY 4 HOURS PRN
Qty: 237 ML | Refills: 0 | Status: SHIPPED | OUTPATIENT
Start: 2022-09-28 | End: 2022-10-06

## 2022-09-28 RX ORDER — CHLORPHENIRAMINE MALEATE 4 MG/1
4 TABLET ORAL
Qty: 30 TABLET | Refills: 4 | Status: SHIPPED | OUTPATIENT
Start: 2022-09-28 | End: 2022-10-28

## 2022-09-28 ASSESSMENT — ENCOUNTER SYMPTOMS: COUGH: 1

## 2022-09-28 NOTE — PROGRESS NOTES
Britton Gant (:  1950) is a 67 y.o. male,Established patient, here for evaluation of the following chief complaint(s):  Congestion (Pt states ongoing issue since he tested positive for Covid on . He was seen last on  and was placed on doxycycline. He states he is not getting any better.), Cough, and Sinusitis (Pt states facial pain and pressure)         ASSESSMENT/PLAN:  1. COVID-19  -     azelastine (ASTELIN) 0.1 % nasal spray; 1 spray by Nasal route 2 times daily Use in each nostril as directed, Disp-60 mL, R-1Normal  -     chlorpheniramine (CHLOR-TRIMETON) 4 MG tablet; Take 1 tablet by mouth every 8-12 hours as needed for Rhinitis, Disp-30 tablet, R-4Normal  -     guaiFENesin-codeine (CHERATUSSIN AC) 100-10 MG/5ML syrup; Take 5 mLs by mouth every 4 hours as needed for Cough for up to 8 days. , Disp-237 mL, R-0Normal  At this time we will do COVID send out to see if there is a about infection after recent antiviral.  Red flags discussed with patient. If these occur he is to go directly to the nearest emergency department. No follow-ups on file. Subjective   SUBJECTIVE/OBJECTIVE:  Cough  Associated symptoms include ear pain. Sinusitis  Associated symptoms include congestion, coughing and ear pain. Tested positive for COVID at the beginning of the month and has had issues since. Recently finishing up doxycycline from his PCP for similar issues with sinus complaints. Denies any fever or chills. Was recently placed on Paxlovid have a repeat/rebound infection. Denies any chest pain or shortness of breath. Denies any fever or chills. Review of Systems   HENT:  Positive for congestion and ear pain. Respiratory:  Positive for cough. All other systems reviewed and are negative.        Current Outpatient Medications:     azelastine (ASTELIN) 0.1 % nasal spray, 1 spray by Nasal route 2 times daily Use in each nostril as directed, Disp: 60 mL, Rfl: 1    chlorpheniramine (CHLOR-TRIMETON) 4 MG tablet, Take 1 tablet by mouth every 8-12 hours as needed for Rhinitis, Disp: 30 tablet, Rfl: 4    guaiFENesin-codeine (CHERATUSSIN AC) 100-10 MG/5ML syrup, Take 5 mLs by mouth every 4 hours as needed for Cough for up to 8 days. , Disp: 237 mL, Rfl: 0    doxycycline hyclate (VIBRA-TABS) 100 MG tablet, Take 1 tablet by mouth 2 times daily for 10 days, Disp: 20 tablet, Rfl: 0    guaiFENesin (MUCINEX) 600 MG extended release tablet, Take 1 tablet by mouth 2 times daily for 15 days, Disp: 30 tablet, Rfl: 0    albuterol sulfate HFA (VENTOLIN HFA) 108 (90 Base) MCG/ACT inhaler, Inhale 2 puffs into the lungs 4 times daily as needed for Wheezing, Disp: 18 g, Rfl: 5    ibuprofen (ADVIL;MOTRIN) 200 MG tablet, Take 200 mg by mouth every 6 hours as needed for Pain, Disp: , Rfl:     olmesartan (BENICAR) 40 MG tablet, take 1 tablet by mouth once daily, Disp: 90 tablet, Rfl: 1    carvedilol (COREG) 12.5 MG tablet, take 1 tablet by mouth every morning then take 1 tablet every evening, Disp: 180 tablet, Rfl: 1    omeprazole (PRILOSEC) 40 MG delayed release capsule, Take 1 capsule by mouth daily, Disp: 90 capsule, Rfl: 1    cetirizine (ZYRTEC) 10 MG tablet, Take 1 tablet by mouth daily, Disp: 30 tablet, Rfl: 1    aspirin 81 MG tablet, Take 81 mg by mouth daily No hold/Dr Alana Roberts, Disp: , Rfl:     Omega-3 Fatty Acids (FISH OIL PO), Take by mouth daily, Disp: , Rfl:     Multiple Vitamin (MULTI-VITAMIN DAILY PO), Take by mouth daily, Disp: , Rfl:     silver sulfADIAZINE (SILVADENE) 1 % cream, Apply topically daily. (Patient not taking: No sig reported), Disp: 50 g, Rfl: 3    clotrimazole-betamethasone (LOTRISONE) 1-0.05 % cream, Apply topically 1 times daily.  (Patient not taking: No sig reported), Disp: 45 g, Rfl: 2    montelukast (SINGULAIR) 10 MG tablet, take 1 tablet by mouth at bedtime if needed (Patient not taking: No sig reported), Disp: 90 tablet, Rfl: 1   Patient Active Problem List   Diagnosis    Primary osteoarthritis of left knee    Chest pain    Disorder of prostate    Essential hypertension    Heart murmur    Mitral valve disorder    H/O colonoscopy with polypectomy    Arthritis of knee    Sacroiliitis (HCC)    Lymphoproliferative disorder (HCC)     Past Medical History:   Diagnosis Date    Acid reflux     Arthritis     BPH (benign prostatic hyperplasia)     Cancer ([facility] [address] 11/2018    skin    Chest pain     Disorder of prostate     Diverticulosis     Heart murmur     Hypertension     Left knee pain     for OR 5/2/2019    Mitral valve disorder     Palpitations      Past Surgical History:   Procedure Laterality Date    BACK SURGERY      COLONOSCOPY  2018    HERNIA REPAIR      KNEE ARTHROSCOPY Left 5/2/2019    LEFT KNEE ARTHROSCOPY WITH PARTIAL MEDIAL MENISECTOMY performed by Laurel Escobar MD at [address] [address]  11/2018    UPPER GASTROINTESTINAL ENDOSCOPY  2018     Social History     Socioeconomic History    Marital status:      Spouse name: Not on file    Number of children: Not on file    Years of education: Not on file    Highest education level: Not on file   Occupational History    Not on file   Tobacco Use    Smoking status: Never    Smokeless tobacco: Never   Vaping Use    Vaping Use: Never used   Substance and Sexual Activity    Alcohol use: Yes     Comment: 3 times weekly    Drug use: No    Sexual activity: Yes     Partners: Female   Other Topics Concern    Not on file   Social History Narrative    Not on file     Social Determinants of Health     Financial Resource Strain: Low Risk     Difficulty of Paying Living Expenses: Not hard at all   Food Insecurity: No Food Insecurity    Worried About Running Out of Food in the Last Year: Never true    Ran Out of Food in the Last Year: Never true   Transportation Needs: Not on file   Physical Activity: Not on file   Stress: Not on file   Social Connections: Not on file   Intimate Partner Violence: Not on file   Housing Stability: Not on file Family History   Problem Relation Age of Onset    Stroke Mother     Heart Disease Father         Bypass Surgery, Pacemaker, Carotids    Coronary Art Dis Father     Other Son         procoagulant disorder      There are no preventive care reminders to display for this patient. There are no preventive care reminders to display for this patient. There are no preventive care reminders to display for this patient. There are no preventive care reminders to display for this patient. Health Maintenance   Topic Date Due    Hepatitis C screen  Never done    COVID-19 Vaccine (4 - Booster for Moderna series) 02/28/2022    Annual Wellness Visit (AWV)  06/16/2022    Flu vaccine (1) 08/01/2022    Depression Screen  06/22/2023    Lipids  06/22/2027    Colorectal Cancer Screen  08/10/2027    DTaP/Tdap/Td vaccine (3 - Td or Tdap) 08/17/2032    Shingles vaccine  Completed    Pneumococcal 65+ years Vaccine  Completed    Hepatitis A vaccine  Aged Out    Hepatitis B vaccine  Aged Out    Hib vaccine  Aged Out    Meningococcal (ACWY) vaccine  Aged Out      There are no preventive care reminders to display for this patient. There are no preventive care reminders to display for this patient. BP (!) 146/86   Pulse 81   Temp 98.2 °F (36.8 °C) (Temporal)   Ht 6' (1.829 m)   Wt 250 lb 3.2 oz (113.5 kg)   SpO2 98%   BMI 33.93 kg/m²     Objective   Physical Exam  Vitals reviewed. Constitutional:       Appearance: He is well-developed. He is ill-appearing. HENT:      Head: Normocephalic and atraumatic. Right Ear: External ear normal.      Left Ear: External ear normal.      Nose: Nose normal.   Eyes:      Conjunctiva/sclera: Conjunctivae normal.      Pupils: Pupils are equal, round, and reactive to light. Cardiovascular:      Rate and Rhythm: Normal rate and regular rhythm. Heart sounds: Normal heart sounds.    Pulmonary:      Effort: Pulmonary effort is normal.      Breath sounds: Decreased breath sounds and wheezing present. Abdominal:      General: Bowel sounds are normal.      Palpations: Abdomen is soft. Musculoskeletal:         General: Normal range of motion. Cervical back: Normal range of motion and neck supple. Skin:     General: Skin is warm and dry. Findings: No erythema. Neurological:      Mental Status: He is alert and oriented to person, place, and time. Cranial Nerves: No cranial nerve deficit. Psychiatric:         Behavior: Behavior normal.         Judgment: Judgment normal.                An electronic signature was used to authenticate this note.     --Amber Larkin, DO

## 2022-09-29 LAB — SARS-COV-2, PCR: NOT DETECTED

## 2022-10-20 ENCOUNTER — NURSE ONLY (OUTPATIENT)
Dept: FAMILY MEDICINE CLINIC | Age: 72
End: 2022-10-20
Payer: MEDICARE

## 2022-10-20 DIAGNOSIS — Z23 NEED FOR INFLUENZA VACCINATION: Primary | ICD-10-CM

## 2022-10-20 PROCEDURE — G0008 ADMIN INFLUENZA VIRUS VAC: HCPCS | Performed by: FAMILY MEDICINE

## 2022-10-20 PROCEDURE — 90694 VACC AIIV4 NO PRSRV 0.5ML IM: CPT | Performed by: FAMILY MEDICINE

## 2022-10-20 PROCEDURE — 99999 PR OFFICE/OUTPT VISIT,PROCEDURE ONLY: CPT | Performed by: FAMILY MEDICINE

## 2022-10-22 ASSESSMENT — PROMIS GLOBAL HEALTH SCALE
HOW IS THE PROMIS V1.1 BEING ADMINISTERED?: 2
IN GENERAL, PLEASE RATE HOW WELL YOU CARRY OUT YOUR USUAL SOCIAL ACTIVITIES (INCLUDES ACTIVITIES AT HOME, AT WORK, AND IN YOUR COMMUNITY, AND RESPONSIBILITIES AS A PARENT, CHILD, SPOUSE, EMPLOYEE, FRIEND, ETC) [ON A SCALE OF 1 (POOR) TO 5 (EXCELLENT)]?: 4
IN GENERAL, WOULD YOU SAY YOUR HEALTH IS...[ON A SCALE OF 1 (POOR) TO 5 (EXCELLENT)]: 4
IN GENERAL, HOW WOULD YOU RATE YOUR PHYSICAL HEALTH [ON A SCALE OF 1 (POOR) TO 5 (EXCELLENT)]?: 4
IN GENERAL, HOW WOULD YOU RATE YOUR SATISFACTION WITH YOUR SOCIAL ACTIVITIES AND RELATIONSHIPS [ON A SCALE OF 1 (POOR) TO 5 (EXCELLENT)]?: 4
TO WHAT EXTENT ARE YOU ABLE TO CARRY OUT YOUR EVERYDAY PHYSICAL ACTIVITIES SUCH AS WALKING, CLIMBING STAIRS, CARRYING GROCERIES, OR MOVING A CHAIR [ON A SCALE OF 1 (NOT AT ALL) TO 5 (COMPLETELY)]?: 3
SUM OF RESPONSES TO QUESTIONS 3, 6, 7, & 8: 17
IN THE PAST 7 DAYS, HOW OFTEN HAVE YOU BEEN BOTHERED BY EMOTIONAL PROBLEMS, SUCH AS FEELING ANXIOUS, DEPRESSED, OR IRRITABLE [ON A SCALE FROM 1 (NEVER) TO 5 (ALWAYS)]?: 3
WHO IS THE PERSON COMPLETING THE PROMIS V1.1 SURVEY?: 0
IN GENERAL, HOW WOULD YOU RATE YOUR MENTAL HEALTH, INCLUDING YOUR MOOD AND YOUR ABILITY TO THINK [ON A SCALE OF 1 (POOR) TO 5 (EXCELLENT)]?: 4
IN THE PAST 7 DAYS, HOW WOULD YOU RATE YOUR FATIGUE ON AVERAGE [ON A SCALE FROM 1 (NONE) TO 5 (VERY SEVERE)]?: 3
IN GENERAL, WOULD YOU SAY YOUR QUALITY OF LIFE IS...[ON A SCALE OF 1 (POOR) TO 5 (EXCELLENT)]: 4
SUM OF RESPONSES TO QUESTIONS 2, 4, 5, & 10: 15
IN THE PAST 7 DAYS, HOW WOULD YOU RATE YOUR PAIN ON AVERAGE [ON A SCALE FROM 0 (NO PAIN) TO 10 (WORST IMAGINABLE PAIN)]?: 7

## 2022-10-22 ASSESSMENT — KOOS JR
HOW SEVERE IS YOUR KNEE STIFFNESS AFTER FIRST WAKING IN MORNING: 2
STRAIGHTENING KNEE FULLY: 1
STANDING UPRIGHT: 2
BENDING TO THE FLOOR TO PICK UP OBJECT: 2
GOING UP OR DOWN STAIRS: 2
RISING FROM SITTING: 2
KOOS JR TOTAL INTERVAL SCORE: 52.465
TWISING OR PIVOTING ON KNEE: 3

## 2022-10-25 ENCOUNTER — HOSPITAL ENCOUNTER (OUTPATIENT)
Dept: CT IMAGING | Age: 72
Discharge: HOME | End: 2022-10-27
Payer: MEDICARE

## 2022-10-25 DIAGNOSIS — M17.12 PRIMARY OSTEOARTHRITIS OF LEFT KNEE: ICD-10-CM

## 2022-10-25 PROCEDURE — G1010 CDSM STANSON: HCPCS

## 2022-10-25 NOTE — DISCHARGE INSTRUCTIONS
DISCHARGE INSTRUCTIONS FOR TOTAL KNEE REPLACEMENT        Prevent blood clots - you are at risk post operatively for DVT (Deep vein    thrombosis and / or PE (Pulmonary Embolism)       Continue antiembolic stockings (PAULINE hose) for 4 weeks from date of surgery. Remove stockings every eight hours. Check skin for redness or any breakdowns on heels and over outer ankle bones. Do not roll stockings down or fold over (this may cause a band of constriction    interfering with circulation and increasing your risk of a blood clot forming or a    skin breakdown. If you have not been wearing your stockings and notice increased swelling or    excessive swelling in your extremity then: Lie down and elevate legs for 20-30   minutes. Apply stockings. If swelling does not improve, call office. REMEMBER: Mild to moderate swelling of the operative limb is expected    for some time after surgery. Take frequent walks around  your home. Be careful outdoors- watch for uneven ground and pavement, curbs and holes. Gradually increase your activity to prevent fatigue. When at rest (sitting in a chair or lying down,resting) continue circulation exercises at least every hour - foot flaps, ankle rolls, quad and glut sets. You will continue one of the following blood thinners at home. Lovenox -  An injection once or twice a day if you have a history of blood clots,    cancer, stomach ulcers or gastrointestinal bleeding. Aspirin - 81 mg twice daily if no history of the above ailments. Coumadin - if held before surgery, your family doctor will be responsible for   managing and ordering this medication upon your discharge. Incision Care: You may shower - Your dressing is water proof. You can shower with your dressing on. After one week, remove your dressing and leave your incision open to air. REMEMBER to let water roll over incision. Incision line is    healing and tender - protect from [address] water.  No need to wash incision with soap   and water. Pat incision dry with clean hand towel or let air dry. DO NOT take baths, go in swimming pools/hot tubs/lakes, or submerge your operative leg under water until you are cleared by Dr. Humberto Hunt. Do not apply creams, liniments, lotions or ointments directly on incision line. If incision is draining, keep covered with sterile gauze. Change dressing daily and each time you shower. Do not touch incision line with your fingers or scratch incision with your   fingernails (your fingernails harbor germs and bacteria). Do not allow pets near your incision - do not allow pets to smell or lick incision. Weight Bearing: You can be weight bearing as tolerated on your operative leg. Use a walker/cane as needed. Signs and symptoms to report to your doctor:     Persistent drainage from the incision. Increased redness or swelling of the incision or operative extremity. Increased or excessive pain at the incision site or in the thigh or calf. Fever over 101 degrees with chills (take your temperature at home and   Record). Go to Emergency Room if you experience any of the following:     Chest pain or tightness   Shortness of breath or difficulty breathing   Anxiety or feeling of impending doom      Note: The above are signs and symptoms of a blood clot in your    lungs. Although this is rare, it can occur. You must be evaluated in the   Emergency Room. Rehabilitation:   Continue exercises at home as instructed by the Physical and Occupational   Therapists. Continue Hip / Knee Precautions until cleared by Dr. Christos Atwood a formal Outpatient Physical Therapy program as soon as you are able.     First post op visit will include:              Wound check               X-ray of operative joint              Exam by your surgeon               Prescription for Outpatient Physical Therapy     Once you are home:   Call office for appointment at [phone] for one week.       Jonnie Schuster MD  Orthopaedic Surgery

## 2022-11-01 ENCOUNTER — OFFICE VISIT (OUTPATIENT)
Dept: FAMILY MEDICINE CLINIC | Age: 72
End: 2022-11-01
Payer: MEDICARE

## 2022-11-01 VITALS
TEMPERATURE: 97.4 F | HEIGHT: 72 IN | SYSTOLIC BLOOD PRESSURE: 136 MMHG | DIASTOLIC BLOOD PRESSURE: 80 MMHG | OXYGEN SATURATION: 94 % | RESPIRATION RATE: 18 BRPM | BODY MASS INDEX: 33.64 KG/M2 | HEART RATE: 70 BPM | WEIGHT: 248.4 LBS

## 2022-11-01 DIAGNOSIS — R01.1 HEART MURMUR: ICD-10-CM

## 2022-11-01 DIAGNOSIS — I10 ESSENTIAL HYPERTENSION: ICD-10-CM

## 2022-11-01 DIAGNOSIS — N42.9 DISORDER OF PROSTATE: ICD-10-CM

## 2022-11-01 DIAGNOSIS — Z01.810 PREOP CARDIOVASCULAR EXAM: ICD-10-CM

## 2022-11-01 DIAGNOSIS — M17.12 PRIMARY OSTEOARTHRITIS OF LEFT KNEE: ICD-10-CM

## 2022-11-01 DIAGNOSIS — R30.0 DYSURIA: Primary | ICD-10-CM

## 2022-11-01 LAB
BILIRUBIN, POC: NEGATIVE
BLOOD URINE, POC: NORMAL
CLARITY, POC: CLEAR
COLOR, POC: YELLOW
GLUCOSE URINE, POC: NEGATIVE
KETONES, POC: NEGATIVE
LEUKOCYTE EST, POC: NEGATIVE
NITRITE, POC: NEGATIVE
PH, POC: 7
PROTEIN, POC: NEGATIVE
SPECIFIC GRAVITY, POC: 1.02
UROBILINOGEN, POC: 0.2

## 2022-11-01 PROCEDURE — G8427 DOCREV CUR MEDS BY ELIG CLIN: HCPCS | Performed by: FAMILY MEDICINE

## 2022-11-01 PROCEDURE — 1036F TOBACCO NON-USER: CPT | Performed by: FAMILY MEDICINE

## 2022-11-01 PROCEDURE — 99214 OFFICE O/P EST MOD 30 MIN: CPT | Performed by: FAMILY MEDICINE

## 2022-11-01 PROCEDURE — 3017F COLORECTAL CA SCREEN DOC REV: CPT | Performed by: FAMILY MEDICINE

## 2022-11-01 PROCEDURE — 3078F DIAST BP <80 MM HG: CPT | Performed by: FAMILY MEDICINE

## 2022-11-01 PROCEDURE — G8417 CALC BMI ABV UP PARAM F/U: HCPCS | Performed by: FAMILY MEDICINE

## 2022-11-01 PROCEDURE — G8484 FLU IMMUNIZE NO ADMIN: HCPCS | Performed by: FAMILY MEDICINE

## 2022-11-01 PROCEDURE — 3074F SYST BP LT 130 MM HG: CPT | Performed by: FAMILY MEDICINE

## 2022-11-01 PROCEDURE — 1123F ACP DISCUSS/DSCN MKR DOCD: CPT | Performed by: FAMILY MEDICINE

## 2022-11-01 PROCEDURE — 81002 URINALYSIS NONAUTO W/O SCOPE: CPT | Performed by: FAMILY MEDICINE

## 2022-11-01 ASSESSMENT — ENCOUNTER SYMPTOMS
DIARRHEA: 0
PHOTOPHOBIA: 0
BACK PAIN: 0
BLOOD IN STOOL: 0
ABDOMINAL PAIN: 0
WHEEZING: 0
TROUBLE SWALLOWING: 0
SORE THROAT: 0
EYE REDNESS: 0
SHORTNESS OF BREATH: 0
SINUS PAIN: 0
CONSTIPATION: 0
VOMITING: 0
COUGH: 0

## 2022-11-01 NOTE — PROGRESS NOTES
OFFICE NOTE    11/1/22  Name: Mika Granados  FRI:[birth year]   Sex:male   Age:72 y.o. SUBJECTIVE  Chief Complaint   Patient presents with    Pre-op Exam       HPI Having left TKR under Dr. Kirk Samano on 11/10. Having Cardiology clearance tomorrow    Review of Systems   Constitutional:  Positive for activity change and fatigue. Negative for appetite change, fever and unexpected weight change. HENT:  Positive for congestion, hearing loss and postnasal drip. Negative for ear pain, sinus pain, sore throat and trouble swallowing. Eyes:  Negative for photophobia, redness and visual disturbance. Respiratory:  Negative for cough, shortness of breath and wheezing. Cardiovascular:  Negative for chest pain, palpitations and leg swelling. Gastrointestinal:  Negative for abdominal pain, blood in stool, constipation, diarrhea and vomiting. Endocrine: Negative for cold intolerance, polydipsia and polyuria. Genitourinary:  Positive for frequency and urgency. Negative for difficulty urinating, genital sores and hematuria. Musculoskeletal:  Positive for arthralgias and gait problem. Negative for back pain and joint swelling. Skin:  Negative for pallor and rash. Allergic/Immunologic: Negative for food allergies. Neurological:  Negative for dizziness, tremors, seizures, syncope, numbness and headaches. Hematological:  Negative for adenopathy. Does not bruise/bleed easily. Psychiatric/Behavioral:  Negative for agitation, dysphoric mood, hallucinations and sleep disturbance. The patient is nervous/anxious. All other systems reviewed and are negative.          Current Outpatient Medications:     azelastine (ASTELIN) 0.1 % nasal spray, 1 spray by Nasal route 2 times daily Use in each nostril as directed, Disp: 60 mL, Rfl: 1    ibuprofen (ADVIL;MOTRIN) 200 MG tablet, Take 200 mg by mouth every 6 hours as needed for Pain, Disp: , Rfl:     olmesartan (BENICAR) 40 MG tablet, take 1 tablet by mouth once daily, Disp: 90 tablet, Rfl: 1    carvedilol (COREG) 12.5 MG tablet, take 1 tablet by mouth every morning then take 1 tablet every evening, Disp: 180 tablet, Rfl: 1    omeprazole (PRILOSEC) 40 MG delayed release capsule, Take 1 capsule by mouth daily, Disp: 90 capsule, Rfl: 1    cetirizine (ZYRTEC) 10 MG tablet, Take 1 tablet by mouth daily (Patient taking differently: Take 10 mg by mouth as needed), Disp: 30 tablet, Rfl: 1    aspirin 81 MG tablet, Take 81 mg by mouth daily No hold/Dr Twila Cunningham, Disp: , Rfl:     Omega-3 Fatty Acids (FISH OIL PO), Take by mouth daily, Disp: , Rfl:     Multiple Vitamin (MULTI-VITAMIN DAILY PO), Take by mouth daily, Disp: , Rfl:     albuterol sulfate HFA (VENTOLIN HFA) 108 (90 Base) MCG/ACT inhaler, Inhale 2 puffs into the lungs 4 times daily as needed for Wheezing (Patient not taking: Reported on 11/1/2022), Disp: 18 g, Rfl: 5    silver sulfADIAZINE (SILVADENE) 1 % cream, Apply topically daily. (Patient not taking: No sig reported), Disp: 50 g, Rfl: 3    clotrimazole-betamethasone (LOTRISONE) 1-0.05 % cream, Apply topically 1 times daily.  (Patient not taking: No sig reported), Disp: 45 g, Rfl: 2    montelukast (SINGULAIR) 10 MG tablet, take 1 tablet by mouth at bedtime if needed (Patient not taking: No sig reported), Disp: 90 tablet, Rfl: 1  Allergies   Allergen Reactions    Augmentin [Amoxicillin-Pot Clavulanate] Diarrhea    Clindamycin/Lincomycin Hives and Rash       Past Medical History:   Diagnosis Date    Acid reflux     Arthritis     BPH (benign prostatic hyperplasia)     Cancer ([facility] [address] 11/2018    skin    Chest pain     Disorder of prostate     Diverticulosis     Heart murmur     Hypertension     Left knee pain     for OR 5/2/2019    Mitral valve disorder     Palpitations      Past Surgical History:   Procedure Laterality Date    BACK SURGERY      COLONOSCOPY  2018    HERNIA REPAIR      KNEE ARTHROSCOPY Left 5/2/2019    LEFT KNEE ARTHROSCOPY WITH PARTIAL MEDIAL MENISECTOMY performed by Isabella Gaines MD at [address] [place]  11/2018    UPPER GASTROINTESTINAL ENDOSCOPY  2018     Family History   Problem Relation Age of Onset    Stroke Mother     Heart Disease Father         Bypass Surgery, Pacemaker, Carotids    Coronary Art Dis Father     Other Son         procoagulant disorder     Social History       Tobacco History       Smoking Status  Never      Smokeless Tobacco Use  Never              Alcohol History       Alcohol Use Status  Yes Comment  3 times weekly              Drug Use       Drug Use Status  No              Sexual Activity       Sexually Active  Yes Partners  Female                    OBJECTIVE  Vitals:    11/01/22 1108   BP: 136/80   Pulse: 70   Resp: 18   Temp: 97.4 °F (36.3 °C)   TempSrc: Temporal   SpO2: 94%   Weight: 248 lb 6.4 oz (112.7 kg)   Height: 6' (1.829 m)        Body mass index is 33.69 kg/m². Orders Placed This Encounter   Procedures    POCT Urinalysis no Micro        EXAM   Physical Exam  Vitals and nursing note reviewed. Constitutional:       Appearance: Normal appearance. He is obese. Comments: Reasonably fit. Near professional calf tim   HENT:      Right Ear: Tympanic membrane and external ear normal.      Left Ear: Tympanic membrane and external ear normal.      Nose: Congestion and rhinorrhea present. Mouth/Throat:      Pharynx: Oropharynx is clear. No posterior oropharyngeal erythema. Eyes:      Conjunctiva/sclera: Conjunctivae normal.      Pupils: Pupils are equal, round, and reactive to light. Neck:      Vascular: No carotid bruit. Cardiovascular:      Rate and Rhythm: Normal rate and regular rhythm. Pulses: Normal pulses. Heart sounds: Murmur heard. Pulmonary:      Effort: Pulmonary effort is normal.      Breath sounds: No wheezing, rhonchi or rales. Abdominal:      General: Bowel sounds are normal.      Palpations: There is no mass. Tenderness: There is no abdominal tenderness.    Musculoskeletal: General: Swelling and tenderness present. Cervical back: No tenderness. Right lower leg: No edema. Left lower leg: No edema. Comments: Decreased ROM lumbar spine. Left knee slightly varus crepitant. Some crepitus on right which does not bother him at this time   Lymphadenopathy:      Cervical: No cervical adenopathy. Skin:     Coloration: Skin is not pale. Findings: No bruising or rash. Neurological:      General: No focal deficit present. Mental Status: He is alert and oriented to person, place, and time. Sensory: No sensory deficit. Motor: No weakness. Coordination: Coordination normal.      Gait: Gait normal.   Psychiatric:         Mood and Affect: Mood normal.         Behavior: Behavior normal.         Magdalena Barrett was seen today for pre-op exam.    Diagnoses and all orders for this visit:    Dysuria  -     POCT Urinalysis no Micro  Had slight dysuria while on antibiotic for dental infection. Resolved when stopped antibiotic, u/a negative  Essential hypertension  Well controlled no changes made  Heart murmur  Most likely mild to moderate MRMonica Seeing Cardioloby tomorrow  Disorder of prostate  Mild BPH symptoms. Should hold antihistamine  Primary osteoarthritis of left knee  Severe. TKR scheduled. Preop cardiovascular exam  Medically cleared from Internal medicine standpoint. Cardio to see tomorrow      No follow-ups on file.     Electronically signed by Gisele Tran MD on 11/1/22 at 12:07 PM EDT

## 2022-11-02 ENCOUNTER — TELEPHONE (OUTPATIENT)
Dept: FAMILY MEDICINE CLINIC | Age: 72
End: 2022-11-02

## 2022-11-02 ENCOUNTER — ANESTHESIA EVENT (OUTPATIENT)
Dept: OPERATING ROOM | Age: 72
End: 2022-11-02
Payer: MEDICARE

## 2022-11-02 ENCOUNTER — HOSPITAL ENCOUNTER (OUTPATIENT)
Dept: PREADMISSION TESTING | Age: 72
Discharge: HOME | End: 2022-11-02
Payer: MEDICARE

## 2022-11-02 VITALS
OXYGEN SATURATION: 97 % | TEMPERATURE: 97.7 F | SYSTOLIC BLOOD PRESSURE: 133 MMHG | RESPIRATION RATE: 20 BRPM | HEIGHT: 72 IN | WEIGHT: 249 LBS | BODY MASS INDEX: 33.72 KG/M2 | DIASTOLIC BLOOD PRESSURE: 73 MMHG | HEART RATE: 63 BPM

## 2022-11-02 DIAGNOSIS — Z01.818 PRE-OP TESTING: ICD-10-CM

## 2022-11-02 LAB
ANION GAP SERPL CALCULATED.3IONS-SCNC: 9 MMOL/L (ref 7–16)
BASOPHILS ABSOLUTE: 0.04 E9/L (ref 0–0.2)
BASOPHILS RELATIVE PERCENT: 1 % (ref 0–2)
BUN BLDV-MCNC: 13 MG/DL (ref 6–23)
CALCIUM SERPL-MCNC: 9.7 MG/DL (ref 8.6–10.2)
CHLORIDE BLD-SCNC: 104 MMOL/L (ref 98–107)
CO2: 24 MMOL/L (ref 22–29)
CREAT SERPL-MCNC: 0.8 MG/DL (ref 0.7–1.2)
EOSINOPHILS ABSOLUTE: 0.47 E9/L (ref 0.05–0.5)
EOSINOPHILS RELATIVE PERCENT: 11.6 % (ref 0–6)
GFR SERPL CREATININE-BSD FRML MDRD: >60 ML/MIN/1.73
GLUCOSE BLD-MCNC: 137 MG/DL (ref 74–99)
HCT VFR BLD CALC: 38.6 % (ref 37–54)
HEMOGLOBIN: 13 G/DL (ref 12.5–16.5)
IMMATURE GRANULOCYTES #: 0.01 E9/L
IMMATURE GRANULOCYTES %: 0.2 % (ref 0–5)
LYMPHOCYTES ABSOLUTE: 0.87 E9/L (ref 1.5–4)
LYMPHOCYTES RELATIVE PERCENT: 21.4 % (ref 20–42)
MCH RBC QN AUTO: 32.8 PG (ref 26–35)
MCHC RBC AUTO-ENTMCNC: 33.7 % (ref 32–34.5)
MCV RBC AUTO: 97.5 FL (ref 80–99.9)
MONOCYTES ABSOLUTE: 0.59 E9/L (ref 0.1–0.95)
MONOCYTES RELATIVE PERCENT: 14.5 % (ref 2–12)
NEUTROPHILS ABSOLUTE: 2.08 E9/L (ref 1.8–7.3)
NEUTROPHILS RELATIVE PERCENT: 51.3 % (ref 43–80)
PDW BLD-RTO: 13.7 FL (ref 11.5–15)
PLATELET # BLD: 192 E9/L (ref 130–450)
PMV BLD AUTO: 8.9 FL (ref 7–12)
POTASSIUM SERPL-SCNC: 4.8 MMOL/L (ref 3.5–5)
PREALBUMIN: 21 MG/DL (ref 20–40)
RBC # BLD: 3.96 E12/L (ref 3.8–5.8)
SODIUM BLD-SCNC: 137 MMOL/L (ref 132–146)
WBC # BLD: 4.1 E9/L (ref 4.5–11.5)

## 2022-11-02 PROCEDURE — 36415 COLL VENOUS BLD VENIPUNCTURE: CPT

## 2022-11-02 PROCEDURE — 80048 BASIC METABOLIC PNL TOTAL CA: CPT

## 2022-11-02 PROCEDURE — 85025 COMPLETE CBC W/AUTO DIFF WBC: CPT

## 2022-11-02 PROCEDURE — 87081 CULTURE SCREEN ONLY: CPT

## 2022-11-02 PROCEDURE — 84134 ASSAY OF PREALBUMIN: CPT

## 2022-11-02 ASSESSMENT — KOOS JR
KOOS JR TOTAL INTERVAL SCORE: 24.875
BENDING TO THE FLOOR TO PICK UP OBJECT: 4
HOW SEVERE IS YOUR KNEE STIFFNESS AFTER FIRST WAKING IN MORNING: 3
GOING UP OR DOWN STAIRS: 3
RISING FROM SITTING: 4
TWISING OR PIVOTING ON KNEE: 4
STRAIGHTENING KNEE FULLY: 2
STANDING UPRIGHT: 4

## 2022-11-02 ASSESSMENT — PAIN SCALES - GENERAL: PAINLEVEL_OUTOF10: 6

## 2022-11-02 ASSESSMENT — PAIN DESCRIPTION - ORIENTATION: ORIENTATION: LEFT

## 2022-11-02 ASSESSMENT — PAIN DESCRIPTION - LOCATION: LOCATION: KNEE

## 2022-11-02 NOTE — PROGRESS NOTES
Spoke with Enid Manzo at Dr. Jodi Rios'  office for clarification of medical clearance note. Awaiting callback.

## 2022-11-02 NOTE — TELEPHONE ENCOUNTER
Dino with [facility] preadmission testing calling in on pcp preop note. He is there now. In note he states you stated cleared medically but needs cardiac clearence from cardio tomorrow. Pt states he has no known heart problems, does not see a cardiologist and does not have an appt. Amy Calzada asks if he needed referred to  a heart dr or if this was typed in error?

## 2022-11-02 NOTE — PROGRESS NOTES
[facility] PRE-ADMISSION TESTING INSTRUCTIONS    The Preadmission Testing patient is instructed accordingly using the following criteria (check applicable):    ARRIVAL INSTRUCTIONS:  [x] Parking the day of Surgery is located in the Main Entrance lot. Upon entering the door, make an immediate right to the surgery reception desk    [x] Bring photo ID and insurance card    [] Bring in a copy of Living will or Durable Power of  papers. [x] Please be sure to arrange for responsible adult to provide transportation to and from the hospital    [x] Please arrange for responsible adult to be with you for the 24 hour period post procedure due to having anesthesia    [x] If you awake am of surgery not feeling well or have temperature >100 please call [phone]    GENERAL INSTRUCTIONS:    [x] Nothing by mouth after midnight, including gum, candy, mints or water    [x] You may brush your teeth, but do not swallow any water    [x] Take medications as instructed with 1-2 oz of water    [x] Stop herbal supplements and vitamins 5 days prior to procedure    [x] Follow preop dosing of blood thinners per physician instructions    [] Take 1/2 dose of evening insulin, but no insulin after midnight    [] No oral diabetic medications after midnight    [] If diabetic and have low blood sugar or feel symptomatic, take 1-2oz apple juice only    [] Bring inhalers day of surgery    [] Bring C-PAP/ Bi-Pap day of surgery    [] Bring urine specimen day of surgery    [x] Shower or bath with soap, lather and rinse well, AM of Surgery, no lotion, powders or creams to surgical site    [] Follow bowel prep as instructed per surgeon    [x] No tobacco products within 24 hours of surgery     [x] No alcohol or illegal drug use within 24 hours of surgery.     [x] Jewelry, body piercing's, eyeglasses, contact lenses and dentures are not permitted into surgery (bring cases)      [] Please do not wear any nail polish, make up or hair products on the day of surgery    [x] You can expect a call the business day prior to procedure to notify you if your arrival time changes    [x] If you receive a survey after surgery we would greatly appreciate your comments    [] Parent/guardian of a minor must accompany their child and remain on the premises  the entire time they are under our care     [] Pediatric patients may bring favorite toy, blanket or comfort item with them    [] A caregiver or family member must remain with the patient during their stay if they are mentally handicapped, have dementia, disoriented or unable to use a call light or would be a safety concern if left unattended    [x] Please notify surgeon if you develop any illness between now and time of surgery (cold, cough, sore throat, fever, nausea, vomiting) or any signs of infections  including skin, wounds, and dental.    [x]  The Outpatient Pharmacy is available to fill your prescription here on your day of surgery, ask your preop nurse for details    [] Other instructions    EDUCATIONAL MATERIALS PROVIDED:    [x] PAT Preoperative Education Packet/Booklet     [x] Medication List    [] Transfusion bracelet applied with instructions    [x] Shower with soap, lather and rinse well, and use CHG wipes provided the evening before surgery as instructed    [x] Incentive spirometer with instructions

## 2022-11-02 NOTE — ANESTHESIA PRE PROCEDURE
Department of Anesthesiology  Preprocedure Note       Name:  Maryjane Romero   Age:  67 y.o.  :  1950                                          MRN:  [id number]         Date:  2022      Surgeon: Jose William):  Mohit Villanueva MD    Procedure: ROBOTIC JAGUAR ASSISTED LEFT KNEE TOTAL ARTHROPLASTY    +++ZOEY+++    ++PNB++ (Left)    Medications prior to admission:   Prior to Admission medications    Medication Sig Start Date End Date Taking? Authorizing Provider   azelastine (ASTELIN) 0.1 % nasal spray 1 spray by Nasal route 2 times daily Use in each nostril as directed  Patient not taking: Reported on 2022   Kal Larkin DO   albuterol sulfate HFA (VENTOLIN HFA) 108 (90 Base) MCG/ACT inhaler Inhale 2 puffs into the lungs 4 times daily as needed for Wheezing 22   Avery Gerardo MD   ibuprofen (ADVIL;MOTRIN) 200 MG tablet Take 200 mg by mouth every 6 hours as needed for Pain    Historical Provider, MD   silver sulfADIAZINE (SILVADENE) 1 % cream Apply topically daily. Patient not taking: Reported on 2022   Kal Larkin DO   olmesartan (BENICAR) 40 MG tablet take 1 tablet by mouth once daily 22   Avery Gerardo MD   carvedilol (COREG) 12.5 MG tablet take 1 tablet by mouth every morning then take 1 tablet every evening 22   Avery Gerardo MD   omeprazole (PRILOSEC) 40 MG delayed release capsule Take 1 capsule by mouth daily 22   Avery Gerardo MD   clotrimazole-betamethasone (LOTRISONE) 1-0.05 % cream Apply topically 1 times daily.   Patient not taking: Reported on 2022   Avery Gerardo MD   montelukast (SINGULAIR) 10 MG tablet take 1 tablet by mouth at bedtime if needed  Patient not taking: Reported on 2022 12/15/21   Avery Gerardo MD   cetirizine (ZYRTEC) 10 MG tablet Take 1 tablet by mouth daily  Patient taking differently: Take 10 mg by mouth daily as needed 12/3/20   Nkechi Ring DO   aspirin 81 MG tablet Take 81 mg by mouth daily Historical Provider, MD   Omega-3 Fatty Acids (FISH OIL PO) Take by mouth daily    Historical Provider, MD   Multiple Vitamin (MULTI-VITAMIN DAILY PO) Take by mouth daily    Historical Provider, MD       Current medications:    Current Outpatient Medications   Medication Sig Dispense Refill    azelastine (ASTELIN) 0.1 % nasal spray 1 spray by Nasal route 2 times daily Use in each nostril as directed (Patient not taking: Reported on 11/2/2022) 60 mL 1    albuterol sulfate HFA (VENTOLIN HFA) 108 (90 Base) MCG/ACT inhaler Inhale 2 puffs into the lungs 4 times daily as needed for Wheezing 18 g 5    ibuprofen (ADVIL;MOTRIN) 200 MG tablet Take 200 mg by mouth every 6 hours as needed for Pain      silver sulfADIAZINE (SILVADENE) 1 % cream Apply topically daily. (Patient not taking: Reported on 11/2/2022) 50 g 3    olmesartan (BENICAR) 40 MG tablet take 1 tablet by mouth once daily 90 tablet 1    carvedilol (COREG) 12.5 MG tablet take 1 tablet by mouth every morning then take 1 tablet every evening 180 tablet 1    omeprazole (PRILOSEC) 40 MG delayed release capsule Take 1 capsule by mouth daily 90 capsule 1    clotrimazole-betamethasone (LOTRISONE) 1-0.05 % cream Apply topically 1 times daily. (Patient not taking: Reported on 11/2/2022) 45 g 2    montelukast (SINGULAIR) 10 MG tablet take 1 tablet by mouth at bedtime if needed (Patient not taking: Reported on 11/2/2022) 90 tablet 1    cetirizine (ZYRTEC) 10 MG tablet Take 1 tablet by mouth daily (Patient taking differently: Take 10 mg by mouth daily as needed) 30 tablet 1    aspirin 81 MG tablet Take 81 mg by mouth daily      Omega-3 Fatty Acids (FISH OIL PO) Take by mouth daily      Multiple Vitamin (MULTI-VITAMIN DAILY PO) Take by mouth daily       Current Facility-Administered Medications   Medication Dose Route Frequency Provider Last Rate Last Admin    lidocaine 1 % injection 20 mL  20 mL Other Once Krystin Iqbal MD           Allergies:     Allergies Allergen Reactions    Augmentin [Amoxicillin-Pot Clavulanate] Diarrhea    Clindamycin/Lincomycin Hives and Rash       Problem List:    Patient Active Problem List   Diagnosis Code    Primary osteoarthritis of left knee M17.12    Chest pain R07.9    Disorder of prostate N42.9    Essential hypertension I10    Heart murmur R01.1    Mitral valve disorder I05.9    H/O colonoscopy with polypectomy Z98.890, Z86.010    Arthritis of knee M17.10    Sacroiliitis (HCC) M46.1    Lymphoproliferative disorder ([facility] [address] D47.9       Past Medical History:        Diagnosis Date    Acid reflux     Arthritis     BPH (benign prostatic hyperplasia)     Cancer ([facility] [address] 11/2018    skin    COVID-19 09/2022    Heart murmur     Hypertension     Left knee pain     for OR 11/10/2022    Mitral valve disorder     Palpitations        Past Surgical History:        Procedure Laterality Date    BACK SURGERY      COLONOSCOPY  2018    HERNIA REPAIR      KNEE ARTHROSCOPY Left 5/2/2019    LEFT KNEE ARTHROSCOPY WITH PARTIAL MEDIAL MENISECTOMY performed by Jimmy Tavarez MD at [address] [place] [address]  11/2018    UPPER GASTROINTESTINAL ENDOSCOPY  2018       Social History:    Social History     Tobacco Use    Smoking status: Never    Smokeless tobacco: Never   Substance Use Topics    Alcohol use: Yes     Alcohol/week: 12.0 standard drinks     Types: 12 Cans of beer per week     Comment: 3 times weekly                                Counseling given: Not Answered      Vital Signs (Current): There were no vitals filed for this visit.                                            BP Readings from Last 3 Encounters:   11/02/22 133/73   11/01/22 136/80   09/28/22 (!) 146/86       NPO Status:                                                                                 BMI:   Wt Readings from Last 3 Encounters:   11/02/22 249 lb (112.9 kg)   11/01/22 248 lb 6.4 oz (112.7 kg)   09/28/22 250 lb 3.2 oz (113.5 kg)     There is no height or weight on file to calculate BMI.    CBC:   Lab Results   Component Value Date/Time    WBC 4.1 11/02/2022 08:20 AM    RBC 3.96 11/02/2022 08:20 AM    HGB 13.0 11/02/2022 08:20 AM    HCT 38.6 11/02/2022 08:20 AM    MCV 97.5 11/02/2022 08:20 AM    RDW 13.7 11/02/2022 08:20 AM     11/02/2022 08:20 AM       CMP:   Lab Results   Component Value Date/Time     11/02/2022 08:20 AM    K 4.8 11/02/2022 08:20 AM     11/02/2022 08:20 AM    CO2 24 11/02/2022 08:20 AM    BUN 13 11/02/2022 08:20 AM    CREATININE 0.8 11/02/2022 08:20 AM    GFRAA >60 05/17/2022 12:49 PM    LABGLOM >60 11/02/2022 08:20 AM    GLUCOSE 137 11/02/2022 08:20 AM    PROT 7.8 05/17/2022 12:49 PM    CALCIUM 9.7 11/02/2022 08:20 AM    BILITOT 0.5 05/17/2022 12:49 PM    ALKPHOS 51 05/17/2022 12:49 PM    AST 32 05/17/2022 12:49 PM    ALT 30 05/17/2022 12:49 PM       POC Tests: No results for input(s): POCGLU, POCNA, POCK, POCCL, POCBUN, POCHEMO, POCHCT in the last 72 hours. Coags: No results found for: PROTIME, INR, APTT    HCG (If Applicable): No results found for: PREGTESTUR, PREGSERUM, HCG, HCGQUANT     ABGs: No results found for: PHART, PO2ART, CBP6YXB, YJB5RNI, BEART, C1ALIBPW     Type & Screen (If Applicable):  No results found for: [facility]    Anesthesia Evaluation  Patient summary reviewed no history of anesthetic complications (denies):   Airway: Mallampati: III  TM distance: >3 FB   Neck ROM: full  Mouth opening: > = 3 FB   Dental: normal exam         Pulmonary:Negative Pulmonary ROS and normal exam  breath sounds clear to auscultation                             Cardiovascular:    (+) hypertension:,       ECG reviewed  Rhythm: regular  Rate: normal           Beta Blocker:  Dose within 24 Hrs      ROS comment: Sinus  Rhythm   Low voltage in limb leads.    -Incomplete right bundle branch block. Essential hypertension  Well controlled no changes made  Heart murmur  Most likely mild to moderate MR.  Seeing Cardiolohortencia tomorrow  Disorder of prostate  Mild BPH symptoms. Should hold antihistamine  Primary osteoarthritis of left knee  Severe. TKR scheduled. Preop cardiovascular exam  Medically cleared from Internal medicine standpoint. Cardio to see tomorrow        No follow-ups on file.     Electronically signed by Jason Falk MD on 11/1/22 at 12:07 PM EDT        Neuro/Psych:                ROS comment: Hx back surgery  Hx of eye surgery left eye smaller than right. Complication from eye surgery per pt GI/Hepatic/Renal:   (+) GERD: well controlled,           Endo/Other: Negative Endo/Other ROS   (+) : arthritis:., .                 Abdominal:   (+) obese,           Vascular: Other Findings:             Anesthesia Plan      general and spinal     ASA 3     (Per pt DR ramos advised no need to see cardiologist)        Anesthetic plan and risks discussed with patient. Plan discussed with attending. Post-op pain plan if not by surgeon: single peripheral nerve block            Clemencia Benitez MD   11/2/2022        Pt seen, examined, chart reviewed, plan discussed.   Irma Shanks MD  11/10/2022  8:33 AM

## 2022-11-03 LAB — MRSA CULTURE ONLY: NORMAL

## 2022-11-03 NOTE — TELEPHONE ENCOUNTER
I will talk with pre-admissions tomorrow. I saw two preops close together. Fabian Paul has slight murmur but no symptoms. I thought he said he was seeing cardiology but the other gentleman has pacemaker and known issues and I might have mixed them up in my note. Fabian Paul has no ischemic or CHF symptoms I am aware of and should be cleared.  Will try to fix this tomorrow

## 2022-11-03 NOTE — PROGRESS NOTES
I met with Daquan Sifuentes this am for an orthopaedic education class. We discussed information regarding pre, intra, post-op, discharge, and LOS expectations. I provided ortho education materials. I encouraged the patient to call with any questions or concerns.

## 2022-11-06 ENCOUNTER — OFFICE VISIT (OUTPATIENT)
Dept: FAMILY MEDICINE CLINIC | Age: 72
End: 2022-11-06
Payer: MEDICARE

## 2022-11-06 VITALS
BODY MASS INDEX: 33.72 KG/M2 | DIASTOLIC BLOOD PRESSURE: 70 MMHG | HEIGHT: 72 IN | RESPIRATION RATE: 16 BRPM | OXYGEN SATURATION: 95 % | TEMPERATURE: 97 F | WEIGHT: 249 LBS | SYSTOLIC BLOOD PRESSURE: 132 MMHG | HEART RATE: 72 BPM
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
## 2022-11-06 DIAGNOSIS — S90.561A TICK BITE OF RIGHT ANKLE, INITIAL ENCOUNTER: Primary | ICD-10-CM

## 2022-11-06 PROCEDURE — 3074F SYST BP LT 130 MM HG: CPT | Performed by: NURSE PRACTITIONER

## 2022-11-06 PROCEDURE — 1123F ACP DISCUSS/DSCN MKR DOCD: CPT | Performed by: NURSE PRACTITIONER

## 2022-11-06 PROCEDURE — G8484 FLU IMMUNIZE NO ADMIN: HCPCS | Performed by: NURSE PRACTITIONER

## 2022-11-06 PROCEDURE — G8417 CALC BMI ABV UP PARAM F/U: HCPCS | Performed by: NURSE PRACTITIONER

## 2022-11-06 PROCEDURE — 1036F TOBACCO NON-USER: CPT | Performed by: NURSE PRACTITIONER

## 2022-11-06 PROCEDURE — G8427 DOCREV CUR MEDS BY ELIG CLIN: HCPCS | Performed by: NURSE PRACTITIONER

## 2022-11-06 PROCEDURE — 3078F DIAST BP <80 MM HG: CPT | Performed by: NURSE PRACTITIONER

## 2022-11-06 PROCEDURE — 3017F COLORECTAL CA SCREEN DOC REV: CPT | Performed by: NURSE PRACTITIONER

## 2022-11-06 PROCEDURE — 99213 OFFICE O/P EST LOW 20 MIN: CPT | Performed by: NURSE PRACTITIONER

## 2022-11-06 RX ORDER — DOXYCYCLINE HYCLATE 100 MG/1
200 CAPSULE ORAL ONCE
Qty: 2 CAPSULE | Refills: 0 | Status: SHIPPED | OUTPATIENT
Start: 2022-11-06 | End: 2022-11-06

## 2022-11-07 ENCOUNTER — OFFICE VISIT (OUTPATIENT)
Dept: ORTHOPEDIC SURGERY | Age: 72
End: 2022-11-07
Payer: MEDICARE

## 2022-11-07 DIAGNOSIS — M17.12 PRIMARY OSTEOARTHRITIS OF LEFT KNEE: Primary | ICD-10-CM

## 2022-11-07 PROCEDURE — G8427 DOCREV CUR MEDS BY ELIG CLIN: HCPCS | Performed by: ORTHOPAEDIC SURGERY

## 2022-11-07 PROCEDURE — 99213 OFFICE O/P EST LOW 20 MIN: CPT | Performed by: ORTHOPAEDIC SURGERY

## 2022-11-07 PROCEDURE — G8484 FLU IMMUNIZE NO ADMIN: HCPCS | Performed by: ORTHOPAEDIC SURGERY

## 2022-11-07 PROCEDURE — 1036F TOBACCO NON-USER: CPT | Performed by: ORTHOPAEDIC SURGERY

## 2022-11-07 PROCEDURE — 1123F ACP DISCUSS/DSCN MKR DOCD: CPT | Performed by: ORTHOPAEDIC SURGERY

## 2022-11-07 PROCEDURE — 3017F COLORECTAL CA SCREEN DOC REV: CPT | Performed by: ORTHOPAEDIC SURGERY

## 2022-11-07 PROCEDURE — G8417 CALC BMI ABV UP PARAM F/U: HCPCS | Performed by: ORTHOPAEDIC SURGERY

## 2022-11-07 NOTE — PROGRESS NOTES
Department of Orthopedic Surgery  Attending Pre-operative History and Physical        DIAGNOSIS:  Left knee osteoarthritis     INDICATION:  Failed conservative management    PROCEDURE:  ROBOTIC SETH ASSISTED LEFT KNEE TOTAL ARTHROPLASTY        CHIEF COMPLAINT:  Left knee pain     History Obtained From:  patient    HISTORY OF PRESENT ILLNESS:      The patient is a 67 y.o. male with significant past medical history of left knee osteoarthritis who presents with left knee pain. Patient has failed conservative treatment including injections, therapy, activity modification, OTC medications. X-ray showing end-stage degenerative changes to the left knee. He continues to have persistent pain affecting his activities of daily living. For these reasons he is interested in surgical management. Surgery will involve a left Seth robotic assisted total knee arthroplasty. The risks, benefits, and alternatives to the procedure were discussed at length with the patient and family members. Risks include but are not limited to infection, neurovascular injury, persistent pain, arthrofibrosis, failure of hardware, need for revision surgery, pulmonary embolism, and the risks of general anesthesia. Patient verbalizes understanding of the risks and wishes to proceed.        Past Medical History:        Diagnosis Date    Acid reflux     Arthritis     BPH (benign prostatic hyperplasia)     Cancer ([facility] [address] 11/2018    skin    COVID-19 09/2022    Heart murmur     Hypertension     Left knee pain     for OR 11/10/2022    Mitral valve disorder     Palpitations        Past Surgical History:        Procedure Laterality Date    BACK SURGERY      COLONOSCOPY  2018    HERNIA REPAIR      KNEE ARTHROSCOPY Left 5/2/2019    LEFT KNEE ARTHROSCOPY WITH PARTIAL MEDIAL MENISECTOMY performed by Jimmy Tavarez MD at [address]  11/2018    UPPER GASTROINTESTINAL ENDOSCOPY  2018       Medications Prior to Admission:   Not in a hospital admission. Allergies:  Augmentin [amoxicillin-pot clavulanate] and Clindamycin/lincomycin    History of allergic reaction to anesthesia:  No    Social History:   TOBACCO:   reports that he has never smoked. He has never used smokeless tobacco.  ETOH:   reports current alcohol use of about 12.0 standard drinks per week. DRUGS:   reports no history of drug use. Family History:       Problem Relation Age of Onset    Stroke Mother     Heart Disease Father         Bypass Surgery, Pacemaker, Carotids    Coronary Art Dis Father     Other Son         procoagulant disorder       REVIEW OF SYSTEMS:  CONSTITUTIONAL:  negative  RESPIRATORY:  negative  CARDIOVASCULAR:  negative  MUSCULOSKELETAL:  negative except for  HPI  NEUROLOGICAL:  negative    PHYSICAL EXAM:     VITALS:  There were no vitals taken for this visit. Gen: AOx3, NAD    Heart:  normal apical pulses, normal S1 and S2 and no edema    Lungs:  No increased work of breathing, good air exchange     Abdomen:  no scars, non-distended and non-tender    Extremities:  No clubbing, cyanosis, or edema    Musculoskeletal: Exam of the left knee range of motion , pain present with range of motion. Valgus varus stress intact eliciting medial sided joint line pain. No swelling deformity or effusion. Patella stable tracking midline.       DATA:  CBC:   Lab Results   Component Value Date/Time    WBC 4.1 11/02/2022 08:20 AM    RBC 3.96 11/02/2022 08:20 AM    HGB 13.0 11/02/2022 08:20 AM    HCT 38.6 11/02/2022 08:20 AM    MCV 97.5 11/02/2022 08:20 AM    MCH 32.8 11/02/2022 08:20 AM    MCHC 33.7 11/02/2022 08:20 AM    RDW 13.7 11/02/2022 08:20 AM     11/02/2022 08:20 AM    MPV 8.9 11/02/2022 08:20 AM     BMP:    Lab Results   Component Value Date/Time     11/02/2022 08:20 AM    K 4.8 11/02/2022 08:20 AM     11/02/2022 08:20 AM    CO2 24 11/02/2022 08:20 AM    BUN 13 11/02/2022 08:20 AM    LABALBU 4.7 05/17/2022 12:49 PM    CREATININE 0.8 11/02/2022 08:20 AM    CALCIUM 9.7 11/02/2022 08:20 AM    GFRAA >60 05/17/2022 12:49 PM    LABGLOM >60 11/02/2022 08:20 AM    GLUCOSE 137 11/02/2022 08:20 AM       Radiology Review: X-rays of the left knee reviewed showing bone-on-bone degenerative changes to the medial compartment with osteophyte formation and joint space narrowing of the patellofemoral joint    ASSESSMENT AND PLAN:    1. Patient is a 67 y.o. male with above specified procedure planned left Seth robotic assisted total knee arthroplasty with anesthesia    2. Procedure options, risks and benefits reviewed with patient. Patient expresses understanding and has signed consent form to proceed. 3.  Patient and family were provided with pre-op and post-op instructions, prescription medications, and any other supplied needed post op (slings, braces, etc.)      Controlled substances monitoring: possible medication side effects, risk of tolerance and/or dependence, and alternative treatments discussed, no signs of potential drug abuse or diversion identified, and OARRS report reviewed today- activity consistent with treatment plan. DANYEL Gregg-CNP  Orthopedic Surgery   11/07/22  12:07 PM      Staff Addendum    I have seen and evaluated the patient and agree with the assessment and plan as documented by Abigail Agrawal CNP. I have performed the key components of the history and physical examination and concur with the findings and plan, and have made changes where appropriate/necessary.           Laurita Gauthier MD  [address] [place] [address]

## 2022-11-09 NOTE — PROGRESS NOTES
Voicemail left for Leighton Menendez at Dr. Miranda Ice office in regard to ER visit on 11/06/2022 for tick bite.

## 2022-11-10 ENCOUNTER — ANESTHESIA (OUTPATIENT)
Dept: OPERATING ROOM | Age: 72
End: 2022-11-10
Payer: MEDICARE

## 2022-11-10 ENCOUNTER — HOSPITAL ENCOUNTER (OUTPATIENT)
Age: 72
Setting detail: OBSERVATION
Discharge: HOME | End: 2022-11-11
Attending: ORTHOPAEDIC SURGERY | Admitting: ORTHOPAEDIC SURGERY
Payer: MEDICARE

## 2022-11-10 ENCOUNTER — APPOINTMENT (OUTPATIENT)
Dept: GENERAL RADIOLOGY | Age: 72
End: 2022-11-10
Attending: ORTHOPAEDIC SURGERY
Payer: MEDICARE

## 2022-11-10 DIAGNOSIS — Z96.652 STATUS POST TOTAL KNEE REPLACEMENT, LEFT: Primary | ICD-10-CM

## 2022-11-10 DIAGNOSIS — M17.12 OSTEOARTHRITIS OF LEFT KNEE, UNSPECIFIED OSTEOARTHRITIS TYPE: ICD-10-CM

## 2022-11-10 DIAGNOSIS — Z01.818 PRE-OP TESTING: ICD-10-CM

## 2022-11-10 PROCEDURE — C1776 JOINT DEVICE (IMPLANTABLE): HCPCS | Performed by: ORTHOPAEDIC SURGERY

## 2022-11-10 PROCEDURE — 97161 PT EVAL LOW COMPLEX 20 MIN: CPT

## 2022-11-10 PROCEDURE — 7100000001 HC PACU RECOVERY - ADDTL 15 MIN: Performed by: ORTHOPAEDIC SURGERY

## 2022-11-10 PROCEDURE — 2580000003 HC RX 258: Performed by: NURSE ANESTHETIST, CERTIFIED REGISTERED

## 2022-11-10 PROCEDURE — 6360000002 HC RX W HCPCS: Performed by: ORTHOPAEDIC SURGERY

## 2022-11-10 PROCEDURE — 6360000002 HC RX W HCPCS

## 2022-11-10 PROCEDURE — G0378 HOSPITAL OBSERVATION PER HR: HCPCS

## 2022-11-10 PROCEDURE — 3600000005 HC SURGERY LEVEL 5 BASE: Performed by: ORTHOPAEDIC SURGERY

## 2022-11-10 PROCEDURE — 2500000003 HC RX 250 WO HCPCS: Performed by: NURSE PRACTITIONER

## 2022-11-10 PROCEDURE — 2709999900 HC NON-CHARGEABLE SUPPLY: Performed by: ORTHOPAEDIC SURGERY

## 2022-11-10 PROCEDURE — 6370000000 HC RX 637 (ALT 250 FOR IP): Performed by: STUDENT IN AN ORGANIZED HEALTH CARE EDUCATION/TRAINING PROGRAM

## 2022-11-10 PROCEDURE — 6360000002 HC RX W HCPCS: Performed by: NURSE ANESTHETIST, CERTIFIED REGISTERED

## 2022-11-10 PROCEDURE — 6360000002 HC RX W HCPCS: Performed by: NURSE PRACTITIONER

## 2022-11-10 PROCEDURE — 6360000002 HC RX W HCPCS: Performed by: ANESTHESIOLOGY

## 2022-11-10 PROCEDURE — 6370000000 HC RX 637 (ALT 250 FOR IP): Performed by: NURSE PRACTITIONER

## 2022-11-10 PROCEDURE — 88311 DECALCIFY TISSUE: CPT

## 2022-11-10 PROCEDURE — 2580000003 HC RX 258: Performed by: NURSE PRACTITIONER

## 2022-11-10 PROCEDURE — 88305 TISSUE EXAM BY PATHOLOGIST: CPT

## 2022-11-10 PROCEDURE — 76942 ECHO GUIDE FOR BIOPSY: CPT | Performed by: ANESTHESIOLOGY

## 2022-11-10 PROCEDURE — 73560 X-RAY EXAM OF KNEE 1 OR 2: CPT

## 2022-11-10 PROCEDURE — 97535 SELF CARE MNGMENT TRAINING: CPT

## 2022-11-10 PROCEDURE — 2500000003 HC RX 250 WO HCPCS: Performed by: NURSE ANESTHETIST, CERTIFIED REGISTERED

## 2022-11-10 PROCEDURE — 3600000015 HC SURGERY LEVEL 5 ADDTL 15MIN: Performed by: ORTHOPAEDIC SURGERY

## 2022-11-10 PROCEDURE — 2580000003 HC RX 258: Performed by: ORTHOPAEDIC SURGERY

## 2022-11-10 PROCEDURE — 97165 OT EVAL LOW COMPLEX 30 MIN: CPT

## 2022-11-10 PROCEDURE — 20985 CPTR-ASST DIR MS PX: CPT | Performed by: ORTHOPAEDIC SURGERY

## 2022-11-10 PROCEDURE — 7100000000 HC PACU RECOVERY - FIRST 15 MIN: Performed by: ORTHOPAEDIC SURGERY

## 2022-11-10 PROCEDURE — 3700000001 HC ADD 15 MINUTES (ANESTHESIA): Performed by: ORTHOPAEDIC SURGERY

## 2022-11-10 PROCEDURE — 3700000000 HC ANESTHESIA ATTENDED CARE: Performed by: ORTHOPAEDIC SURGERY

## 2022-11-10 PROCEDURE — 2500000003 HC RX 250 WO HCPCS: Performed by: ORTHOPAEDIC SURGERY

## 2022-11-10 PROCEDURE — 27447 TOTAL KNEE ARTHROPLASTY: CPT | Performed by: ORTHOPAEDIC SURGERY

## 2022-11-10 PROCEDURE — 97530 THERAPEUTIC ACTIVITIES: CPT

## 2022-11-10 PROCEDURE — C1713 ANCHOR/SCREW BN/BN,TIS/BN: HCPCS | Performed by: ORTHOPAEDIC SURGERY

## 2022-11-10 PROCEDURE — 2720000010 HC SURG SUPPLY STERILE: Performed by: ORTHOPAEDIC SURGERY

## 2022-11-10 PROCEDURE — A4217 STERILE WATER/SALINE, 500 ML: HCPCS | Performed by: ORTHOPAEDIC SURGERY

## 2022-11-10 DEVICE — TIBIAL COMPONENT
Type: IMPLANTABLE DEVICE | Site: KNEE | Status: FUNCTIONAL
Brand: TRIATHLON

## 2022-11-10 DEVICE — PATELLA
Type: IMPLANTABLE DEVICE | Site: KNEE | Status: FUNCTIONAL
Brand: TRIATHLON

## 2022-11-10 DEVICE — IMPLANTABLE DEVICE: Type: IMPLANTABLE DEVICE | Site: KNEE | Status: FUNCTIONAL

## 2022-11-10 DEVICE — CRUCIATE RETAINING FEMORAL
Type: IMPLANTABLE DEVICE | Site: KNEE | Status: FUNCTIONAL
Brand: TRIATHLON

## 2022-11-10 RX ORDER — CARVEDILOL 6.25 MG/1
12.5 TABLET ORAL 2 TIMES DAILY
Status: DISCONTINUED | OUTPATIENT
Start: 2022-11-10 | End: 2022-11-11 | Stop reason: HOSPADM

## 2022-11-10 RX ORDER — ALBUTEROL SULFATE 90 UG/1
2 AEROSOL, METERED RESPIRATORY (INHALATION) 4 TIMES DAILY PRN
Status: DISCONTINUED | OUTPATIENT
Start: 2022-11-10 | End: 2022-11-10

## 2022-11-10 RX ORDER — OLMESARTAN MEDOXOMIL 20 MG/1
40 TABLET ORAL DAILY
Status: DISCONTINUED | OUTPATIENT
Start: 2022-11-10 | End: 2022-11-10 | Stop reason: CLARIF

## 2022-11-10 RX ORDER — SODIUM CHLORIDE 9 MG/ML
INJECTION, SOLUTION INTRAVENOUS PRN
Status: DISCONTINUED | OUTPATIENT
Start: 2022-11-10 | End: 2022-11-10 | Stop reason: HOSPADM

## 2022-11-10 RX ORDER — SODIUM CHLORIDE 9 MG/ML
INJECTION, SOLUTION INTRAVENOUS PRN
Status: DISCONTINUED | OUTPATIENT
Start: 2022-11-10 | End: 2022-11-11 | Stop reason: HOSPADM

## 2022-11-10 RX ORDER — SODIUM CHLORIDE 0.9 % (FLUSH) 0.9 %
5-40 SYRINGE (ML) INJECTION EVERY 12 HOURS SCHEDULED
Status: DISCONTINUED | OUTPATIENT
Start: 2022-11-10 | End: 2022-11-11 | Stop reason: HOSPADM

## 2022-11-10 RX ORDER — LIDOCAINE HYDROCHLORIDE 20 MG/ML
INJECTION, SOLUTION EPIDURAL; INFILTRATION; INTRACAUDAL; PERINEURAL PRN
Status: DISCONTINUED | OUTPATIENT
Start: 2022-11-10 | End: 2022-11-10 | Stop reason: SDUPTHER

## 2022-11-10 RX ORDER — SODIUM CHLORIDE 0.9 % (FLUSH) 0.9 %
5-40 SYRINGE (ML) INJECTION EVERY 12 HOURS SCHEDULED
Status: DISCONTINUED | OUTPATIENT
Start: 2022-11-10 | End: 2022-11-10 | Stop reason: HOSPADM

## 2022-11-10 RX ORDER — MIDAZOLAM HYDROCHLORIDE 1 MG/ML
INJECTION INTRAMUSCULAR; INTRAVENOUS
Status: DISPENSED
Start: 2022-11-10 | End: 2022-11-10

## 2022-11-10 RX ORDER — ACETAMINOPHEN 500 MG
1000 TABLET ORAL ONCE
Status: COMPLETED | OUTPATIENT
Start: 2022-11-10 | End: 2022-11-10

## 2022-11-10 RX ORDER — M-VIT,TX,IRON,MINS/CALC/FOLIC 27MG-0.4MG
1 TABLET ORAL DAILY
Status: DISCONTINUED | OUTPATIENT
Start: 2022-11-11 | End: 2022-11-11 | Stop reason: HOSPADM

## 2022-11-10 RX ORDER — MORPHINE SULFATE 2 MG/ML
1 INJECTION, SOLUTION INTRAMUSCULAR; INTRAVENOUS EVERY 5 MIN PRN
Status: DISCONTINUED | OUTPATIENT
Start: 2022-11-10 | End: 2022-11-10 | Stop reason: HOSPADM

## 2022-11-10 RX ORDER — MORPHINE SULFATE 2 MG/ML
2 INJECTION, SOLUTION INTRAMUSCULAR; INTRAVENOUS EVERY 5 MIN PRN
Status: DISCONTINUED | OUTPATIENT
Start: 2022-11-10 | End: 2022-11-10 | Stop reason: HOSPADM

## 2022-11-10 RX ORDER — NEOSTIGMINE METHYLSULFATE 1 MG/ML
INJECTION, SOLUTION INTRAVENOUS PRN
Status: DISCONTINUED | OUTPATIENT
Start: 2022-11-10 | End: 2022-11-10 | Stop reason: SDUPTHER

## 2022-11-10 RX ORDER — ROPIVACAINE HYDROCHLORIDE 5 MG/ML
INJECTION, SOLUTION EPIDURAL; INFILTRATION; PERINEURAL
Status: COMPLETED | OUTPATIENT
Start: 2022-11-10 | End: 2022-11-10

## 2022-11-10 RX ORDER — KETOROLAC TROMETHAMINE 30 MG/ML
15 INJECTION, SOLUTION INTRAMUSCULAR; INTRAVENOUS EVERY 6 HOURS PRN
Status: DISCONTINUED | OUTPATIENT
Start: 2022-11-10 | End: 2022-11-11 | Stop reason: HOSPADM

## 2022-11-10 RX ORDER — DEXAMETHASONE SODIUM PHOSPHATE 10 MG/ML
8 INJECTION, SOLUTION INTRAMUSCULAR; INTRAVENOUS ONCE
Status: DISCONTINUED | OUTPATIENT
Start: 2022-11-10 | End: 2022-11-10 | Stop reason: HOSPADM

## 2022-11-10 RX ORDER — EPHEDRINE SULFATE/0.9% NACL/PF 50 MG/5 ML
SYRINGE (ML) INTRAVENOUS PRN
Status: DISCONTINUED | OUTPATIENT
Start: 2022-11-10 | End: 2022-11-10 | Stop reason: SDUPTHER

## 2022-11-10 RX ORDER — MIDAZOLAM HYDROCHLORIDE 2 MG/2ML
2 INJECTION, SOLUTION INTRAMUSCULAR; INTRAVENOUS ONCE
Status: COMPLETED | OUTPATIENT
Start: 2022-11-10 | End: 2022-11-10

## 2022-11-10 RX ORDER — ACETAMINOPHEN 325 MG/1
650 TABLET ORAL EVERY 6 HOURS SCHEDULED
Status: DISCONTINUED | OUTPATIENT
Start: 2022-11-10 | End: 2022-11-11 | Stop reason: HOSPADM

## 2022-11-10 RX ORDER — VANCOMYCIN HYDROCHLORIDE 1 G/20ML
INJECTION, POWDER, LYOPHILIZED, FOR SOLUTION INTRAVENOUS PRN
Status: DISCONTINUED | OUTPATIENT
Start: 2022-11-10 | End: 2022-11-10 | Stop reason: ALTCHOICE

## 2022-11-10 RX ORDER — SODIUM CHLORIDE 0.9 % (FLUSH) 0.9 %
5-40 SYRINGE (ML) INJECTION PRN
Status: DISCONTINUED | OUTPATIENT
Start: 2022-11-10 | End: 2022-11-10 | Stop reason: HOSPADM

## 2022-11-10 RX ORDER — ONDANSETRON 2 MG/ML
INJECTION INTRAMUSCULAR; INTRAVENOUS PRN
Status: DISCONTINUED | OUTPATIENT
Start: 2022-11-10 | End: 2022-11-10 | Stop reason: SDUPTHER

## 2022-11-10 RX ORDER — CETIRIZINE HYDROCHLORIDE 10 MG/1
10 TABLET ORAL DAILY PRN
Status: DISCONTINUED | OUTPATIENT
Start: 2022-11-10 | End: 2022-11-11 | Stop reason: HOSPADM

## 2022-11-10 RX ORDER — PHENYLEPHRINE HCL IN 0.9% NACL 1 MG/10 ML
SYRINGE (ML) INTRAVENOUS PRN
Status: DISCONTINUED | OUTPATIENT
Start: 2022-11-10 | End: 2022-11-10 | Stop reason: SDUPTHER

## 2022-11-10 RX ORDER — FENTANYL CITRATE 50 UG/ML
INJECTION, SOLUTION INTRAMUSCULAR; INTRAVENOUS PRN
Status: DISCONTINUED | OUTPATIENT
Start: 2022-11-10 | End: 2022-11-10 | Stop reason: SDUPTHER

## 2022-11-10 RX ORDER — PANTOPRAZOLE SODIUM 40 MG/1
40 TABLET, DELAYED RELEASE ORAL
Status: DISCONTINUED | OUTPATIENT
Start: 2022-11-11 | End: 2022-11-11 | Stop reason: HOSPADM

## 2022-11-10 RX ORDER — ROPIVACAINE HYDROCHLORIDE 5 MG/ML
30 INJECTION, SOLUTION EPIDURAL; INFILTRATION; PERINEURAL
Status: COMPLETED | OUTPATIENT
Start: 2022-11-10 | End: 2022-11-10

## 2022-11-10 RX ORDER — SODIUM CHLORIDE 9 MG/ML
INJECTION, SOLUTION INTRAVENOUS CONTINUOUS PRN
Status: DISCONTINUED | OUTPATIENT
Start: 2022-11-10 | End: 2022-11-10 | Stop reason: SDUPTHER

## 2022-11-10 RX ORDER — ALBUTEROL SULFATE 2.5 MG/3ML
2.5 SOLUTION RESPIRATORY (INHALATION) EVERY 6 HOURS PRN
Status: DISCONTINUED | OUTPATIENT
Start: 2022-11-10 | End: 2022-11-11 | Stop reason: HOSPADM

## 2022-11-10 RX ORDER — ROPIVACAINE HYDROCHLORIDE 5 MG/ML
INJECTION, SOLUTION EPIDURAL; INFILTRATION; PERINEURAL
Status: COMPLETED
Start: 2022-11-10 | End: 2022-11-10

## 2022-11-10 RX ORDER — ONDANSETRON 4 MG/1
4 TABLET, ORALLY DISINTEGRATING ORAL EVERY 8 HOURS PRN
Status: DISCONTINUED | OUTPATIENT
Start: 2022-11-10 | End: 2022-11-11 | Stop reason: HOSPADM

## 2022-11-10 RX ORDER — ASPIRIN 81 MG/1
81 TABLET ORAL 2 TIMES DAILY
Status: DISCONTINUED | OUTPATIENT
Start: 2022-11-10 | End: 2022-11-11 | Stop reason: HOSPADM

## 2022-11-10 RX ORDER — DEXAMETHASONE SODIUM PHOSPHATE 4 MG/ML
INJECTION, SOLUTION INTRA-ARTICULAR; INTRALESIONAL; INTRAMUSCULAR; INTRAVENOUS; SOFT TISSUE PRN
Status: DISCONTINUED | OUTPATIENT
Start: 2022-11-10 | End: 2022-11-10 | Stop reason: SDUPTHER

## 2022-11-10 RX ORDER — PROPOFOL 10 MG/ML
INJECTION, EMULSION INTRAVENOUS PRN
Status: DISCONTINUED | OUTPATIENT
Start: 2022-11-10 | End: 2022-11-10 | Stop reason: SDUPTHER

## 2022-11-10 RX ORDER — SODIUM CHLORIDE 0.9 % (FLUSH) 0.9 %
5-40 SYRINGE (ML) INJECTION PRN
Status: DISCONTINUED | OUTPATIENT
Start: 2022-11-10 | End: 2022-11-11 | Stop reason: HOSPADM

## 2022-11-10 RX ORDER — OXYCODONE HYDROCHLORIDE 5 MG/1
10 TABLET ORAL EVERY 4 HOURS PRN
Status: DISCONTINUED | OUTPATIENT
Start: 2022-11-10 | End: 2022-11-11 | Stop reason: HOSPADM

## 2022-11-10 RX ORDER — LOSARTAN POTASSIUM 50 MG/1
100 TABLET ORAL DAILY
Status: DISCONTINUED | OUTPATIENT
Start: 2022-11-10 | End: 2022-11-11 | Stop reason: HOSPADM

## 2022-11-10 RX ORDER — FENTANYL CITRATE 50 UG/ML
100 INJECTION, SOLUTION INTRAMUSCULAR; INTRAVENOUS ONCE
Status: COMPLETED | OUTPATIENT
Start: 2022-11-10 | End: 2022-11-10

## 2022-11-10 RX ORDER — FENTANYL CITRATE 50 UG/ML
INJECTION, SOLUTION INTRAMUSCULAR; INTRAVENOUS
Status: DISPENSED
Start: 2022-11-10 | End: 2022-11-10

## 2022-11-10 RX ORDER — ROCURONIUM BROMIDE 10 MG/ML
INJECTION, SOLUTION INTRAVENOUS PRN
Status: DISCONTINUED | OUTPATIENT
Start: 2022-11-10 | End: 2022-11-10 | Stop reason: SDUPTHER

## 2022-11-10 RX ORDER — KETOROLAC TROMETHAMINE 30 MG/ML
15 INJECTION, SOLUTION INTRAMUSCULAR; INTRAVENOUS ONCE
Status: DISCONTINUED | OUTPATIENT
Start: 2022-11-10 | End: 2022-11-10 | Stop reason: HOSPADM

## 2022-11-10 RX ORDER — MEPERIDINE HYDROCHLORIDE 25 MG/ML
12.5 INJECTION INTRAMUSCULAR; INTRAVENOUS; SUBCUTANEOUS EVERY 5 MIN PRN
Status: DISCONTINUED | OUTPATIENT
Start: 2022-11-10 | End: 2022-11-10 | Stop reason: HOSPADM

## 2022-11-10 RX ORDER — ONDANSETRON 2 MG/ML
4 INJECTION INTRAMUSCULAR; INTRAVENOUS EVERY 6 HOURS PRN
Status: DISCONTINUED | OUTPATIENT
Start: 2022-11-10 | End: 2022-11-11 | Stop reason: HOSPADM

## 2022-11-10 RX ORDER — GLYCOPYRROLATE 0.2 MG/ML
INJECTION INTRAMUSCULAR; INTRAVENOUS PRN
Status: DISCONTINUED | OUTPATIENT
Start: 2022-11-10 | End: 2022-11-10 | Stop reason: SDUPTHER

## 2022-11-10 RX ORDER — OXYCODONE HYDROCHLORIDE 5 MG/1
5 TABLET ORAL EVERY 4 HOURS PRN
Status: DISCONTINUED | OUTPATIENT
Start: 2022-11-10 | End: 2022-11-11 | Stop reason: HOSPADM

## 2022-11-10 RX ADMIN — SODIUM CHLORIDE: 9 INJECTION, SOLUTION INTRAVENOUS at 10:00

## 2022-11-10 RX ADMIN — ROPIVACAINE HYDROCHLORIDE 30 ML: 5 INJECTION, SOLUTION EPIDURAL; INFILTRATION; PERINEURAL at 08:21

## 2022-11-10 RX ADMIN — KETOROLAC TROMETHAMINE 15 MG: 30 INJECTION, SOLUTION INTRAMUSCULAR at 21:18

## 2022-11-10 RX ADMIN — CARVEDILOL 12.5 MG: 6.25 TABLET, FILM COATED ORAL at 21:18

## 2022-11-10 RX ADMIN — LIDOCAINE HYDROCHLORIDE 100 MG: 20 INJECTION, SOLUTION EPIDURAL; INFILTRATION; INTRACAUDAL; PERINEURAL at 08:50

## 2022-11-10 RX ADMIN — TRANEXAMIC ACID 1000 MG: 1 INJECTION, SOLUTION INTRAVENOUS at 11:53

## 2022-11-10 RX ADMIN — FENTANYL CITRATE 50 MCG: 50 INJECTION, SOLUTION INTRAMUSCULAR; INTRAVENOUS at 10:43

## 2022-11-10 RX ADMIN — ONDANSETRON 4 MG: 2 INJECTION INTRAMUSCULAR; INTRAVENOUS at 10:28

## 2022-11-10 RX ADMIN — ROCURONIUM BROMIDE 40 MG: 10 INJECTION, SOLUTION INTRAVENOUS at 08:50

## 2022-11-10 RX ADMIN — WATER 2000 MG: 1 INJECTION INTRAMUSCULAR; INTRAVENOUS; SUBCUTANEOUS at 08:47

## 2022-11-10 RX ADMIN — ROCURONIUM BROMIDE 10 MG: 10 INJECTION, SOLUTION INTRAVENOUS at 10:08

## 2022-11-10 RX ADMIN — Medication 50 MCG: at 09:02

## 2022-11-10 RX ADMIN — FENTANYL CITRATE 50 MCG: 50 INJECTION, SOLUTION INTRAMUSCULAR; INTRAVENOUS at 08:50

## 2022-11-10 RX ADMIN — HYDROMORPHONE HYDROCHLORIDE 0.5 MG: 0.5 INJECTION, SOLUTION INTRAMUSCULAR; INTRAVENOUS; SUBCUTANEOUS at 12:25

## 2022-11-10 RX ADMIN — SODIUM CHLORIDE: 9 INJECTION, SOLUTION INTRAVENOUS at 08:44

## 2022-11-10 RX ADMIN — DEXAMETHASONE SODIUM PHOSPHATE 10 MG: 4 INJECTION, SOLUTION INTRAMUSCULAR; INTRAVENOUS at 08:55

## 2022-11-10 RX ADMIN — TRANEXAMIC ACID 1000 MG: 1 INJECTION, SOLUTION INTRAVENOUS at 08:55

## 2022-11-10 RX ADMIN — MIDAZOLAM 2 MG: 1 INJECTION INTRAMUSCULAR; INTRAVENOUS at 08:18

## 2022-11-10 RX ADMIN — ROCURONIUM BROMIDE 10 MG: 10 INJECTION, SOLUTION INTRAVENOUS at 09:21

## 2022-11-10 RX ADMIN — MORPHINE SULFATE 2 MG: 2 INJECTION, SOLUTION INTRAMUSCULAR; INTRAVENOUS at 11:45

## 2022-11-10 RX ADMIN — CEFAZOLIN 2000 MG: 2 INJECTION, POWDER, FOR SOLUTION INTRAMUSCULAR; INTRAVENOUS at 17:53

## 2022-11-10 RX ADMIN — HYDROMORPHONE HYDROCHLORIDE 0.5 MG: 0.5 INJECTION, SOLUTION INTRAMUSCULAR; INTRAVENOUS; SUBCUTANEOUS at 12:00

## 2022-11-10 RX ADMIN — ROCURONIUM BROMIDE 10 MG: 10 INJECTION, SOLUTION INTRAVENOUS at 09:07

## 2022-11-10 RX ADMIN — PROPOFOL 150 MG: 10 INJECTION, EMULSION INTRAVENOUS at 08:50

## 2022-11-10 RX ADMIN — ROPIVACAINE HYDROCHLORIDE 30 ML: 5 INJECTION, SOLUTION EPIDURAL; INFILTRATION; PERINEURAL at 08:34

## 2022-11-10 RX ADMIN — FENTANYL CITRATE 50 MCG: 50 INJECTION, SOLUTION INTRAMUSCULAR; INTRAVENOUS at 11:13

## 2022-11-10 RX ADMIN — HYDROMORPHONE HYDROCHLORIDE 0.5 MG: 0.5 INJECTION, SOLUTION INTRAMUSCULAR; INTRAVENOUS; SUBCUTANEOUS at 12:20

## 2022-11-10 RX ADMIN — FENTANYL CITRATE 100 MCG: 0.05 INJECTION, SOLUTION INTRAMUSCULAR; INTRAVENOUS at 08:18

## 2022-11-10 RX ADMIN — PSYLLIUM HUSK 1 PACKET: 3.4 GRANULE ORAL at 21:18

## 2022-11-10 RX ADMIN — KETOROLAC TROMETHAMINE 15 MG: 30 INJECTION, SOLUTION INTRAMUSCULAR at 13:52

## 2022-11-10 RX ADMIN — ACETAMINOPHEN 1000 MG: 500 TABLET ORAL at 07:27

## 2022-11-10 RX ADMIN — HYDROMORPHONE HYDROCHLORIDE 0.5 MG: 0.5 INJECTION, SOLUTION INTRAMUSCULAR; INTRAVENOUS; SUBCUTANEOUS at 12:08

## 2022-11-10 RX ADMIN — ASPIRIN 81 MG: 81 TABLET, COATED ORAL at 21:18

## 2022-11-10 RX ADMIN — Medication 5 MG: at 09:05

## 2022-11-10 RX ADMIN — Medication 3 MG: at 10:39

## 2022-11-10 RX ADMIN — MORPHINE SULFATE 2 MG: 2 INJECTION, SOLUTION INTRAMUSCULAR; INTRAVENOUS at 11:35

## 2022-11-10 RX ADMIN — ROCURONIUM BROMIDE 20 MG: 10 INJECTION, SOLUTION INTRAVENOUS at 09:48

## 2022-11-10 RX ADMIN — FENTANYL CITRATE 50 MCG: 50 INJECTION, SOLUTION INTRAMUSCULAR; INTRAVENOUS at 11:07

## 2022-11-10 RX ADMIN — LOSARTAN POTASSIUM 100 MG: 50 TABLET, FILM COATED ORAL at 13:52

## 2022-11-10 RX ADMIN — Medication 5 MG: at 09:55

## 2022-11-10 RX ADMIN — GLYCOPYRROLATE 0.6 MG: 0.2 INJECTION, SOLUTION INTRAMUSCULAR; INTRAVENOUS at 10:39

## 2022-11-10 RX ADMIN — SODIUM CHLORIDE, PRESERVATIVE FREE 10 ML: 5 INJECTION INTRAVENOUS at 21:19

## 2022-11-10 RX ADMIN — ACETAMINOPHEN 650 MG: 325 TABLET ORAL at 17:53

## 2022-11-10 RX ADMIN — OXYCODONE 5 MG: 5 TABLET ORAL at 14:11

## 2022-11-10 RX ADMIN — Medication 50 MCG: at 08:56

## 2022-11-10 RX ADMIN — ACETAMINOPHEN 650 MG: 325 TABLET ORAL at 13:51

## 2022-11-10 ASSESSMENT — PAIN DESCRIPTION - LOCATION
LOCATION: LEG
LOCATION: KNEE;LEG
LOCATION: LEG
LOCATION: KNEE;LEG
LOCATION: KNEE
LOCATION: LEG;KNEE
LOCATION: KNEE
LOCATION: KNEE;LEG
LOCATION: KNEE
LOCATION: KNEE
LOCATION: KNEE;LEG

## 2022-11-10 ASSESSMENT — PAIN SCALES - GENERAL
PAINLEVEL_OUTOF10: 3
PAINLEVEL_OUTOF10: 5
PAINLEVEL_OUTOF10: 6
PAINLEVEL_OUTOF10: 3
PAINLEVEL_OUTOF10: 6
PAINLEVEL_OUTOF10: 4
PAINLEVEL_OUTOF10: 3
PAINLEVEL_OUTOF10: 8
PAINLEVEL_OUTOF10: 8
PAINLEVEL_OUTOF10: 5
PAINLEVEL_OUTOF10: 8
PAINLEVEL_OUTOF10: 2
PAINLEVEL_OUTOF10: 7
PAINLEVEL_OUTOF10: 7
PAINLEVEL_OUTOF10: 3
PAINLEVEL_OUTOF10: 7

## 2022-11-10 ASSESSMENT — PAIN DESCRIPTION - DESCRIPTORS
DESCRIPTORS: ACHING;CRUSHING;DISCOMFORT;GNAWING
DESCRIPTORS: ACHING;DISCOMFORT
DESCRIPTORS: ACHING;DISCOMFORT;DULL
DESCRIPTORS: DISCOMFORT;DULL;ACHING;SORE
DESCRIPTORS: DISCOMFORT
DESCRIPTORS: DISCOMFORT;DULL;SORE
DESCRIPTORS: ACHING
DESCRIPTORS: DISCOMFORT
DESCRIPTORS: ACHING;DISCOMFORT;DULL;SORE
DESCRIPTORS: ACHING
DESCRIPTORS: DISCOMFORT;SORE;TENDER
DESCRIPTORS: ACHING;DISCOMFORT
DESCRIPTORS: ACHING;DISCOMFORT;DULL

## 2022-11-10 ASSESSMENT — PAIN DESCRIPTION - ORIENTATION
ORIENTATION: LEFT

## 2022-11-10 ASSESSMENT — PAIN - FUNCTIONAL ASSESSMENT
PAIN_FUNCTIONAL_ASSESSMENT: ACTIVITIES ARE NOT PREVENTED
PAIN_FUNCTIONAL_ASSESSMENT: 0-10

## 2022-11-10 ASSESSMENT — PAIN DESCRIPTION - FREQUENCY: FREQUENCY: CONTINUOUS

## 2022-11-10 ASSESSMENT — PAIN DESCRIPTION - PAIN TYPE
TYPE: CHRONIC PAIN
TYPE: SURGICAL PAIN
TYPE: ACUTE PAIN;SURGICAL PAIN

## 2022-11-10 ASSESSMENT — PAIN DESCRIPTION - ONSET: ONSET: ON-GOING

## 2022-11-10 NOTE — ANESTHESIA POSTPROCEDURE EVALUATION
Department of Anesthesiology  Postprocedure Note    Patient: Donna Box  MRN: [id number]  [place]: 1950  Date of evaluation: 11/10/2022      Procedure Summary     Date: 11/10/22 Room / Location: [facility] OR 02 / [facility]    Anesthesia Start:  Anesthesia Stop:     Procedure: ROBOTIC JAGUAR ASSISTED LEFT KNEE TOTAL ARTHROPLASTY +PNB (Left: Knee) Diagnosis:       Osteoarthritis of left knee, unspecified osteoarthritis type      (Osteoarthritis of left knee, unspecified osteoarthritis type [M17.12])    Surgeons: Petra Vernon MD Responsible Provider:     Anesthesia Type: general, spinal ASA Status: 3          Anesthesia Type: General    Kane Phase I: Kane Score: 10    Kane Phase II:        Anesthesia Post Evaluation    Patient location during evaluation: PACU  Patient participation: complete - patient participated  Level of consciousness: awake  Pain score: 3  Airway patency: patent  Nausea & Vomiting: no nausea and no vomiting  Complications: no  Cardiovascular status: blood pressure returned to baseline  Respiratory status: acceptable  Hydration status: euvolemic

## 2022-11-10 NOTE — CARE COORDINATION
Met with patient and spouse about diagnosis and discharge plan of care. Post op left TKA, pt seen in ortho class. Lives in split level home, bed upstairs and bath on 1st floor. Has wheeled walker, 3-1 commode and walk in shower with seat. Plan is home with [id number] [facility] home care-orders completed and notified. Will follow-mjo    The Plan for Transition of Care is related to the following treatment goals: home with home care     The Patient and/or patient representative pt  was provided with a choice of provider and agrees   with the discharge plan. [x] Yes [] No    Freedom of choice list was provided with basic dialogue that supports the patient's individualized plan of care/goals, treatment preferences and shares the quality data associated with the providers.  [x] Yes [] No

## 2022-11-10 NOTE — ANESTHESIA PROCEDURE NOTES
Peripheral Block    Patient location during procedure: pre-op  Reason for block: post-op pain management and at surgeon's request  Start time: 11/10/2022 8:34 AM  Staffing  Performed: anesthesiologist   Anesthesiologist: Roxy Bolanos MD  Preanesthetic Checklist  Completed: patient identified, IV checked, site marked, risks and benefits discussed, surgical/procedural consents, equipment checked, pre-op evaluation, timeout performed, anesthesia consent given, oxygen available, monitors applied/VS acknowledged, fire risk safety assessment completed and verbalized and blood product R/B/A discussed and consented  Peripheral Block   Patient position: supine  Prep: ChloraPrep  Provider prep: mask and sterile gloves  Patient monitoring: cardiac monitor, continuous pulse ox, frequent blood pressure checks and IV access  Block type: Femoral  Laterality: right  Injection technique: single-shot  Guidance: ultrasound guided  Local infiltration: lidocaine  Infiltration strength: 1 %  Local infiltration: lidocaine  Dose: 3 mL    Needle   Needle type: insulated echogenic nerve stimulator needle   Needle gauge: 20 G  Needle localization: ultrasound guidance  Needle length: 10 cm  Assessment   Injection assessment: negative aspiration for heme, no paresthesia on injection, local visualized surrounding nerve on ultrasound and no intravascular symptoms  Paresthesia pain: none  Slow fractionated injection: yes  Hemodynamics: stable  Real-time US image taken/store: yes  Outcomes: patient tolerated procedure well and uncomplicated    Additional Notes  U/S [id number]. Time out performed.          Medications Administered  ropivacaine (NAROPIN) injection 0.5% - Perineural   30 mL - 11/10/2022 8:34:00 AM

## 2022-11-10 NOTE — PROGRESS NOTES
Physical Therapy  Facility/Department: [facility] SURGERY  Physical Therapy Initial Assessment    Name: Jammie Li  : 1950  MRN: [id number]  Date of Service: 11/10/2022               Patient Diagnosis(es): The primary encounter diagnosis was Pre-op testing. A diagnosis of Osteoarthritis of left knee, unspecified osteoarthritis type was also pertinent to this visit. Past Medical History:  has a past medical history of Acid reflux, Arthritis, BPH (benign prostatic hyperplasia), Cancer ([address] COVID-19, Heart murmur, Hypertension, Left knee pain, Mitral valve disorder, and Palpitations. Past Surgical History:  has a past surgical history that includes back surgery; hernia repair; skin biopsy (2018); Colonoscopy (); Upper gastrointestinal endoscopy (); Knee arthroscopy (Left, 2019); and Total knee arthroplasty (Left, 11/10/2022). Requires PT Follow-Up: Yes     Evaluating Therapist: Jocelin Alcocer PT     Referring Provider:  DANYEL Momin CNP    PT order : PT eval and treat     Room #: 709   DIAGNOSIS:  OA s/p L TKA 11/10/2022   PRECAUTIONS: falls, FWBAT     Social:  Pt lives with  spouse  in a  split  floor plan 6-8 steps with B HRs between levels and 4  steps and  1  rails to enter. Prior to admission pt walked with  no AD. Has ww      Initial Evaluation  Date:  11/10/2022 Treatment      Short Term/ Long Term   Goals   Was pt agreeable to Eval/treatment? Yes      Does pt have pain?   Denies      Bed Mobility  Rolling:  NT   Supine to sit:  min assist   Sit to supine:  mod assist   Scooting:  SBA in sit    S/SBA    Transfers Sit to stand:  min assist   Stand to sit: min assist   Stand pivot:  NT    S/SBA    Ambulation    15 and 50  feet with  ww  with  min assist    200 feet with   ww  with  SBA        Stair negotiation: ascended and descended NT   4  steps with  1-2  rail with  SBA    LE ROM  AAROM R knee 0-85 degrees      LE strength  3-/ 5 R knee      AM- PAC RAW score 16/ 24            Pt is alert and Oriented x  3     Balance:  min assist. Fall risk due to  decreased sensation from block, mild LE instability, decreased safety   Sensation: impaired from block        ASSESSMENT  Pt displays functional ability as noted in the objective portion of this evaluation. Conditions Requiring Skilled Therapeutic Intervention:    [x]Decreased strength     [x]Decreased ROM  [x]Decreased functional mobility  [x]Decreased balance   [x]Decreased endurance   [x]Decreased posture  [x]Decreased sensation  [x]Decreased coordination   []Decreased vision  [x]Decreased safety awareness   [x]Increased pain         Treatment/Education:    Pt in bed  upon arrival ; agreeable to PT. Instructed in APs and QS prior to mobility. Mobility as above. Assisted needed with R LE with supine to sit. Pt with sensory impairment from nerve block. Instruction given for hand and foot placement with transfers and safe ww use. Pt amb with reciprocal pattern; mild LE instability noted with gait. Mild nausea reports after session; RN informed            Pt educated on fall risk, safety with mobility        Patient response to education:   Pt verbalized understanding Pt demonstrated skill Pt requires further education in this area    Needs cues  x       Comments:  Pt left in bed per pt request after session, with call light in reach. Rehab potential is Good for reaching above PT goals. Pts/ family goals   1. Home     Patient and or family understand(s) diagnosis, prognosis, and plan of care. -  yes     PLAN  PT care will be provided in accordance with the objectives noted above. Whenever appropriate, clear delegation orders will be provided for nursing staff. Exercises and functional mobility practice will be used as well as appropriate assistive devices or modalities to obtain goals. Patient and family education will also be administered as needed.         PLAN OF CARE:    Current Treatment Recommendations [x] Strengthening to improve independence with functional mobility   [x] ROM to improve independence with functional mobility   [x] Balance Training to improve static/dynamic balance and to reduce fall risk  [x] Endurance Training to improve activity tolerance during functional mobility   [x] Transfer Training to improve safety and independence with all functional transfers   [x] Gait Training to improve gait mechanics, endurance and assess need for appropriate assistive device  [x] Stair Training in preparation for safe discharge home and/or into the community   [x] Positioning to prevent skin breakdown and contractures  [x] Safety and Education Training   [x] Patient/Caregiver Education   [] HEP  [] Other     Frequency of treatments will be BID x 2 days. Time in: 1530   Time out:  1555       Evaluation Time includes thorough review of current medical information, gathering information on past medical history/social history and prior level of function, completion of standardized testing/informal observation of tasks, assessment of data and education on plan of care and goals.     CPT codes:  [x] Low Complexity PT evaluation 95294  [] Moderate Complexity PT evaluation 21430  [] High Complexity PT evaluation 97868  [] PT Re-evaluation 92531  [] Gait training 41597  minutes  [x] Therapeutic activities 29079 8 minutes  [] Therapeutic exercises 85276  minutes  [] Neuromuscular reeducation 54394  minutes       Padma Awad number:  PT [phone]

## 2022-11-10 NOTE — OP NOTE
OPERATIVE REPORT    PATIENT:  Ari Chan  [id number]    DATE OF PROCEDURE:  11/10/22    SURGEON: Higinio Bui MD    ASSISTANT:  Norah Hollingsworth DO, David Aggarwal DO; Josi Andre CNP. CNP was necessary for positioning, prepping/draping, intra-operative assistance, and wound closure. His assistance expedited the procedure and decreased operating room time. PREOPERATIVE DIAGNOSIS: Degenerative joint disease , Left knee. POSTOPERATIVE DIAGNOSIS: Degenerative joint disease,  Leftknee. OPERATION: Seth Robot Assisted Left total knee arthroplasty     ANESTHESIA: . general and ACB    OPERATIVE INDICATIONS:  The patient is a 67year old male with end stage degenerative joint disease involving the knee, for which more conservative non operative management has failed. The patient is thus indicated for total knee arthroplasty. We discussed use of the [facility] robot for assistance. The patient was agreeable. The risks and benefits, as well as alternatives were discussed at length with the patient in detail. These risks include, but are not limited to infection, nerve and/or blood vessel injury, intra or post operative fracture, need for revision surgery, failure of implants, deep vein thrombosis and pulmonary embolism, athrofibrosis, and the risks of general anesthesia provided by the anesthesiologist.   The patient understood these risks, signed an informed consent, and elected to proceed    OPERATIVE FINDINGS:   There was extensive eburnation of bone, and full thickness cartilage loss over the femoral and tibial condyles. OPERATIVE PROCEDURE: After satisfactory spinal anesthesia, the patient was placed supine on the operative table. A Bump was placed under the operative leg and a tourniquet was placed high on the operative thigh. The operative extremity was prepped and draped in sterile fashion.   Prior to procedure start, a time out was performed including confirmation of operative site and operation to be performed. Antibiotic prophylaxis, 2 g Ancef was given prior to incision, as was 1 g of transexamic acid. The leg was exsanguinated with an Esmarch bandage. The tourniquet was inflated. An anterior midline incision was made centered about the patella. Dissection was carried down in the midline of the extensor mechanism where a medial parapatellar arthrotomy was made. The patella was everted and a free hand method was used to cut the patella after removal of osteophytes. The patella was then sized and drilled for a size 35 asymmetric component. A patellar protector was placed and the patella was subluxed laterally. We then placed our femoral and tibial pins and assembled the array for the femur and tibia respectively. Femoral and tibial registration buttons were placed. Hip centering and identification of medial and lateral malleoli was performed. The knee was flexed. Appropriate points were registered on the femur followed by the tibia. All osteophytes were then removed. The knee was brought into extension. We noted baseline measurements were  8 flexion, 6 varus . Appropriate tension was placed on the medial and lateral compartments with sizing spoons and/or [place] elevators and we captured our measurements with correction of deformity. This was repeated with the knee in 90 degrees of flexion. Appropriate adjustments were then made utilizing the [address] to plan for 18 mm gaps in extension and flexion. We then prepared to make our cuts. The knee was flexed. Medial and lateral retractors were placed. Utilizing the [facility] robot arm, we made femoral and tibial cuts. All bone fragments were removed. A lamina  was placed in order to access the posterior aspect of the knee and remove osteophytes and remaining meniscal tissue. The knee was then flexed, and the appropriate size tibial tray was placed in the correct amount of external rotation.   A size 7 proved to be best fit.  The tibial tray and 9mm poly trial were placed on the tibia and allowed to float. The femoral trial was then placed, size 7, and the knee was reduced and taken through a range of motion. Range of motion was found to be excellent including 0 degrees at extension, and 140 degrees of flexion without tibial tray lift off. We noted that we were balanced nicely in extension and flexion based on the measurements on our CT image, 18 mm gaps in flexion and extension. The knee was then flexed again, and the femur was drilled. The tibial tower was placed and the tibia was punched. The additional component for press-fit tibia was placed and holes were drilled. All trial components were then removed. The knee was thoroughly irrigated. With the knee in extension, bovie electrocautery was utilized to coagulate in the posteromedial and posterolateral gutters. Local anesthetic consisting of 1% lidocaine with epi, 1% lidocaine without epi, and 0.25% Marcaine was then placed into the posterior capsule and into the periosteum of the femur and tibia, and into the anterior capsule. The bony surfaces were dried and the knee was flexed. The tibial component was then placed using a mallet. There was an excellent press-fit and we were able to lift the knee to be up without evidence of loosening. The final polyethylene was impacted into place. The press-fit femoral component was then placed. The knee was then placed in extension. The patella was everted, sized, and drilled. The press-fit component was placed and seated, and checked with a [place] and found to be stable. Final correction showed 4 degrees flexion, 3 of varus      The tourniquet was released and bleeders were coagulated. A Betadine shock was performed for 3 minutes.   We assessed range of motion and stability once again and noted full extension, flexion 140 degrees without significant tightness in good posterior drawer, as well as symmetric gaps on our CT model.  Irrigated once more. 500 mg of vancomycin powder was placed in the joint. The joint capsule was then closed using an O stratafix. 2-O interrupted vicryl suture was used to close the subcutanseous tissue. Finally, a 4-O running subcuticular monocryl suture was used to closed skin. Tibial pin sites were closed with nylon. Dermabond was then placed over the incision. A sterile dressing was placed. The patient was then transferred to their hospital bed, awoken from anesthesia, and transported to the PACU in stable condition. IMPLANTS:   Implant Name Type Inv. Item Serial No.  Lot No. LRB No. Used Action   INSERT TIB BEAR SZ 7 THK9MM KNEE X3 CRUCE RET TRIATHLON - [id number]  INSERT TIB BEAR SZ 7 THK9MM KNEE X3 CRUCE RET TRIATHLON  [employer]Reach Pros [id number] Left 1 Implanted   BASEPLATE TIB SZ 7 [id number] ML80MM KNEE TRITANIUM 4 CRUCFRM - [id number]  BASEPLATE TIB SZ 7 [id number] ML80MM KNEE TRITANIUM 4 CRUCFRM  [employer]Reach Pros [id number] Left 1 Implanted   COMPONENT FEM SZ 7 L KNEE DIVYA APATITE CRUCE RET CEMENTLESS - [id number]  COMPONENT FEM SZ 7 L KNEE DIVYA APATITE CRUCE RET CEMENTLESS  [employer] Pros [id number] Left 1 Implanted   COMPONENT PAT [id number] SUPERIOR/INFERIOR KNEE - [id number]  COMPONENT PAT [id number] SUPERIOR/INFERIOR KNEE  [employer]Reach Pros [id number] Left 1 Implanted       TOURNIQUET TIME: 60 mins    ESTIMATED BLOOD LOSS: 25 mL    COMPLICATIONS: none    POST OPERATIVE PLAN: The patient will be transferred to the orthopaedic floor from PACU once stable. Post operative antibiotics will be continued for 24 hours. Physical therapy will begin immediately, with weight bearing as tolerated. DVT prophylaxis will be with SCD's starting today, and ASA 81 mg BID starting tomorrow. I anticipate discharge to home/rehab within the first 3 post operative days.        Breanne Chowdhury MD  Orthopaedic Surgery   11/10/22  10:41 AM

## 2022-11-10 NOTE — PROGRESS NOTES
Occupational Therapy  OCCUPATIONAL THERAPY INITIAL EVALUATION     Daysi Tam [facility] [place], [place] [facility]:                                                  Patient Name: Alex Taylor    MRN: [id number]    : 1950    Room: [address] [place], [place] [address]      Evaluating OT: Susi Gonzalez, OTR/L [id number]      Referring Provider: DANYEL Modi CNP    Specific Provider Orders/Date:OT eval and treat 11/10/22      Diagnosis:  Osteoarthritis of left knee, unspecified osteoarthritis type [M17.12]  Status post total knee replacement, left [Z96.652]     Surgery: L TKA 11/10/22     Pertinent Medical History: arthritis, skin CA, HTN       Precautions:  Fall Risk, FWBAT LLE     Assessment of current deficits    [x] Functional mobility  [x]ADLs  [x] Strength               []Cognition    [x] Functional transfers   [x] IADLs         [x] Safety Awareness   [x]Endurance    [] Fine Coordination              [x] Balance      [] Vision/perception   [x]Sensation     []Gross Motor Coordination  [] ROM  [] Delirium                   [] Motor Control     OT PLAN OF CARE   OT POC based on physician orders, patient diagnosis and results of clinical assessment    Frequency/Duration 2-5 days/wk for 2 weeks PRN   Specific OT Treatment Interventions to include:   * Instruction/training on adapted ADL techniques and AE recommendations to increase functional independence within precautions       * Training on energy conservation strategies, correct breathing pattern and techniques to improve independence/tolerance for self-care routine  * Functional transfer/mobility training/DME recommendations for increased independence, safety, and fall prevention  * Patient/Family education to increase follow through with safety techniques and functional independence  * Recommendation of environmental modifications for increased safety with functional transfers/mobility and ADLs  * Therapeutic exercise to improve motor endurance, ROM, and functional strength for ADLs/functional transfers  * Therapeutic activities to facilitate/challenge dynamic balance, stand tolerance for increased safety and independence with ADLs        Recommended Adaptive Equipment: TBD     Home Living: Pt lives with wife in split level house with 4 DANITZA and B hand rails. 6-8 steps between levels with  B hand rails . Pt's bedroom and bathroom are on the top level. Bathroom setup: walk in shower with shower chair, grab bars, and hand held shower head   Equipment owned: wheeled walker, BSC    Prior Level of Function: independent with ADLs , independent with IADLs; functional mobility: no device    Pain Level: pt reported pain in LLE; pt agreeable to therapy  Cognition: A&O: pt alert, conversing, and following commands. Memory:  Good   Sequencing:  Good   Problem solving:  Fair   Judgement/safety:  Fair (impulsive at times)     Functional Assessment:  AM-PAC Daily Activity Raw Score: 17/24   Initial Eval Status  Date: 11/10/22 Treatment Status  Date: STGs = LTGs  Time frame: 10-14 days   Feeding Independent     Grooming Min A  Mod I/I   UB Dressing Min A  For gown management  Mod I/I   LB Dressing Mod A  To adjust socks    Mod I/I-with use of AD as appropriate/needed   Bathing Mod A  Mod I/I -with use of AD as appropriate/needed   Toileting Min A   To stand at toilet and for clothing management; pt unable to urinate this date. RN notified.   Mod I/I    Bed Mobility  Supine to sit: Min A   Sit to supine: Min A   Independent   Functional Transfers Min A with wheeled walker  Sit<>stand from EOB  Cues for hand placement and safe technique to maximize safety and independence with ADLs and functional tasks  Independent    Functional Mobility Min A with wheeled walker  To and from bathroom and short distance in room  Cues for safe wheeled walker management  Independent -with device as needed to maximize independence with ADLs and functional task completion   Balance Sitting:     Static:  Sup    Dynamic:SBA  Standing: Min A with wheeled walker  I for static/dynamic sitting balance to maximize independence with ADLs and functional task completion    I for standing balance to maximize independence with ADLs and functional task completion   Activity Tolerance Fair with light activity. Pt reported some nausea at end of session. RN notified. Good with ADL activity. Pt will demonstrate good understanding of education provided on EC/WS techniques    Visual/  Perceptual Glasses: yes                Additional long-term goal: Pt will increase functional independence to PLOF to allow pt to live in least restrictive environment. Hand Dominance R   AROM (PROM) Strength Additional Info:    RUE  WFL WFL good  and wfl FMC/dexterity noted during ADL tasks     LUE WFL WFL good  and wfl FMC/dexterity noted during ADL tasks     Hearing: WFL   Sensation:  No c/o numbness or tingling   Tone: WFL   Edema: LLE    Comments: Upon arrival patient lying in bed with wife present. At end of session, patient returned to bed with call light and phone within reach, all lines and tubes intact, and wife present. Overall patient demonstrated  decreased independence and safety during completion of ADL/functional transfer/mobility tasks. Pt would benefit from continued skilled OT to increase safety and independence with completion of ADL/IADL tasks for functional independence and quality of life. Treatment: OT treatment provided this date includes:   Instruction/training on safety and adapted techniques for completion of ADLs: Mod A to adjust B socks seated EOB. Cues for technique and to not twist on knee. Pt declined to sit on commode and requested to stand to attempt to urinate. Pt stood at commode with Min A for safety. Decreased balance/tolerance noted. Pt unable to urinate this date. Some impulsivity noted during session with cues for safety. Instruction/training on safe functional mobility/transfer techniques: Cues for safe technique and hand placement during transfers and functional bed mobility. Walked to and from bathroom and short distance in room with Min A for safety. Proper Positioning/Alignment         Rehab Potential: Good for established goals     Patient / Family Goal: to return home    Patient and/or family were instructed on functional diagnosis, prognosis/goals and OT plan of care. Demonstrated good understanding. Eval Complexity: Low    Time In: 1529  Time Out: 1552  Total Treatment Time: 10    Min Units   OT Eval Low 97165  x  1   OT Eval Medium 21401      OT Eval High 64439      OT Re-Eval [id number]       Therapeutic Ex 20211       Therapeutic Activities 61560       ADL/Self Care 12482  10  1   Orthotic Management 30339       Manual 01037     Neuro Re-Ed 54808       Non-Billable Time          Evaluation Time additionally includes thorough review of current medical information, gathering information on past medical history/social history and prior level of function, interpretation of standardized testing/informal observation of tasks, assessment of data and development of plan of care and goals.             Fanny Painting, OTR/L, [id number]

## 2022-11-10 NOTE — H&P
Department of Orthopedic Surgery  Attending Pre-operative History and Physical        DIAGNOSIS:  Left knee osteoarthritis     INDICATION:  Failed conservative management    PROCEDURE:  ROBOTIC SETH ASSISTED LEFT KNEE TOTAL ARTHROPLASTY        CHIEF COMPLAINT:  Left knee pain     History Obtained From:  patient    HISTORY OF PRESENT ILLNESS:      The patient is a 67 y.o. male with significant past medical history of left knee osteoarthritis who presents with left knee pain. Patient has failed conservative treatment including injections, therapy, activity modification, OTC medications. X-ray showing end-stage degenerative changes to the left knee. He continues to have persistent pain affecting his activities of daily living. For these reasons he is interested in surgical management. Surgery will involve a left Seth robotic assisted total knee arthroplasty. The risks, benefits, and alternatives to the procedure were discussed at length with the patient and family members. Risks include but are not limited to infection, neurovascular injury, persistent pain, arthrofibrosis, failure of hardware, need for revision surgery, pulmonary embolism, and the risks of general anesthesia. Patient verbalizes understanding of the risks and wishes to proceed.        Past Medical History:        Diagnosis Date    Acid reflux     Arthritis     BPH (benign prostatic hyperplasia)     Cancer ([facility] [address] 11/2018    skin    COVID-19 09/2022    Heart murmur     Hypertension     Left knee pain     for OR 11/10/2022    Mitral valve disorder     Palpitations        Past Surgical History:        Procedure Laterality Date    BACK SURGERY      COLONOSCOPY  2018    HERNIA REPAIR      KNEE ARTHROSCOPY Left 5/2/2019    LEFT KNEE ARTHROSCOPY WITH PARTIAL MEDIAL MENISECTOMY performed by Harmony Lew MD at [address]  11/2018    UPPER GASTROINTESTINAL ENDOSCOPY  2018       Medications Prior to Admission:   Medications Prior to Admission: albuterol sulfate HFA (VENTOLIN HFA) 108 (90 Base) MCG/ACT inhaler, Inhale 2 puffs into the lungs 4 times daily as needed for Wheezing  ibuprofen (ADVIL;MOTRIN) 200 MG tablet, Take 200 mg by mouth every 6 hours as needed for Pain  olmesartan (BENICAR) 40 MG tablet, take 1 tablet by mouth once daily  carvedilol (COREG) 12.5 MG tablet, take 1 tablet by mouth every morning then take 1 tablet every evening  omeprazole (PRILOSEC) 40 MG delayed release capsule, Take 1 capsule by mouth daily  cetirizine (ZYRTEC) 10 MG tablet, Take 1 tablet by mouth daily (Patient taking differently: Take 10 mg by mouth daily as needed)  aspirin 81 MG tablet, Take 81 mg by mouth daily  Omega-3 Fatty Acids (FISH OIL PO), Take by mouth daily  Multiple Vitamin (MULTI-VITAMIN DAILY PO), Take by mouth daily    Allergies:  Augmentin [amoxicillin-pot clavulanate] and Clindamycin/lincomycin    History of allergic reaction to anesthesia:  No    Social History:   TOBACCO:   reports that he has never smoked. He has never used smokeless tobacco.  ETOH:   reports current alcohol use of about 12.0 standard drinks per week. DRUGS:   reports no history of drug use. Family History:       Problem Relation Age of Onset    Stroke Mother     Heart Disease Father         Bypass Surgery, Pacemaker, Carotids    Coronary Art Dis Father     Other Son         procoagulant disorder       REVIEW OF SYSTEMS:  CONSTITUTIONAL:  negative  RESPIRATORY:  negative  CARDIOVASCULAR:  negative  MUSCULOSKELETAL:  negative except for  HPI  NEUROLOGICAL:  negative    PHYSICAL EXAM:     VITALS:  There were no vitals taken for this visit.     Gen: AOx3, NAD    Heart:  normal apical pulses, normal S1 and S2 and no edema    Lungs:  No increased work of breathing, good air exchange     Abdomen:  no scars, non-distended and non-tender    Extremities:  No clubbing, cyanosis, or edema    Musculoskeletal: Exam of the left knee range of motion , pain present with range of motion. Valgus varus stress intact eliciting medial sided joint line pain. No swelling deformity or effusion. Patella stable tracking midline. DATA:  CBC:   Lab Results   Component Value Date/Time    WBC 4.1 11/02/2022 08:20 AM    RBC 3.96 11/02/2022 08:20 AM    HGB 13.0 11/02/2022 08:20 AM    HCT 38.6 11/02/2022 08:20 AM    MCV 97.5 11/02/2022 08:20 AM    MCH 32.8 11/02/2022 08:20 AM    MCHC 33.7 11/02/2022 08:20 AM    RDW 13.7 11/02/2022 08:20 AM     11/02/2022 08:20 AM    MPV 8.9 11/02/2022 08:20 AM     BMP:    Lab Results   Component Value Date/Time     11/02/2022 08:20 AM    K 4.8 11/02/2022 08:20 AM     11/02/2022 08:20 AM    CO2 24 11/02/2022 08:20 AM    BUN 13 11/02/2022 08:20 AM    LABALBU 4.7 05/17/2022 12:49 PM    CREATININE 0.8 11/02/2022 08:20 AM    CALCIUM 9.7 11/02/2022 08:20 AM    GFRAA >60 05/17/2022 12:49 PM    LABGLOM >60 11/02/2022 08:20 AM    GLUCOSE 137 11/02/2022 08:20 AM       Radiology Review: X-rays of the left knee reviewed showing bone-on-bone degenerative changes to the medial compartment with osteophyte formation and joint space narrowing of the patellofemoral joint    ASSESSMENT AND PLAN:    1. Patient is a 67 y.o. male with above specified procedure planned left Seth robotic assisted total knee arthroplasty with anesthesia    2. Procedure options, risks and benefits reviewed with patient. Patient expresses understanding and has signed consent form to proceed. 3.  Patient and family were provided with pre-op and post-op instructions, prescription medications, and any other supplied needed post op (slings, braces, etc.)      Controlled substances monitoring: possible medication side effects, risk of tolerance and/or dependence, and alternative treatments discussed, no signs of potential drug abuse or diversion identified, and OARRS report reviewed today- activity consistent with treatment plan.       El Nissen, APRN-CNP  Orthopedic Surgery 11/10/22  6:50 AM      Staff Addendum    I have seen and evaluated the patient and agree with the assessment and plan as documented by Lonnie Burnham CNP. I have performed the key components of the history and physical examination and concur with the findings and plan, and have made changes where appropriate/necessary. Glenroy Last MD  [address]          Update History & Physical     The patient's History and Physical was reviewed with the patient and there were no significant changes. I examined the patient and there were no significant changes from the previous History and Physical.     Plan: The risk, benefits, expected outcome, and alternative to the recommended procedure have been discussed with the patient. Patient understands and wants to proceed with the procedure.

## 2022-11-10 NOTE — CARE COORDINATION
Social Work:    Mr. Sherine Bhatarmani Nava) is scheduled for a Robotic Seth assisted left knee arthroplasty today. Social service met with Yuni Sheikh in ortho camp on November 2nd and provided him with all discharge information. Yuni Sheikh resides with his wife in a split level home with steps to the main floor. He expects to return home with [facility], after choices were provided and has a wheeled walker &  3-1 commode. Social work to follow.     Electronically signed by TIGIST Allen on 11/10/2022 at 10:18 AM

## 2022-11-11 VITALS
RESPIRATION RATE: 16 BRPM | DIASTOLIC BLOOD PRESSURE: 71 MMHG | WEIGHT: 249 LBS | BODY MASS INDEX: 33.72 KG/M2 | SYSTOLIC BLOOD PRESSURE: 122 MMHG | OXYGEN SATURATION: 97 % | HEIGHT: 72 IN | TEMPERATURE: 98.1 F | HEART RATE: 81 BPM

## 2022-11-11 LAB
ALBUMIN SERPL-MCNC: 3.7 G/DL (ref 3.5–5.2)
ALP BLD-CCNC: 53 U/L (ref 40–129)
ALT SERPL-CCNC: 16 U/L (ref 0–40)
ANION GAP SERPL CALCULATED.3IONS-SCNC: 10 MMOL/L (ref 7–16)
AST SERPL-CCNC: 17 U/L (ref 0–39)
BILIRUB SERPL-MCNC: 0.5 MG/DL (ref 0–1.2)
BUN BLDV-MCNC: 13 MG/DL (ref 6–23)
CALCIUM SERPL-MCNC: 9.5 MG/DL (ref 8.6–10.2)
CHLORIDE BLD-SCNC: 104 MMOL/L (ref 98–107)
CO2: 21 MMOL/L (ref 22–29)
CREAT SERPL-MCNC: 0.8 MG/DL (ref 0.7–1.2)
GFR SERPL CREATININE-BSD FRML MDRD: >60 ML/MIN/1.73
GLUCOSE BLD-MCNC: 148 MG/DL (ref 74–99)
HCT VFR BLD CALC: 34.2 % (ref 37–54)
HEMOGLOBIN: 11.4 G/DL (ref 12.5–16.5)
MCH RBC QN AUTO: 31.9 PG (ref 26–35)
MCHC RBC AUTO-ENTMCNC: 33.3 % (ref 32–34.5)
MCV RBC AUTO: 95.8 FL (ref 80–99.9)
PDW BLD-RTO: 13.6 FL (ref 11.5–15)
PLATELET # BLD: 150 E9/L (ref 130–450)
PMV BLD AUTO: 9 FL (ref 7–12)
POTASSIUM REFLEX MAGNESIUM: 4.4 MMOL/L (ref 3.5–5)
RBC # BLD: 3.57 E12/L (ref 3.8–5.8)
SODIUM BLD-SCNC: 135 MMOL/L (ref 132–146)
TOTAL PROTEIN: 6 G/DL (ref 6.4–8.3)
WBC # BLD: 10 E9/L (ref 4.5–11.5)

## 2022-11-11 PROCEDURE — 6370000000 HC RX 637 (ALT 250 FOR IP): Performed by: NURSE PRACTITIONER

## 2022-11-11 PROCEDURE — G0378 HOSPITAL OBSERVATION PER HR: HCPCS

## 2022-11-11 PROCEDURE — 80053 COMPREHEN METABOLIC PANEL: CPT

## 2022-11-11 PROCEDURE — 36415 COLL VENOUS BLD VENIPUNCTURE: CPT

## 2022-11-11 PROCEDURE — 6360000002 HC RX W HCPCS: Performed by: NURSE PRACTITIONER

## 2022-11-11 PROCEDURE — 97530 THERAPEUTIC ACTIVITIES: CPT

## 2022-11-11 PROCEDURE — 97110 THERAPEUTIC EXERCISES: CPT

## 2022-11-11 PROCEDURE — 2580000003 HC RX 258: Performed by: NURSE PRACTITIONER

## 2022-11-11 PROCEDURE — 97535 SELF CARE MNGMENT TRAINING: CPT

## 2022-11-11 PROCEDURE — 85027 COMPLETE CBC AUTOMATED: CPT

## 2022-11-11 RX ORDER — ASPIRIN 81 MG/1
81 TABLET ORAL 2 TIMES DAILY
Qty: 56 TABLET | Refills: 0 | Status: SHIPPED | OUTPATIENT
Start: 2022-11-11 | End: 2022-12-09

## 2022-11-11 RX ORDER — OXYCODONE HYDROCHLORIDE AND ACETAMINOPHEN 5; 325 MG/1; MG/1
1 TABLET ORAL EVERY 6 HOURS PRN
Qty: 28 TABLET | Refills: 0 | Status: SHIPPED | OUTPATIENT
Start: 2022-11-11 | End: 2022-11-18

## 2022-11-11 RX ADMIN — LOSARTAN POTASSIUM 100 MG: 50 TABLET, FILM COATED ORAL at 08:42

## 2022-11-11 RX ADMIN — PANTOPRAZOLE SODIUM 40 MG: 40 TABLET, DELAYED RELEASE ORAL at 06:07

## 2022-11-11 RX ADMIN — ACETAMINOPHEN 650 MG: 325 TABLET ORAL at 01:10

## 2022-11-11 RX ADMIN — ACETAMINOPHEN 650 MG: 325 TABLET ORAL at 06:06

## 2022-11-11 RX ADMIN — ASPIRIN 81 MG: 81 TABLET, COATED ORAL at 08:42

## 2022-11-11 RX ADMIN — OXYCODONE 10 MG: 5 TABLET ORAL at 10:58

## 2022-11-11 RX ADMIN — CEFAZOLIN 2000 MG: 2 INJECTION, POWDER, FOR SOLUTION INTRAMUSCULAR; INTRAVENOUS at 01:11

## 2022-11-11 RX ADMIN — OXYCODONE 10 MG: 5 TABLET ORAL at 01:32

## 2022-11-11 RX ADMIN — CARVEDILOL 12.5 MG: 6.25 TABLET, FILM COATED ORAL at 08:42

## 2022-11-11 RX ADMIN — SODIUM CHLORIDE, PRESERVATIVE FREE 10 ML: 5 INJECTION INTRAVENOUS at 08:44

## 2022-11-11 RX ADMIN — KETOROLAC TROMETHAMINE 15 MG: 30 INJECTION, SOLUTION INTRAMUSCULAR at 08:42

## 2022-11-11 ASSESSMENT — PAIN DESCRIPTION - FREQUENCY: FREQUENCY: CONTINUOUS

## 2022-11-11 ASSESSMENT — PAIN SCALES - GENERAL
PAINLEVEL_OUTOF10: 2
PAINLEVEL_OUTOF10: 3
PAINLEVEL_OUTOF10: 2
PAINLEVEL_OUTOF10: 3
PAINLEVEL_OUTOF10: 8

## 2022-11-11 ASSESSMENT — PAIN DESCRIPTION - LOCATION
LOCATION: KNEE

## 2022-11-11 ASSESSMENT — PAIN DESCRIPTION - DESCRIPTORS
DESCRIPTORS: ACHING;DISCOMFORT;SORE;TENDER
DESCRIPTORS: ACHING;DISCOMFORT;SORE;TENDER
DESCRIPTORS: ACHING;SORE

## 2022-11-11 ASSESSMENT — PAIN DESCRIPTION - ORIENTATION
ORIENTATION: LEFT

## 2022-11-11 ASSESSMENT — PAIN DESCRIPTION - ONSET: ONSET: ON-GOING

## 2022-11-11 ASSESSMENT — PAIN - FUNCTIONAL ASSESSMENT
PAIN_FUNCTIONAL_ASSESSMENT: ACTIVITIES ARE NOT PREVENTED
PAIN_FUNCTIONAL_ASSESSMENT: ACTIVITIES ARE NOT PREVENTED

## 2022-11-11 ASSESSMENT — PAIN DESCRIPTION - DIRECTION: RADIATING_TOWARDS: L KNEE

## 2022-11-11 ASSESSMENT — PAIN DESCRIPTION - PAIN TYPE: TYPE: ACUTE PAIN;SURGICAL PAIN

## 2022-11-11 NOTE — CARE COORDINATION
Updated plan of care. POD#1. Plan discharge today.  Has all DME, home with [id number] [facility] home care-orders completed and notified-mary

## 2022-11-11 NOTE — PROGRESS NOTES
ORTHOPAEDIC SURGERY  Progress Note    CC: Postop    Subjective: Patient is alert and oriented x3, calm and cooperative showing no signs of acute distress. Reports no overnight issues. Patient reports ambulating around room and down the hallway tolerating well. Reports pain well controlled this time. Vitals  VITALS:  BP (!) 145/70   Pulse 87   Temp 97.4 °F (36.3 °C) (Oral)   Resp 16   Ht 6' (1.829 m)   Wt 249 lb (112.9 kg)   SpO2 94%   BMI 33.77 kg/m²   24HR INTAKE/OUTPUT:    Intake/Output Summary (Last 24 hours) at 11/11/2022 1549  Last data filed at 11/11/2022 0009  Gross per 24 hour   Intake 1000 ml   Output 1200 ml   Net -200 ml       PHYSICAL EXAM:    Orientation:  alert and oriented to person, place and time    Left Lower Extremity    Incision:  dressing in place, clean, dry and intact    Lower Extremity Motor :  Dorsiflexion:  5/5  Plantarflexion:  5/5  EHL:  5/5    Lower Extremity Sensory: intact L4-S1 distribution     Pulses: Foot warm/well perfused    Abnormal Exam findings:  none      LABS:    CBC:   Lab Results   Component Value Date/Time    WBC 4.1 11/02/2022 08:20 AM    RBC 3.96 11/02/2022 08:20 AM    HGB 13.0 11/02/2022 08:20 AM    HCT 38.6 11/02/2022 08:20 AM    MCV 97.5 11/02/2022 08:20 AM    MCH 32.8 11/02/2022 08:20 AM    MCHC 33.7 11/02/2022 08:20 AM    RDW 13.7 11/02/2022 08:20 AM     11/02/2022 08:20 AM    MPV 8.9 11/02/2022 08:20 AM       ASSESSMENT AND PLAN:    Post operative day 1 status post left total Knee arthroplasty    - Weight bearing as tolerated  - Deep venous thrombosis prophylaxis - ASA 81 BID, SCD's. Anand hose bilateral, thigh high  - Continue physical therapy  - Pain Control: Wean to oral meds   - Dressing change: Aquacel protocol  - D/C Plan:  Home Health, anticipate discharge home today after physical therapy is completed. He will follow-up in our office in 1 week for dressing change and postoperative imaging. Patient verbalized understanding.       Emile Wheeler [address] [facility], DANYEL-CNP  Orthopaedic Surgery   11/11/22  6:39 AM

## 2022-11-11 NOTE — PROGRESS NOTES
Physical Therapy  Facility/Department: [facility] SURGERY  Physical Therapy Treatment Note    Name: Rose Saleem  : 1950  MRN: [id number]  Date of Service: 2022               Patient Diagnosis(es): The primary encounter diagnosis was Status post total knee replacement, left. Diagnoses of Pre-op testing and Osteoarthritis of left knee, unspecified osteoarthritis type were also pertinent to this visit. Past Medical History:  has a past medical history of Acid reflux, Arthritis, BPH (benign prostatic hyperplasia), Cancer ([address] COVID-19, Heart murmur, Hypertension, Left knee pain, Mitral valve disorder, and Palpitations. Past Surgical History:  has a past surgical history that includes back surgery; hernia repair; skin biopsy (2018); Colonoscopy (); Upper gastrointestinal endoscopy (); Knee arthroscopy (Left, 2019); and Total knee arthroplasty (Left, 11/10/2022). Evaluating Therapist: Oral Pierson PT     Referring Provider:  DANYEL Lane CNP    PT order : PT eval and treat     Room #: 709   DIAGNOSIS:  OA s/p L TKA 11/10/2022   PRECAUTIONS: falls, FWBAT     Social:  Pt lives with  spouse  in a  split  floor plan 6-8 steps with B HRs between levels and 4  steps and  1  rails to enter. Prior to admission pt walked with  no AD. Has ww      Initial Evaluation  Date:  11/10/2022 Treatment  2022 PM   Short Term/ Long Term   Goals   Was pt agreeable to Eval/treatment? Yes  yes    Does pt have pain?   Denies  L quad, anterior knee pain    Bed Mobility  Rolling:  NT   Supine to sit:  min assist   Sit to supine:  mod assist   Scooting:  SBA in sit  Supine <> sit: Min A R LE support  S/SBA    Transfers Sit to stand:  min assist   Stand to sit: min assist   Stand pivot:  NT  Sit <> stand; SBA  S/SBA    Ambulation    15 and 50  feet with  ww  with  min assist  180 + 90 feet x 1 using WW for support SBA for balance  200 feet with   ww  with  SBA        Stair negotiation: ascended and descended NT  4 steps with B rails, 4 steps with rail/SBQC SBA, step to pattern used 4  steps with  1-2  rail with  SBA    LE ROM  AAROM R knee 0-85 degrees      LE strength  3-/ 5 R knee      AM- PAC RAW score   16/ 24 18/24        Pt is alert and able to follow instruction  Balance: fair dynamic using [address] for support    Pt performed therapeutic exercise of the following as per written HEP: NT    Patient education/treatment  Pt was educated on UE usage to assist with transfer safety, gait mechanics promoting posture and sequence, step negotiation promoting sequence and cane placement, truck transfer to back into the front passenger seat and ascend 9\" platform step backwards simulating a running board, assistance with bed mobility as needed. Patient response to education:   Pt verbalized understanding Pt demonstrated skill Pt requires further education in this area   yes With instruction yes     ASSESSMENT:   Comments: Gait remains slow and consistent, Pt progressed from a step to pattern to an intermittent step through reciprocal pattern, no loss of balance or shortness of breath noted. Steps performed with minimal difficulty. Pt states feels safe to enter his home, states has no further questions about home safety. Pt was left in a bedside chair as found with call light in reach    Time in 1300  Time out 1325   Total Treatment Time 25 minutes   CPT codes:     Therapeutic activities 03124 25 minutes   Therapeutic exercises 30806 0 minutes       Pt is making good progress toward established Physical Therapy goals. Pt displays functional mobility needed to go home with family assistance. Continue with physical therapy current plan of care.      Richard Bahena [facility]   License Number: [id number]

## 2022-11-11 NOTE — PROGRESS NOTES
Physical Therapy  Facility/Department: [facility] SURGERY  Physical Therapy Treatment Note    Name: Rubén Rodriguez  : 1950  MRN: [id number]  Date of Service: 2022               Patient Diagnosis(es): The primary encounter diagnosis was Status post total knee replacement, left. Diagnoses of Pre-op testing and Osteoarthritis of left knee, unspecified osteoarthritis type were also pertinent to this visit. Past Medical History:  has a past medical history of Acid reflux, Arthritis, BPH (benign prostatic hyperplasia), Cancer ([facility] [address] COVID-19, Heart murmur, Hypertension, Left knee pain, Mitral valve disorder, and Palpitations. Past Surgical History:  has a past surgical history that includes back surgery; hernia repair; skin biopsy (2018); Colonoscopy (); Upper gastrointestinal endoscopy (); Knee arthroscopy (Left, 2019); and Total knee arthroplasty (Left, 11/10/2022). Evaluating Therapist: Royce Mcburney, PT     Referring Provider:  DANYEL Hernandez CNP    PT order : PT eval and treat     Room #: 709   DIAGNOSIS:  OA s/p L TKA 11/10/2022   PRECAUTIONS: falls, FWBAT     Social:  Pt lives with  spouse  in a  split  floor plan 6-8 steps with B HRs between levels and 4  steps and  1  rails to enter. Prior to admission pt walked with  no AD. Has ww      Initial Evaluation  Date:  11/10/2022 Treatment  2022    Short Term/ Long Term   Goals   Was pt agreeable to Eval/treatment? Yes  yes    Does pt have pain?   Denies  L quad, anterior knee pain    Bed Mobility  Rolling:  NT   Supine to sit:  min assist   Sit to supine:  mod assist   Scooting:  SBA in sit  NT  S/SBA    Transfers Sit to stand:  min assist   Stand to sit: min assist   Stand pivot:  NT  Sit <> stand; SBA  S/SBA    Ambulation    15 and 50  feet with  ww  with  min assist  180 + 70 feet x 1 using WW for support SBA for balance  200 feet with   ww  with  SBA        Stair negotiation: ascended and descended NT  4 steps with B rails, 4 steps with rail/SBQC SBA, step to pattern used 4  steps with  1-2  rail with  SBA    LE ROM  AAROM R knee 0-85 degrees      LE strength  3-/ 5 R knee      AM- PAC RAW score   16/ 24 18/24        Pt is alert and able to follow instruction  Balance: fair dynamic using [employer] for support    Pt performed therapeutic exercise of the following as per written HEP: seated B ankle pumps, L LE quad sets x 20: L LE pillow case slides x 30 AROM    Patient education/treatment  Pt was educated on therapeutic exercise promoting circulation, ROM and strengthening, UE usage to assist with transfer safety, gait mechanics promoting posture and sequence, step negotiation promoting sequence and cane placement    Patient response to education:   Pt verbalized understanding Pt demonstrated skill Pt requires further education in this area   yes With instruction yes     ASSESSMENT:   Comments: Gait slow and consistent, Pt progressed from a step to pattern to an intermittent step through reciprocal pattern, no loss of balance or shortness of breath noted. Steps performed with minimal difficulty. Pt was left in a bedside chair as found with call light in reach    Time in 1012   Time out 1045   Total Treatment Time 33 minutes   CPT codes:     Therapeutic activities 31810 20 minutes   Therapeutic exercises 49863 13 minutes       Pt is making good progress toward established Physical Therapy goals. Continue with physical therapy current plan of care promoting discharge home after second session today.     Adrián Foster PTA   License Number: [id number]

## 2022-11-11 NOTE — CONSULTS
Internal Medicine Consultation    Chief Complaint: Elective L TKA  Primary Care Physician: Marina Ibanez MD  Reason for consultation: Post-op medical management    History of Present Illness  Sharyle Centers is a 67 y.o. male with Hx of left knee osteoarthritis who presented on 11/10/2022 for evaluation of left knee pain, was admitted by orthopedic surgery for left total knee arthroplasty. Past medical history also includes acid reflux, arthritis, BPH, cancer, COVID-19 in September 2022, HTN, mitral valve disorder, palpitations. Surgery was performed on 11/10. It well and there were no reported complications. The patient pain is controlled. The patient has worked with physical therapy. The plan is for the patient to go home with outpatient rehab. Sharyle Centers has no other complaints at this time including no abdominal pain, nausea, vomiting, chest pain, dyspnea. There are no family or friends at bedside. The history is provided by the patient. He is felt to be a good historian.     Past Medical History:   Diagnosis Date    Acid reflux     Arthritis     BPH (benign prostatic hyperplasia)     Cancer ([address] 11/2018    skin    COVID-19 09/2022    Heart murmur     Hypertension     Left knee pain     for OR 11/10/2022    Mitral valve disorder     Palpitations        Past Surgical History:   Procedure Laterality Date    BACK SURGERY      COLONOSCOPY  2018    HERNIA REPAIR      KNEE ARTHROSCOPY Left 5/2/2019    LEFT KNEE ARTHROSCOPY WITH PARTIAL MEDIAL MENISECTOMY performed by Felix Ayala MD at [address] [facility]  11/2018    TOTAL KNEE ARTHROPLASTY Left 11/10/2022    ROBOTIC JAGUAR ASSISTED LEFT KNEE TOTAL ARTHROPLASTY +PNB performed by Felix Ayala MD at [facility]  2018       Family History  Family History   Problem Relation Age of Onset    Stroke Mother     Heart Disease Father         Bypass Surgery, Pacemaker, Carotids    Coronary Art Dis Father     Other Son         procoagulant disorder       No pertinent family medical history. Social History  Patient lives at home with his wife and daughter. Employment: none  Illicit drug use- none  TOBACCO:   reports that he has never smoked. He has never used smokeless tobacco.  ETOH:   reports current alcohol use of about 12.0 standard drinks per week. Home Medications  Prior to Admission medications    Medication Sig Start Date End Date Taking? Authorizing Provider   aspirin EC 81 MG EC tablet Take 1 tablet by mouth 2 times daily for 28 days 11/11/22 12/9/22 Yes DANYEL Garcia CNP   oxyCODONE-acetaminophen (PERCOCET) 5-325 MG per tablet Take 1 tablet by mouth every 6 hours as needed for Pain for up to 7 days. Intended supply: 7 days.  Take lowest dose possible to manage pain 11/11/22 11/18/22 Yes DANYEL Garcia CNP   albuterol sulfate HFA (VENTOLIN HFA) 108 (90 Base) MCG/ACT inhaler Inhale 2 puffs into the lungs 4 times daily as needed for Wheezing 9/19/22   Peace Ochoa MD   ibuprofen (ADVIL;MOTRIN) 200 MG tablet Take 200 mg by mouth every 6 hours as needed for Pain    Historical Provider, MD   olmesartan (BENICAR) 40 MG tablet take 1 tablet by mouth once daily 6/22/22   Peace Ochoa MD   carvedilol (COREG) 12.5 MG tablet take 1 tablet by mouth every morning then take 1 tablet every evening 6/22/22   Peace Ochoa MD   omeprazole (PRILOSEC) 40 MG delayed release capsule Take 1 capsule by mouth daily 6/22/22   Peace Ocoha MD   cetirizine (ZYRTEC) 10 MG tablet Take 1 tablet by mouth daily  Patient taking differently: Take 10 mg by mouth daily as needed 12/3/20   Therese Clubs, DO   Omega-3 Fatty Acids (FISH OIL PO) Take by mouth daily    Historical Provider, MD   Multiple Vitamin (MULTI-VITAMIN DAILY PO) Take by mouth daily    Historical Provider, MD       Allergies  Allergies   Allergen Reactions    Augmentin [Amoxicillin-Pot Clavulanate] Diarrhea    Clindamycin/Lincomycin Hives and Rash       Review of Systems  Please see HPI above. All bolded are positive. All un-bolded are negative. Constitutional Symptoms: fever, chills, fatigue, generalized weakness, diaphoresis, increase in thirst, loss of appetite  Eyes: vision change   Ears, Nose, Mouth, Throat: hearing loss, nasal congestion, sores in the mouth  Cardiovascular: chest pain, chest heaviness, palpitations  Respiratory: shortness of breath, wheezing, coughing  Gastrointestinal: abdominal pain, nausea, vomiting, diarrhea, constipation, melena, hematochezia, hematemesis  Genitourinary: dysuria, hematuria, or increase in frequency  Musculoskeletal: lower extremity edema, myalgias, arthralgias, back pain  Integumentary: rashes, itching   Neurological: headache, lightheadedness, dizziness, confusion, syncope, numbness, tingling, weakness  Psychiatric: depression, suicidal ideation, or anxiety  Endocrine: unintentional weight change  Hematologic/Lymphatic: lymphadenopathy, easy bruising, easy bleeding   Allergic/Immunologic: recurrent infections      Objective  VITALS:  /71   Pulse 81   Temp 98.1 °F (36.7 °C) (Oral)   Resp 16   Ht 6' (1.829 m)   Wt 249 lb (112.9 kg)   SpO2 97%   BMI 33.77 kg/m²     Physical Exam:  General: awake, alert, oriented to person, place, time, and purpose, appears stated age, cooperative, no acute distress, pleasant, appropriate mood  Eyes: conjunctivae/corneas clear, sclera non icteric, EOMI  Ears: no obvious scars, no lesions, no masses, hearing intact  Mouth: mucous membranes moist, no obvious oral sores  Head: normocephalic, atraumatic  Neck: no JVD, no adenopathy, no thyromegaly, neck is supple, trachea is midline  Back: ROM normal, no CVA tenderness.   Chest: no pain on palpation  Lungs: clear to auscultation bilaterally, without rhonchi, crackle, wheezing, or rale, no retractions or use of accessory muscles  Heart: regular rate and regular rhythm, no murmur, normal S1, S2  Abdomen: soft, non-tender; bowel sounds normal; no masses, no organomegaly  Extremities: post surgical changes LLE, normal sensation throughout  Skin: normal color, normal texture, normal turgor, no rashes, no lesions  Neurologic:5/5 muscle strength throughout, normal muscle tone throughout, face symmetric, hearing intact, tongue midline, speech appropriate without slurring, sensation to fine touch intact in upper and lower extremities    Labs-   Lab Results   Component Value Date    WBC 10.0 11/11/2022    HGB 11.4 (L) 11/11/2022    HCT 34.2 (L) 11/11/2022     11/11/2022     11/11/2022    K 4.4 11/11/2022     11/11/2022    CREATININE 0.8 11/11/2022    BUN 13 11/11/2022    CO2 21 (L) 11/11/2022    GLUCOSE 148 (H) 11/11/2022    ALT 16 11/11/2022    AST 17 11/11/2022     No results found for: CKTOTAL, CKMB, CKMBINDEX, TROPONINI    Recent Radiological Studies:  XR KNEE LEFT (1-2 VIEWS)    Result Date: 11/10/2022  EXAMINATION: TWO XRAY VIEWS OF THE LEFT KNEE 11/10/2022 10:37 am COMPARISON: 25 March 2022 HISTORY: ORDERING SYSTEM PROVIDED HISTORY: post op TECHNOLOGIST PROVIDED HISTORY: Of operative side while in recovery room. Reason for exam:->post op FINDINGS: Anatomically aligned recent left TKA with no abnormal bone or soft tissue findings. Left TKA with no unexpected findings. CT KNEE LEFT WO CONTRAST    Result Date: 10/25/2022  EXAMINATION: CT OF THE LEFT KNEE WITHOUT CONTRAST 10/25/2022 9:19 am TECHNIQUE: CT of the left knee was performed without the administration of intravenous contrast.  Multiplanar reformatted images are provided for review. Automated exposure control, iterative reconstruction, and/or weight based adjustment of the mA/kV was utilized to reduce the radiation dose to as low as reasonably achievable.  COMPARISON: X-ray 03/25/2022 HISTORY ORDERING SYSTEM PROVIDED HISTORY: Primary osteoarthritis of left knee TECHNOLOGIST PROVIDED HISTORY: Reason for exam:->Pre-Surgical Planning FINDINGS: Bones: No acute osseous findings specifically no acute fracture of the visualized lower extremity hip and femur as well as knee and right ankle with degenerative changes detailed below Soft Tissue: No acute soft tissue findings specifically no focal fluid collection with mild prepatellar edema and subcutaneous edema without hematoma. Joint: Mild DJD of the hip. Moderate to severe tricompartmental DJD of the knee with subchondral cyst formation and osteophytosis joint space loss greatest in the medial compartment with there is near bone-on-bone configuration. Irregularities of the tibial spines with peaking and osseous structure adjacent in the lateral aspect. Chronic loose body versus osteophytosis. No acute fracture. Moderate to large suprapatellar effusion. No displaced or depressed tibial plateau findings     Surgical planning CT with moderate to severe tricompartmental DJD of the knee along with moderate to large suprapatellar joint effusion however no acute fracture       Assessment  Patient Active Problem List    Diagnosis Date Noted    Status post total knee replacement, left 11/10/2022    Sacroiliitis ([address] 05/17/2022    Lymphoproliferative disorder ([address] 05/17/2022    H/O colonoscopy with polypectomy 06/15/2021    Arthritis of knee 06/15/2021    Primary osteoarthritis of left knee 08/10/2018    Chest pain 09/21/2006    Disorder of prostate 09/21/2006    Essential hypertension 09/21/2006    Mitral valve disorder 09/21/2006    Heart murmur 11/23/2004       Plan  Sp left knee replacement on 11/10: Orthopedic surgery admitted patient, DVT prophylaxis per ortho, Pain control per ortho, Encouraged incentive spirometer, PT/OT  Follow labs  Continue home medications as ordered   Please see orders for further management and care.    for discharge planning  Discharge plan: OK for discharge from medicine standpoint       Gregor Bond DO, PGY-3  11/11/2022  2:43 [address] (always forwarded to covering physician): [phone]. I can be reached through [employer] as well. NOTE:  This report was transcribed using voice recognition software. Every effort was made to ensure accuracy; however, inadvertent computerized transcription errors may be present. Addendum: I have personally participated in an independent face-to-face history and physical exam on the date of service with the patient. I have independently formulated and executed the above assessment and plan. The vitals, labs, imaging, medications and treatment plan were reviewed independently by myself in addition to with the resident doctor. I agree with the above documentation and treatment plan     Electronically signed by Syd Tapia MD on 11/11/2022 at 3:06 PM    I can be reached through [address] [place] [address].

## 2022-11-11 NOTE — PROGRESS NOTES
Occupational Therapy  OT BEDSIDE TREATMENT NOTE      Date:2022  Patient Name: Ari Chan  MRN: [id number]  : 1950  Room: [address] [place] [address]     Evaluating OT: PERCY Denny/KARLA [id number]       Referring Provider: DANYEL Bourgeois CNP    Specific Provider Orders/Date:OT eval and treat 11/10/22       Diagnosis:  Osteoarthritis of left knee, unspecified osteoarthritis type [M17.12]  Status post total knee replacement, left [Z96.652]     Surgery: L TKA 11/10/22     Pertinent Medical History: arthritis, skin CA, HTN       Precautions:  Fall Risk, FWBAT LLE      Assessment of current deficits    [x] Functional mobility            [x]ADLs           [x] Strength                  []Cognition    [x] Functional transfers          [x] IADLs         [x] Safety Awareness   [x]Endurance    [] Fine Coordination                         [x] Balance      [] Vision/perception   [x]Sensation      []Gross Motor Coordination             [] ROM           [] Delirium                   [] Motor Control      OT PLAN OF CARE   OT POC based on physician orders, patient diagnosis and results of clinical assessment     Frequency/Duration 2-5 days/wk for 2 weeks PRN   Specific OT Treatment Interventions to include:   * Instruction/training on adapted ADL techniques and AE recommendations to increase functional independence within precautions       * Training on energy conservation strategies, correct breathing pattern and techniques to improve independence/tolerance for self-care routine  * Functional transfer/mobility training/DME recommendations for increased independence, safety, and fall prevention  * Patient/Family education to increase follow through with safety techniques and functional independence  * Recommendation of environmental modifications for increased safety with functional transfers/mobility and ADLs  * Therapeutic exercise to improve motor endurance, ROM, and functional strength for ADLs/functional transfers  * bathroom and short distance in room  Cues for safe wheeled walker management SBA with WW in room and trevizo  Independent -with device as needed to maximize independence with ADLs and functional task completion   Balance Sitting:     Static:  Sup    Dynamic:SBA  Standing: Min A with wheeled walker Standing balance SBA  I for static/dynamic sitting balance to maximize independence with ADLs and functional task completion     I for standing balance to maximize independence with ADLs and functional task completion   Activity Tolerance Fair with light activity. Pt reported some nausea at end of session. RN notified. Fair+  Good with ADL activity. Pt will demonstrate good understanding of education provided on EC/WS techniques      Comments:  patient cleared with nursing and agreeable to therapy. Patient with good carry over of instruction and improvement demonstrated with good participation. Patient in chair with call light in reach at the end of the session    Education/treatment: ADL and functional transfer/activity performed to increase safety and independence during self care tasks. Education provided on safety awareness, adl reeducation, functional transfer training    Pt has made progress towards set goals.      Time In: 9:10  Time Out: 9:58     Min Units   Therapeutic Ex 35023     Therapeutic Activities 91754 10 1   ADL/Self Care [id number] 38 2   Orthotic Management 25470     Neuro Re-Ed 41233     Non-Billable Time     TOTAL TIMED TREATMENT 48 [address] [facility] MORGAN/L [id number]

## 2022-11-14 NOTE — DISCHARGE SUMMARY
Physician Discharge Summary     Patient ID:  Varinder Bender  [id number]  25 y.o.  1950    Admit date: 11/10/2022    Discharge date and time: 11/11/2022  2:48 PM     Admitting Physician: Sofia Lincoln MD     Discharge Physician: Tl Salcido MD    Admission Diagnoses: Osteoarthritis of left knee, unspecified osteoarthritis type [M17.12]  Status post total knee replacement, left [Z96.652]    Discharge Diagnoses: Osteoarthritis of left knee, unspecified osteoarthritis type [M17.12]  Status post total knee replacement, left [Z96.652]    Admission Condition: good    Discharged Condition: good    Surgery: left bridgett robotic assisted total knee arthroplasty     Hospital Course: The patient was admitted for elective joint replacement. The patient underwent routine left bridgett robotic assisted total knee arthoplasty with anesthesia and was transferred to the orthopaedic floor from PACU following surgery. The post operative hospital course was unremarkable. The patient worked with PT/OT daily to improve mobility. Pain was controlled and DVT prophylaxis was with SCD's and ASA 81 BID. The patient was discharged on POD 1 to home. Consults: PCP, PT/OT, Case management     Significant Diagnostic Studies:   Post operative xray showed components in good position     Treatments:   IV hydration  antibiotics: Ancef perioperatively for 24 hours  analgesia: oral and IV narcotics; toradol  anticoagulation: ASA 81 BID  therapies: PT, OT and    surgery: as above     Discharge Exam:  Operative limb incision was clean, dry, and intact. Leg swelling was minimal.  Motor and sensory were intact throughout the operative leg.       Disposition: home    Patient Instructions:   Discharge Medication List as of 11/11/2022  1:43 PM        START taking these medications    Details   aspirin EC 81 MG EC tablet Take 1 tablet by mouth 2 times daily for 28 days, Disp-56 tablet, R-0Normal      oxyCODONE-acetaminophen (PERCOCET) 5-325 MG per tablet Take 1 tablet by mouth every 6 hours as needed for Pain for up to 7 days. Intended supply: 7 days. Take lowest dose possible to manage pain, Disp-28 tablet, R-0Normal           CONTINUE these medications which have NOT CHANGED    Details   albuterol sulfate HFA (VENTOLIN HFA) 108 (90 Base) MCG/ACT inhaler Inhale 2 puffs into the lungs 4 times daily as needed for Wheezing, Disp-18 g, R-5Normal      ibuprofen (ADVIL;MOTRIN) 200 MG tablet Take 200 mg by mouth every 6 hours as needed for PainHistorical Med      olmesartan (BENICAR) 40 MG tablet take 1 tablet by mouth once daily, Disp-90 tablet, R-1**Patient requests 90 days supply**Normal      carvedilol (COREG) 12.5 MG tablet take 1 tablet by mouth every morning then take 1 tablet every evening, Disp-180 tablet, R-1Normal      omeprazole (PRILOSEC) 40 MG delayed release capsule Take 1 capsule by mouth daily, Disp-90 capsule, R-1Normal      cetirizine (ZYRTEC) 10 MG tablet Take 1 tablet by mouth daily, Disp-30 tablet,R-1Normal      Omega-3 Fatty Acids (FISH OIL PO) Take by mouth dailyHistorical Med      Multiple Vitamin (MULTI-VITAMIN DAILY PO) Take by mouth dailyHistorical Med           STOP taking these medications       aspirin 81 MG tablet Comments:   Reason for Stopping:             Activity: no driving while on analgesics; weight bearing as tolerated. Diet: regular diet  Wound Care: as directed    Follow-up with  in 1 week.   Call [phone] for appointment     Signed:  SILVESTRE Alvarado   Orthopaedic Surgery   11/14/22  4:10 PM

## 2022-11-14 NOTE — PROGRESS NOTES
CLINICAL PHARMACY NOTE: MEDS TO BEDS    Total # of Prescriptions Filled: 2   The following medications were delivered to the patient:  Asp 81  Percocet 5/325    Additional Documentation:

## 2022-11-18 ENCOUNTER — OFFICE VISIT (OUTPATIENT)
Dept: ORTHOPEDIC SURGERY | Age: 72
End: 2022-11-18

## 2022-11-18 VITALS — BODY MASS INDEX: 33.72 KG/M2 | HEIGHT: 72 IN | WEIGHT: 249 LBS

## 2022-11-18 DIAGNOSIS — M17.12 PRIMARY OSTEOARTHRITIS OF LEFT KNEE: ICD-10-CM

## 2022-11-18 DIAGNOSIS — Z96.652 S/P TOTAL KNEE ARTHROPLASTY, LEFT: Primary | ICD-10-CM

## 2022-11-18 PROCEDURE — 99024 POSTOP FOLLOW-UP VISIT: CPT | Performed by: ORTHOPAEDIC SURGERY

## 2022-11-18 RX ORDER — OXYCODONE HYDROCHLORIDE AND ACETAMINOPHEN 5; 325 MG/1; MG/1
1 TABLET ORAL EVERY 6 HOURS PRN
Qty: 28 TABLET | Refills: 0 | Status: SHIPPED | OUTPATIENT
Start: 2022-11-18 | End: 2022-11-25

## 2022-11-18 NOTE — PROGRESS NOTES
Follow Up Post Operative Visit     Surgery: Seth Robot Assisted Left total knee arthroplasty   Date: 11/10/2022    Subjective:    Doreen Be is here for follow up visit s/p above procedure. He is doing well. He has been making good progress with home PT    Controlled Substances Monitoring:        Physical Exam:    Height: 6' (1.829 m), Weight: 249 lb (112.9 kg) (per pt)    General: Alert and oriented x3, no acute distress  Cardiovascular/pulmonary: No labored breathing, peripheral perfusion intact  Musculoskeletal:    Exam the knee today shows intact incision. Range of motion is 0 to about 85 degrees. There is trace effusion. No erythema. The knee is stable. He is ambulating with a walker      Imaging: X-rays 4 views of the left knee show well aligned total knee arthroplasty    Assessment and Plan: 1 week out from left total knee doing well  He will continue with home PT and transition to outpatient at [facility]. We will see him back in 6 weeks.     Breanne Chowdhury MD  Orthopaedic Surgery   11/18/22  9:48 AM

## 2022-11-18 NOTE — PROGRESS NOTES
Surgical dressings were removed s/p Seth Robot Assisted Left total knee arthroplasty 11/10/2022 . Incision was cleaned with alcohol prep pads. Minimal swelling and bleeding in area. Steri stripes and Tegaderm placed over the area to cover surgical sites x 1 week.     ERIK

## 2022-11-22 ENCOUNTER — EVALUATION (OUTPATIENT)
Dept: PHYSICAL THERAPY | Age: 72
End: 2022-11-22
Payer: MEDICARE

## 2022-11-22 DIAGNOSIS — Z96.652 S/P TOTAL KNEE ARTHROPLASTY, LEFT: Primary | ICD-10-CM

## 2022-11-22 DIAGNOSIS — M17.12 PRIMARY OSTEOARTHRITIS OF LEFT KNEE: ICD-10-CM

## 2022-11-22 PROCEDURE — 97161 PT EVAL LOW COMPLEX 20 MIN: CPT | Performed by: PHYSICAL THERAPIST

## 2022-11-22 PROCEDURE — 97110 THERAPEUTIC EXERCISES: CPT | Performed by: PHYSICAL THERAPIST

## 2022-11-22 NOTE — PROGRESS NOTES
[address] [facility]   Phone: [phone]   Fax: [phone]           Date:  2022   Patient: Varinder Bender  : 1950  MRN: [id number]  Referring Provider: Sofia Lincoln MD  [address]     Medical Diagnosis:     D09.549 (ICD-10-CM) - S/P total knee arthroplasty, left   M17.12 (ICD-10-CM) - Primary osteoarthritis of left knee       SUBJECTIVE:     Surgical procedure: L TKA     Date of surgery: Nov 10    Services provided following surgery: home PT 5 x     History: Pt reports had arthroscopic surgery 3 years ago,  then was having shots for pain. Went to see doc in August and was ready for surgery. Reports sleeping in chair at night due to pain sleeping in bed. Advised pt about keeping knee straight while sleeping. Reports icing a couple times a day . Chief complaint:  swelling, difficulty sleeping     Behavior: condition is getting better    Pain:   Current: 5/10   can go up to an 8 /10 per pt     Symptom Type/Quality: aching  Location[de-identified] Knee:   anterior , medial, - big improvement from a week ago per pt. Imaging results: XR KNEE LEFT (1-2 VIEWS)    Result Date: 11/10/2022  EXAMINATION: TWO XRAY VIEWS OF THE LEFT KNEE 11/10/2022 10:37 am COMPARISON: 2022 HISTORY: ORDERING SYSTEM PROVIDED HISTORY: post op TECHNOLOGIST PROVIDED HISTORY: Of operative side while in recovery room. Reason for exam:->post op FINDINGS: Anatomically aligned recent left TKA with no abnormal bone or soft tissue findings. Left TKA with no unexpected findings. XR KNEE LEFT (MIN 4 VIEWS)    Result Date: 2022  EXAMINATION: FOUR XRAY VIEWS OF THE LEFT KNEE 2022 10:02 am COMPARISON: 11/10/2022 HISTORY: ORDERING SYSTEM PROVIDED HISTORY: S/P total knee arthroplasty, left FINDINGS: Four views of the left knee reveal hardware from total left knee arthroplasty. Satisfactory alignment of the prosthesis. No hardware complication. Moderate-sized joint effusion. Status post total left knee arthroplasty with moderate-sized joint effusion. Satisfactory alignment of the prosthesis. No hardware complication. CT KNEE LEFT WO CONTRAST    Result Date: 10/25/2022  EXAMINATION: CT OF THE LEFT KNEE WITHOUT CONTRAST 10/25/2022 9:19 am TECHNIQUE: CT of the left knee was performed without the administration of intravenous contrast.  Multiplanar reformatted images are provided for review. Automated exposure control, iterative reconstruction, and/or weight based adjustment of the mA/kV was utilized to reduce the radiation dose to as low as reasonably achievable. COMPARISON: X-ray 03/25/2022 HISTORY ORDERING SYSTEM PROVIDED HISTORY: Primary osteoarthritis of left knee TECHNOLOGIST PROVIDED HISTORY: Reason for exam:->Pre-Surgical Planning FINDINGS: Bones: No acute osseous findings specifically no acute fracture of the visualized lower extremity hip and femur as well as knee and right ankle with degenerative changes detailed below Soft Tissue: No acute soft tissue findings specifically no focal fluid collection with mild prepatellar edema and subcutaneous edema without hematoma. Joint: Mild DJD of the hip. Moderate to severe tricompartmental DJD of the knee with subchondral cyst formation and osteophytosis joint space loss greatest in the medial compartment with there is near bone-on-bone configuration. Irregularities of the tibial spines with peaking and osseous structure adjacent in the lateral aspect. Chronic loose body versus osteophytosis. No acute fracture. Moderate to large suprapatellar effusion.  No displaced or depressed tibial plateau findings     Surgical planning CT with moderate to severe tricompartmental DJD of the knee along with moderate to large suprapatellar joint effusion however no acute fracture       Past Medical History:  Past Medical History:   Diagnosis Date    Acid reflux     Arthritis     BPH (benign prostatic hyperplasia)     Cancer ([facility] [address] 11/2018    skin    COVID-19 09/2022    Heart murmur     Hypertension     Left knee pain     for OR 11/10/2022    Mitral valve disorder     Palpitations      Past Surgical History:   Procedure Laterality Date    BACK SURGERY      COLONOSCOPY  2018    HERNIA REPAIR      KNEE ARTHROSCOPY Left 5/2/2019    LEFT KNEE ARTHROSCOPY WITH PARTIAL MEDIAL MENISECTOMY performed by Julieta Murray MD at [address] [facility]  11/2018    TOTAL KNEE ARTHROPLASTY Left 11/10/2022    ROBOTIC JAGUAR ASSISTED LEFT KNEE TOTAL ARTHROPLASTY +PNB performed by Julieta Murray MD at [facility] [address]  2018       Medications:   Current Outpatient Medications   Medication Sig Dispense Refill    oxyCODONE-acetaminophen (PERCOCET) 5-325 MG per tablet Take 1 tablet by mouth every 6 hours as needed for Pain for up to 7 days. Intended supply: 7 days.  Take lowest dose possible to manage pain 28 tablet 0    tamsulosin (FLOMAX) 0.4 MG capsule Take 1 capsule by mouth daily Should be taken with food either at dinner or supper 30 capsule 5    aspirin EC 81 MG EC tablet Take 1 tablet by mouth 2 times daily for 28 days 56 tablet 0    albuterol sulfate HFA (VENTOLIN HFA) 108 (90 Base) MCG/ACT inhaler Inhale 2 puffs into the lungs 4 times daily as needed for Wheezing 18 g 5    ibuprofen (ADVIL;MOTRIN) 200 MG tablet Take 200 mg by mouth every 6 hours as needed for Pain      olmesartan (BENICAR) 40 MG tablet take 1 tablet by mouth once daily 90 tablet 1    carvedilol (COREG) 12.5 MG tablet take 1 tablet by mouth every morning then take 1 tablet every evening 180 tablet 1    omeprazole (PRILOSEC) 40 MG delayed release capsule Take 1 capsule by mouth daily 90 capsule 1    cetirizine (ZYRTEC) 10 MG tablet Take 1 tablet by mouth daily (Patient taking differently: Take 10 mg by mouth daily as needed) 30 tablet 1    Omega-3 Fatty Acids (FISH OIL PO) Take by mouth daily      Multiple Vitamin (MULTI-VITAMIN DAILY PO) Take by mouth daily       Current Facility-Administered Medications   Medication Dose Route Frequency Provider Last Rate Last Admin    lidocaine 1 % injection 20 mL  20 mL Other Once Ashley Earl MD           Occupation: retired. Exercise regimen: N/A     Hobbies: working around the house, take care of chickens, horses. Patient Goals: pain relief, get back to normal    Precautions/Contraindications: recent surgery    OBJECTIVE:     Observations: Well nourished male with normal affect. Rises with 2 UE  from chair. Ambulates with SBQC  and w/w. Using Zoyi Lincoln today. Recommend to pt to use w/w at this time due to antalgic gait. Inspection: Incision edges approximated, no purulent drainage, no redness, no swelling, no tenderness, and not hot to touch. Edema: knee circumference measured over pants   R: 46.5 cm   L:  51 cm     Gait: antalgic gait, ambulates with quad cane    Joint/Motion:    Knee:  Right:   AROM: 125° Flexion,  0° Extension    Left:   AROM: 85° Flexion,  lacks 7 ° Extension      Strength:    Knee:   Right: Flexion 5/5,  Extension 5/5  Left: Flexion 4/5,  Extension 3+/5    Palpation: Tender to palpation around top and bottom of incision and lateral knee     ASSESSMENT     Outcome Measure:   Lower Extremity Functional Scale (LEFS) 23/80  impairment    Problems:   Pain reported 5-8 /10  ROM decreased  Strength decreased  Decreased functional ability with walking, stairs, standing    [x] There are no barriers affecting plan of care or recovery    [] Barriers to this patient's plan of care or recovery include. Domestic Concerns:  [x] No  [] Yes:    Short Term goals (3 weeks)  Decrease reported pain to 0-5 /10  Increase ROM to AROM: 105° Flexion,  lacks 3 ° Extension  Increase Strength to Left: Flexion 4+/5,  Extension 4/5  Able to perform/complete the following functions/tasks: pt able to perform 10 sit to stands with 1 UE support with min pain/limitation.   Pt able to go up/down 5 steps with min pain/limitation. Pt able to walk 20 minutes with min pain/limitation. Lower Extremity Functional Scale (LEFS) 45/80  impairment    Long Term goals (6 weeks)  Decrease reported pain to 0-2 /10  Increase ROM to AROM: 120° Flexion,  0 ° Extension  Increase Strength to Left: Flexion 5/5,  Extension 4+/5  Able to perform/complete the following functions/tasks: pt able to perform 10 sit to stands without UE support with no pain/limitation. Pt able to go up/down flight of steps with no pain/limitation. Pt able to walk 30 minutes with no pain/limitation. Independent with Home Exercise Programs  Lower Extremity Functional Scale (LEFS) 55/80  impairment    Rehab Potential: [x] Good  [] Fair  [] Poor    PLAN       Treatment Plan:  [x] Therapeutic Exercise  [x] Therapeutic Activity  [x] Neuromuscular Re-education   [x] Gait Training  [x] Balance Training  [x] Aerobic conditioning  [x] Manual Therapy  [x] Massage/Fascial release   [] Work/Sport specific activities    [] Pain Neuroscience [x] Cold/hotpack  [] Vasocompression  [x] Electrical Stimulation  [] Lumbar/Cervical Traction  [] Ultrasound   [] Iontophoresis: 4 mg/mL Dexamethasone Sodium Phosphate 40-80 mAmin        [x] Instruction in HEP      []  Medication allergies reviewed for use of Dexamethasone Sodium Phosphate 4mg/ml  with iontophoresis treatments. Patient is not allergic. The following CPT codes are likely to be used in the care of this patient: 88078 PT Evaluation: Low Complexity   46886 PT Re-Evaluation   24194 Therapeutic Exercise   26418 Neuromuscular Re-Education   51166 Therapeutic Activities   13000 Manual Therapy   19223 Gait Training    Electrical Stimulation      Suggested Professional Referral: [x] No  [] Yes:     Patient Education:  [x] Plans/Goals, Risks/Benefits discussed  [x] Home exercise program  Method of Education: [x] Verbal  [x] Demo  [x] Written  Comprehension of Education:  [x] Verbalizes understanding.   [x] Demonstrates understanding. [] Needs Review. [] Demonstrates/verbalizes understanding of HEP/Ed previously given. Frequency:  2 days per week for 6 weeks    Patient understands diagnosis/prognosis and consents to treatment, plan and goals: [x] Yes    [] No     Thank you for the opportunity to work with your patient. If you have questions or comments, please contact me at numbers listed above. Electronically signed by: Devika Medeiros, PT DPT, PT [id number]         Please sign Physician's Certification and return to: [address]  [address] [facility] [place] [id number]  Dept: [phone]  Dept Fax: [phone]: [phone] Certification / Comments     Frequency/Duration 2 days per week for 6 weeks. Certification period from 11/22/2022  to 1/6/2023. I have reviewed the Plan of Care established for skilled therapy services and certify that the services are required and that they will be provided while the patient is under my care.     Physician's Comments/Revisions:               Physician's Printed Name:                                           [de-identified] Signature:                                                               Date:

## 2022-11-29 ENCOUNTER — TREATMENT (OUTPATIENT)
Dept: PHYSICAL THERAPY | Age: 72
End: 2022-11-29

## 2022-11-29 DIAGNOSIS — M17.12 PRIMARY OSTEOARTHRITIS OF LEFT KNEE: ICD-10-CM

## 2022-11-29 DIAGNOSIS — Z96.652 S/P TOTAL KNEE ARTHROPLASTY, LEFT: Primary | ICD-10-CM

## 2022-11-29 NOTE — PROGRESS NOTES
[address] [facility]   Phone: [phone]   Fax: [phone]      Physical Therapy Daily Treatment Note    Date: 2022  Patient Name: Tiara Parr  : 1950   MRN: [id number]  DOInjury: years   DOSx: 11/10/2022 - Seth Robot Assisted Left total knee arthroplasty   Referring Provider: Felix Ayala MD  [address]     Medical Diagnosis:     Y32.178 (ICD-10-CM) - S/P total knee arthroplasty, left   M17.12 (ICD-10-CM) - Primary osteoarthritis of left knee       Outcome Measure:  LEFS      S: Pt reports pain this morning is 2-310  O:  Time  4981-2398      Visit    Repeat outcome measure at mid point and end. Pain    2-310     ROM  Knee:  Right:   AROM: 125° Flexion,  0° Extension     Left:   AROM: 85° Flexion,  lacks 7 ° Extension        Modalities       Ice, compression, elevation      Stretching       Patella mobs      Prone hangs      Heel props      Knee flex stretch-seated      Prone self flexion stretch      Exercise       Bike 5 min     Quad sets 5 sec hold x 20 reps HEP te   Heel slides X 10 in between exercises  HEP te   SLR Attempted but pt unable. SAQ  2 X 10  HEP te   Sidekicks      Squat       Step-ups - FWD       Step-ups - LAT      Step-ups - BWD       Step up and over reciprocally       TG squats      TG Calf raises       LAQ             NMR For safe ambulation in community and home    Marching gait      Side stepping      Retrowalk      Heel to toe      A: Tolerated fairly well. Reports increase pain following treatment.      P: Continue with rehab plan  Rodrigo Sanchez, PTA 53123    Treatment Charges: Mins Units        Re-Evaluation     Ther Exercise         TE 40 3   Manual Therapy     MT     Ther Activities        TA     Gait Training          GT     Neuro Re-education NR     Modalities     Non-Billable Service Time     Other     Total Time/Units 40 3

## 2022-12-01 ENCOUNTER — TREATMENT (OUTPATIENT)
Dept: PHYSICAL THERAPY | Age: 72
End: 2022-12-01

## 2022-12-01 DIAGNOSIS — Z96.652 S/P TOTAL KNEE ARTHROPLASTY, LEFT: Primary | ICD-10-CM

## 2022-12-01 DIAGNOSIS — M25.562 LEFT KNEE PAIN, UNSPECIFIED CHRONICITY: ICD-10-CM

## 2022-12-01 DIAGNOSIS — M17.12 PRIMARY OSTEOARTHRITIS OF LEFT KNEE: ICD-10-CM

## 2022-12-01 RX ORDER — OXYCODONE HYDROCHLORIDE AND ACETAMINOPHEN 5; 325 MG/1; MG/1
1 TABLET ORAL EVERY 6 HOURS PRN
Qty: 28 TABLET | Refills: 0 | Status: SHIPPED | OUTPATIENT
Start: 2022-12-01 | End: 2022-12-08

## 2022-12-01 NOTE — PROGRESS NOTES
[address] [facility]   Phone: [phone]   Fax: [phone]      Physical Therapy Daily Treatment Note    Date: 2022  Patient Name: Dorita Dancer  : 1950   MRN: [id number]  DOInjury: years   DOSx: 11/10/2022 - Seth Robot Assisted Left total knee arthroplasty   Referring Provider: No referring provider defined for this encounter. Medical Diagnosis:     Z96.652 (ICD-10-CM) - S/P total knee arthroplasty, left   M17.12 (ICD-10-CM) - Primary osteoarthritis of left knee       Outcome Measure:  LEFS      S: Pt reports pain this morning is 4/10. O:  Time  0840 - 5438      Visit  3/12  Repeat outcome measure at mid point and end. Pain  See above. ROM  Knee:  Right:   AROM: 125° Flexion,  0° Extension     Left:   AROM: 90° Flexion,  lacks 7 ° Extension        Modalities       Ice,  elevation 5 minutes      Stretching       Patella mobs      Prone hangs      Heel props 2 minutes      Knee flex stretch-seated      Prone self flexion stretch      Exercise       Bike 8 min Partial revolutations     Quad sets 5 sec hold x 20 reps HEP te   Heel slides X 10 in between exercises  HEP te   SLR  2 x 5       SAQ  2 X 10  HEP te   Sidekicks      Squat       Step-ups - FWD       Step-ups - LAT      Step-ups - BWD       Step up and over reciprocally       TG squats      TG Calf raises       LAQ             NMR For safe ambulation in community and home    Marching gait      Side stepping      Retrowalk      Heel to toe      A: Tolerated fairly well. Ice last 5 minutes of session.       P: Continue with rehab plan    Reyes Avilez, [address] [facility] [id number]    Treatment Charges: Mins Units        Re-Evaluation     Ther Exercise         TE 31 2   Manual Therapy     MT     Ther Activities        TA     Gait Training          GT     Neuro Re-education NR     Modalities     Non-Billable Service Time 5    Other     Total Time/Units 36 2

## 2022-12-05 ENCOUNTER — TREATMENT (OUTPATIENT)
Dept: PHYSICAL THERAPY | Age: 72
End: 2022-12-05
Payer: MEDICARE

## 2022-12-05 DIAGNOSIS — Z96.652 S/P TOTAL KNEE ARTHROPLASTY, LEFT: Primary | ICD-10-CM

## 2022-12-05 DIAGNOSIS — M17.12 PRIMARY OSTEOARTHRITIS OF LEFT KNEE: ICD-10-CM

## 2022-12-05 PROCEDURE — 97530 THERAPEUTIC ACTIVITIES: CPT

## 2022-12-05 PROCEDURE — 97110 THERAPEUTIC EXERCISES: CPT

## 2022-12-05 NOTE — PROGRESS NOTES
[address] [facility]   Phone: [phone]   Fax: [phone]      Physical Therapy Daily Treatment Note    Date: 2022  Patient Name: Tiara Parr  : 1950   MRN: [id number]  DOInjury: years   DOSx: 11/10/2022 - Seth Robot Assisted Left total knee arthroplasty   Referring Provider: Felix Ayala MD  [address]     Medical Diagnosis:     P93.669 (ICD-10-CM) - S/P total knee arthroplasty, left   M17.12 (ICD-10-CM) - Primary osteoarthritis of left knee       Outcome Measure:  LEFS      S: Pt reports pain today is 4/10 with taking pain medication. O:  Time  1335- 1415      Visit    Repeat outcome measure at mid point and end. Pain  See above. ROM  Knee:  Right:   AROM: 125° Flexion,  0° Extension     Left:   AROM: 90° Flexion,  lacks 7 ° Extension        Modalities       Ice,  elevation     Stretching       Patella mobs      Prone hangs      Heel props 2 minutes  With 2# wt    Knee flex stretch-seated      Prone self flexion stretch      Exercise       Bike 8 min Partial revolutations     Quad sets 5 sec hold x 20 reps HEP te   Heel slides X 10 in between exercises  HEP te   SLR  2 x 5       SAQ  2 X 10  HEP te   Sidekicks      Squat       Step-ups - FWD  X 10 each  4 inch     Step-ups - LAT      Step-ups - BWD       Step up and over reciprocally       Standing hip abd and ext  2 x 5     TG squats      TG Calf raises X 10     Sit to stand  2 x 5  1 blue foam      LAQ             NMR For safe ambulation in community and home    Marching gait      Side stepping      Retrowalk      Heel to toe      A: Tolerated fairly well. Pt with most difficulty performing SLR supine secondary to knee pain.        P: Continue with rehab plan    Rodrigo Sanchez, PTA 68[address]    Treatment Charges: Mins Units        Re-Evaluation     Ther Exercise         TE 30 2   Manual Therapy     MT     Ther Activities        TA 10 1   Gait Training GT     Neuro Re-education NR     Modalities     Non-Billable Service Time     Other     Total Time/Units 40 3

## 2022-12-07 ASSESSMENT — KOOS JR
GOING UP OR DOWN STAIRS: 1
TWISING OR PIVOTING ON KNEE: 0
STRAIGHTENING KNEE FULLY: 2
STANDING UPRIGHT: 1
HOW SEVERE IS YOUR KNEE STIFFNESS AFTER FIRST WAKING IN MORNING: 2
BENDING TO THE FLOOR TO PICK UP OBJECT: 1
RISING FROM SITTING: 2
KOOS JR TOTAL INTERVAL SCORE: 63.776

## 2022-12-07 ASSESSMENT — PROMIS GLOBAL HEALTH SCALE
HOW IS THE PROMIS V1.1 BEING ADMINISTERED?: 2
IN GENERAL, WOULD YOU SAY YOUR HEALTH IS...[ON A SCALE OF 1 (POOR) TO 5 (EXCELLENT)]: 5
TO WHAT EXTENT ARE YOU ABLE TO CARRY OUT YOUR EVERYDAY PHYSICAL ACTIVITIES SUCH AS WALKING, CLIMBING STAIRS, CARRYING GROCERIES, OR MOVING A CHAIR [ON A SCALE OF 1 (NOT AT ALL) TO 5 (COMPLETELY)]?: 4
IN THE PAST 7 DAYS, HOW WOULD YOU RATE YOUR PAIN ON AVERAGE [ON A SCALE FROM 0 (NO PAIN) TO 10 (WORST IMAGINABLE PAIN)]?: 3
IN GENERAL, HOW WOULD YOU RATE YOUR SATISFACTION WITH YOUR SOCIAL ACTIVITIES AND RELATIONSHIPS [ON A SCALE OF 1 (POOR) TO 5 (EXCELLENT)]?: 4
IN GENERAL, WOULD YOU SAY YOUR QUALITY OF LIFE IS...[ON A SCALE OF 1 (POOR) TO 5 (EXCELLENT)]: 5
IN GENERAL, HOW WOULD YOU RATE YOUR PHYSICAL HEALTH [ON A SCALE OF 1 (POOR) TO 5 (EXCELLENT)]?: 4
SUM OF RESPONSES TO QUESTIONS 2, 4, 5, & 10: 16
IN THE PAST 7 DAYS, HOW WOULD YOU RATE YOUR FATIGUE ON AVERAGE [ON A SCALE FROM 1 (NONE) TO 5 (VERY SEVERE)]?: 4
IN GENERAL, HOW WOULD YOU RATE YOUR MENTAL HEALTH, INCLUDING YOUR MOOD AND YOUR ABILITY TO THINK [ON A SCALE OF 1 (POOR) TO 5 (EXCELLENT)]?: 4
IN GENERAL, PLEASE RATE HOW WELL YOU CARRY OUT YOUR USUAL SOCIAL ACTIVITIES (INCLUDES ACTIVITIES AT HOME, AT WORK, AND IN YOUR COMMUNITY, AND RESPONSIBILITIES AS A PARENT, CHILD, SPOUSE, EMPLOYEE, FRIEND, ETC) [ON A SCALE OF 1 (POOR) TO 5 (EXCELLENT)]?: 3
SUM OF RESPONSES TO QUESTIONS 3, 6, 7, & 8: 15
IN THE PAST 7 DAYS, HOW OFTEN HAVE YOU BEEN BOTHERED BY EMOTIONAL PROBLEMS, SUCH AS FEELING ANXIOUS, DEPRESSED, OR IRRITABLE [ON A SCALE FROM 1 (NEVER) TO 5 (ALWAYS)]?: 3

## 2022-12-08 ENCOUNTER — TREATMENT (OUTPATIENT)
Dept: PHYSICAL THERAPY | Age: 72
End: 2022-12-08

## 2022-12-08 DIAGNOSIS — Z96.652 S/P TOTAL KNEE ARTHROPLASTY, LEFT: Primary | ICD-10-CM

## 2022-12-08 DIAGNOSIS — M17.12 PRIMARY OSTEOARTHRITIS OF LEFT KNEE: ICD-10-CM

## 2022-12-08 NOTE — PROGRESS NOTES
[address] [facility]   Phone: [phone]   Fax: [phone]      Physical Therapy Daily Treatment Note    Date: 2022  Patient Name: Dorita Dancer  : 1950   MRN: [id number]  DOInjury: years   DOSx: 11/10/2022 - Seth Robot Assisted Left total knee arthroplasty   Referring Provider: No referring provider defined for this encounter. Medical Diagnosis:     Z96.652 (ICD-10-CM) - S/P total knee arthroplasty, left   M17.12 (ICD-10-CM) - Primary osteoarthritis of left knee       Outcome Measure:  LEFS      S: Pt reports pain today is 4/10. O:  Time  08 - 09       Visit    Repeat outcome measure at mid point and end. Pain  See above. ROM  Knee:  Right:   AROM: 125° Flexion,  0° Extension     Left:   AROM: 103° Flexion,  lacks 6 ° Extension        Modalities       Ice,  elevation     Stretching       Patella mobs      Prone hangs      Heel props 2 minutes  With 2# wt    Knee flex stretch-seated      Prone self flexion stretch      Exercise       Bike 10 min Partial revolutations     Quad sets 5 sec hold x 20 reps HEP te   Heel slides X 20 in between exercises  HEP te   SLR  2 x 5       SAQ  2 X 10  HEP te   Sidekicks      Squat       Step-ups - FWD  4 inch     Step-ups - LAT      Step-ups - BWD       Step up and over reciprocally       Standing hip abd and ext  2 x 10      TG squats      TG Calf raises 2 X 10     Sit to stand  1 blue foam      LAQ             NMR For safe ambulation in community and home    Marching gait      Side stepping      Retrowalk      Heel to toe      A: Tolerated fairly well.  Pt demonstrates difficulty with quad sets initiating quad muscle at first.        P: Continue with rehab plan    Reyes Avilez, [place] [id number]    Treatment Charges: Mins Units        Re-Evaluation     Ther Exercise         TE 40   3   Manual Therapy     MT     Ther Activities        TA     Gait Training          GT     Neuro Re-education NR     Modalities Non-Billable Service Time     Other     Total Time/Units 40 3

## 2022-12-13 ENCOUNTER — TREATMENT (OUTPATIENT)
Dept: PHYSICAL THERAPY | Age: 72
End: 2022-12-13

## 2022-12-13 DIAGNOSIS — Z96.652 S/P TOTAL KNEE ARTHROPLASTY, LEFT: Primary | ICD-10-CM

## 2022-12-13 DIAGNOSIS — M17.12 PRIMARY OSTEOARTHRITIS OF LEFT KNEE: ICD-10-CM

## 2022-12-13 NOTE — PROGRESS NOTES
[address] [facility]   Phone: [phone]   Fax: [phone]      Physical Therapy Daily Treatment Note    Date: 2022  Patient Name: Raymond Tiwari  : 1950   MRN: [id number]  DOInjury: years   DOSx: 11/10/2022 - Seth Robot Assisted Left total knee arthroplasty   Referring Provider: No referring provider defined for this encounter. Medical Diagnosis:     Z96.652 (ICD-10-CM) - S/P total knee arthroplasty, left   M17.12 (ICD-10-CM) - Primary osteoarthritis of left knee       Outcome Measure:  LEFS      S: Pt reports pain today is 3-4/10 \"one spot\" pt indicating inferior and lateral to L patella. O:  Time  1158- 1238      Visit    Repeat outcome measure at mid point and end. Pain  See above. ROM  Knee:  Right:   AROM: 125° Flexion,  0° Extension     Left:   AROM: 103° Flexion,  lacks 4 ° Extension        Modalities       Ice,  elevation     Stretching       Patella mobs 2 min     Prone hangs      Heel props With 2# wt    Knee flex stretch-seated      Prone self flexion stretch      Exercise       Bike 10 min Partial revolutations     Quad sets 5 sec hold x 20 reps HEP te   Heel slides X 20 in between exercises  HEP te   SLR  2 x 5       SAQ  2 X 10  HEP te   Sidekicks      Squat       Step-ups - FWD  X 10 each  4 inch     Step-ups - LAT      Step-ups - BWD       Step up and over reciprocally       Standing hip abd and ext  2 x 10      TG squats      TG Calf raises 2 X 10     Sit to stand  2 x 10 1 blue foam      LAQ             NMR For safe ambulation in community and home    Marching gait      Side stepping      Retrowalk      Heel to toe      A: Tolerated fairly well. Pt perseverates on pain in knee inferior and lateral to patella. Pain with palpation, however no pain with patellar mobs.        P: Continue with rehab plan    Francoise Chris, PTA 75892    Treatment Charges: Mins Units        Re-Evaluation     Ther Exercise         TE 38  3   Manual Therapy MT 2    Ther Activities        TA     Gait Training          GT     Neuro Re-education NR     Modalities     Non-Billable Service Time     Other     Total Time/Units 40 3

## 2022-12-15 ENCOUNTER — TREATMENT (OUTPATIENT)
Dept: PHYSICAL THERAPY | Age: 72
End: 2022-12-15

## 2022-12-15 DIAGNOSIS — Z96.652 S/P TOTAL KNEE ARTHROPLASTY, LEFT: Primary | ICD-10-CM

## 2022-12-15 DIAGNOSIS — M17.12 PRIMARY OSTEOARTHRITIS OF LEFT KNEE: ICD-10-CM

## 2022-12-15 NOTE — PROGRESS NOTES
[address] [facility]   Phone: [phone]   Fax: [phone]      Physical Therapy Daily Treatment Note    Date: 12/15/2022  Patient Name: Raymond Tiwari  : 1950   MRN: [id number]  DOInjury: years   DOSx: 11/10/2022 - Seth Robot Assisted Left total knee arthroplasty   Referring Provider: No referring provider defined for this encounter. Medical Diagnosis:     Z96.652 (ICD-10-CM) - S/P total knee arthroplasty, left   M17.12 (ICD-10-CM) - Primary osteoarthritis of left knee       Outcome Measure:  LEFS      S: Pt reports pain today is 2-3 today. Reports last night was first night slept pretty good. O:  Time  1158- 1238       Visit    Repeat outcome measure at mid point and end. Pain  See above. ROM  Knee:  Right:   AROM: 125° Flexion,  0° Extension     Left:   AROM: 105° Flexion,  lacks 4 ° Extension        Modalities       Ice,  elevation     Stretching       Patella mobs     Prone hangs      Heel props 2 minutes  With 2# wt    Knee flex stretch-seated      Prone self flexion stretch      Exercise       Bike 10 min Partial revolutations     Quad sets 5 sec hold x 20 reps HEP te   Heel slides X 20 in between exercises  HEP te   SLR  2 x 5       SAQ  2 X 10  HEP te   Bridge  x 10   ta   Squat       Step-ups - FWD  X 10 each  4 inch  ta   Step-ups - LAT      Step-ups - BWD       Step up and over reciprocally       Standing hip abd and ext  2 x 10      TG squats      TG Calf raises 2 X 10     Sit to stand  2 x 10 1 blue foam  ta    LAQ             NMR For safe ambulation in community and home    Marching gait      Side stepping      Retrowalk      Heel to toe      A: Tolerated fairly well.        P: Continue with rehab plan    [place], [place] [id number]    Treatment Charges: Mins Units        Re-Evaluation     Ther Exercise         TE 32 2   Manual Therapy     MT     Ther Activities        TA 8 1   Gait Training          GT     Neuro Re-education NR     Modalities Non-Billable Service Time     Other     Total Time/Units 40 3

## 2022-12-19 ENCOUNTER — TREATMENT (OUTPATIENT)
Dept: PHYSICAL THERAPY | Age: 72
End: 2022-12-19

## 2022-12-19 DIAGNOSIS — M17.12 PRIMARY OSTEOARTHRITIS OF LEFT KNEE: ICD-10-CM

## 2022-12-19 DIAGNOSIS — Z96.652 S/P TOTAL KNEE ARTHROPLASTY, LEFT: Primary | ICD-10-CM

## 2022-12-19 NOTE — PROGRESS NOTES
[address] [facility]   Phone: [phone]   Fax: [phone]      Physical Therapy Daily Treatment Note    Date: 2022  Patient Name: Mile Carr  : 1950   MRN: [id number]  DOInjury: years   DOSx: 11/10/2022 - Seth Robot Assisted Left total knee arthroplasty   Referring Provider: No referring provider defined for this encounter. Medical Diagnosis:     Z96.652 (ICD-10-CM) - S/P total knee arthroplasty, left   M17.12 (ICD-10-CM) - Primary osteoarthritis of left knee       Outcome Measure:  LEFS      S: Pt reports pain today is /10 today. O:  Time  1340 - 1418      Visit    Repeat outcome measure at mid point and end. Pain  See above. ROM  Knee:  Right:   AROM: 125° Flexion,  0° Extension     Left:   AROM: 108 ° Flexion,  lacks 4 ° Extension        Modalities       Ice,  elevation     Stretching       Patella mobs     Prone hangs      Heel props 2 minutes  With manual OP 10x     Knee flex stretch-seated      Prone self flexion stretch      Exercise       Bike 10 min Partial revolutations     Quad sets 5 sec hold x 20 reps HEP te   Heel slides X 20 in between exercises  HEP te   SLR  2 x 10        SAQ  2 X 10  HEP te   Bridge  x 10   ta   Squat       Step-ups - FWD  X 10 each  6 inch  ta   Step-ups - LAT      Step-ups - BWD       Step up and over reciprocally       Standing hip abd and ext  2 x 10      TG squats      TG Calf raises 2 X 10     Sit to stand  2 x 10 1 blue foam  ta    LAQ       X 10       NMR For safe ambulation in community and home    Marching gait      Side stepping      Retrowalk      Heel to toe      A: Tolerated fairly well.        P: Continue with rehab plan    [place], [place] [address]    Treatment Charges: Mins Units        Re-Evaluation     Ther Exercise         TE 28 2   Manual Therapy     MT     Ther Activities        TA 10  1   Gait Training          GT     Neuro Re-education NR     Modalities     Non-Billable Service Time Other     Total Time/Units 38 3

## 2022-12-20 DIAGNOSIS — Z96.652 S/P TOTAL KNEE ARTHROPLASTY, LEFT: Primary | ICD-10-CM

## 2022-12-20 RX ORDER — OXYCODONE HYDROCHLORIDE AND ACETAMINOPHEN 5; 325 MG/1; MG/1
1 TABLET ORAL EVERY 6 HOURS PRN
Qty: 28 TABLET | Refills: 0 | Status: SHIPPED | OUTPATIENT
Start: 2022-12-20 | End: 2022-12-27

## 2022-12-20 NOTE — TELEPHONE ENCOUNTER
Patient stopped in office to request a medication refill     Surgery: [facility] Robot Assisted Left total knee arthroplasty   Date: 11/10/2022    Pended and routed

## 2022-12-22 ENCOUNTER — TREATMENT (OUTPATIENT)
Dept: PHYSICAL THERAPY | Age: 72
End: 2022-12-22

## 2022-12-22 DIAGNOSIS — Z96.652 S/P TOTAL KNEE ARTHROPLASTY, LEFT: Primary | ICD-10-CM

## 2022-12-22 NOTE — PROGRESS NOTES
[address] [facility]   Phone: [phone]   Fax: [phone]      Physical Therapy Daily Treatment Note    Date: 2022  Patient Name: Anil Gupta  : 1950   MRN: [id number]  DOInjury: years   DOSx: 11/10/2022 - Seth Robot Assisted Left total knee arthroplasty   Referring Provider: No referring provider defined for this encounter. Medical Diagnosis:     Z96.652 (ICD-10-CM) - S/P total knee arthroplasty, left   M17.12 (ICD-10-CM) - Primary osteoarthritis of left knee       Outcome Measure:  LEFS      S: Pt reports pain today is 1-2/10 today. \"Sore and stiff\" per pt.   O:  Time  1200 - 7305      Visit    Repeat outcome measure at mid point and end. Pain  See above. ROM  Knee:  Right:   AROM: 125° Flexion,  0° Extension     Left:   AROM: 108 ° Flexion,  lacks 4 ° Extension        Modalities       Ice,  elevation     Stretching       Patella mobs     Prone hangs      Heel props With manual OP 10x     Knee flex stretch-seated      Prone self flexion stretch      Exercise       Bike 10 min Partial revolutations     Quad sets 5 sec hold x 20 reps HEP te   Heel slides X 20 in between exercises  HEP te   SLR  2 x 10        SAQ  2 X 10  HEP te   Bridge  x 10   ta   Squat       Step-ups - FWD  X 10 each  6 inch  ta   Step-ups - LAT      Step-ups - BWD       Step up and over reciprocally       Standing hip abd and ext  2 x 10      TG squats      TG Calf raises 2 X 10     Sit to stand  2 x 10 1 blue foam  ta    LAQ       2 X 10       NMR For safe ambulation in community and home    Marching gait      Side stepping      Retrowalk      Heel to toe      A: Tolerated fairly well. No c/o pain at end of session.        P: Continue with rehab plan    Charisjeff Crane, PTA [id number]  Treatment Charges: Mins Units        Re-Evaluation     Ther Exercise         TE 29 2   Manual Therapy     MT     Ther Activities        TA 10  1   Gait Training          GT     Neuro Re-education NR Modalities     Non-Billable Service Time     Other     Total Time/Units 39 3

## 2022-12-27 ENCOUNTER — HOSPITAL ENCOUNTER (OUTPATIENT)
Dept: ULTRASOUND IMAGING | Age: 72
Discharge: HOME | End: 2022-12-29
Payer: MEDICARE

## 2022-12-27 ENCOUNTER — OFFICE VISIT (OUTPATIENT)
Dept: FAMILY MEDICINE CLINIC | Age: 72
End: 2022-12-27
Payer: MEDICARE

## 2022-12-27 VITALS
OXYGEN SATURATION: 99 % | DIASTOLIC BLOOD PRESSURE: 78 MMHG | TEMPERATURE: 97.8 F | BODY MASS INDEX: 35.59 KG/M2 | WEIGHT: 254.2 LBS | SYSTOLIC BLOOD PRESSURE: 144 MMHG | HEART RATE: 74 BPM | HEIGHT: 71 IN

## 2022-12-27 DIAGNOSIS — R60.0 EDEMA OF LEFT LOWER LEG: ICD-10-CM

## 2022-12-27 DIAGNOSIS — R60.0 EDEMA OF LEFT LOWER LEG: Primary | ICD-10-CM

## 2022-12-27 PROCEDURE — 3074F SYST BP LT 130 MM HG: CPT | Performed by: FAMILY MEDICINE

## 2022-12-27 PROCEDURE — G8427 DOCREV CUR MEDS BY ELIG CLIN: HCPCS | Performed by: FAMILY MEDICINE

## 2022-12-27 PROCEDURE — 3017F COLORECTAL CA SCREEN DOC REV: CPT | Performed by: FAMILY MEDICINE

## 2022-12-27 PROCEDURE — 93971 EXTREMITY STUDY: CPT

## 2022-12-27 PROCEDURE — 99213 OFFICE O/P EST LOW 20 MIN: CPT | Performed by: FAMILY MEDICINE

## 2022-12-27 PROCEDURE — G8417 CALC BMI ABV UP PARAM F/U: HCPCS | Performed by: FAMILY MEDICINE

## 2022-12-27 PROCEDURE — 1123F ACP DISCUSS/DSCN MKR DOCD: CPT | Performed by: FAMILY MEDICINE

## 2022-12-27 PROCEDURE — 3078F DIAST BP <80 MM HG: CPT | Performed by: FAMILY MEDICINE

## 2022-12-27 PROCEDURE — 1036F TOBACCO NON-USER: CPT | Performed by: FAMILY MEDICINE

## 2022-12-27 PROCEDURE — G8484 FLU IMMUNIZE NO ADMIN: HCPCS | Performed by: FAMILY MEDICINE

## 2022-12-27 ASSESSMENT — ENCOUNTER SYMPTOMS
SHORTNESS OF BREATH: 0
COUGH: 0
SORE THROAT: 0
WHEEZING: 0
ABDOMINAL PAIN: 0
SINUS PAIN: 0
CONSTIPATION: 0
EYE PAIN: 0
NAUSEA: 0
BACK PAIN: 0
VOMITING: 0
DIARRHEA: 0

## 2022-12-27 NOTE — PROGRESS NOTES
12/27/22    Name: Shelton Leung  WQO:[birth year]   Sex:male   Age:72 y.o. Chief Complaint   Patient presents with    Ankle Pain     Left ankle    Leg Swelling     Left leg and ankle for the past two days     Patient here with left lower leg and left ankle swelling'  Started a few days prior to fara  He has been on his feet last few weeks a lot more and now swelling till jsut recent  November 10 had left knee replacement  The knee feels good, no redness of swelling in the knee itself  No falls  Has been doing PT but it was canceled this week due to people being off    His left ankle feels pretty swollen, some tenderness in calf and lower lateral left leg  No pain into thigh or behind knee though    No fevers  No trauma    Review of Systems   Constitutional:  Negative for appetite change, fatigue and fever. HENT:  Negative for congestion, ear pain, hearing loss, sinus pain and sore throat. Eyes:  Negative for pain. Respiratory:  Negative for cough, shortness of breath and wheezing. Cardiovascular:  Positive for leg swelling. Negative for chest pain. Gastrointestinal:  Negative for abdominal pain, constipation, diarrhea, nausea and vomiting. Endocrine: Negative for cold intolerance and heat intolerance. Genitourinary:  Negative for difficulty urinating, hematuria, scrotal swelling, testicular pain and urgency. Musculoskeletal:  Positive for arthralgias and joint swelling. Negative for back pain and myalgias. Skin:  Negative for rash and wound. Neurological:  Negative for dizziness, syncope and light-headedness. Hematological:  Negative for adenopathy. Psychiatric/Behavioral:  Negative for confusion and sleep disturbance. The patient is not nervous/anxious. Current Outpatient Medications:     ASPIRIN 81 PO, Take by mouth, Disp: , Rfl:     oxyCODONE-acetaminophen (PERCOCET) 5-325 MG per tablet, Take 1 tablet by mouth every 6 hours as needed for Pain for up to 7 days. , Disp: 28 tablet, Rfl: 0    tamsulosin (FLOMAX) 0.4 MG capsule, Take 1 capsule by mouth daily Should be taken with food either at dinner or supper, Disp: 30 capsule, Rfl: 5    albuterol sulfate HFA (VENTOLIN HFA) 108 (90 Base) MCG/ACT inhaler, Inhale 2 puffs into the lungs 4 times daily as needed for Wheezing, Disp: 18 g, Rfl: 5    ibuprofen (ADVIL;MOTRIN) 200 MG tablet, Take 200 mg by mouth every 6 hours as needed for Pain, Disp: , Rfl:     olmesartan (BENICAR) 40 MG tablet, take 1 tablet by mouth once daily, Disp: 90 tablet, Rfl: 1    carvedilol (COREG) 12.5 MG tablet, take 1 tablet by mouth every morning then take 1 tablet every evening, Disp: 180 tablet, Rfl: 1    omeprazole (PRILOSEC) 40 MG delayed release capsule, Take 1 capsule by mouth daily, Disp: 90 capsule, Rfl: 1    cetirizine (ZYRTEC) 10 MG tablet, Take 1 tablet by mouth daily (Patient taking differently: Take 10 mg by mouth daily as needed), Disp: 30 tablet, Rfl: 1    Omega-3 Fatty Acids (FISH OIL PO), Take by mouth daily, Disp: , Rfl:     Multiple Vitamin (MULTI-VITAMIN DAILY PO), Take by mouth daily, Disp: , Rfl:   Allergies   Allergen Reactions    Augmentin [Amoxicillin-Pot Clavulanate] Diarrhea    Clindamycin/Lincomycin Hives and Rash      Past Medical History:   Diagnosis Date    Acid reflux     Arthritis     BPH (benign prostatic hyperplasia)     Cancer ([facility] [address] 11/2018    skin    COVID-19 09/2022    Heart murmur     Hypertension     Left knee pain     for OR 11/10/2022    Mitral valve disorder     Palpitations      Patient Active Problem List    Diagnosis Date Noted    Status post total knee replacement, left 11/10/2022    Sacroiliitis ([facility] [address] 05/17/2022    Lymphoproliferative disorder ([facility] [address] 05/17/2022    H/O colonoscopy with polypectomy 06/15/2021    Arthritis of knee 06/15/2021    Primary osteoarthritis of left knee 08/10/2018    Chest pain 09/21/2006    Disorder of prostate 09/21/2006    Essential hypertension 09/21/2006    Mitral valve disorder 09/21/2006 Heart murmur 11/23/2004      Past Surgical History:   Procedure Laterality Date    BACK SURGERY      COLONOSCOPY  2018    HERNIA REPAIR      KNEE ARTHROSCOPY Left 5/2/2019    LEFT KNEE ARTHROSCOPY WITH PARTIAL MEDIAL MENISECTOMY performed by Julieta Murray MD at [address]  11/2018    TOTAL KNEE ARTHROPLASTY Left 11/10/2022    ROBOTIC JAGUAR ASSISTED LEFT KNEE TOTAL ARTHROPLASTY +PNB performed by Julieta Murrya MD at [facility]  2018      Social History       Tobacco History       Smoking Status  Never      Smokeless Tobacco Use  Never              Alcohol History       Alcohol Use Status  Yes Drinks/Week  12 Cans of beer per week Amount  12.0 standard drinks of alcohol/wk Comment  3 times weekly              Drug Use       Drug Use Status  No              Sexual Activity       Sexually Active  Yes Partners  Female                BP (!) 144/78   Pulse 74   Temp 97.8 °F (36.6 °C)   Ht 5' 11\" (1.803 m)   Wt 254 lb 3.2 oz (115.3 kg)   SpO2 99%   BMI 35.45 kg/m²     EXAM:   Physical Exam  Vitals and nursing note reviewed. Constitutional:       General: He is not in acute distress. Appearance: He is well-developed. He is not toxic-appearing. HENT:      Head: Normocephalic and atraumatic. Eyes:      Pupils: Pupils are equal, round, and reactive to light. Cardiovascular:      Rate and Rhythm: Normal rate and regular rhythm. Pulmonary:      Effort: Pulmonary effort is normal.      Breath sounds: Normal breath sounds. No wheezing or rhonchi. Musculoskeletal:      Cervical back: Normal range of motion. Comments: Gait steady  Left ankle and left lower leg just above the ankle swollen, slightly red, calf and lateral left lower leg tender  No politeal tenderness, no thigh tenderness of cords palpable  Homans negative   Skin:     General: Skin is warm and dry. Neurological:      Mental Status: He is alert.         Fabian Paul was seen today for ankle pain and leg swelling. Diagnoses and all orders for this visit:    Edema of left lower leg  Comments:  us done today negative for DVT  elevate as needed, no restrictions  should get back to PT  Orders:  -     US DUP LOWER EXTREMITY LEFT ABDIRAHMAN; Future      I independently reviewed and updated the chief complaint, HPI, past medical and surgical history, medications, allergies and ROS as entered by the LPN. Seen by:   Cinthya Long DO

## 2023-01-03 ENCOUNTER — TELEPHONE (OUTPATIENT)
Dept: FAMILY MEDICINE CLINIC | Age: 73
End: 2023-01-03

## 2023-01-03 ENCOUNTER — OFFICE VISIT (OUTPATIENT)
Dept: ORTHOPEDIC SURGERY | Age: 73
End: 2023-01-03

## 2023-01-03 ENCOUNTER — TREATMENT (OUTPATIENT)
Dept: PHYSICAL THERAPY | Age: 73
End: 2023-01-03
Payer: MEDICARE

## 2023-01-03 DIAGNOSIS — Z96.652 S/P TOTAL KNEE ARTHROPLASTY, LEFT: Primary | ICD-10-CM

## 2023-01-03 DIAGNOSIS — M17.12 PRIMARY OSTEOARTHRITIS OF LEFT KNEE: ICD-10-CM

## 2023-01-03 PROCEDURE — 97530 THERAPEUTIC ACTIVITIES: CPT | Performed by: PHYSICAL THERAPIST

## 2023-01-03 PROCEDURE — 99024 POSTOP FOLLOW-UP VISIT: CPT | Performed by: NURSE PRACTITIONER

## 2023-01-03 PROCEDURE — 97110 THERAPEUTIC EXERCISES: CPT | Performed by: PHYSICAL THERAPIST

## 2023-01-03 RX ORDER — OXYCODONE HYDROCHLORIDE AND ACETAMINOPHEN 5; 325 MG/1; MG/1
1 TABLET ORAL EVERY 6 HOURS PRN
Qty: 28 TABLET | Refills: 0 | Status: SHIPPED | OUTPATIENT
Start: 2023-01-03 | End: 2023-01-10

## 2023-01-03 NOTE — TELEPHONE ENCOUNTER
Norma Sherman stopped in, he is needed his pharmacy changed to [facility] in [place]. He just wanted to make us aware for when he needs something called in.

## 2023-01-03 NOTE — PROGRESS NOTES
[facility]   Phone: [phone]   Fax: [phone]      Physical Therapy Daily Treatment Note    Date: 1/3/2023  Patient Name: Aminah Mandujano  : 1950   MRN: [id number]  DOInjury: years   DOSx: 11/10/2022 - Seth Robot Assisted Left total knee arthroplasty   Referring Provider: No referring provider defined for this encounter. Medical Diagnosis:     Z96.652 (ICD-10-CM) - S/P total knee arthroplasty, left   M17.12 (ICD-10-CM) - Primary osteoarthritis of left knee       Outcome Measure:  LEFS      S: Pt reports pain today is 1-2/10 today. \"Sore and stiff\" per pt.   O:  Time  1200 - 5990      Visit    Repeat outcome measure at mid point and end. Pain  See above. ROM  Knee:  Right:   AROM: 125° Flexion,  0° Extension     Left:   AROM: 108 ° Flexion,  lacks 4 ° Extension        Modalities       Ice,  elevation     Stretching       Patella mobs     Prone hangs      Heel props With manual OP 10x     Knee flex stretch-seated      Prone self flexion stretch      Exercise       Bike 10 min Partial revolutations     Quad sets 5 sec hold x 20 reps HEP te   Heel slides X 20 in between exercises  HEP te   SLR  2 x 10        SAQ  2 X 10  HEP te   Bridge  x 10   ta   Squat       Step-ups - FWD  X 10 each  6 inch  ta   Step-ups - LAT      Step-ups - BWD       Step up and over reciprocally       Standing hip abd and ext  2 x 10      TG squats      TG Calf raises 2 X 10     Sit to stand  2 x 10 1 blue foam  ta    LAQ       2 X 10       NMR For safe ambulation in community and home    Marching gait      Side stepping      Retrowalk      Heel to toe      A: Tolerated fairly well. No c/o pain at end of session.        P: Continue [place] [id number]  Treatment Charges: Mins Units        Re-Evaluation     Ther Exercise         TE 29 2   Manual Therapy     MT     Ther Activities        TA 10  1   Gait Training          GT     Neuro Re-education NR Modalities     Non-Billable Service Time     Other     Total Time/Units 39 3

## 2023-01-03 NOTE — PROGRESS NOTES
[address] [facility]   Phone: [phone]   Fax: [phone]      Physical Therapy Daily Treatment Note    Date: 1/3/2023  Patient Name: Bailey Chadwick  : 1950   MRN: [id number]  DOInjury: years   DOSx: 11/10/2022 - Seth Robot Assisted Left total knee arthroplasty   Referring Provider: No referring provider defined for this encounter. Medical Diagnosis:     Z96.652 (ICD-10-CM) - S/P total knee arthroplasty, left   M17.12 (ICD-10-CM) - Primary osteoarthritis of left knee       Outcome Measure:  LEFS      S: Pt reports pain today is 1/10. O:  Time  1340 - 1418       Visit  10 /12  Repeat outcome measure at mid point and end. Pain  See above. ROM  Knee:  Right:   AROM: 125° Flexion,  0° Extension     Left:   AROM: 111 ° Flexion,  lacks 4 ° Extension        Modalities       Ice,  elevation     Stretching       Patella mobs     Prone hangs      Heel props With manual OP 10x     Knee flex stretch-seated      Prone self flexion stretch      Exercise       Bike 10 min Partial revolutations     Quad sets 5 sec hold x 20 reps HEP te   Heel slides X 20 in between exercises  HEP te   SLR  2 x 10    3 way     SAQ  2 X 10  HEP te   Bridge  x 10   ta   Squat       Step-ups - FWD  X 10 each  6 inch  ta   Step-ups - LAT      Step-ups - BWD       Step up and over reciprocally       Standing hip abd and ext  2 x 10      TG squats      TG Calf raises 2 X 10     Sit to stand  2 x 10 1 blue foam  ta    LAQ            NMR For safe ambulation in community and home    Marching gait      Side stepping      Retrowalk      Heel to toe      A: Tolerated fairly well.        P: Continue with rehab plan    [place], [place] [address]  Treatment Charges: Mins Units        Re-Evaluation     Ther Exercise         TE 28 2   Manual Therapy     MT     Ther Activities        TA 10  1   Gait Training          GT     Neuro Re-education NR     Modalities     Non-Billable Service Time     Other     Total Time/Units 38 3

## 2023-01-03 NOTE — PROGRESS NOTES
Follow Up Post Operative Visit     Surgery: Seth Robot Assisted Left total knee arthroplasty   Date: 11/10/2022       Subjective:    Destiny William is here for follow up visit s/p above procedure. He is doing well. He has been compliant with postoperative care. Patient is making good progress with outpatient therapy. He does report occasional pain primarily at night when trying to sleep    Controlled Substances Monitoring:        Physical Exam:    No data recorded    General: Alert and oriented x3, no acute distress  Cardiovascular/pulmonary: No labored breathing, peripheral perfusion intact  Musculoskeletal:    Left knee exam incision site healed mature scar present, neurovascular sensation grossly intact, diffuse swelling from the knee to ankle present without effusion. Skin warm dry and intact. No signs or symptoms of infection present. Range of motion 5-105      Imaging: No new imaging obtained today. Previous imaging reviewed    Assessment and Plan: Status post left Seth robotic assisted total knee arthroplasty    Today we discussed his left knee. Patient is about 7 weeks out from procedure listed above doing well. Making good progress outpatient therapy. He is still having pain primarily at night when sleeping. Overall has good range of motion on exam.  We will continue with therapy and wean to home exercises.   He will follow-up in 6 weeks with Dr. China Bullock for repeat Kaylin 91, APRN-CNP  Orthopedic Surgery   01/03/23  4:03 PM

## 2023-01-05 ENCOUNTER — TREATMENT (OUTPATIENT)
Dept: PHYSICAL THERAPY | Age: 73
End: 2023-01-05
Payer: MEDICARE

## 2023-01-05 DIAGNOSIS — Z96.652 S/P TOTAL KNEE ARTHROPLASTY, LEFT: Primary | ICD-10-CM

## 2023-01-05 DIAGNOSIS — M17.12 PRIMARY OSTEOARTHRITIS OF LEFT KNEE: ICD-10-CM

## 2023-01-05 PROCEDURE — 97110 THERAPEUTIC EXERCISES: CPT | Performed by: PHYSICAL THERAPIST

## 2023-01-05 PROCEDURE — 97530 THERAPEUTIC ACTIVITIES: CPT | Performed by: PHYSICAL THERAPIST

## 2023-01-05 NOTE — PROGRESS NOTES
[facility]   Phone: [phone]   Fax: [phone]      Physical Therapy Daily Treatment Note    Date: 2023  Patient Name: Denita Whitaker  : 1950   MRN: [id number]  DOInjury: years   DOSx: 11/10/2022 - Seth Robot Assisted Left total knee arthroplasty   Referring Provider: Cierra Live MD  [address]     Medical Diagnosis:     N59.564 (ICD-10-CM) - S/P total knee arthroplasty, left   M17.12 (ICD-10-CM) - Primary osteoarthritis of left knee       Outcome Measure:  LEFS      S: Pt reports no pain today just stiffness. O:  Time  1200- 1240      Visit    Repeat outcome measure at mid point and end. Pain  See above. ROM  Knee:  Right:   AROM: 125° Flexion,  0° Extension     Left:   AROM: 115 ° Flexion,  lacks 1 ° Extension        Modalities       Ice,  elevation     Stretching       Patella mobs     Prone hangs      Heel props With manual OP 10x     Knee flex stretch-seated      Prone self flexion stretch      Exercise       Bike 10 min Partial revolutations     Quad sets 5 sec hold x 20 reps HEP te   Heel slides X 20 in between exercises  HEP te   SLR  2 x 10    Flex and abd    SAQ  2 X 10  HEP te   Bridge   ta   Squat       Step-ups - FWD  X 10 each  8 inch  ta   Step-ups - LAT      Step-ups - BWD       Step up and over reciprocally       Standing hip abd and ext      TG squats     TG Calf raises     Sit to stand  2 x 10 1 blue foam  ta    LAQ            NMR For safe ambulation in community and home    Marching gait      Side stepping      Retrowalk      Heel to toe      A: Tolerated fairly well.        P: Continue with rehab plan    [place] [id number]  Treatment Charges: Mins Units        Re-Evaluation     Ther Exercise         TE 30 2   Manual Therapy     MT     Ther Activities        TA 10  1   Gait Training          GT     Neuro Re-education NR     Modalities     Non-Billable Service Time     Other Total Time/Units 40 3

## 2023-01-10 ENCOUNTER — TREATMENT (OUTPATIENT)
Dept: PHYSICAL THERAPY | Age: 73
End: 2023-01-10
Payer: MEDICARE

## 2023-01-10 DIAGNOSIS — M17.12 PRIMARY OSTEOARTHRITIS OF LEFT KNEE: ICD-10-CM

## 2023-01-10 DIAGNOSIS — Z96.652 S/P TOTAL KNEE ARTHROPLASTY, LEFT: Primary | ICD-10-CM

## 2023-01-10 PROCEDURE — 97110 THERAPEUTIC EXERCISES: CPT | Performed by: PHYSICAL THERAPIST

## 2023-01-10 PROCEDURE — 97164 PT RE-EVAL EST PLAN CARE: CPT | Performed by: PHYSICAL THERAPIST

## 2023-01-10 NOTE — PROGRESS NOTES
[address] [facility]   Phone: [phone]   Fax: [phone]      Physical Therapy Daily Treatment Note    Date: 1/10/2023  Patient Name: Eusebia Robertson  : 1950   MRN: [id number]  DOInjury: years   DOSx: 11/10/2022 - Seth Robot Assisted Left total knee arthroplasty   Referring Provider: William Chow MD  [address]     Medical Diagnosis:     Y35.059 (ICD-10-CM) - S/P total knee arthroplasty, left   M17.12 (ICD-10-CM) - Primary osteoarthritis of left knee       Outcome Measure:  LEFS 50/80     S: Pt again reports no pain today just stiffness. O:  Time  1155 - 1234      Visit    Repeat outcome measure at mid point and end. Pain  See above. ROM  Knee:  Right:   AROM: 125° Flexion,  0° Extension     Left:   AROM: 113 ° Flexion,  0 ° Extension        Modalities       Ice,  elevation     Stretching       Patella mobs     Prone hangs      Heel props With manual OP 10x     Knee flex stretch-seated      Prone self flexion stretch      Exercise       Bike 10 min fulll revolutations     Quad sets 5 sec hold x 20 reps HEP te   Heel slides X 20 in between exercises  HEP te   SLR Flex and abd    SAQ  HEP te   Bridge   ta   Squat       Step-ups - FWD  8 inch  ta   Step-ups - LAT      Step-ups - BWD       Step up and over reciprocally       Standing hip abd and ext      TG squats     TG Calf raises     Sit to stand  1 blue foam  ta    LAQ            NMR For safe ambulation in community and home    Marching gait      Side stepping      Retrowalk      Heel to toe      A: Tolerated fairly well. Reassessment completed today. See note for details. P: Recommend Continue with rehab plan 2 x week for additional 3 weeks.      [place], [place] [id number]  Treatment Charges: Mins Units        Re-Evaluation 23 1   Ther Exercise         TE 16 1   Manual Therapy     MT     Ther Activities        TA     Gait Training          GT     Neuro Re-education NR     Modalities     Non-Billable Service Time     Other     Total Time/Units 39 2

## 2023-01-10 NOTE — PROGRESS NOTES
[facility]   Phone: [phone]   Fax: [phone]        Referring Provider: Michael Gibson MD  [address] [facility]  [address] [place] [address]     Medical Diagnosis:   Z96.652 (ICD-10-CM) - S/P total knee arthroplasty, left   M17.12 (ICD-10-CM) - Primary osteoarthritis of left knee       CERTIFICATION PERIOD:  11/22/2022  to 1/6/2023.    ATTENDANCE:  Patient has attended 12 of 13 scheduled treatments from 11/22/2022  to 1/10/2023.  TREATMENTS RECEIVED:  therapeutic exercise, therapeutic activity     INITIAL STATUS:    Observations: Well nourished male with normal affect. Rises with 2 UE  from chair. Ambulates with SBQC  and w/w.  Using SBQC today.  Recommend to pt to use w/w at this time due to antalgic gait.      Inspection: Incision edges approximated, no purulent drainage, no redness, no swelling, no tenderness, and not hot to touch.       Edema: knee circumference measured over pants   R: 46.5 cm   L:  51 cm      Gait: antalgic gait, ambulates with quad cane     Joint/Motion:     Knee:  Right:   AROM: 125° Flexion,  0° Extension     Left:   AROM: 85° Flexion,  lacks 7 ° Extension        Strength:     Knee:   Right: Flexion 5/5,  Extension 5/5  Left: Flexion 4/5,  Extension 3+/5     Palpation: Tender to palpation around top and bottom of incision and lateral knee     CURRENT STATUS:    Observations: Well nourished male with normal affect. Rises with 0 UE  from chair. Ambulates without AD.      Inspection: Incision edges approximated, no purulent drainage, no redness, no swelling, no tenderness, and not hot to touch.       Edema: knee circumference   R: 46 cm   L:  47.5 cm      Gait: ambulates without assistive device.       Joint/Motion:     Knee:  Right:   AROM: 125° Flexion,  0° Extension     Left:   AROM: 113° Flexion,  0  ° Extension        Strength:     Knee:   Right: Flexion 5/5,  Extension 5/5  Left: Flexion 5- /5,  Extension 5- /5     Palpation: Tender to  palpation  lateral knee       Short Term goals (3 weeks)  Decrease reported pain to 0-5 /10 (goal met)   Increase ROM to AROM: 105° Flexion,  lacks 3 ° Extension (goal met)   Increase Strength to Left: Flexion 4+/5,  Extension 4/5 (goal met)   Able to perform/complete the following functions/tasks: pt able to perform 10 sit to stands with 1 UE support with min pain/limitation. Pt able to go up/down 5 steps with min pain/limitation. Pt able to walk 20 minutes with min pain/limitation.  (goal met)   Lower Extremity Functional Scale (LEFS) 45/80  impairment (goal met)      Long Term goals (6 weeks)  Decrease reported pain to 0-2 /10 (goal met)   Increase ROM to AROM: 120° Flexion,  0 ° Extension (not met)   Increase Strength to Left: Flexion 5/5,  Extension 4+/5 (not met)   Able to perform/complete the following functions/tasks: pt able to perform 10 sit to stands without UE support with no pain/limitation. Pt able to go up/down flight of steps with no pain/limitation. Pt able to walk 30 minutes with no pain/limitation. (Not met)   Independent with Home Exercise Programs  Lower Extremity Functional Scale (LEFS) 55/80  impairment (not met)     OUTCOME MEASURE:  Lower Extremity Functional Scale (LEFS) 50/80  impairment    COMMENTS AND RECOMMENDATIONS:   Pt has met short term goals but not long term goals yet. Pt reports was able to walk [facility] recently and sat to take breaks as needed. Pt able to complete 10 sit to stands without UE support with some pain noted. Pt able to go up/down flight of steps with 2 rails with some pain/difficulty. Pt improves with cuing. Pt reports daily he is doing knee flexion stretch on step at home and hamstring stretch to straighten knee with foot on chair but doesn't mention HEP that PT had given pt. Recommend pt continue with PT 2x week for additional 3 weeks to continue to improve ROM, strength and mobility. Pt agreeable.        Thank you for the opportunity to work with your patient. Kyle Crump, PT DPT [id number]    I CERTIFY THAT THE ABOVE REASSESSMENT AND PLAN OF CARE FOR PHYSICAL THERAPY SERVICES ARE APPROPRIATE AND MEDICALLY NECESSARY.     Duration: From 1/10/2023 thru 2/3/2023    ________________________                _______________  Physician     Date

## 2023-01-17 ENCOUNTER — TREATMENT (OUTPATIENT)
Dept: PHYSICAL THERAPY | Age: 73
End: 2023-01-17
Payer: MEDICARE

## 2023-01-17 ENCOUNTER — TELEPHONE (OUTPATIENT)
Dept: ORTHOPEDIC SURGERY | Age: 73
End: 2023-01-17

## 2023-01-17 DIAGNOSIS — Z96.652 S/P TOTAL KNEE ARTHROPLASTY, LEFT: Primary | ICD-10-CM

## 2023-01-17 DIAGNOSIS — I10 ESSENTIAL HYPERTENSION: ICD-10-CM

## 2023-01-17 DIAGNOSIS — M17.12 PRIMARY OSTEOARTHRITIS OF LEFT KNEE: ICD-10-CM

## 2023-01-17 DIAGNOSIS — K21.00 GASTROESOPHAGEAL REFLUX DISEASE WITH ESOPHAGITIS WITHOUT HEMORRHAGE: ICD-10-CM

## 2023-01-17 PROCEDURE — 97110 THERAPEUTIC EXERCISES: CPT

## 2023-01-17 RX ORDER — OLMESARTAN MEDOXOMIL 40 MG/1
TABLET ORAL
Qty: 90 TABLET | Refills: 1 | Status: SHIPPED | OUTPATIENT
Start: 2023-01-17

## 2023-01-17 RX ORDER — OMEPRAZOLE 40 MG/1
40 CAPSULE, DELAYED RELEASE ORAL DAILY
Qty: 90 CAPSULE | Refills: 1 | Status: SHIPPED | OUTPATIENT
Start: 2023-01-17

## 2023-01-17 RX ORDER — CARVEDILOL 12.5 MG/1
TABLET ORAL
Qty: 180 TABLET | Refills: 1 | Status: SHIPPED | OUTPATIENT
Start: 2023-01-17

## 2023-01-17 NOTE — PROGRESS NOTES
[address] [facility]   Phone: [phone]   Fax: [phone]      Physical Therapy Daily Treatment Note    Date: 2023  Patient Name: Roger Hopson  : 1950   MRN: [id number]  DOInjury: years   DOSx: 11/10/2022 - Seth Robot Assisted Left total knee arthroplasty   Referring Provider: Gila Brannon MD  [address]     Medical Diagnosis:     V17.035 (ICD-10-CM) - S/P total knee arthroplasty, left   M17.12 (ICD-10-CM) - Primary osteoarthritis of left knee       Outcome Measure:  LEFS 50/80     S: Pt again reports no pain today just stiffness. O:  Time  840-920      Visit     1 Repeat outcome measure at mid point and end. Pain  See above. ROM  Knee:  Right:   AROM: 125° Flexion,  0° Extension     Left:   AROM: 113 ° Flexion,  0 ° Extension        Modalities       Ice,  elevation     Stretching       Patella mobs     Prone hangs      Heel props With manual OP 10x     Knee flex stretch-seated      Prone self flexion stretch      Exercise       Bike 10 min fulll revolutations     Quad sets HEP te   Heel slides HEP te   SLR Flex and abd    SAQ  HEP te   Bridge   ta   Squat       Step-ups - FWD  X 10 each  8 inch  ta   Step-ups - LAT up and over X 10 8 inch     Step-ups - BWD       Step up and over reciprocally       Standing hip abd and ext  2 x 10  2#    TG squats     TG Calf raises     Sit to stand  2 x 10 1ta    LAQ       2 X 10  2# cuff wts. NMR For safe ambulation in community and home    Marching gait      Side stepping      Retrowalk      Heel to toe      A: Tolerated fairly well. Focus on strength training today. Pt required rest breaks at times and instructed to ice and elevated this evening if increased pain or swelling is noted. P: Recommend Continue with rehab plan.   Jenni Staples, JOHNIE [address]  Treatment Charges: Mins Units        Re-Evaluation     Ther Exercise         TE 35 2   Manual Therapy     MT     Ther Activities        TA     Gait Training          GT     Neuro Re-education NR     Modalities     Non-Billable Service Time     Other     Total Time/Units 35 2

## 2023-01-17 NOTE — TELEPHONE ENCOUNTER
Surgery: Seth Robot Assisted Left total knee arthroplasty   Date: 11/10/2022    Patient called to notify the office that he is having an urgent removal of a \"bone chip\" in his mouth where he had a tooth removed previously, patient states that he is in a lot of pain with his mouth.  Patient knows he was educated on dental care prior to his joint surgery, states that he is going to be taking amoxicillin, has an appointment tomorrow with Dr. Brenda Hassan for removal.

## 2023-01-17 NOTE — TELEPHONE ENCOUNTER
Last Appointment:  11/1/2022  Future Appointments   Date Time Provider Ashvin Paulino   1/20/2023 10:00 AM Hamp Sickle, PTA [facility]   1/24/2023 12:00 PM Hamp Sickle, [facility] PT [facility]   1/27/2023 11:20 AM Hamp Sickle, PTA [facility]   1/31/2023 11:20 AM Hamp Sickle, PTA [facility]   2/3/2023 11:20 AM Jerry Wooten [facility]   2/15/2023 10:10 AM Brice Mckeon MD [facility]

## 2023-01-20 ENCOUNTER — TREATMENT (OUTPATIENT)
Dept: PHYSICAL THERAPY | Age: 73
End: 2023-01-20

## 2023-01-20 DIAGNOSIS — Z96.652 S/P TOTAL KNEE ARTHROPLASTY, LEFT: Primary | ICD-10-CM

## 2023-01-20 DIAGNOSIS — M17.12 PRIMARY OSTEOARTHRITIS OF LEFT KNEE: ICD-10-CM

## 2023-01-20 NOTE — PROGRESS NOTES
[address] [facility]   Phone: [phone]   Fax: [phone]      Physical Therapy Daily Treatment Note    Date: 2023  Patient Name: Mortimer Flowers  : 1950   MRN: [id number]  DOInjury: years   DOSx: 11/10/2022 - Seth Robot Assisted Left total knee arthroplasty   Referring Provider: Brenna Singh MD  [address]     Medical Diagnosis:     D46.403 (ICD-10-CM) - S/P total knee arthroplasty, left   M17.12 (ICD-10-CM) - Primary osteoarthritis of left knee       Outcome Measure:  LEFS 50/80     S: Pt again reports no pain today just stiffness. O:  Time  1000- 1038      Visit     2 Repeat outcome measure at mid point and end. Pain  See above. ROM  Knee:  Right:   AROM: 125° Flexion,  0° Extension     Left:   AROM: 113 ° Flexion,  0 ° Extension        Modalities       Ice,  elevation     Stretching       Patella mobs     Prone hangs      Heel props With manual OP 10x     Knee flex stretch-seated      Prone self flexion stretch      Exercise       Bike 10 min fulll revolutations     Quad sets HEP te   Heel slides HEP te   SLR Flex and abd    SAQ  HEP te   Bridge   ta   Squat       Step-ups - FWD  X 10 each  8 inch  ta   Step-ups - LAT up and over X 10 8 inch     Step-ups - BWD       Step up and over reciprocally       Standing hip abd and ext  2 x 10  2#    TG squats     TG Calf raises 2 X 10 2#    Sit to stand  2 x 10 1ta    LAQ       2 X 10  2# cuff wts. NMR For safe ambulation in community and home    Marching gait      Side stepping      Retrowalk      Heel to toe      A: Tolerated well. Continued strengthening today, pt seemed to require less rest breaks than in previous session. P: Recommend Continue with rehab plan.   Moni Tidwell, PTA 36088  Treatment Charges: Mins Units        Re-Evaluation     Ther Exercise         TE 30 2   Manual Therapy     MT     Ther Activities        TA 8 1   Gait Training          GT Neuro Re-education NR     Modalities     Non-Billable Service Time     Other     Total Time/Units 38 3

## 2023-01-24 ENCOUNTER — TREATMENT (OUTPATIENT)
Dept: PHYSICAL THERAPY | Age: 73
End: 2023-01-24
Payer: MEDICARE

## 2023-01-24 DIAGNOSIS — M17.12 PRIMARY OSTEOARTHRITIS OF LEFT KNEE: ICD-10-CM

## 2023-01-24 DIAGNOSIS — Z96.652 S/P TOTAL KNEE ARTHROPLASTY, LEFT: Primary | ICD-10-CM

## 2023-01-24 PROCEDURE — 97110 THERAPEUTIC EXERCISES: CPT

## 2023-01-24 NOTE — PROGRESS NOTES
[address] [facility]   Phone: [phone]   Fax: [phone]      Physical Therapy Daily Treatment Note    Date: 2023  Patient Name: Ralph Rogers  : 1950   MRN: [id number]  DOInjury: years   DOSx: 11/10/2022 - Seth Robot Assisted Left total knee arthroplasty   Referring Provider: Joel Chery MD  [address]     Medical Diagnosis:     Z95.502 (ICD-10-CM) - S/P total knee arthroplasty, left   M17.12 (ICD-10-CM) - Primary osteoarthritis of left knee       Outcome Measure:  LEFS 50/80     S: Pt again reports no pain today just stiffness. O:  Time  1765-2401      Visit     2 Repeat outcome measure at mid point and end. Pain  See above. ROM  Knee:  Right:   AROM: 125° Flexion,  0° Extension     Left:   AROM: 113 ° Flexion,  0 ° Extension        Modalities       Ice,  elevation     Stretching       Patella mobs     Prone hangs      Heel props With manual OP 10x     Knee flex stretch-seated      Prone self flexion stretch      Exercise       Bike 10 min fulll revolutations     Quad sets HEP te   Heel slides HEP te   SLR Flex and abd    SAQ  HEP te   Bridge   ta   Squat       Step-ups - FWD  X 10 each  8 inch  ta   Step-ups - LAT up and over X 10 8 inch     Step-ups - BWD       Step up and over reciprocally       Standing hip abd and ext  2 x 10  2#    TG squats     TG Calf raises 2 X 10 2#    Sit to stand  2 x 10 1ta    LAQ       2 X 10  2# cuff wts. NMR For safe ambulation in community and home    Marching gait      Side stepping      Retrowalk      Heel to toe      A: Tolerated well. Pt reports some pain on the lateral aspect of knee after treatment.    Pepper Foil, PTA 61802  Treatment Charges: Mins Units        Re-Evaluation     Ther Exercise         TE 30 2   Manual Therapy     MT     Ther Activities        TA     Gait Training          GT     Neuro Re-education NR     Modalities     Non-Billable Service Time Other     Total Time/Units 30 2

## 2023-01-25 DIAGNOSIS — Z96.652 S/P TOTAL KNEE ARTHROPLASTY, LEFT: Primary | ICD-10-CM

## 2023-01-25 RX ORDER — OXYCODONE HYDROCHLORIDE AND ACETAMINOPHEN 5; 325 MG/1; MG/1
1 TABLET ORAL EVERY 6 HOURS PRN
Qty: 28 TABLET | Refills: 0 | Status: SHIPPED | OUTPATIENT
Start: 2023-01-25 | End: 2023-02-01

## 2023-01-25 NOTE — TELEPHONE ENCOUNTER
Surgery: Seth Robot Assisted Left total knee arthroplasty   Date: 11/10/2022    Patient is 11 weeks p/o  Requesting a refill on narcotics   4th p/o refill     Pended and routed

## 2023-01-27 ENCOUNTER — TREATMENT (OUTPATIENT)
Dept: PHYSICAL THERAPY | Age: 73
End: 2023-01-27

## 2023-01-27 DIAGNOSIS — M17.12 PRIMARY OSTEOARTHRITIS OF LEFT KNEE: ICD-10-CM

## 2023-01-27 DIAGNOSIS — Z96.652 S/P TOTAL KNEE ARTHROPLASTY, LEFT: Primary | ICD-10-CM

## 2023-01-27 NOTE — PROGRESS NOTES
[address] [facility]   Phone: [phone]   Fax: [phone]      Physical Therapy Daily Treatment Note    Date: 2023  Patient Name: Darya Torres  : 1950   MRN: [id number]  DOInjury: years   DOSx: 11/10/2022 - Seth Robot Assisted Left total knee arthroplasty   Referring Provider: Baron Stephenie MD  [address] [place], [place]     Medical Diagnosis:     J28.773 (ICD-10-CM) - S/P total knee arthroplasty, left   M17.12 (ICD-10-CM) - Primary osteoarthritis of left knee       Outcome Measure:  LEFS 50/80     S: Pt reports that yesterday he fell while out getting the mail. He slippen on the ice and landed on on his back however states that L leg flexed and \"twisted\" a little bit. Pt called doctors office. Therapist spoke with nurse and said ok to go ahead with therapy as pt tolerates. Time  4269-3730      Visit     3 Repeat outcome measure at mid point and end. Pain  See above. ROM  Knee:  Right:   AROM: 125° Flexion,  0° Extension     Left:   AROM: 113 ° Flexion,  0 ° Extension        Modalities       Ice,  elevation     Stretching       Patella mobs     Prone hangs      Heel props With manual OP 10x     Knee flex stretch-seated      Prone self flexion stretch      Exercise       Bike 10 min fulll revolutations     Quad sets HEP te   Heel slides HEP te   SLR  2 x 10    Flex and abd    SAQ  HEP te   Bridge   ta   Squat       Step-ups - FWD  8 inch  ta   Step-ups - LAT up and over 8 inch     Step-ups - BWD       Step up and over reciprocally       Standing hip abd and ext  2 x 10     TG squats    TG Calf raises 2 X 10    Sit to stand  2 x 10 1ta    LAQ       2 X 10      NMR For safe ambulation in community and home    Marching gait      Side stepping      Retrowalk      Heel to toe      A: Tolerated well. Pt reports some pain on the lateral aspect of knee, L hip and back.  Session ended early, pt wasn't able to tolerate more activity secondary to fatigue and discomfort.     Doc Coates, PTA [id number]  Treatment Charges: Mins Units        Re-Evaluation     Ther Exercise         TE 31 2   Manual Therapy     MT     Ther Activities        TA     Gait Training          GT     Neuro Re-education NR     Modalities     Non-Billable Service Time     Other     Total Time/Units 31 2

## 2023-01-31 ENCOUNTER — TREATMENT (OUTPATIENT)
Dept: PHYSICAL THERAPY | Age: 73
End: 2023-01-31
Payer: MEDICARE

## 2023-01-31 DIAGNOSIS — Z96.652 S/P TOTAL KNEE ARTHROPLASTY, LEFT: Primary | ICD-10-CM

## 2023-01-31 DIAGNOSIS — M17.12 PRIMARY OSTEOARTHRITIS OF LEFT KNEE: ICD-10-CM

## 2023-01-31 PROCEDURE — 97112 NEUROMUSCULAR REEDUCATION: CPT

## 2023-01-31 PROCEDURE — 97530 THERAPEUTIC ACTIVITIES: CPT

## 2023-01-31 PROCEDURE — 97110 THERAPEUTIC EXERCISES: CPT

## 2023-01-31 NOTE — PROGRESS NOTES
[address] [facility]   Phone: [phone]   Fax: [phone]      Physical Therapy Daily Treatment Note    Date: 2023  Patient Name: Joselyn Reddy  : 1950   MRN: [id number]  DOInjury: years   DOSx: 11/10/2022 - Seth Robot Assisted Left total knee arthroplasty   Referring Provider: Ruel Hardin MD  [address]     Medical Diagnosis:     Q26.694 (ICD-10-CM) - S/P total knee arthroplasty, left   M17.12 (ICD-10-CM) - Primary osteoarthritis of left knee       Outcome Measure:  LEFS 50/80     S: Pt reports that he feels improvement from previous session. Minimal pain with stiffness this morning. Time  1149-0735      Visit     6 Repeat outcome measure at mid point and end. Pain  See above. ROM  Knee:  Right:   AROM: 125° Flexion,  0° Extension     Left:   AROM: 113 ° Flexion,  0 ° Extension        Modalities       Ice,  elevation     Stretching       Patella mobs     Prone hangs      Heel props With manual OP 10x     Knee flex stretch-seated      Prone self flexion stretch      Exercise       Bike 10 min fulll revolutations     Quad sets HEP te   Heel slides HEP te   SLR  2 x 10    Flex and abd    SAQ  HEP te   Bridge   ta   Squat       Step-ups - FWD  8 inch  ta   Step-ups - LAT up and over 8 inch     Step-ups - BWD       Step up and over reciprocally       Standing hip abd and ext  2 x 10     TG squats    TG Calf raises 2 X 10    Sit to stand  2 x 10 1ta    LAQ       2 X 10      NMR For safe ambulation in community and home    Steps  Full slight using B stair rail reciprocally     Side stepping      Resisted walk outs   Fwd   Back   Lateral  X 5 each  40#= 10# cable system    30#=7.5# cable system. Heel to toe      A: Tolerated well. Pt reports knee felt \"stiff\" today. Slightly pain reported laterally in L knee. P:Continue with current POC.     [place], [place] [address]  Treatment Charges: Mins Units Re-Evaluation     Ther Exercise         TE 20 1   Manual Therapy     MT     Ther Activities        TA 8 1   Gait Training          GT     Neuro Re-education NR 10 1   Modalities     Non-Billable Service Time     Other     Total Time/Units 38 3

## 2023-02-02 ENCOUNTER — TREATMENT (OUTPATIENT)
Dept: PHYSICAL THERAPY | Age: 73
End: 2023-02-02
Payer: MEDICARE

## 2023-02-02 DIAGNOSIS — Z96.652 S/P TOTAL KNEE ARTHROPLASTY, LEFT: Primary | ICD-10-CM

## 2023-02-02 DIAGNOSIS — M17.12 PRIMARY OSTEOARTHRITIS OF LEFT KNEE: ICD-10-CM

## 2023-02-02 PROCEDURE — 97164 PT RE-EVAL EST PLAN CARE: CPT | Performed by: PHYSICAL THERAPIST

## 2023-02-02 NOTE — PROGRESS NOTES
[facility] [facility]   Phone: [phone]   Fax: [phone]      Physical Therapy Daily Treatment Note    Date: 2023  Patient Name: Garfield Melton  : 1950   MRN: [id number]  DOInjury: years   DOSx: 11/10/2022 - Seth Robot Assisted Left total knee arthroplasty   Referring Provider: Allison Sorto MD  [address]     Medical Diagnosis:     I12.316 (ICD-10-CM) - S/P total knee arthroplasty, left   M17.12 (ICD-10-CM) - Primary osteoarthritis of left knee       Outcome Measure:  LEFS 59/80     S: Pt reports no pain today. Time  1155-8074       Visit     6 Repeat outcome measure at mid point and end. Pain  See above. ROM  Knee:  Right:   AROM: 125° Flexion,  0° Extension     Left:   AROM: 114 ° Flexion,  0 ° Extension        Modalities       Ice,  elevation     Stretching       Patella mobs     Prone hangs      Heel props With manual OP 10x     Knee flex stretch-seated      Prone self flexion stretch      Exercise       Bike fulll revolutations     Quad sets 5 sec hold x 20 reps HEP te   Heel slides X 20 in between exercises  HEP te   SLR Flex and abd    SAQ  HEP te   Bridge   ta   Squat       Step-ups - FWD  8 inch  ta   Step-ups - LAT up and over 8 inch     Step-ups - BWD       Step up and over reciprocally       Standing hip abd and ext     TG squats    TG Calf raises    Sit to stand  1ta    LAQ           NMR For safe ambulation in community and home    Steps      Side stepping     Resisted walk outs   Fwd   Back   Lateral  40#= 10# cable system    30#=7.5# cable system. Heel to toe      A: Tolerated well. Reassessment completed today. See note for details. Recommend continue with HEP at home and call with any questions. P:  will discharge pt at this time.      [place] [id number]  Treatment Charges: Mins Units        Re-Evaluation 14 1   Ther Exercise         TE 4    Manual Therapy     MT     Ther Activities        TA     Gait Training          GT     Neuro Re-education NR     Modalities     Non-Billable Service Time     Other     Total Time/Units 18 1

## 2023-02-02 NOTE — PROGRESS NOTES
[facility] [facility]   Phone: [phone]   Fax: [phone]        Referring Provider: Anurag Miles MD  [address]     Medical Diagnosis:   A53.591 (ICD-10-CM) - S/P total knee arthroplasty, left   M17.12 (ICD-10-CM) - Primary osteoarthritis of left knee       CERTIFICATION PERIOD:   1/10/2023 thru 2/3/2023    ATTENDANCE:  Patient has attended 6 of 6 scheduled treatments from 1/17/2022  to 2/2/2023. TREATMENTS RECEIVED:  therapeutic exercise, therapeutic activity, neuromuscular reeducation. INITIAL STATUS:  Observations: Well nourished male with normal affect. Rises with 0 UE  from chair. Ambulates without AD. Inspection: Incision edges approximated, no purulent drainage, no redness, no swelling, no tenderness, and not hot to touch. Edema: knee circumference   R: 46 cm   L:  47.5 cm      Gait: ambulates without assistive device. Joint/Motion:     Knee:  Right:   AROM: 125° Flexion,  0° Extension     Left:   AROM: 113° Flexion,  0  ° Extension        Strength:     Knee:   Right: Flexion 5/5,  Extension 5/5  Left: Flexion 5- /5,  Extension 5- /5     Palpation: Tender to palpation  lateral knee          CURRENT STATUS:      Observations: Well nourished male with normal affect. Rises with 0 UE  from chair. Ambulates without AD. Inspection: Incision edges approximated, no purulent drainage, no redness, no swelling, no tenderness, and not hot to touch. Edema: knee circumference   R: 46 cm   L:  47.5 cm      Gait: ambulates without assistive device.        Joint/Motion:     Knee:  Right:   AROM: 125° Flexion,  0° Extension     Left:   AROM: 114° Flexion,  0  ° Extension        Strength:     Knee:   Right: Flexion 5/5,  Extension 5/5  Left: Flexion 5 /5,  Extension 5 /5     Palpation: reports was tender distal to patella but not today        Short Term goals (3 weeks)  Decrease reported pain to 0-5 /10 (goal met) Increase ROM to AROM: 105° Flexion,  lacks 3 ° Extension (goal met)   Increase Strength to Left: Flexion 4+/5,  Extension 4/5 (goal met)   Able to perform/complete the following functions/tasks: pt able to perform 10 sit to stands with 1 UE support with min pain/limitation. Pt able to go up/down 5 steps with min pain/limitation. Pt able to walk 20 minutes with min pain/limitation.  (goal met)   Lower Extremity Functional Scale (LEFS) 45/80  impairment (goal met)      Long Term goals (6 weeks)  Decrease reported pain to 0-2 /10 (goal met)   Increase ROM to AROM: 120° Flexion,  0 ° Extension (not met)   Increase Strength to Left: Flexion 5/5,  Extension 4+/5 (goal met)   Able to perform/complete the following functions/tasks: pt able to perform 10 sit to stands without UE support with no pain/limitation. Pt able to go up/down flight of steps with no pain/limitation. Pt able to walk 30 minutes with no pain/limitation. (goal met)   Independent with Home Exercise Programs  Lower Extremity Functional Scale (LEFS) 55/80  impairment (goal met)     OUTCOME MEASURE:  Lower Extremity Functional Scale (LEFS) 59/80  impairment    COMMENTS AND RECOMMENDATIONS:   Pt has made good progress in therapy. Pt reports no knee pain today. Pt has met majority of goals. Pt reports recently walked around grocery store without a problem. Pt did well going up/down flight of steps in session reciprocally. Recommend pt continue with HEP at home and call with any questions. Will discharge pt at this time. Thank you for the opportunity to work with your patient. Lo Dawn, PT DPT [id number]    I CERTIFY THAT THE ABOVE REASSESSMENT AND PLAN OF CARE FOR PHYSICAL THERAPY SERVICES ARE APPROPRIATE AND MEDICALLY NECESSARY.         ________________________                _______________  Physician     Date

## 2023-02-15 ENCOUNTER — OFFICE VISIT (OUTPATIENT)
Dept: ORTHOPEDIC SURGERY | Age: 73
End: 2023-02-15

## 2023-02-15 VITALS — WEIGHT: 250 LBS | BODY MASS INDEX: 35 KG/M2 | HEIGHT: 71 IN

## 2023-02-15 DIAGNOSIS — Z96.652 S/P TOTAL KNEE ARTHROPLASTY, LEFT: Primary | ICD-10-CM

## 2023-02-15 RX ORDER — AMOXICILLIN 500 MG/1
TABLET, FILM COATED ORAL
COMMUNITY
Start: 2023-01-19

## 2023-02-15 NOTE — PROGRESS NOTES
[facility]  ORTHOPAEDICS AND SPORTS MEDICINE  DATE OF VISIT: 02/15/23  Follow Up Post Operative Visit     CHIEF COMPLAINT:   Chief Complaint   Patient presents with    Follow Up After Procedure     3 month p/o L TKA 11/10        Surgery: Seth Robot Assisted Left total knee arthroplasty   Date: 11/10/2022    Subjective:    Navin Michele is here for follow up visit s/p above procedure. He is doing well. He has been making good progress with PT. He did recently suffer a fall in his driveway but did not injure his knee. Controlled Substances Monitoring:        Physical Exam:    No data recorded    General: Alert and oriented x3, no acute distress  Cardiovascular/pulmonary: No labored breathing, peripheral perfusion intact  Musculoskeletal:    Exam of the knee shows range of motion about 5 to 115 degrees. The knee is stable throughout. There is no effusion. Incision is healed. There is excellent stability      Imaging: X-rays of the left knee show well aligned total knee replacement    Assessment and Plan: 3 months out from left total knee arthroplasty  Overall he is doing very well. He will continue to progress with activities and strengthening as well as continue with range of motion.   We will see him back in 3 months with images    Duke Ambrosio MD  [facility] [employer]

## 2023-03-17 ENCOUNTER — PATIENT MESSAGE (OUTPATIENT)
Dept: ORTHOPEDIC SURGERY | Age: 73
End: 2023-03-17

## 2023-04-12 PROBLEM — D47.9 LYMPHOPROLIFERATIVE DISORDER (HCC): Status: RESOLVED | Noted: 2022-05-17 | Resolved: 2023-04-12

## 2023-04-12 PROBLEM — M46.1 SACROILIITIS (HCC): Status: RESOLVED | Noted: 2022-05-17 | Resolved: 2023-04-12

## 2023-05-03 ENCOUNTER — OFFICE VISIT (OUTPATIENT)
Dept: CHIROPRACTIC MEDICINE | Age: 73
End: 2023-05-03
Payer: MEDICARE

## 2023-05-03 VITALS
OXYGEN SATURATION: 98 % | SYSTOLIC BLOOD PRESSURE: 125 MMHG | DIASTOLIC BLOOD PRESSURE: 62 MMHG | HEIGHT: 71 IN | TEMPERATURE: 97.7 F | HEART RATE: 60 BPM | WEIGHT: 256 LBS | BODY MASS INDEX: 35.84 KG/M2

## 2023-05-03 DIAGNOSIS — M54.2 CERVICALGIA: ICD-10-CM

## 2023-05-03 DIAGNOSIS — M99.02 SEGMENTAL AND SOMATIC DYSFUNCTION OF THORACIC REGION: ICD-10-CM

## 2023-05-03 DIAGNOSIS — M99.01 SEGMENTAL AND SOMATIC DYSFUNCTION OF CERVICAL REGION: Primary | ICD-10-CM

## 2023-05-03 DIAGNOSIS — M54.04 PANNICULITIS AFFECTING REGIONS OF NECK AND BACK, THORACIC REGION: ICD-10-CM

## 2023-05-03 PROCEDURE — 98940 CHIROPRACT MANJ 1-2 REGIONS: CPT | Performed by: CHIROPRACTOR

## 2023-05-03 PROCEDURE — 99999 PR OFFICE/OUTPT VISIT,PROCEDURE ONLY: CPT | Performed by: CHIROPRACTOR

## 2023-05-03 NOTE — PROGRESS NOTES
5/3/23  Sujey Moder : 1950 Sex: male  Age: 75 y.o. Chief Complaint   Patient presents with    Neck Pain       HPI:   Darrin Walker returns today-have not seen him since 2022. In the interim he did undergo left knee TKA with Dr. Alyx Keys. Today, he returns reporting neck pain x 4 weeks. R sided neck pain, worse with rotations  Can hear some clicks and catches at times  Will pop, and when it does, he gets relief   No UE symptoms  Will use some tylenol or advil with relief. Intermittent pain, again with motions. Current Outpatient Medications:     tamsulosin (FLOMAX) 0.4 MG capsule, Take 1 capsule by mouth daily Should be taken with food either at dinner or supper, Disp: 30 capsule, Rfl: 5    Amoxicillin 500 MG TABS, , Disp: , Rfl:     omeprazole (PRILOSEC) 40 MG delayed release capsule, Take 1 capsule by mouth daily, Disp: 90 capsule, Rfl: 1    carvedilol (COREG) 12.5 MG tablet, take 1 tablet by mouth every morning then take 1 tablet every evening, Disp: 180 tablet, Rfl: 1    olmesartan (BENICAR) 40 MG tablet, take 1 tablet by mouth once daily, Disp: 90 tablet, Rfl: 1    ASPIRIN 81 PO, Take by mouth, Disp: , Rfl:     albuterol sulfate HFA (VENTOLIN HFA) 108 (90 Base) MCG/ACT inhaler, Inhale 2 puffs into the lungs 4 times daily as needed for Wheezing, Disp: 18 g, Rfl: 5    ibuprofen (ADVIL;MOTRIN) 200 MG tablet, Take 1 tablet by mouth every 6 hours as needed for Pain, Disp: , Rfl:     cetirizine (ZYRTEC) 10 MG tablet, Take 1 tablet by mouth daily, Disp: 30 tablet, Rfl: 1    Omega-3 Fatty Acids (FISH OIL PO), Take by mouth daily, Disp: , Rfl:     Multiple Vitamin (MULTI-VITAMIN DAILY PO), Take by mouth daily, Disp: , Rfl:     Exam:   Vitals:    23 1327   BP: 125/62   Pulse: 60   Temp: 97.7 °F (36.5 °C)   SpO2: 98%     Appearance: alert, well appearing, and in no distress and oriented to person, place, and time.      Cervical AROM:  Flexion: 40 /50  Extension: 30 /60 with end range

## 2023-05-03 NOTE — PROGRESS NOTES
Patient is here for neck pain. Patient states for the last month limited ROM.  Lluvia Estrada MD  Electronically signed by Amandeep Coleman LPN on 1/5/9812 at 3:80 PM

## 2023-05-09 ENCOUNTER — OFFICE VISIT (OUTPATIENT)
Dept: CHIROPRACTIC MEDICINE | Age: 73
End: 2023-05-09
Payer: MEDICARE

## 2023-05-09 VITALS
SYSTOLIC BLOOD PRESSURE: 126 MMHG | DIASTOLIC BLOOD PRESSURE: 78 MMHG | TEMPERATURE: 97.2 F | OXYGEN SATURATION: 97 % | WEIGHT: 256 LBS | HEART RATE: 60 BPM | HEIGHT: 71 IN | BODY MASS INDEX: 35.84 KG/M2

## 2023-05-09 DIAGNOSIS — M99.01 SEGMENTAL AND SOMATIC DYSFUNCTION OF CERVICAL REGION: Primary | ICD-10-CM

## 2023-05-09 DIAGNOSIS — M25.542 PAIN INVOLVING JOINT OF FINGER OF LEFT HAND: ICD-10-CM

## 2023-05-09 DIAGNOSIS — M54.04 PANNICULITIS AFFECTING REGIONS OF NECK AND BACK, THORACIC REGION: ICD-10-CM

## 2023-05-09 DIAGNOSIS — M54.2 CERVICALGIA: ICD-10-CM

## 2023-05-09 DIAGNOSIS — M99.02 SEGMENTAL AND SOMATIC DYSFUNCTION OF THORACIC REGION: ICD-10-CM

## 2023-05-09 PROCEDURE — 99999 PR OFFICE/OUTPT VISIT,PROCEDURE ONLY: CPT | Performed by: CHIROPRACTOR

## 2023-05-09 PROCEDURE — 98940 CHIROPRACT MANJ 1-2 REGIONS: CPT | Performed by: CHIROPRACTOR

## 2023-05-09 NOTE — PROGRESS NOTES
Patient is here for follow up neck and back pain.  Danielle Santos MD  Electronically signed by Arnulfo Alvarenga LPN on 1/1/5196 at 7:51 AM

## 2023-05-09 NOTE — PROGRESS NOTES
23  Sid Aj : 1950 Sex: male  Age: 75 y.o. Chief Complaint   Patient presents with    Neck Pain    Back Pain       HPI:   Pain has improved. Exercises are helping. He did spend some time clearing brush, using a chainsaw yesterday and may have made it bit worse today. But overall moving in the right direction. He has no new complaints today with respect to his back. No new radiating pains. He does note having some difficulties with his left hand, specifically flexing his third digit. He believes he has some arthritis. He is not dropping objects, but will have trouble with twisting jars open. He does have a spot near the PIP, radial side which he does not know what it is.       Current Outpatient Medications:     tamsulosin (FLOMAX) 0.4 MG capsule, Take 1 capsule by mouth daily Should be taken with food either at dinner or supper, Disp: 30 capsule, Rfl: 5    Amoxicillin 500 MG TABS, , Disp: , Rfl:     omeprazole (PRILOSEC) 40 MG delayed release capsule, Take 1 capsule by mouth daily, Disp: 90 capsule, Rfl: 1    carvedilol (COREG) 12.5 MG tablet, take 1 tablet by mouth every morning then take 1 tablet every evening, Disp: 180 tablet, Rfl: 1    olmesartan (BENICAR) 40 MG tablet, take 1 tablet by mouth once daily, Disp: 90 tablet, Rfl: 1    ASPIRIN 81 PO, Take by mouth, Disp: , Rfl:     albuterol sulfate HFA (VENTOLIN HFA) 108 (90 Base) MCG/ACT inhaler, Inhale 2 puffs into the lungs 4 times daily as needed for Wheezing, Disp: 18 g, Rfl: 5    ibuprofen (ADVIL;MOTRIN) 200 MG tablet, Take 1 tablet by mouth every 6 hours as needed for Pain, Disp: , Rfl:     cetirizine (ZYRTEC) 10 MG tablet, Take 1 tablet by mouth daily, Disp: 30 tablet, Rfl: 1    Omega-3 Fatty Acids (FISH OIL PO), Take by mouth daily, Disp: , Rfl:     Multiple Vitamin (MULTI-VITAMIN DAILY PO), Take by mouth daily, Disp: , Rfl:     Exam:   Vitals:    23 0912   BP: 126/78   Pulse: 60   Temp: 97.2 °F (36.2 °C)   SpO2: 97%

## 2023-05-16 ENCOUNTER — OFFICE VISIT (OUTPATIENT)
Dept: CHIROPRACTIC MEDICINE | Age: 73
End: 2023-05-16
Payer: MEDICARE

## 2023-05-16 VITALS
OXYGEN SATURATION: 97 % | BODY MASS INDEX: 34.67 KG/M2 | TEMPERATURE: 97.5 F | WEIGHT: 256 LBS | HEIGHT: 72 IN | HEART RATE: 78 BPM

## 2023-05-16 DIAGNOSIS — M99.01 SEGMENTAL AND SOMATIC DYSFUNCTION OF CERVICAL REGION: Primary | ICD-10-CM

## 2023-05-16 DIAGNOSIS — M54.04 PANNICULITIS AFFECTING REGIONS OF NECK AND BACK, THORACIC REGION: ICD-10-CM

## 2023-05-16 DIAGNOSIS — M54.2 CERVICALGIA: ICD-10-CM

## 2023-05-16 DIAGNOSIS — M99.02 SEGMENTAL AND SOMATIC DYSFUNCTION OF THORACIC REGION: ICD-10-CM

## 2023-05-16 PROCEDURE — 99999 PR OFFICE/OUTPT VISIT,PROCEDURE ONLY: CPT | Performed by: CHIROPRACTOR

## 2023-05-16 PROCEDURE — 98940 CHIROPRACT MANJ 1-2 REGIONS: CPT | Performed by: CHIROPRACTOR

## 2023-05-16 NOTE — PROGRESS NOTES
Patient is here for follow up neck pain. Patient states improvement and no new concerns.  Shahla Osborn MD  Electronically signed by Johnny Dean LPN on 2/76/1710 at 03:16 AM

## 2023-05-16 NOTE — PROGRESS NOTES
23  Raymond Tiwari : 1950 Sex: male  Age: 75 y.o. Chief Complaint   Patient presents with    Neck Pain       HPI:   Pain has improved. On average, pain is perceived as mild (1-3  pain scale). Patient denies new numbness, new weakness, new tingling happy with his progress. He states that after today he would like to be seen as needed. He will continue with home-based self-care until he feels he needs to see me again. Current Outpatient Medications:     tamsulosin (FLOMAX) 0.4 MG capsule, Take 1 capsule by mouth daily Should be taken with food either at dinner or supper, Disp: 30 capsule, Rfl: 5    Amoxicillin 500 MG TABS, , Disp: , Rfl:     omeprazole (PRILOSEC) 40 MG delayed release capsule, Take 1 capsule by mouth daily, Disp: 90 capsule, Rfl: 1    carvedilol (COREG) 12.5 MG tablet, take 1 tablet by mouth every morning then take 1 tablet every evening, Disp: 180 tablet, Rfl: 1    olmesartan (BENICAR) 40 MG tablet, take 1 tablet by mouth once daily, Disp: 90 tablet, Rfl: 1    ASPIRIN 81 PO, Take by mouth, Disp: , Rfl:     albuterol sulfate HFA (VENTOLIN HFA) 108 (90 Base) MCG/ACT inhaler, Inhale 2 puffs into the lungs 4 times daily as needed for Wheezing, Disp: 18 g, Rfl: 5    ibuprofen (ADVIL;MOTRIN) 200 MG tablet, Take 1 tablet by mouth every 6 hours as needed for Pain, Disp: , Rfl:     cetirizine (ZYRTEC) 10 MG tablet, Take 1 tablet by mouth daily, Disp: 30 tablet, Rfl: 1    Omega-3 Fatty Acids (FISH OIL PO), Take by mouth daily, Disp: , Rfl:     Multiple Vitamin (MULTI-VITAMIN DAILY PO), Take by mouth daily, Disp: , Rfl:     Exam:   Vitals:    23 1105   Pulse: 78   Temp: 97.5 °F (36.4 °C)   SpO2: 97%       There are hypertonic and tender fibers noted with palpation in the paraspinal muscles of the cervical and thoracic region. Joint fixation is noted with motion screening at C5-6, T3-5. There are trigger points to bilateral suboccipitals today, bilateral trapezius.   There is still

## 2023-05-17 ENCOUNTER — OFFICE VISIT (OUTPATIENT)
Dept: ORTHOPEDIC SURGERY | Age: 73
End: 2023-05-17

## 2023-05-17 DIAGNOSIS — Z96.652 S/P TOTAL KNEE ARTHROPLASTY, LEFT: Primary | ICD-10-CM

## 2023-05-17 NOTE — PROGRESS NOTES
[facility]   ORTHOPAEDIC SURGERY AND SPORTS MEDICINE  DATE OF VISIT: 05/17/23  Follow Up Post Operative Visit     CHIEF COMPLAINT:   Chief Complaint   Patient presents with    Follow Up After Procedure     6 month p/o L TKA 11/10/2022. Patient states that he overall feels improvement in terms of his pain. States that he has no pain with walking, only has pain with going down the steps. Surgery: Seth Robot Assisted Left total knee arthroplasty   Date: 11/10/2022    Subjective:    Rubén Rodriguez is here for follow up visit s/p above procedure. He is doing well. He has been back to all of his normal activities. Still having some issues walking down the stairs with some lateral pain. Controlled Substances Monitoring:        Physical Exam:    Height: 6' (1.829 m), Weight - Scale: 256 lb (116.1 kg)    General: Alert and oriented x3, no acute distress  Cardiovascular/pulmonary: No labored breathing, peripheral perfusion intact  Musculoskeletal:    Exam of the knee shows well-healed incision. Range of motion is full. Knee is stable. There is no swelling      Imaging: X-rays of the left knee show well aligned total knee replacement    Assessment and Plan: 6 months out from left total knee replacement doing well  He will continue to progress with activities as tolerated.   We will see him back as needed    Hardik Mtz MD  Orthopaedic Surgery   5/17/23  10:11 AM

## 2023-07-10 DIAGNOSIS — I10 ESSENTIAL HYPERTENSION: ICD-10-CM

## 2023-07-10 DIAGNOSIS — K21.00 GASTROESOPHAGEAL REFLUX DISEASE WITH ESOPHAGITIS WITHOUT HEMORRHAGE: ICD-10-CM

## 2023-07-10 RX ORDER — OLMESARTAN MEDOXOMIL 40 MG/1
TABLET ORAL
Qty: 90 TABLET | Refills: 3 | Status: SHIPPED | OUTPATIENT
Start: 2023-07-10

## 2023-07-10 RX ORDER — OMEPRAZOLE 40 MG/1
40 CAPSULE, DELAYED RELEASE ORAL DAILY
Qty: 90 CAPSULE | Refills: 3 | Status: SHIPPED | OUTPATIENT
Start: 2023-07-10

## 2023-07-10 RX ORDER — CARVEDILOL 12.5 MG/1
TABLET ORAL
Qty: 180 TABLET | Refills: 3 | Status: SHIPPED | OUTPATIENT
Start: 2023-07-10

## 2023-07-10 NOTE — TELEPHONE ENCOUNTER
Idalia Jaimes needing 3 of his medications renewed at [facility] in [facility].       Last Appointment:  4/12/2023  Future Appointments   Date Time Provider [address]   10/3/2023  1:00 PM Corky Churchill MD [facility] [place]

## 2023-07-13 NOTE — PROGRESS NOTES
22  Rubén Rodriguez : 1950 Sex: male  Age 67 y.o. Subjective:  Chief Complaint   Patient presents with    Tick Removal     Tick bite right leg and ankle; noticed it last night and it was removed; the wife states she noticed a black spot on it; he is having knee replacement surgery so he is concerned and just wants to be safe       HPI:   Rubén Rodriguez , 67 y.o. male presents to the clinic for evaluation of tick bite right ankle x 1 day. The patient also reports associated mild redness. The patient states attachment time was less than 24 hours. The patient has not taken any treatment for symptoms. The patient reports unchanged symptoms over time. The patient denies headache, arthralgia, myalgia, erythema migrans, and increased fatigue. The patient also denies fever, chest pain, abdominal pain, shortness of breath, and nausea / vomiting / diarrhea. ROS:   Unless otherwise stated in this report the patient's positive and negative responses for review of systems for constitutional, eyes, ENT, cardiovascular, respiratory, gastrointestinal, neurological, , musculoskeletal, and integument systems and related systems to the presenting problem are either stated in the history of present illness or were not pertinent or were negative for the symptoms and/or complaints related to the presenting medical problem. Positives and pertinent negatives as per HPI. All others reviewed and are negative.       PMH:     Past Medical History:   Diagnosis Date    Acid reflux     Arthritis     BPH (benign prostatic hyperplasia)     Cancer ([facility] [address] 2018    skin    COVID-19 2022    Heart murmur     Hypertension     Left knee pain     for OR 11/10/2022    Mitral valve disorder     Palpitations        Past Surgical History:   Procedure Laterality Date    BACK SURGERY      COLONOSCOPY  2018    HERNIA REPAIR      KNEE ARTHROSCOPY Left 2019    LEFT KNEE ARTHROSCOPY WITH PARTIAL MEDIAL MENISECTOMY performed by Garry FRANZ Mona seen in clinic today for OB visit at 29w6d gestation. VSS. Pt reports normal fetal movement, see flowsheet. Pt evaluated for  labor, preeclampsia, infection, fetal wellbeing and UTI symptoms without concerns. Pt denies bleeding/lof/change in vaginal discharge/contractions/headache/vision changes/chest pain/SOB/edema. Discussed Tdap again, patient would like to defer to next visit as she is feeling like she might have a cold today. Phone number given to schedule anesthesia consult. Pt notified to review new pt education in AVS, verbally reviewed highlights. Dr. Montiel and Dr. Tellez met with pt and discussed POC. Plan for return in 1 week as scheduled. NST Performed due to FGR.  Dr. Tellez reviewed efm tracing. See NST/BPP Doc Flowsheet tab. Pt discharged stable and ambulatory.       Margarito Arellano MD at [address] [facility]  11/2018    UPPER GASTROINTESTINAL ENDOSCOPY  2018       Family History   Problem Relation Age of Onset    Stroke Mother     Heart Disease Father         Bypass Surgery, Pacemaker, Carotids    Coronary Art Dis Father     Other Son         procoagulant disorder       Medications:     Current Outpatient Medications:     doxycycline hyclate (VIBRAMYCIN) 100 MG capsule, Take 2 capsules by mouth once for 1 dose, Disp: 2 capsule, Rfl: 0    albuterol sulfate HFA (VENTOLIN HFA) 108 (90 Base) MCG/ACT inhaler, Inhale 2 puffs into the lungs 4 times daily as needed for Wheezing, Disp: 18 g, Rfl: 5    ibuprofen (ADVIL;MOTRIN) 200 MG tablet, Take 200 mg by mouth every 6 hours as needed for Pain, Disp: , Rfl:     olmesartan (BENICAR) 40 MG tablet, take 1 tablet by mouth once daily, Disp: 90 tablet, Rfl: 1    carvedilol (COREG) 12.5 MG tablet, take 1 tablet by mouth every morning then take 1 tablet every evening, Disp: 180 tablet, Rfl: 1    omeprazole (PRILOSEC) 40 MG delayed release capsule, Take 1 capsule by mouth daily, Disp: 90 capsule, Rfl: 1    cetirizine (ZYRTEC) 10 MG tablet, Take 1 tablet by mouth daily (Patient taking differently: Take 10 mg by mouth daily as needed), Disp: 30 tablet, Rfl: 1    aspirin 81 MG tablet, Take 81 mg by mouth daily, Disp: , Rfl:     Omega-3 Fatty Acids (FISH OIL PO), Take by mouth daily, Disp: , Rfl:     Multiple Vitamin (MULTI-VITAMIN DAILY PO), Take by mouth daily, Disp: , Rfl:     Allergies: Allergies   Allergen Reactions    Augmentin [Amoxicillin-Pot Clavulanate] Diarrhea    Clindamycin/Lincomycin Hives and Rash       Social History:     Social History     Tobacco Use    Smoking status: Never    Smokeless tobacco: Never   Vaping Use    Vaping Use: Never used   Substance Use Topics    Alcohol use:  Yes     Alcohol/week: 12.0 standard drinks     Types: 12 Cans of beer per week     Comment: 3 times weekly    Drug use: No       Physical Exam:     Vitals: 11/06/22 0821   BP: 132/70   Pulse: 72   Resp: 16   Temp: 97 °F (36.1 °C)   TempSrc: Temporal   SpO2: 95%   Weight: 249 lb (112.9 kg)   Height: 6' (1.829 m)       Physical Exam (PE)    Physical Exam  Constitutional:       Appearance: Normal appearance. HENT:      Head: Normocephalic. Right Ear: External ear normal.      Left Ear: External ear normal.      Nose: Nose normal.      Mouth/Throat:      Mouth: Mucous membranes are moist.      Pharynx: Oropharynx is clear. Eyes:      Pupils: Pupils are equal, round, and reactive to light. Cardiovascular:      Rate and Rhythm: Normal rate and regular rhythm. Pulses: Normal pulses. Heart sounds: Normal heart sounds. Pulmonary:      Effort: Pulmonary effort is normal.      Breath sounds: Normal breath sounds. Abdominal:      General: Bowel sounds are normal.      Palpations: Abdomen is soft. Musculoskeletal:         General: Normal range of motion. Cervical back: Normal range of motion and neck supple. Skin:     General: Skin is warm and dry. Capillary Refill: Capillary refill takes less than 2 seconds. Comments: Right posterior ankle: < 0.5 cm erythematous area. No foreign body, TTP, warmth, erythema migrans, or edema. Neurological:      General: No focal deficit present. Mental Status: He is alert and oriented to person, place, and time. Psychiatric:         Mood and Affect: Mood normal.         Behavior: Behavior normal.        Testing:   (All laboratory and radiology results have been personally reviewed by myself)  Labs:  No results found for this visit on 11/06/22. Imaging: All Radiology results interpreted by Radiologist unless otherwise noted. No orders to display       Assessment / Plan:   The patient's vitals, allergies, medications, and past medical history have been reviewed.   Namrata Iniguez was seen today for tick removal.    Diagnoses and all orders for this visit:    Tick bite of right ankle, initial encounter  -     doxycycline hyclate (VIBRAMYCIN) 100 MG capsule; Take 2 capsules by mouth once for 1 dose      - Disposition: Home    - Educational material printed for patient's review and were included in patient instructions. After Visit Summary and given to patient at the end of visit. - Patient reports having history of Lyme disease and upcoming knee replacement and would like prophylaxis treatment. - Discussed symptomatic treatments with the patient today. The patient is to schedule a follow-up with PCP in the next 2-3 days for reevaluation. Red flag symptoms were also discussed with the patient today. If symptoms worsen the patient is to go directly to the emergency department for reevaluation and treatment. Pt verbalizes understanding and is in agreement with plan of care. All questions answered. SIGNATURE: DANYEL Fitzpatrick-CNP    *NOTE: This report was transcribed using voice recognition software. Every effort was made to ensure accuracy; however, inadvertent computerized transcription errors may be present.

## 2023-08-02 DIAGNOSIS — N40.1 BENIGN PROSTATIC HYPERPLASIA WITH URINARY FREQUENCY: ICD-10-CM

## 2023-08-02 DIAGNOSIS — R35.0 BENIGN PROSTATIC HYPERPLASIA WITH URINARY FREQUENCY: ICD-10-CM

## 2023-08-02 RX ORDER — TAMSULOSIN HYDROCHLORIDE 0.4 MG/1
0.4 CAPSULE ORAL DAILY
Qty: 90 CAPSULE | Refills: 1 | Status: SHIPPED | OUTPATIENT
Start: 2023-08-02

## 2023-08-02 NOTE — TELEPHONE ENCOUNTER
Last Appointment:  4/12/2023  Future Appointments   Date Time Provider [address]  [address]   10/3/2023  1:00 PM Christiane Amin  [facility]

## 2023-08-07 ASSESSMENT — PROMIS GLOBAL HEALTH SCALE
IN THE PAST 7 DAYS, HOW OFTEN HAVE YOU BEEN BOTHERED BY EMOTIONAL PROBLEMS, SUCH AS FEELING ANXIOUS, DEPRESSED, OR IRRITABLE [ON A SCALE FROM 1 (NEVER) TO 5 (ALWAYS)]?: 3
SUM OF RESPONSES TO QUESTIONS 3, 6, 7, & 8: 14
WHO IS THE PERSON COMPLETING THE PROMIS V1.1 SURVEY?: 0
TO WHAT EXTENT ARE YOU ABLE TO CARRY OUT YOUR EVERYDAY PHYSICAL ACTIVITIES SUCH AS WALKING, CLIMBING STAIRS, CARRYING GROCERIES, OR MOVING A CHAIR [ON A SCALE OF 1 (NOT AT ALL) TO 5 (COMPLETELY)]?: 4
IN GENERAL, PLEASE RATE HOW WELL YOU CARRY OUT YOUR USUAL SOCIAL ACTIVITIES (INCLUDES ACTIVITIES AT HOME, AT WORK, AND IN YOUR COMMUNITY, AND RESPONSIBILITIES AS A PARENT, CHILD, SPOUSE, EMPLOYEE, FRIEND, ETC) [ON A SCALE OF 1 (POOR) TO 5 (EXCELLENT)]?: 4
IN GENERAL, WOULD YOU SAY YOUR QUALITY OF LIFE IS...[ON A SCALE OF 1 (POOR) TO 5 (EXCELLENT)]: 4
IN GENERAL, HOW WOULD YOU RATE YOUR PHYSICAL HEALTH [ON A SCALE OF 1 (POOR) TO 5 (EXCELLENT)]?: 4
IN GENERAL, HOW WOULD YOU RATE YOUR SATISFACTION WITH YOUR SOCIAL ACTIVITIES AND RELATIONSHIPS [ON A SCALE OF 1 (POOR) TO 5 (EXCELLENT)]?: 4
IN GENERAL, HOW WOULD YOU RATE YOUR MENTAL HEALTH, INCLUDING YOUR MOOD AND YOUR ABILITY TO THINK [ON A SCALE OF 1 (POOR) TO 5 (EXCELLENT)]?: 3
IN THE PAST 7 DAYS, HOW WOULD YOU RATE YOUR PAIN ON AVERAGE [ON A SCALE FROM 0 (NO PAIN) TO 10 (WORST IMAGINABLE PAIN)]?: 2
HOW IS THE PROMIS V1.1 BEING ADMINISTERED?: 2
IN THE PAST 7 DAYS, HOW WOULD YOU RATE YOUR FATIGUE ON AVERAGE [ON A SCALE FROM 1 (NONE) TO 5 (VERY SEVERE)]?: 4
IN GENERAL, WOULD YOU SAY YOUR HEALTH IS...[ON A SCALE OF 1 (POOR) TO 5 (EXCELLENT)]: 4
SUM OF RESPONSES TO QUESTIONS 2, 4, 5, & 10: 14

## 2023-08-07 ASSESSMENT — KOOS JR
KOOS JR TOTAL INTERVAL SCORE: 79.914
TWISING OR PIVOTING ON KNEE: 0
HOW SEVERE IS YOUR KNEE STIFFNESS AFTER FIRST WAKING IN MORNING: 0
STRAIGHTENING KNEE FULLY: 0
GOING UP OR DOWN STAIRS: 2
RISING FROM SITTING: 0
STANDING UPRIGHT: 0
BENDING TO THE FLOOR TO PICK UP OBJECT: 1

## 2023-08-10 ENCOUNTER — OFFICE VISIT (OUTPATIENT)
Dept: FAMILY MEDICINE CLINIC | Age: 73
End: 2023-08-10
Payer: MEDICARE

## 2023-08-10 VITALS
BODY MASS INDEX: 34.38 KG/M2 | WEIGHT: 253.8 LBS | SYSTOLIC BLOOD PRESSURE: 132 MMHG | RESPIRATION RATE: 18 BRPM | OXYGEN SATURATION: 96 % | DIASTOLIC BLOOD PRESSURE: 74 MMHG | HEIGHT: 72 IN | HEART RATE: 76 BPM | TEMPERATURE: 97.9 F

## 2023-08-10 DIAGNOSIS — N64.4 PAIN OF RIGHT BREAST: ICD-10-CM

## 2023-08-10 DIAGNOSIS — J32.9 SINOBRONCHITIS: Primary | ICD-10-CM

## 2023-08-10 DIAGNOSIS — J40 SINOBRONCHITIS: Primary | ICD-10-CM

## 2023-08-10 PROCEDURE — 1036F TOBACCO NON-USER: CPT | Performed by: PHYSICIAN ASSISTANT

## 2023-08-10 PROCEDURE — 99214 OFFICE O/P EST MOD 30 MIN: CPT | Performed by: PHYSICIAN ASSISTANT

## 2023-08-10 PROCEDURE — 3078F DIAST BP <80 MM HG: CPT | Performed by: PHYSICIAN ASSISTANT

## 2023-08-10 PROCEDURE — G8417 CALC BMI ABV UP PARAM F/U: HCPCS | Performed by: PHYSICIAN ASSISTANT

## 2023-08-10 PROCEDURE — 3075F SYST BP GE 130 - 139MM HG: CPT | Performed by: PHYSICIAN ASSISTANT

## 2023-08-10 PROCEDURE — 1123F ACP DISCUSS/DSCN MKR DOCD: CPT | Performed by: PHYSICIAN ASSISTANT

## 2023-08-10 PROCEDURE — G8427 DOCREV CUR MEDS BY ELIG CLIN: HCPCS | Performed by: PHYSICIAN ASSISTANT

## 2023-08-10 PROCEDURE — 3017F COLORECTAL CA SCREEN DOC REV: CPT | Performed by: PHYSICIAN ASSISTANT

## 2023-08-10 RX ORDER — DOXYCYCLINE HYCLATE 100 MG
100 TABLET ORAL 2 TIMES DAILY
Qty: 20 TABLET | Refills: 0 | Status: SHIPPED | OUTPATIENT
Start: 2023-08-10 | End: 2023-08-20

## 2023-08-10 RX ORDER — METHYLPREDNISOLONE 4 MG/1
TABLET ORAL
Qty: 1 KIT | Refills: 0 | Status: SHIPPED | OUTPATIENT
Start: 2023-08-10 | End: 2023-08-16

## 2023-08-10 NOTE — PROGRESS NOTES
Chief Complaint       Cough (Onset x 3 days /Has tested for Covid 2 x already both tests were negative ), Drainage, Congestion, and Breast Pain      History of Present Illness   Source of history provided by: patient. Isabel Odmo is a 75 y.o. old male presenting to the walk in clinic for evaluation of sinus pressure, nasal congestion, discolored nasal drainage, occasionally productive cough, and chest congestion which has been present for the past 3 days. Denies any fever, chills, loss of taste or smell, CP, dyspnea, LE edema, abdominal pain, vomiting, rash, or lethargy. Denies any hx of asthma, COPD, or tobacco use. Patient reports recent sick exposures. Patient has been vaccinated for COVID-19. Patient reports that he tested negative for COVID-19 twice at home prior to arrival.    Patient is also complaining of intermittent pain of his right nipple. This has been present for the past 3 weeks. Patient is mostly concerned because his pain does not seem to be resolving. He denies any associated injury to the site. Denies any overlying rash, swelling, or erythema. Denies any night sweats or unintentional weight loss. ROS    Unless otherwise stated in this report or unable to obtain because of the patient's clinical or mental status as evidenced by the medical record, this patients's positive and negative responses for Review of Systems, constitutional, psych, eyes, ENT, cardiovascular, respiratory, gastrointestinal, neurological, genitourinary, musculoskeletal, integument systems and systems related to the presenting problem are either stated in the preceding or were not pertinent or were negative for the symptoms and/or complaints related to the medical problem. Past Medical History:  has a past medical history of Acid reflux, Arthritis, BPH (benign prostatic hyperplasia), Cancer ([address]), COVID-19, Heart murmur, Hypertension, Left knee pain, Mitral valve disorder, and Palpitations.   Past Surgical

## 2023-08-29 ENCOUNTER — HOSPITAL ENCOUNTER (OUTPATIENT)
Dept: GENERAL RADIOLOGY | Age: 73
End: 2023-08-29
Payer: MEDICARE

## 2023-08-29 ENCOUNTER — HOSPITAL ENCOUNTER (OUTPATIENT)
Dept: GENERAL RADIOLOGY | Age: 73
Discharge: HOME | End: 2023-08-31
Payer: MEDICARE

## 2023-08-29 DIAGNOSIS — N64.4 PAIN OF RIGHT BREAST: ICD-10-CM

## 2023-08-29 PROCEDURE — G0279 TOMOSYNTHESIS, MAMMO: HCPCS

## 2023-10-03 ENCOUNTER — OFFICE VISIT (OUTPATIENT)
Dept: FAMILY MEDICINE CLINIC | Age: 73
End: 2023-10-03
Payer: MEDICARE

## 2023-10-03 VITALS
HEIGHT: 72 IN | BODY MASS INDEX: 34.54 KG/M2 | HEART RATE: 74 BPM | DIASTOLIC BLOOD PRESSURE: 80 MMHG | RESPIRATION RATE: 18 BRPM | TEMPERATURE: 97.5 F | WEIGHT: 255 LBS | OXYGEN SATURATION: 98 % | SYSTOLIC BLOOD PRESSURE: 124 MMHG

## 2023-10-03 DIAGNOSIS — E78.49 OTHER HYPERLIPIDEMIA: ICD-10-CM

## 2023-10-03 DIAGNOSIS — I10 HYPERTENSION, UNSPECIFIED TYPE: Primary | ICD-10-CM

## 2023-10-03 DIAGNOSIS — N62 GYNECOMASTIA, MALE: ICD-10-CM

## 2023-10-03 DIAGNOSIS — Z12.5 SCREENING FOR PROSTATE CANCER: ICD-10-CM

## 2023-10-03 DIAGNOSIS — I10 HYPERTENSION, UNSPECIFIED TYPE: ICD-10-CM

## 2023-10-03 DIAGNOSIS — M94.0 COSTOCHONDRITIS: ICD-10-CM

## 2023-10-03 LAB
ABSOLUTE IMMATURE GRANULOCYTE: <0.03 K/UL (ref 0–0.58)
ALBUMIN SERPL-MCNC: 4.8 G/DL (ref 3.5–5.2)
ALP BLD-CCNC: 64 U/L (ref 40–129)
ALT SERPL-CCNC: 24 U/L (ref 0–40)
ANION GAP SERPL CALCULATED.3IONS-SCNC: 16 MMOL/L (ref 7–16)
AST SERPL-CCNC: 28 U/L (ref 0–39)
BASOPHILS ABSOLUTE: 0.02 K/UL (ref 0–0.2)
BASOPHILS RELATIVE PERCENT: 0 % (ref 0–2)
BILIRUB SERPL-MCNC: 0.7 MG/DL (ref 0–1.2)
BILIRUBIN URINE: NEGATIVE
BUN BLDV-MCNC: 15 MG/DL (ref 6–23)
CALCIUM SERPL-MCNC: 10.1 MG/DL (ref 8.6–10.2)
CHLORIDE BLD-SCNC: 101 MMOL/L (ref 98–107)
CHOLESTEROL: 140 MG/DL
CO2: 21 MMOL/L (ref 22–29)
COLOR: YELLOW
COMMENT: NORMAL
CREAT SERPL-MCNC: 0.9 MG/DL (ref 0.7–1.2)
EOSINOPHILS ABSOLUTE: 0.15 K/UL (ref 0.05–0.5)
EOSINOPHILS RELATIVE PERCENT: 3 % (ref 0–6)
GFR SERPL CREATININE-BSD FRML MDRD: >60 ML/MIN/1.73M2
GLUCOSE BLD-MCNC: 101 MG/DL (ref 74–99)
GLUCOSE URINE: NEGATIVE MG/DL
HCT VFR BLD CALC: 43.4 % (ref 37–54)
HDLC SERPL-MCNC: 50 MG/DL
HEMOGLOBIN: 14.5 G/DL (ref 12.5–16.5)
IMMATURE GRANULOCYTES: 0 % (ref 0–5)
KETONES, URINE: NEGATIVE MG/DL
LDL CHOLESTEROL: 81 MG/DL
LEUKOCYTE ESTERASE, URINE: NEGATIVE
LYMPHOCYTES ABSOLUTE: 1.45 K/UL (ref 1.5–4)
LYMPHOCYTES RELATIVE PERCENT: 32 % (ref 20–42)
MCH RBC QN AUTO: 32.4 PG (ref 26–35)
MCHC RBC AUTO-ENTMCNC: 33.4 G/DL (ref 32–34.5)
MCV RBC AUTO: 97.1 FL (ref 80–99.9)
MONOCYTES ABSOLUTE: 0.55 K/UL (ref 0.1–0.95)
MONOCYTES RELATIVE PERCENT: 12 % (ref 2–12)
NEUTROPHILS ABSOLUTE: 2.41 K/UL (ref 1.8–7.3)
NEUTROPHILS RELATIVE PERCENT: 53 % (ref 43–80)
NITRITE, URINE: NEGATIVE
PDW BLD-RTO: 14.2 % (ref 11.5–15)
PH UA: 7 (ref 5–9)
PLATELET # BLD: 189 K/UL (ref 130–450)
PMV BLD AUTO: 9.6 FL (ref 7–12)
POTASSIUM SERPL-SCNC: 4.8 MMOL/L (ref 3.5–5)
PROLACTIN: 7 NG/ML
PROSTATE SPECIFIC ANTIGEN: 0.46 NG/ML (ref 0–4)
PROTEIN UA: NEGATIVE MG/DL
RBC # BLD: 4.47 M/UL (ref 3.8–5.8)
SODIUM BLD-SCNC: 138 MMOL/L (ref 132–146)
SPECIFIC GRAVITY UA: 1.01 (ref 1–1.03)
TOTAL PROTEIN: 7.6 G/DL (ref 6.4–8.3)
TRIGL SERPL-MCNC: 43 MG/DL
TSH SERPL DL<=0.05 MIU/L-ACNC: 3.23 UIU/ML (ref 0.27–4.2)
TURBIDITY: CLEAR
URINE HGB: NEGATIVE
UROBILINOGEN, URINE: 0.2 EU/DL (ref 0–1)
VLDLC SERPL CALC-MCNC: 9 MG/DL
WBC # BLD: 4.6 K/UL (ref 4.5–11.5)

## 2023-10-03 PROCEDURE — 99214 OFFICE O/P EST MOD 30 MIN: CPT | Performed by: FAMILY MEDICINE

## 2023-10-03 PROCEDURE — G8427 DOCREV CUR MEDS BY ELIG CLIN: HCPCS | Performed by: FAMILY MEDICINE

## 2023-10-03 PROCEDURE — G0008 ADMIN INFLUENZA VIRUS VAC: HCPCS | Performed by: FAMILY MEDICINE

## 2023-10-03 PROCEDURE — 3074F SYST BP LT 130 MM HG: CPT | Performed by: FAMILY MEDICINE

## 2023-10-03 PROCEDURE — 3078F DIAST BP <80 MM HG: CPT | Performed by: FAMILY MEDICINE

## 2023-10-03 PROCEDURE — 1123F ACP DISCUSS/DSCN MKR DOCD: CPT | Performed by: FAMILY MEDICINE

## 2023-10-03 PROCEDURE — G8417 CALC BMI ABV UP PARAM F/U: HCPCS | Performed by: FAMILY MEDICINE

## 2023-10-03 PROCEDURE — 90694 VACC AIIV4 NO PRSRV 0.5ML IM: CPT | Performed by: FAMILY MEDICINE

## 2023-10-03 PROCEDURE — 3017F COLORECTAL CA SCREEN DOC REV: CPT | Performed by: FAMILY MEDICINE

## 2023-10-03 PROCEDURE — G8484 FLU IMMUNIZE NO ADMIN: HCPCS | Performed by: FAMILY MEDICINE

## 2023-10-03 PROCEDURE — 1036F TOBACCO NON-USER: CPT | Performed by: FAMILY MEDICINE

## 2023-10-03 PROCEDURE — 93000 ELECTROCARDIOGRAM COMPLETE: CPT | Performed by: FAMILY MEDICINE

## 2023-10-03 SDOH — ECONOMIC STABILITY: INCOME INSECURITY: HOW HARD IS IT FOR YOU TO PAY FOR THE VERY BASICS LIKE FOOD, HOUSING, MEDICAL CARE, AND HEATING?: NOT HARD AT ALL

## 2023-10-03 SDOH — ECONOMIC STABILITY: HOUSING INSECURITY
IN THE LAST 12 MONTHS, WAS THERE A TIME WHEN YOU DID NOT HAVE A STEADY PLACE TO SLEEP OR SLEPT IN A SHELTER (INCLUDING NOW)?: NO

## 2023-10-03 SDOH — ECONOMIC STABILITY: FOOD INSECURITY: WITHIN THE PAST 12 MONTHS, YOU WORRIED THAT YOUR FOOD WOULD RUN OUT BEFORE YOU GOT MONEY TO BUY MORE.: NEVER TRUE

## 2023-10-03 SDOH — ECONOMIC STABILITY: FOOD INSECURITY: WITHIN THE PAST 12 MONTHS, THE FOOD YOU BOUGHT JUST DIDN'T LAST AND YOU DIDN'T HAVE MONEY TO GET MORE.: NEVER TRUE

## 2023-10-05 ASSESSMENT — ENCOUNTER SYMPTOMS
BACK PAIN: 0
COUGH: 0
EYE REDNESS: 0
SINUS PRESSURE: 1
PHOTOPHOBIA: 0
SORE THROAT: 0
CONSTIPATION: 0
VOMITING: 0
WHEEZING: 0
DIARRHEA: 0
ABDOMINAL PAIN: 0
BLOOD IN STOOL: 0
TROUBLE SWALLOWING: 0
SHORTNESS OF BREATH: 0
SINUS PAIN: 0

## 2023-11-15 ENCOUNTER — OFFICE VISIT (OUTPATIENT)
Dept: FAMILY MEDICINE CLINIC | Age: 73
End: 2023-11-15
Payer: COMMERCIAL

## 2023-11-15 VITALS
SYSTOLIC BLOOD PRESSURE: 138 MMHG | HEART RATE: 85 BPM | RESPIRATION RATE: 18 BRPM | TEMPERATURE: 97.2 F | BODY MASS INDEX: 34.92 KG/M2 | HEIGHT: 72 IN | DIASTOLIC BLOOD PRESSURE: 80 MMHG | WEIGHT: 257.8 LBS | OXYGEN SATURATION: 97 %

## 2023-11-15 DIAGNOSIS — F41.9 ANXIETY: ICD-10-CM

## 2023-11-15 DIAGNOSIS — R07.89 CHEST DISCOMFORT: Primary | ICD-10-CM

## 2023-11-15 PROCEDURE — 1036F TOBACCO NON-USER: CPT | Performed by: FAMILY MEDICINE

## 2023-11-15 PROCEDURE — 93000 ELECTROCARDIOGRAM COMPLETE: CPT | Performed by: FAMILY MEDICINE

## 2023-11-15 PROCEDURE — 3075F SYST BP GE 130 - 139MM HG: CPT | Performed by: FAMILY MEDICINE

## 2023-11-15 PROCEDURE — G8417 CALC BMI ABV UP PARAM F/U: HCPCS | Performed by: FAMILY MEDICINE

## 2023-11-15 PROCEDURE — 1123F ACP DISCUSS/DSCN MKR DOCD: CPT | Performed by: FAMILY MEDICINE

## 2023-11-15 PROCEDURE — 3017F COLORECTAL CA SCREEN DOC REV: CPT | Performed by: FAMILY MEDICINE

## 2023-11-15 PROCEDURE — G8427 DOCREV CUR MEDS BY ELIG CLIN: HCPCS | Performed by: FAMILY MEDICINE

## 2023-11-15 PROCEDURE — 3079F DIAST BP 80-89 MM HG: CPT | Performed by: FAMILY MEDICINE

## 2023-11-15 PROCEDURE — 99214 OFFICE O/P EST MOD 30 MIN: CPT | Performed by: FAMILY MEDICINE

## 2023-11-15 PROCEDURE — G8484 FLU IMMUNIZE NO ADMIN: HCPCS | Performed by: FAMILY MEDICINE

## 2023-11-15 RX ORDER — PAROXETINE HYDROCHLORIDE 20 MG/1
20 TABLET, FILM COATED ORAL DAILY
Qty: 30 TABLET | Refills: 3 | Status: SHIPPED | OUTPATIENT
Start: 2023-11-15

## 2023-11-15 NOTE — PROGRESS NOTES
patient at some length, will take Paxil for a mos        No follow-ups on file.     Electronically signed by Candi Hylton MD on 11/15/23 at 3:11 PM EST

## 2023-12-19 PROBLEM — F33.1 MAJOR DEPRESSIVE DISORDER, RECURRENT, MODERATE (HCC): Status: ACTIVE | Noted: 2023-12-19

## 2023-12-29 ENCOUNTER — OFFICE VISIT (OUTPATIENT)
Dept: FAMILY MEDICINE CLINIC | Age: 73
End: 2023-12-29

## 2023-12-29 VITALS
HEIGHT: 72 IN | HEART RATE: 76 BPM | DIASTOLIC BLOOD PRESSURE: 80 MMHG | OXYGEN SATURATION: 98 % | RESPIRATION RATE: 18 BRPM | BODY MASS INDEX: 34.81 KG/M2 | WEIGHT: 257 LBS | SYSTOLIC BLOOD PRESSURE: 134 MMHG | TEMPERATURE: 98 F

## 2023-12-29 DIAGNOSIS — M25.512 ACUTE PAIN OF LEFT SHOULDER: ICD-10-CM
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
## 2023-12-29 RX ORDER — KETOROLAC TROMETHAMINE 30 MG/ML
30 INJECTION, SOLUTION INTRAMUSCULAR; INTRAVENOUS ONCE
Status: COMPLETED | OUTPATIENT
Start: 2023-12-29 | End: 2023-12-29

## 2023-12-29 RX ORDER — BACLOFEN 10 MG/1
10 TABLET ORAL 2 TIMES DAILY
Qty: 30 TABLET | Refills: 0 | Status: SHIPPED | OUTPATIENT
Start: 2023-12-29

## 2023-12-29 RX ADMIN — KETOROLAC TROMETHAMINE 30 MG: 30 INJECTION, SOLUTION INTRAMUSCULAR; INTRAVENOUS at 10:37

## 2023-12-29 NOTE — ASSESSMENT & PLAN NOTE
Initial appearance of x-ray does not show any acute fractures. Will await final reading by specialist. Will provide Toradol for pain. Advised on home exercises.   Muscle relaxers sent in

## 2023-12-29 NOTE — PROGRESS NOTES
[address] [place] [address] [address] [facility]  [address] [facility] [address]  Dept: [phone]  Dept Fax: Amber Mcqueen: [phone]   DATE OF VISIT : 2023      Patient:  Joel Andino  Age: 75 y.o.       : 1950      Chief complaint:   Chief Complaint   Patient presents with    Fall     Yesterday     Shoulder Pain     Left shoulder         History of Present Illness     Joel Andino is a 75 y.o. male who presented to the clinic today for left shoulder injury    Patient presents today after having a ground-level fall from taking down [place] lights. He reports missing a step on the ladder and falling onto his back and injuring his left posterior shoulder. He denies hitting his head or losing any consciousness. Ground-level fall was witnessed by family members. He reports feeling immediate pain on his posterior left shoulder. Since then he is unable to lift his left arm without feeling significant pain. Patient denies pain radiating anywhere. Overall feels bruised. X-rays obtained at urgent care currently does not appear to be fractured.   Will await final reading from specialist.    Medication List:    Current Outpatient Medications   Medication Sig Dispense Refill    baclofen (LIORESAL) 10 MG tablet Take 1 tablet by mouth 2 times daily 30 tablet 0    buPROPion (WELLBUTRIN XL) 150 MG extended release tablet Take 1 tablet by mouth every morning 30 tablet 5    PARoxetine (PAXIL) 20 MG tablet Take 1 tablet by mouth daily 30 tablet 3    tamsulosin (FLOMAX) 0.4 MG capsule Take 1 capsule by mouth daily Should be taken with food either at dinner or supper 90 capsule 1    carvedilol (COREG) 12.5 MG tablet take 1 tablet by mouth every morning then take 1 tablet every evening 180 tablet 3    olmesartan (BENICAR) 40 MG tablet take 1 tablet by mouth once daily 90 tablet 3    omeprazole (PRILOSEC) 40 MG delayed release capsule Take 1 capsule by mouth daily

## 2024-01-26 DIAGNOSIS — N40.1 BENIGN PROSTATIC HYPERPLASIA WITH URINARY FREQUENCY: ICD-10-CM

## 2024-01-26 DIAGNOSIS — R35.0 BENIGN PROSTATIC HYPERPLASIA WITH URINARY FREQUENCY: ICD-10-CM

## 2024-01-27 RX ORDER — TAMSULOSIN HYDROCHLORIDE 0.4 MG/1
CAPSULE ORAL
Qty: 90 CAPSULE | Refills: 1 | Status: SHIPPED | OUTPATIENT
Start: 2024-01-27

## 2024-03-15 DIAGNOSIS — R07.89 CHEST DISCOMFORT: ICD-10-CM

## 2024-03-15 DIAGNOSIS — F41.9 ANXIETY: ICD-10-CM

## 2024-03-15 RX ORDER — PAROXETINE HYDROCHLORIDE 20 MG/1
20 TABLET, FILM COATED ORAL DAILY
Qty: 30 TABLET | Refills: 2 | Status: SHIPPED | OUTPATIENT
Start: 2024-03-15

## 2024-03-15 NOTE — TELEPHONE ENCOUNTER
Last Appointment:  12/19/2023  Future Appointments   Date Time Provider Department Center   6/24/2024  8:15 AM Monroe Claudio MD [facility]

## 2024-03-20 ENCOUNTER — OFFICE VISIT (OUTPATIENT)
Dept: FAMILY MEDICINE CLINIC | Age: 74
End: 2024-03-20

## 2024-03-20 VITALS
OXYGEN SATURATION: 96 % | DIASTOLIC BLOOD PRESSURE: 84 MMHG | TEMPERATURE: 97.3 F | HEIGHT: 72 IN | BODY MASS INDEX: 33.59 KG/M2 | SYSTOLIC BLOOD PRESSURE: 138 MMHG | WEIGHT: 248 LBS | HEART RATE: 66 BPM

## 2024-03-20 DIAGNOSIS — S60.552A SPLINTER OF LEFT HAND: Primary | ICD-10-CM

## 2024-03-20 DIAGNOSIS — I10 ESSENTIAL HYPERTENSION: ICD-10-CM

## 2024-03-20 RX ORDER — CEFDINIR 300 MG/1
300 CAPSULE ORAL 2 TIMES DAILY
Qty: 14 CAPSULE | Refills: 0 | Status: SHIPPED | OUTPATIENT
Start: 2024-03-20 | End: 2024-03-27

## 2024-03-20 NOTE — PROGRESS NOTES
OFFICE NOTE    3/20/24  Name: Rosas Corbin  :1950   Sex:male   Age:73 y.o.      SUBJECTIVE  Chief Complaint   Patient presents with    Foreign Body in Skin     In left hand, had piece of wood jammed in hand          HPI believe he still has splinter material in left hand at  mid thenar muscle. Has been trying to remove it unsuccessfully. Has been in there for 4 days at least.    Review of Systems   No fever chills or cellulitis      Current Outpatient Medications:     cefdinir (OMNICEF) 300 MG capsule, Take 1 capsule by mouth 2 times daily for 7 days, Disp: 14 capsule, Rfl: 0    doxycycline hyclate (VIBRA-TABS) 100 MG tablet, Take 1 tablet by mouth 2 times daily for 10 days, Disp: 20 tablet, Rfl: 0    PARoxetine (PAXIL) 20 MG tablet, TAKE ONE TABLET BY MOUTH EVERY DAY, Disp: 30 tablet, Rfl: 2    tamsulosin (FLOMAX) 0.4 MG capsule, TAKE 1 CAPSULE BY MOUTH DAILY, SHOULD BE TAKEN WITH FOOD (DINNER OR SUPPER)., Disp: 90 capsule, Rfl: 1    baclofen (LIORESAL) 10 MG tablet, Take 1 tablet by mouth 2 times daily, Disp: 30 tablet, Rfl: 0    buPROPion (WELLBUTRIN XL) 150 MG extended release tablet, Take 1 tablet by mouth every morning, Disp: 30 tablet, Rfl: 5    carvedilol (COREG) 12.5 MG tablet, take 1 tablet by mouth every morning then take 1 tablet every evening, Disp: 180 tablet, Rfl: 3    olmesartan (BENICAR) 40 MG tablet, take 1 tablet by mouth once daily, Disp: 90 tablet, Rfl: 3    omeprazole (PRILOSEC) 40 MG delayed release capsule, Take 1 capsule by mouth daily, Disp: 90 capsule, Rfl: 3    Amoxicillin 500 MG TABS, , Disp: , Rfl:     ASPIRIN 81 PO, Take by mouth, Disp: , Rfl:     albuterol sulfate HFA (VENTOLIN HFA) 108 (90 Base) MCG/ACT inhaler, Inhale 2 puffs into the lungs 4 times daily as needed for Wheezing, Disp: 18 g, Rfl: 5    ibuprofen (ADVIL;MOTRIN) 200 MG tablet, Take 1 tablet by mouth every 6 hours as needed for Pain, Disp: , Rfl:     cetirizine (ZYRTEC) 10 MG tablet, Take 1 tablet by mouth daily,

## 2024-06-11 DIAGNOSIS — R07.89 CHEST DISCOMFORT: ICD-10-CM

## 2024-06-11 DIAGNOSIS — F41.9 ANXIETY: ICD-10-CM

## 2024-06-11 RX ORDER — BUPROPION HYDROCHLORIDE 150 MG/1
150 TABLET ORAL EVERY MORNING
Qty: 90 TABLET | Refills: 1 | Status: SHIPPED | OUTPATIENT
Start: 2024-06-11

## 2024-06-11 RX ORDER — PAROXETINE HYDROCHLORIDE 20 MG/1
20 TABLET, FILM COATED ORAL DAILY
Qty: 90 TABLET | Refills: 1 | Status: SHIPPED | OUTPATIENT
Start: 2024-06-11

## 2024-06-11 NOTE — TELEPHONE ENCOUNTER
Last Appointment:  3/20/2024  Future Appointments   Date Time Provider Department Center   6/24/2024  8:15 AM Monroe Claudio MD [facility]

## 2024-06-24 ENCOUNTER — OFFICE VISIT (OUTPATIENT)
Dept: FAMILY MEDICINE CLINIC | Age: 74
End: 2024-06-24
Payer: MEDICARE

## 2024-06-24 VITALS
TEMPERATURE: 97 F | WEIGHT: 239 LBS | OXYGEN SATURATION: 98 % | HEIGHT: 72 IN | SYSTOLIC BLOOD PRESSURE: 130 MMHG | HEART RATE: 67 BPM | BODY MASS INDEX: 32.37 KG/M2 | DIASTOLIC BLOOD PRESSURE: 80 MMHG | RESPIRATION RATE: 18 BRPM

## 2024-06-24 DIAGNOSIS — R09.89 CAROTID BRUIT, UNSPECIFIED LATERALITY: ICD-10-CM

## 2024-06-24 DIAGNOSIS — Z00.00 MEDICARE ANNUAL WELLNESS VISIT, SUBSEQUENT: Primary | ICD-10-CM

## 2024-06-24 DIAGNOSIS — Z82.49 FAMILY HISTORY OF CORONARY ARTERY DISEASE IN FATHER: ICD-10-CM

## 2024-06-24 DIAGNOSIS — K21.00 GASTROESOPHAGEAL REFLUX DISEASE WITH ESOPHAGITIS WITHOUT HEMORRHAGE: ICD-10-CM

## 2024-06-24 DIAGNOSIS — I10 ESSENTIAL HYPERTENSION: ICD-10-CM

## 2024-06-24 DIAGNOSIS — R07.89 ATYPICAL CHEST PAIN: ICD-10-CM

## 2024-06-24 PROBLEM — F33.1 MAJOR DEPRESSIVE DISORDER, RECURRENT, MODERATE (HCC): Status: RESOLVED | Noted: 2023-12-19 | Resolved: 2024-06-24

## 2024-06-24 PROCEDURE — 3017F COLORECTAL CA SCREEN DOC REV: CPT | Performed by: FAMILY MEDICINE

## 2024-06-24 PROCEDURE — G0439 PPPS, SUBSEQ VISIT: HCPCS | Performed by: FAMILY MEDICINE

## 2024-06-24 PROCEDURE — 3075F SYST BP GE 130 - 139MM HG: CPT | Performed by: FAMILY MEDICINE

## 2024-06-24 PROCEDURE — 3079F DIAST BP 80-89 MM HG: CPT | Performed by: FAMILY MEDICINE

## 2024-06-24 PROCEDURE — 1123F ACP DISCUSS/DSCN MKR DOCD: CPT | Performed by: FAMILY MEDICINE

## 2024-06-24 RX ORDER — CARVEDILOL 12.5 MG/1
TABLET ORAL
Qty: 180 TABLET | Refills: 3 | Status: SHIPPED | OUTPATIENT
Start: 2024-06-24

## 2024-06-24 RX ORDER — OLMESARTAN MEDOXOMIL 40 MG/1
TABLET ORAL
Qty: 90 TABLET | Refills: 3 | Status: SHIPPED | OUTPATIENT
Start: 2024-06-24

## 2024-06-24 RX ORDER — OMEPRAZOLE 40 MG/1
40 CAPSULE, DELAYED RELEASE ORAL DAILY
Qty: 90 CAPSULE | Refills: 3 | Status: SHIPPED | OUTPATIENT
Start: 2024-06-24

## 2024-06-24 ASSESSMENT — PATIENT HEALTH QUESTIONNAIRE - PHQ9
SUM OF ALL RESPONSES TO PHQ9 QUESTIONS 1 & 2: 2
5. POOR APPETITE OR OVEREATING: NOT AT ALL
2. FEELING DOWN, DEPRESSED OR HOPELESS: SEVERAL DAYS
3. TROUBLE FALLING OR STAYING ASLEEP: SEVERAL DAYS
SUM OF ALL RESPONSES TO PHQ QUESTIONS 1-9: 4
9. THOUGHTS THAT YOU WOULD BE BETTER OFF DEAD, OR OF HURTING YOURSELF: NOT AT ALL
8. MOVING OR SPEAKING SO SLOWLY THAT OTHER PEOPLE COULD HAVE NOTICED. OR THE OPPOSITE, BEING SO FIGETY OR RESTLESS THAT YOU HAVE BEEN MOVING AROUND A LOT MORE THAN USUAL: NOT AT ALL
SUM OF ALL RESPONSES TO PHQ QUESTIONS 1-9: 4
6. FEELING BAD ABOUT YOURSELF - OR THAT YOU ARE A FAILURE OR HAVE LET YOURSELF OR YOUR FAMILY DOWN: SEVERAL DAYS
10. IF YOU CHECKED OFF ANY PROBLEMS, HOW DIFFICULT HAVE THESE PROBLEMS MADE IT FOR YOU TO DO YOUR WORK, TAKE CARE OF THINGS AT HOME, OR GET ALONG WITH OTHER PEOPLE: NOT DIFFICULT AT ALL
4. FEELING TIRED OR HAVING LITTLE ENERGY: NOT AT ALL
SUM OF ALL RESPONSES TO PHQ QUESTIONS 1-9: 4
7. TROUBLE CONCENTRATING ON THINGS, SUCH AS READING THE NEWSPAPER OR WATCHING TELEVISION: NOT AT ALL
SUM OF ALL RESPONSES TO PHQ QUESTIONS 1-9: 4

## 2024-06-24 ASSESSMENT — ENCOUNTER SYMPTOMS
COLOR CHANGE: 0
SHORTNESS OF BREATH: 0
RHINORRHEA: 1
VOMITING: 0
SORE THROAT: 0
BACK PAIN: 0
CONSTIPATION: 0
WHEEZING: 0
ABDOMINAL PAIN: 0
TROUBLE SWALLOWING: 0
SINUS PAIN: 0
COUGH: 0
BLOOD IN STOOL: 0
PHOTOPHOBIA: 0
EYE REDNESS: 0
DIARRHEA: 0

## 2024-06-24 NOTE — PROGRESS NOTES
Medicare Annual Wellness Visit    Rosas Corbin is here for Medicare AWV and Fatigue (More tired )    Assessment & Plan   Medicare annual wellness visit, subsequent  Gastroesophageal reflux disease with esophagitis without hemorrhage  -     omeprazole (PRILOSEC) 40 MG delayed release capsule; Take 1 capsule by mouth daily, Disp-90 capsule, R-3Normal  Essential hypertension  -     olmesartan (BENICAR) 40 MG tablet; take 1 tablet by mouth once daily, Disp-90 tablet, R-3**Patient requests 90 days supply**Normal  -     carvedilol (COREG) 12.5 MG tablet; take 1 tablet by mouth every morning then take 1 tablet every evening, Disp-180 tablet, R-3Normal  Family history of coronary artery disease in father  -     93880 - CT Duplex Scan Extracranial, [place]ño  -     Ruiz Chua MD, Cardiology, [place]  Atypical chest pain  -     Ruiz Chua MD, Cardiology, [place]  Carotid bruit, unspecified laterality  -     93880 - CT Duplex Scan Extracranial, Toño    Recommendations for Preventive Services Due: see orders and patient instructions/AVS.  Recommended screening schedule for the next 5-10 years is provided to the patient in written form: see Patient Instructions/AVS.     Return for Medicare Annual Wellness Visit in 1 year.     Subjective   The following acute and/or chronic problems were also addressed today:  See OV note  Patient's complete Health Risk Assessment and screening values have been reviewed and are found in Flowsheets. The following problems were reviewed today and where indicated follow up appointments were made and/or referrals ordered.    Positive Risk Factor Screenings with Interventions:          Drug Use:    DAST-10 Score: 1   Interpretation:  1-2: Low level - Monitor, re-assess at a later date  3-5: Moderate level - Further Investigation  6-8: Substantial level - Intensive Assessment  9-10: Severe level - Intensive Assessment  Interventions:  Rarely takes cannabis gummies with friend and

## 2024-06-24 NOTE — PATIENT INSTRUCTIONS

## 2024-07-09 PROBLEM — I45.10 RBBB: Status: ACTIVE | Noted: 2024-07-09

## 2024-07-09 PROBLEM — M25.512 ACUTE PAIN OF LEFT SHOULDER: Status: RESOLVED | Noted: 2023-12-29 | Resolved: 2024-07-09

## 2024-07-09 PROBLEM — M17.10 ARTHRITIS OF KNEE: Status: RESOLVED | Noted: 2021-06-15 | Resolved: 2024-07-09

## 2024-07-09 PROBLEM — M17.12 PRIMARY OSTEOARTHRITIS OF LEFT KNEE: Status: RESOLVED | Noted: 2018-08-10 | Resolved: 2024-07-09

## 2024-07-10 ENCOUNTER — OFFICE VISIT (OUTPATIENT)
Dept: CARDIOLOGY CLINIC | Age: 74
End: 2024-07-10
Payer: MEDICARE

## 2024-07-10 VITALS
WEIGHT: 235.2 LBS | BODY MASS INDEX: 31.86 KG/M2 | RESPIRATION RATE: 18 BRPM | SYSTOLIC BLOOD PRESSURE: 131 MMHG | HEART RATE: 60 BPM | HEIGHT: 72 IN | DIASTOLIC BLOOD PRESSURE: 90 MMHG

## 2024-07-10 DIAGNOSIS — I45.10 RBBB: Primary | ICD-10-CM

## 2024-07-10 DIAGNOSIS — R94.31 ABNORMAL ELECTROCARDIOGRAPHY: ICD-10-CM

## 2024-07-10 PROCEDURE — 3080F DIAST BP >= 90 MM HG: CPT | Performed by: INTERNAL MEDICINE

## 2024-07-10 PROCEDURE — 99204 OFFICE O/P NEW MOD 45 MIN: CPT | Performed by: INTERNAL MEDICINE

## 2024-07-10 PROCEDURE — G8427 DOCREV CUR MEDS BY ELIG CLIN: HCPCS | Performed by: INTERNAL MEDICINE

## 2024-07-10 PROCEDURE — 1036F TOBACCO NON-USER: CPT | Performed by: INTERNAL MEDICINE

## 2024-07-10 PROCEDURE — 1123F ACP DISCUSS/DSCN MKR DOCD: CPT | Performed by: INTERNAL MEDICINE

## 2024-07-10 PROCEDURE — 3017F COLORECTAL CA SCREEN DOC REV: CPT | Performed by: INTERNAL MEDICINE

## 2024-07-10 PROCEDURE — 93000 ELECTROCARDIOGRAM COMPLETE: CPT | Performed by: INTERNAL MEDICINE

## 2024-07-10 PROCEDURE — G8417 CALC BMI ABV UP PARAM F/U: HCPCS | Performed by: INTERNAL MEDICINE

## 2024-07-10 PROCEDURE — 3075F SYST BP GE 130 - 139MM HG: CPT | Performed by: INTERNAL MEDICINE

## 2024-07-11 ENCOUNTER — TELEPHONE (OUTPATIENT)
Dept: CARDIOLOGY | Age: 74
End: 2024-07-11

## 2024-07-11 NOTE — TELEPHONE ENCOUNTER
Spoke w/ patient to confirm stress test for Monday, 7/15/24, starting at 0700.  Instructions given and medications reviewed.  Patient instructed to hold Coreg for 48 hrs prior to test and instructed no caffeine products for 12 hours prior to test.  All questions answered.

## 2024-07-15 ENCOUNTER — HOSPITAL ENCOUNTER (OUTPATIENT)
Dept: CARDIOLOGY | Age: 74
Discharge: HOME | End: 2024-07-17
Payer: MEDICARE

## 2024-07-15 VITALS
HEART RATE: 66 BPM | HEIGHT: 72 IN | RESPIRATION RATE: 14 BRPM | BODY MASS INDEX: 31.83 KG/M2 | WEIGHT: 235 LBS | SYSTOLIC BLOOD PRESSURE: 132 MMHG | DIASTOLIC BLOOD PRESSURE: 76 MMHG

## 2024-07-15 DIAGNOSIS — I45.10 RBBB: ICD-10-CM

## 2024-07-15 DIAGNOSIS — R94.31 ABNORMAL ELECTROCARDIOGRAPHY: ICD-10-CM

## 2024-07-15 LAB
ECHO BSA: 2.33 M2
NUC REST EJECTION FRACTION: 68 %
STRESS BASELINE DIAS BP: 76 MMHG
STRESS BASELINE HR: 67 BPM
STRESS BASELINE SYS BP: 132 MMHG
STRESS ESTIMATED WORKLOAD: 4.1 METS
STRESS EXERCISE DUR MIN: 9 MIN
STRESS PEAK DIAS BP: 70 MMHG
STRESS PEAK SYS BP: 140 MMHG
STRESS PERCENT HR ACHIEVED: 81 %
STRESS POST PEAK HR: 118 BPM
STRESS RATE PRESSURE PRODUCT: NORMAL BPM*MMHG
STRESS TARGET HR: 145 BPM

## 2024-07-15 PROCEDURE — 2580000003 HC RX 258: Performed by: INTERNAL MEDICINE

## 2024-07-15 PROCEDURE — 93016 CV STRESS TEST SUPVJ ONLY: CPT | Performed by: INTERNAL MEDICINE

## 2024-07-15 PROCEDURE — 3430000000 HC RX DIAGNOSTIC RADIOPHARMACEUTICAL: Performed by: INTERNAL MEDICINE

## 2024-07-15 PROCEDURE — A9500 TC99M SESTAMIBI: HCPCS | Performed by: INTERNAL MEDICINE

## 2024-07-15 PROCEDURE — 93018 CV STRESS TEST I&R ONLY: CPT | Performed by: INTERNAL MEDICINE

## 2024-07-15 PROCEDURE — 78452 HT MUSCLE IMAGE SPECT MULT: CPT | Performed by: INTERNAL MEDICINE

## 2024-07-15 PROCEDURE — 93017 CV STRESS TEST TRACING ONLY: CPT

## 2024-07-15 RX ORDER — SODIUM CHLORIDE 0.9 % (FLUSH) 0.9 %
10 SYRINGE (ML) INJECTION PRN
Status: DISCONTINUED | OUTPATIENT
Start: 2024-07-15 | End: 2024-07-18 | Stop reason: HOSPADM

## 2024-07-15 RX ORDER — TETRAKIS(2-METHOXYISOBUTYLISOCYANIDE)COPPER(I) TETRAFLUOROBORATE 1 MG/ML
33.9 INJECTION, POWDER, LYOPHILIZED, FOR SOLUTION INTRAVENOUS
Status: COMPLETED | OUTPATIENT
Start: 2024-07-15 | End: 2024-07-15

## 2024-07-15 RX ORDER — TETRAKIS(2-METHOXYISOBUTYLISOCYANIDE)COPPER(I) TETRAFLUOROBORATE 1 MG/ML
10.6 INJECTION, POWDER, LYOPHILIZED, FOR SOLUTION INTRAVENOUS
Status: COMPLETED | OUTPATIENT
Start: 2024-07-15 | End: 2024-07-15

## 2024-07-15 RX ADMIN — SODIUM CHLORIDE, PRESERVATIVE FREE 10 ML: 5 INJECTION INTRAVENOUS at 08:44

## 2024-07-15 RX ADMIN — Medication 33.9 MILLICURIE: at 08:44

## 2024-07-15 RX ADMIN — SODIUM CHLORIDE, PRESERVATIVE FREE 10 ML: 5 INJECTION INTRAVENOUS at 07:16

## 2024-07-15 RX ADMIN — Medication 10.6 MILLICURIE: at 07:15

## 2024-07-16 ENCOUNTER — TELEPHONE (OUTPATIENT)
Dept: CARDIOLOGY CLINIC | Age: 74
End: 2024-07-16

## 2024-07-16 NOTE — TELEPHONE ENCOUNTER
----- Message from Bruno Montilla MD sent at 7/15/2024  4:45 PM EDT -----  Normal stress.   Above average exercise time as well  Ov prn    ----- Message -----  From: Martinez Hunter DO  Sent: 7/15/2024   2:21 PM EDT  To: Bruno Montilla MD

## 2024-08-06 ENCOUNTER — HOSPITAL ENCOUNTER (INPATIENT)
Age: 74
LOS: 7 days | Discharge: NURSING, REHAB OR LONG-TERM CARE | DRG: 515 | End: 2024-08-13
Attending: EMERGENCY MEDICINE | Admitting: STUDENT IN AN ORGANIZED HEALTH CARE EDUCATION/TRAINING PROGRAM
Payer: MEDICARE

## 2024-08-06 ENCOUNTER — APPOINTMENT (OUTPATIENT)
Dept: CT IMAGING | Age: 74
DRG: 515 | End: 2024-08-06
Payer: MEDICARE

## 2024-08-06 ENCOUNTER — APPOINTMENT (OUTPATIENT)
Dept: GENERAL RADIOLOGY | Age: 74
DRG: 515 | End: 2024-08-06
Payer: MEDICARE

## 2024-08-06 DIAGNOSIS — S32.9XXA CLOSED NONDISPLACED FRACTURE OF PELVIS, UNSPECIFIED PART OF PELVIS, INITIAL ENCOUNTER (HCC): ICD-10-CM

## 2024-08-06 DIAGNOSIS — S32.401A CLOSED NONDISPLACED FRACTURE OF RIGHT ACETABULUM, UNSPECIFIED PORTION OF ACETABULUM, INITIAL ENCOUNTER (HCC): ICD-10-CM

## 2024-08-06 DIAGNOSIS — S22.31XA CLOSED FRACTURE OF ONE RIB OF RIGHT SIDE, INITIAL ENCOUNTER: ICD-10-CM

## 2024-08-06 DIAGNOSIS — K68.3 RETROPERITONEAL HEMATOMA: ICD-10-CM
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
## 2024-08-06 PROBLEM — S32.434A: Status: ACTIVE | Noted: 2024-08-06

## 2024-08-06 LAB
ABO + RH BLD: NORMAL
ALBUMIN SERPL-MCNC: 3.9 G/DL (ref 3.5–5.2)
ALP SERPL-CCNC: 62 U/L (ref 40–129)
ALT SERPL-CCNC: 37 U/L (ref 0–40)
ANION GAP SERPL CALCULATED.3IONS-SCNC: 8 MMOL/L (ref 7–16)
ARM BAND NUMBER: NORMAL
AST SERPL-CCNC: 49 U/L (ref 0–39)
BASOPHILS # BLD: 0.01 K/UL (ref 0–0.2)
BASOPHILS # BLD: 0.02 K/UL (ref 0–0.2)
BASOPHILS NFR BLD: 0 % (ref 0–2)
BASOPHILS NFR BLD: 0 % (ref 0–2)
BILIRUB SERPL-MCNC: 0.7 MG/DL (ref 0–1.2)
BLOOD BANK SAMPLE EXPIRATION: NORMAL
BLOOD GROUP ANTIBODIES SERPL: NEGATIVE
BUN SERPL-MCNC: 18 MG/DL (ref 6–23)
CALCIUM SERPL-MCNC: 9.2 MG/DL (ref 8.6–10.2)
CHLORIDE SERPL-SCNC: 102 MMOL/L (ref 98–107)
CO2 SERPL-SCNC: 24 MMOL/L (ref 22–29)
CREAT SERPL-MCNC: 1 MG/DL (ref 0.7–1.2)
EOSINOPHIL # BLD: 0.03 K/UL (ref 0.05–0.5)
EOSINOPHIL # BLD: 0.11 K/UL (ref 0.05–0.5)
EOSINOPHILS RELATIVE PERCENT: 1 % (ref 0–6)
EOSINOPHILS RELATIVE PERCENT: 2 % (ref 0–6)
ERYTHROCYTE [DISTWIDTH] IN BLOOD BY AUTOMATED COUNT: 13.2 % (ref 11.5–15)
ERYTHROCYTE [DISTWIDTH] IN BLOOD BY AUTOMATED COUNT: 13.2 % (ref 11.5–15)
GFR, ESTIMATED: 77 ML/MIN/1.73M2
GLUCOSE SERPL-MCNC: 153 MG/DL (ref 74–99)
HCT VFR BLD AUTO: 32.9 % (ref 37–54)
HCT VFR BLD AUTO: 35.7 % (ref 37–54)
HGB BLD-MCNC: 11.6 G/DL (ref 12.5–16.5)
HGB BLD-MCNC: 12.6 G/DL (ref 12.5–16.5)
IMM GRANULOCYTES # BLD AUTO: 0.03 K/UL (ref 0–0.58)
IMM GRANULOCYTES # BLD AUTO: <0.03 K/UL (ref 0–0.58)
IMM GRANULOCYTES NFR BLD: 0 % (ref 0–5)
IMM GRANULOCYTES NFR BLD: 1 % (ref 0–5)
INR PPP: 1.3
LYMPHOCYTES NFR BLD: 0.67 K/UL (ref 1.5–4)
LYMPHOCYTES NFR BLD: 0.86 K/UL (ref 1.5–4)
LYMPHOCYTES RELATIVE PERCENT: 13 % (ref 20–42)
LYMPHOCYTES RELATIVE PERCENT: 14 % (ref 20–42)
MCH RBC QN AUTO: 34.1 PG (ref 26–35)
MCH RBC QN AUTO: 34.3 PG (ref 26–35)
MCHC RBC AUTO-ENTMCNC: 35.3 G/DL (ref 32–34.5)
MCHC RBC AUTO-ENTMCNC: 35.3 G/DL (ref 32–34.5)
MCV RBC AUTO: 96.7 FL (ref 80–99.9)
MCV RBC AUTO: 97.3 FL (ref 80–99.9)
MONOCYTES NFR BLD: 0.41 K/UL (ref 0.1–0.95)
MONOCYTES NFR BLD: 0.69 K/UL (ref 0.1–0.95)
MONOCYTES NFR BLD: 11 % (ref 2–12)
MONOCYTES NFR BLD: 8 % (ref 2–12)
NEUTROPHILS NFR BLD: 74 % (ref 43–80)
NEUTROPHILS NFR BLD: 77 % (ref 43–80)
NEUTS SEG NFR BLD: 4.14 K/UL (ref 1.8–7.3)
NEUTS SEG NFR BLD: 4.51 K/UL (ref 1.8–7.3)
PLATELET # BLD AUTO: 141 K/UL (ref 130–450)
PLATELET # BLD AUTO: 155 K/UL (ref 130–450)
PMV BLD AUTO: 9.1 FL (ref 7–12)
PMV BLD AUTO: 9.2 FL (ref 7–12)
POTASSIUM SERPL-SCNC: 4.6 MMOL/L (ref 3.5–5)
PROT SERPL-MCNC: 6.4 G/DL (ref 6.4–8.3)
PROTHROMBIN TIME: 13.9 SEC (ref 9.3–12.4)
RBC # BLD AUTO: 3.38 M/UL (ref 3.8–5.8)
RBC # BLD AUTO: 3.69 M/UL (ref 3.8–5.8)
SODIUM SERPL-SCNC: 134 MMOL/L (ref 132–146)
WBC OTHER # BLD: 5.4 K/UL (ref 4.5–11.5)
WBC OTHER # BLD: 6.1 K/UL (ref 4.5–11.5)

## 2024-08-06 PROCEDURE — 6370000000 HC RX 637 (ALT 250 FOR IP)

## 2024-08-06 PROCEDURE — 72131 CT LUMBAR SPINE W/O DYE: CPT

## 2024-08-06 PROCEDURE — 2580000003 HC RX 258

## 2024-08-06 PROCEDURE — 74177 CT ABD & PELVIS W/CONTRAST: CPT

## 2024-08-06 PROCEDURE — 96374 THER/PROPH/DIAG INJ IV PUSH: CPT

## 2024-08-06 PROCEDURE — 86850 RBC ANTIBODY SCREEN: CPT

## 2024-08-06 PROCEDURE — 72125 CT NECK SPINE W/O DYE: CPT

## 2024-08-06 PROCEDURE — 76376 3D RENDER W/INTRP POSTPROCES: CPT

## 2024-08-06 PROCEDURE — 99222 1ST HOSP IP/OBS MODERATE 55: CPT | Performed by: STUDENT IN AN ORGANIZED HEALTH CARE EDUCATION/TRAINING PROGRAM

## 2024-08-06 PROCEDURE — 6360000002 HC RX W HCPCS: Performed by: EMERGENCY MEDICINE

## 2024-08-06 PROCEDURE — 72192 CT PELVIS W/O DYE: CPT

## 2024-08-06 PROCEDURE — 72190 X-RAY EXAM OF PELVIS: CPT

## 2024-08-06 PROCEDURE — 99285 EMERGENCY DEPT VISIT HI MDM: CPT

## 2024-08-06 PROCEDURE — 1200000000 HC SEMI PRIVATE

## 2024-08-06 PROCEDURE — 2580000003 HC RX 258: Performed by: EMERGENCY MEDICINE

## 2024-08-06 PROCEDURE — 86900 BLOOD TYPING SEROLOGIC ABO: CPT

## 2024-08-06 PROCEDURE — 96361 HYDRATE IV INFUSION ADD-ON: CPT

## 2024-08-06 PROCEDURE — 73552 X-RAY EXAM OF FEMUR 2/>: CPT

## 2024-08-06 PROCEDURE — 85025 COMPLETE CBC W/AUTO DIFF WBC: CPT

## 2024-08-06 PROCEDURE — 72128 CT CHEST SPINE W/O DYE: CPT

## 2024-08-06 PROCEDURE — 36415 COLL VENOUS BLD VENIPUNCTURE: CPT

## 2024-08-06 PROCEDURE — 99223 1ST HOSP IP/OBS HIGH 75: CPT | Performed by: ORTHOPAEDIC SURGERY

## 2024-08-06 PROCEDURE — 86901 BLOOD TYPING SEROLOGIC RH(D): CPT

## 2024-08-06 PROCEDURE — 85610 PROTHROMBIN TIME: CPT

## 2024-08-06 PROCEDURE — 70450 CT HEAD/BRAIN W/O DYE: CPT

## 2024-08-06 PROCEDURE — 80053 COMPREHEN METABOLIC PANEL: CPT

## 2024-08-06 PROCEDURE — 6360000004 HC RX CONTRAST MEDICATION: Performed by: RADIOLOGY

## 2024-08-06 PROCEDURE — 71260 CT THORAX DX C+: CPT

## 2024-08-06 RX ORDER — PANTOPRAZOLE SODIUM 40 MG/1
40 TABLET, DELAYED RELEASE ORAL
Status: DISCONTINUED | OUTPATIENT
Start: 2024-08-07 | End: 2024-08-13 | Stop reason: HOSPADM

## 2024-08-06 RX ORDER — TRANEXAMIC ACID 10 MG/ML
1000 INJECTION, SOLUTION INTRAVENOUS SEE ADMIN INSTRUCTIONS
Status: COMPLETED | OUTPATIENT
Start: 2024-08-07 | End: 2024-08-07

## 2024-08-06 RX ORDER — SODIUM CHLORIDE 9 MG/ML
INJECTION, SOLUTION INTRAVENOUS PRN
Status: DISCONTINUED | OUTPATIENT
Start: 2024-08-06 | End: 2024-08-13 | Stop reason: HOSPADM

## 2024-08-06 RX ORDER — POLYETHYLENE GLYCOL 3350 17 G/17G
17 POWDER, FOR SOLUTION ORAL DAILY PRN
Status: DISCONTINUED | OUTPATIENT
Start: 2024-08-06 | End: 2024-08-09

## 2024-08-06 RX ORDER — ONDANSETRON 4 MG/1
4 TABLET, ORALLY DISINTEGRATING ORAL EVERY 8 HOURS PRN
Status: DISCONTINUED | OUTPATIENT
Start: 2024-08-06 | End: 2024-08-13 | Stop reason: HOSPADM

## 2024-08-06 RX ORDER — FENTANYL CITRATE 50 UG/ML
50 INJECTION, SOLUTION INTRAMUSCULAR; INTRAVENOUS ONCE
Status: COMPLETED | OUTPATIENT
Start: 2024-08-06 | End: 2024-08-06

## 2024-08-06 RX ORDER — BUPROPION HYDROCHLORIDE 150 MG/1
150 TABLET ORAL EVERY MORNING
Status: DISCONTINUED | OUTPATIENT
Start: 2024-08-07 | End: 2024-08-13 | Stop reason: HOSPADM

## 2024-08-06 RX ORDER — MORPHINE SULFATE 2 MG/ML
2 INJECTION, SOLUTION INTRAMUSCULAR; INTRAVENOUS EVERY 4 HOURS PRN
Status: DISCONTINUED | OUTPATIENT
Start: 2024-08-06 | End: 2024-08-06

## 2024-08-06 RX ORDER — ONDANSETRON 2 MG/ML
4 INJECTION INTRAMUSCULAR; INTRAVENOUS EVERY 6 HOURS PRN
Status: DISCONTINUED | OUTPATIENT
Start: 2024-08-06 | End: 2024-08-13 | Stop reason: HOSPADM

## 2024-08-06 RX ORDER — OXYCODONE HYDROCHLORIDE 5 MG/1
5 TABLET ORAL EVERY 4 HOURS PRN
Status: DISCONTINUED | OUTPATIENT
Start: 2024-08-06 | End: 2024-08-06

## 2024-08-06 RX ORDER — OXYCODONE HYDROCHLORIDE 5 MG/1
5 TABLET ORAL EVERY 4 HOURS PRN
Status: DISCONTINUED | OUTPATIENT
Start: 2024-08-06 | End: 2024-08-13 | Stop reason: HOSPADM

## 2024-08-06 RX ORDER — SODIUM CHLORIDE 0.9 % (FLUSH) 0.9 %
5-40 SYRINGE (ML) INJECTION EVERY 12 HOURS SCHEDULED
Status: DISCONTINUED | OUTPATIENT
Start: 2024-08-06 | End: 2024-08-13 | Stop reason: HOSPADM

## 2024-08-06 RX ORDER — ACETAMINOPHEN 325 MG/1
650 TABLET ORAL EVERY 6 HOURS SCHEDULED
Status: DISCONTINUED | OUTPATIENT
Start: 2024-08-06 | End: 2024-08-13 | Stop reason: HOSPADM

## 2024-08-06 RX ORDER — OXYCODONE HYDROCHLORIDE 10 MG/1
10 TABLET ORAL EVERY 4 HOURS PRN
Status: DISCONTINUED | OUTPATIENT
Start: 2024-08-06 | End: 2024-08-13 | Stop reason: HOSPADM

## 2024-08-06 RX ORDER — LOSARTAN POTASSIUM 50 MG/1
100 TABLET ORAL DAILY
Status: DISCONTINUED | OUTPATIENT
Start: 2024-08-07 | End: 2024-08-13 | Stop reason: HOSPADM

## 2024-08-06 RX ORDER — CARVEDILOL 6.25 MG/1
12.5 TABLET ORAL 2 TIMES DAILY
Status: DISCONTINUED | OUTPATIENT
Start: 2024-08-06 | End: 2024-08-13 | Stop reason: HOSPADM

## 2024-08-06 RX ORDER — SODIUM CHLORIDE 0.9 % (FLUSH) 0.9 %
5-40 SYRINGE (ML) INJECTION PRN
Status: DISCONTINUED | OUTPATIENT
Start: 2024-08-06 | End: 2024-08-13 | Stop reason: HOSPADM

## 2024-08-06 RX ORDER — SODIUM CHLORIDE 9 MG/ML
INJECTION, SOLUTION INTRAVENOUS CONTINUOUS
Status: DISCONTINUED | OUTPATIENT
Start: 2024-08-06 | End: 2024-08-12

## 2024-08-06 RX ORDER — ACETAMINOPHEN 325 MG/1
650 TABLET ORAL EVERY 6 HOURS SCHEDULED
Status: DISCONTINUED | OUTPATIENT
Start: 2024-08-06 | End: 2024-08-06

## 2024-08-06 RX ORDER — 0.9 % SODIUM CHLORIDE 0.9 %
1000 INTRAVENOUS SOLUTION INTRAVENOUS ONCE
Status: COMPLETED | OUTPATIENT
Start: 2024-08-06 | End: 2024-08-06

## 2024-08-06 RX ADMIN — IOPAMIDOL 75 ML: 755 INJECTION, SOLUTION INTRAVENOUS at 12:41

## 2024-08-06 RX ADMIN — OXYCODONE 5 MG: 5 TABLET ORAL at 14:30

## 2024-08-06 RX ADMIN — CARVEDILOL 12.5 MG: 6.25 TABLET, FILM COATED ORAL at 22:00

## 2024-08-06 RX ADMIN — ACETAMINOPHEN 650 MG: 325 TABLET ORAL at 22:01

## 2024-08-06 RX ADMIN — OXYCODONE HYDROCHLORIDE 5 MG: 5 TABLET ORAL at 18:34

## 2024-08-06 RX ADMIN — SODIUM CHLORIDE 1000 ML: 9 INJECTION, SOLUTION INTRAVENOUS at 13:31

## 2024-08-06 RX ADMIN — FENTANYL CITRATE 50 MCG: 50 INJECTION INTRAMUSCULAR; INTRAVENOUS at 13:32

## 2024-08-06 RX ADMIN — OXYCODONE HYDROCHLORIDE 10 MG: 10 TABLET ORAL at 22:01

## 2024-08-06 RX ADMIN — ACETAMINOPHEN 650 MG: 325 TABLET ORAL at 14:30

## 2024-08-06 RX ADMIN — SODIUM CHLORIDE: 9 INJECTION, SOLUTION INTRAVENOUS at 22:07

## 2024-08-06 RX ADMIN — SODIUM CHLORIDE: 9 INJECTION, SOLUTION INTRAVENOUS at 15:00

## 2024-08-06 ASSESSMENT — PAIN DESCRIPTION - LOCATION
LOCATION: HIP

## 2024-08-06 ASSESSMENT — PAIN DESCRIPTION - DESCRIPTORS
DESCRIPTORS: ACHING;THROBBING
DESCRIPTORS: ACHING;THROBBING;TEARING

## 2024-08-06 ASSESSMENT — PAIN SCALES - GENERAL
PAINLEVEL_OUTOF10: 4
PAINLEVEL_OUTOF10: 7
PAINLEVEL_OUTOF10: 4
PAINLEVEL_OUTOF10: 4
PAINLEVEL_OUTOF10: 7

## 2024-08-06 ASSESSMENT — PAIN DESCRIPTION - FREQUENCY: FREQUENCY: CONTINUOUS

## 2024-08-06 ASSESSMENT — PAIN DESCRIPTION - ONSET: ONSET: ON-GOING

## 2024-08-06 ASSESSMENT — PAIN - FUNCTIONAL ASSESSMENT: PAIN_FUNCTIONAL_ASSESSMENT: PREVENTS OR INTERFERES SOME ACTIVE ACTIVITIES AND ADLS

## 2024-08-06 ASSESSMENT — PAIN DESCRIPTION - ORIENTATION
ORIENTATION: RIGHT

## 2024-08-06 ASSESSMENT — PAIN DESCRIPTION - PAIN TYPE: TYPE: ACUTE PAIN

## 2024-08-06 NOTE — PROGRESS NOTES
Cervical Spine C Collar Clearance -  Patient CT Cervical Spine Imaging normal.  Patient does not complain of Cervical Spine tenderness upon palpation.  Patients C-Spine ranged.  C-spine clear, no need for C-Collar.

## 2024-08-06 NOTE — H&P
TRAUMA HISTORY & PHYSICAL  Attending/Surgical Resident/Advance Practice Nurse  8/6/2024  4:25 PM    PRIMARY SURVEY    CHIEF COMPLAINT:  Trauma consult.    Injury occurred earlier today when the patient was on a ladder trimming trees.  He says the chainsaw swung back and hit him and knocked him down falling 12 feet and he landed on his right side.  He denies any loss of consciousness.      AIRWAY:   Airway Normal    EMS ETT Absent  Noisy respirations Absent  Retractions: Absent  Vomiting/bleeding: Absent    BREATHING:    Midaxillary breath sound left:  Normal  Midaxillary breath sound right:  Normal    Cough sound intensity:  good    FiO2:  Room air    SMI 2500 mL.     CIRCULATION:   Femerol pulse intensity: Strong  Palpebral conjunctiva: Red    Vitals:    08/06/24 1614   BP: 137/68   Pulse: 73   Resp: 17   Temp:    SpO2: 97%       Vitals:    08/06/24 1159 08/06/24 1331 08/06/24 1614 08/06/24 1615   BP: 119/60 128/72 137/68    Pulse: 64 63 73    Resp: 19 18 17    Temp: 97.8 °F (36.6 °C)      SpO2: 92% 96% 97%    Weight:    106.6 kg (235 lb)        FAST EXAM: Deferred    Central Nervous System    GCS Initial 15 minutes   Eye  Motor  Verbal 4 - Opens eyes on own  6 - Follows simple motor commands  5 - Alert and oriented 4 - Opens eyes on own  6 - Follows simple motor commands  5 - Alert and oriented     Neuromuscular blockade: No  Pupil size:  Left 4 mm    Right 4 mm  Pupil reaction: Yes    Wiggles fingers: Left Yes Right Yes  Wiggles toes: Left Yes   Right Yes    Hand grasp:   Left  Present      Right  Present  Plantar flexion: Left  Present      Right   Present    Loss of consciousness:  No     History Obtained From:  Patient & EMS  Private Medical Doctor: Monroe Claudio MD      Pre-exisiting Medical History:  yes    Conditions: Hypertension, GERD    Medications: Carvedilol, olmesartan, Wellbutrin, aspirin    Allergies: Augmentin-diarrhea, clindamycin-rash/hives    Social History:   Tobacco use:  none  Alcohol use:

## 2024-08-06 NOTE — ED NOTES
Radiology Procedure Waiver   Name: Rosas Corbin  : 1950  MRN: [id number]    Date:  24    Time: 12:03 PM EDT    Benefits of immediately proceeding with Radiology exam(s) without pre-testing outweigh the risks or are not indicated as specified below and therefore the following is/are being waived:    [] Pregnancy test   [] Patients LMP on-time and regular.   [] Patient had Tubal Ligation or has other Contraception Device.   [] Patient  is Menopausal or Premenarcheal.    [] Patient had Full or Partial Hysterectomy.    [] Protocol for Iodine allergy    [] MRI Questionnaire     [x] BUN/Creatinine   [] Patient age w/no hx of renal dysfunction.   [] Patient on Dialysis.   [] Recent Normal Labs.  Electronically signed by Greg Ellison DO on 24 at 12:03 PM EDT

## 2024-08-06 NOTE — ED PROVIDER NOTES
Name: Rosas Corbin    MRN: [id number]     Date / Time Roomed:  8/6/2024 11:49 AM  ED Bed Assignment:  TED SAUL    ------------------ History of Present Illness --------------------  8/6/24, Time: 11:50 AM EDT   Chief Complaint   Patient presents with    Fall     Patient fell 8-10 feet off of ladder while cutting trees on to right side. C/o right shoulder, rib, and hip pain.      HPI    Rosas Corbin is a 74 y.o. male, with hx of GERD, arthritis, heart murmur, hypertension, mitral valve disorder, palpitations, skin cancer, on Wellbutrin, alcohol use, who presents to the ED today for fall off a ladder as he was working on his house this morning.  Patient was brought in by EMS.  Cervical collar in place.  He states he was on the approximately 6-7 step on the ladder and fell back landing on his right side onto the ground.  He denies any loss of consciousness.  He complains of right shoulder and right hip pain which is constant, worse with movement, moderate in severity..  He relates having some right lower abdominal pain.  He relates having some mid to upper right-sided back pain.  He does relate some mild nausea, no vomiting.  Denies any headache.  Denies any blood thinners however he is on baby aspirin daily.  He denies any chest pain or shortness of breath. The pt denies other ROS at this time.     PCP: Monroe Claudio MD.    -------------------- PMH --------------------    Past Medical History:   has a past medical history of Acid reflux, Arthritis, BPH (benign prostatic hyperplasia), Cancer (HCC) (11/2018), COVID-19 (09/2022), Heart murmur, Hypertension, Left knee pain, Mitral valve disorder, and Palpitations.     Surgical History:  Past Surgical History:   Procedure Laterality Date    BACK SURGERY      COLONOSCOPY  2018    HERNIA REPAIR      KNEE ARTHROSCOPY Left 5/2/2019    LEFT KNEE ARTHROSCOPY WITH PARTIAL MEDIAL MENISECTOMY performed by Michael Gibson MD at [facility] OR    SKIN BIOPSY  11/2018    TOTAL KNEE  ARTHROPLASTY Left 11/10/2022    ROBOTIC JAGUAR ASSISTED LEFT KNEE TOTAL ARTHROPLASTY +PNB performed by [facility] at [facility] OR    UPPER GASTROINTESTINAL ENDOSCOPY  2018       Social History:  Social History     Tobacco Use    Smoking status: Never    Smokeless tobacco: Never   Vaping Use    Vaping Use: Never used   Substance Use Topics    Alcohol use: Yes     Alcohol/week: 12.0 standard drinks of alcohol     Types: 12 Cans of beer per week     Comment: 3 times weekly    Drug use: No       Family History:  Family History   Problem Relation Age of Onset    Stroke Mother     Heart Disease Father         Bypass Surgery, Pacemaker, Carotids    Coronary Art Dis Father     Other Son         procoagulant disorder    Breast Cancer Maternal Aunt        Allergies:  Augmentin [amoxicillin-pot clavulanate] and Clindamycin/lincomycin    The patient’s past medical history has been reviewed.    -------------------- Current Meds --------------------  Previous Medications    ASPIRIN 81 PO    Take by mouth    BUPROPION (WELLBUTRIN XL) 150 MG EXTENDED RELEASE TABLET    TAKE ONE TABLET BY MOUTH EVERY MORNING    CARVEDILOL (COREG) 12.5 MG TABLET    take 1 tablet by mouth every morning then take 1 tablet every evening    CETIRIZINE (ZYRTEC) 10 MG TABLET    Take 1 tablet by mouth daily    IBUPROFEN (ADVIL;MOTRIN) 200 MG TABLET    Take 1 tablet by mouth every 6 hours as needed for Pain    MULTIPLE VITAMINS-MINERALS (MULTIVITAMIN GUMMIES ADULT PO)    Take by mouth daily 1 gummy/daily    OLMESARTAN (BENICAR) 40 MG TABLET    take 1 tablet by mouth once daily    OMEGA-3 FATTY ACIDS (FISH OIL PO)    Take by mouth daily    OMEPRAZOLE (PRILOSEC) 40 MG DELAYED RELEASE CAPSULE    Take 1 capsule by mouth daily       The patient’s home medications have been reviewed.    -------------------- PE --------------------  Physical Exam  Vitals and nursing note reviewed.   Constitutional:       General: He is not in acute distress.     Appearance:

## 2024-08-06 NOTE — CONSULTS
Department of Orthopedic Surgery  Resident Consult Note          Reason for Consult: Right acetabulum fracture    HISTORY OF PRESENT ILLNESS:       Patient is a 74 y.o. male who presents as a trauma with a right acetabulum fracture after a fall from 8 to 10 feet off of a ladder.  Patient stated he was cutting tree limbs when he fell off of a ladder directly onto his right side.  Orthopedic was consulted for management after imaging showed above listed reason for consult.  Upon this presentation patient is resting comfortably in bed complaining of pain to his right hip.  Patient denies pain anywhere else on his body at this time.  Denies any numbness tingling paresthesias traveling down right lower extremity and right foot.  States he is normally an ambulator without assistance at baseline.  Denies any other orthopedic complaints..    Past Medical History:        Diagnosis Date    Acid reflux     Arthritis     BPH (benign prostatic hyperplasia)     Cancer (HCC) 11/2018    skin    COVID-19 09/2022    Heart murmur     Hypertension     Left knee pain     for OR 11/10/2022    Mitral valve disorder     Palpitations      Past Surgical History:        Procedure Laterality Date    BACK SURGERY      COLONOSCOPY  2018    HERNIA REPAIR      KNEE ARTHROSCOPY Left 5/2/2019    LEFT KNEE ARTHROSCOPY WITH PARTIAL MEDIAL MENISECTOMY performed by Michael Gibson MD at [facility] OR    SKIN BIOPSY  11/2018    TOTAL KNEE ARTHROPLASTY Left 11/10/2022    ROBOTIC JAGUAR ASSISTED LEFT KNEE TOTAL ARTHROPLASTY +PNB performed by Michael Gibson MD at [facility] OR    UPPER GASTROINTESTINAL ENDOSCOPY  2018     Current Medications:   Current Facility-Administered Medications: sodium chloride 0.9 % bolus 1,000 mL, 1,000 mL, IntraVENous, Once  0.9 % sodium chloride infusion, , IntraVENous, Continuous  [START ON 8/7/2024] ceFAZolin (ANCEF) 2,000 mg in sterile water 20 mL IV syringe, 2,000 mg, IntraVENous, See Admin Instructions  [START ON 8/7/2024]  appearing, and in no distress,  normal appearing weight. No visible signs of trauma   Mental status: alert, oriented to person, place, and time, normal mood, behavior, speech, dress, motor activity, and thought processes  Abdomen: soft, nondistended  Resp:   resp easy and unlabored, no audible wheezes note, normal symmetrical expansion of both hemithoraces  Cardiac: distal pulses palpable, skin and extremities well perfused  Neurological: alert, oriented X3, normal speech, no focal findings or movement disorder noted, motor and sensory grossly normal bilaterally, normal muscle tone, no tremors  HEENT: normochephalic atraumatic, external ears and eyes normal, sclera normal, neck supple, no nasal discharge.   Extremities:   peripheral pulses normal, no edema, redness or tenderness in the calves   Skin: normal coloration, no rashes or open wounds, no suspicious skin lesions noted  Psych: Affect euthymic   Musculoskeletal:   Extremity:  Agrees above examination.  Wiggles toes.  Difficult to do full neurologic examination due to the amount of pain that the patient is in    Impression: Right anterior column posterior hemitransverse fracture of the acetabulum    Plan open reduction internal fixation right acetabulum both column fracture and monitor for occult injury        ELECTRONICALLY signed by:    Perry Alcocer MD  8/7/24   This is been dictated utilizing voice recognition software.  All efforts have been made to make the note accurate although inadvertent errors may be present.

## 2024-08-07 ENCOUNTER — APPOINTMENT (OUTPATIENT)
Dept: GENERAL RADIOLOGY | Age: 74
DRG: 515 | End: 2024-08-07
Payer: MEDICARE

## 2024-08-07 ENCOUNTER — ANESTHESIA EVENT (OUTPATIENT)
Dept: OPERATING ROOM | Age: 74
End: 2024-08-07
Payer: MEDICARE

## 2024-08-07 ENCOUNTER — ANESTHESIA (OUTPATIENT)
Dept: OPERATING ROOM | Age: 74
End: 2024-08-07
Payer: MEDICARE
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
## 2024-08-07 PROBLEM — K68.3 RETROPERITONEAL HEMATOMA: Status: ACTIVE | Noted: 2024-08-07

## 2024-08-07 PROBLEM — S22.31XA CLOSED FRACTURE OF ONE RIB OF RIGHT SIDE: Status: ACTIVE | Noted: 2024-08-07

## 2024-08-07 PROBLEM — S32.9XXA CLOSED NONDISPLACED FRACTURE OF PELVIS (HCC): Status: ACTIVE | Noted: 2024-08-07

## 2024-08-07 LAB
ALBUMIN SERPL-MCNC: 3.5 G/DL (ref 3.5–5.2)
ALP SERPL-CCNC: 51 U/L (ref 40–129)
ALT SERPL-CCNC: 30 U/L (ref 0–40)
ANION GAP SERPL CALCULATED.3IONS-SCNC: 11 MMOL/L (ref 7–16)
AST SERPL-CCNC: 37 U/L (ref 0–39)
BASOPHILS # BLD: 0.02 K/UL (ref 0–0.2)
BASOPHILS NFR BLD: 0 % (ref 0–2)
BILIRUB SERPL-MCNC: 0.9 MG/DL (ref 0–1.2)
BUN SERPL-MCNC: 17 MG/DL (ref 6–23)
CALCIUM SERPL-MCNC: 8.5 MG/DL (ref 8.6–10.2)
CHLORIDE SERPL-SCNC: 102 MMOL/L (ref 98–107)
CO2 SERPL-SCNC: 20 MMOL/L (ref 22–29)
CREAT SERPL-MCNC: 1 MG/DL (ref 0.7–1.2)
EOSINOPHIL # BLD: 0.07 K/UL (ref 0.05–0.5)
EOSINOPHILS RELATIVE PERCENT: 1 % (ref 0–6)
ERYTHROCYTE [DISTWIDTH] IN BLOOD BY AUTOMATED COUNT: 13.5 % (ref 11.5–15)
GFR, ESTIMATED: 78 ML/MIN/1.73M2
GLUCOSE SERPL-MCNC: 109 MG/DL (ref 74–99)
HCT VFR BLD AUTO: 32 % (ref 37–54)
HGB BLD-MCNC: 10.7 G/DL (ref 12.5–16.5)
IMM GRANULOCYTES # BLD AUTO: <0.03 K/UL (ref 0–0.58)
IMM GRANULOCYTES NFR BLD: 0 % (ref 0–5)
LYMPHOCYTES NFR BLD: 0.94 K/UL (ref 1.5–4)
LYMPHOCYTES RELATIVE PERCENT: 16 % (ref 20–42)
MCH RBC QN AUTO: 32.8 PG (ref 26–35)
MCHC RBC AUTO-ENTMCNC: 33.4 G/DL (ref 32–34.5)
MCV RBC AUTO: 98.2 FL (ref 80–99.9)
MONOCYTES NFR BLD: 0.67 K/UL (ref 0.1–0.95)
MONOCYTES NFR BLD: 12 % (ref 2–12)
NEUTROPHILS NFR BLD: 70 % (ref 43–80)
NEUTS SEG NFR BLD: 4.03 K/UL (ref 1.8–7.3)
PLATELET # BLD AUTO: 136 K/UL (ref 130–450)
PMV BLD AUTO: 9.3 FL (ref 7–12)
POTASSIUM SERPL-SCNC: 4.4 MMOL/L (ref 3.5–5)
PROT SERPL-MCNC: 5.6 G/DL (ref 6.4–8.3)
RBC # BLD AUTO: 3.26 M/UL (ref 3.8–5.8)
SODIUM SERPL-SCNC: 133 MMOL/L (ref 132–146)
WBC OTHER # BLD: 5.8 K/UL (ref 4.5–11.5)

## 2024-08-07 PROCEDURE — 2500000003 HC RX 250 WO HCPCS: Performed by: ORTHOPAEDIC SURGERY

## 2024-08-07 PROCEDURE — 2500000003 HC RX 250 WO HCPCS: Performed by: NURSE ANESTHETIST, CERTIFIED REGISTERED

## 2024-08-07 PROCEDURE — 2500000003 HC RX 250 WO HCPCS

## 2024-08-07 PROCEDURE — 6360000002 HC RX W HCPCS: Performed by: NURSE ANESTHETIST, CERTIFIED REGISTERED

## 2024-08-07 PROCEDURE — 6370000000 HC RX 637 (ALT 250 FOR IP)

## 2024-08-07 PROCEDURE — 3700000001 HC ADD 15 MINUTES (ANESTHESIA): Performed by: ORTHOPAEDIC SURGERY

## 2024-08-07 PROCEDURE — 99232 SBSQ HOSP IP/OBS MODERATE 35: CPT | Performed by: STUDENT IN AN ORGANIZED HEALTH CARE EDUCATION/TRAINING PROGRAM

## 2024-08-07 PROCEDURE — 72170 X-RAY EXAM OF PELVIS: CPT

## 2024-08-07 PROCEDURE — 7100000000 HC PACU RECOVERY - FIRST 15 MIN: Performed by: ORTHOPAEDIC SURGERY

## 2024-08-07 PROCEDURE — 27228 TREAT HIP FRACTURE(S): CPT | Performed by: ORTHOPAEDIC SURGERY

## 2024-08-07 PROCEDURE — 2580000003 HC RX 258

## 2024-08-07 PROCEDURE — 73030 X-RAY EXAM OF SHOULDER: CPT

## 2024-08-07 PROCEDURE — 3700000000 HC ANESTHESIA ATTENDED CARE: Performed by: ORTHOPAEDIC SURGERY

## 2024-08-07 PROCEDURE — 6360000002 HC RX W HCPCS

## 2024-08-07 PROCEDURE — 2500000003 HC RX 250 WO HCPCS: Performed by: ANESTHESIOLOGY

## 2024-08-07 PROCEDURE — 2580000003 HC RX 258: Performed by: NURSE ANESTHETIST, CERTIFIED REGISTERED

## 2024-08-07 PROCEDURE — 2720000010 HC SURG SUPPLY STERILE: Performed by: ORTHOPAEDIC SURGERY

## 2024-08-07 PROCEDURE — 3600000015 HC SURGERY LEVEL 5 ADDTL 15MIN: Performed by: ORTHOPAEDIC SURGERY

## 2024-08-07 PROCEDURE — 1200000000 HC SEMI PRIVATE

## 2024-08-07 PROCEDURE — 2709999900 HC NON-CHARGEABLE SUPPLY: Performed by: ORTHOPAEDIC SURGERY

## 2024-08-07 PROCEDURE — C1713 ANCHOR/SCREW BN/BN,TIS/BN: HCPCS | Performed by: ORTHOPAEDIC SURGERY

## 2024-08-07 PROCEDURE — 0QS404Z REPOSITION RIGHT ACETABULUM WITH INTERNAL FIXATION DEVICE, OPEN APPROACH: ICD-10-PCS | Performed by: ORTHOPAEDIC SURGERY

## 2024-08-07 PROCEDURE — 80053 COMPREHEN METABOLIC PANEL: CPT

## 2024-08-07 PROCEDURE — 7100000001 HC PACU RECOVERY - ADDTL 15 MIN: Performed by: ORTHOPAEDIC SURGERY

## 2024-08-07 PROCEDURE — 3600000005 HC SURGERY LEVEL 5 BASE: Performed by: ORTHOPAEDIC SURGERY

## 2024-08-07 PROCEDURE — 85025 COMPLETE CBC W/AUTO DIFF WBC: CPT

## 2024-08-07 PROCEDURE — 36415 COLL VENOUS BLD VENIPUNCTURE: CPT

## 2024-08-07 DEVICE — IMPLANTABLE DEVICE: Type: IMPLANTABLE DEVICE | Site: HIP | Status: FUNCTIONAL

## 2024-08-07 DEVICE — SCREW BNE L55MM DIA3.5MM STD CORT S STL ST NONCANNULATED: Type: IMPLANTABLE DEVICE | Site: HIP | Status: FUNCTIONAL

## 2024-08-07 DEVICE — SCREW BNE L110MM DIA3.5MM PELV CORT S STL ST THRD SM HEX: Type: IMPLANTABLE DEVICE | Site: HIP | Status: FUNCTIONAL

## 2024-08-07 DEVICE — SCREW BNE L44MM DIA3.5MM CORT S STL ST NONCANNULATED LOK: Type: IMPLANTABLE DEVICE | Site: HIP | Status: FUNCTIONAL

## 2024-08-07 DEVICE — SCREW BNE L50MM DIA3.5MM STD CORT S STL ST NONCANNULATED: Type: IMPLANTABLE DEVICE | Site: HIP | Status: FUNCTIONAL

## 2024-08-07 DEVICE — SCREW BNE L20MM DIA3.5MM CORT S STL ST NONCANNULATED LOK: Type: IMPLANTABLE DEVICE | Site: HIP | Status: FUNCTIONAL

## 2024-08-07 DEVICE — SCREW BNE L42MM DIA3.5MM CORT S STL ST NONCANNULATED LOK: Type: IMPLANTABLE DEVICE | Site: HIP | Status: FUNCTIONAL

## 2024-08-07 RX ORDER — PROPOFOL 10 MG/ML
INJECTION, EMULSION INTRAVENOUS PRN
Status: DISCONTINUED | OUTPATIENT
Start: 2024-08-07 | End: 2024-08-07 | Stop reason: SDUPTHER

## 2024-08-07 RX ORDER — SODIUM CHLORIDE, SODIUM LACTATE, POTASSIUM CHLORIDE, CALCIUM CHLORIDE 600; 310; 30; 20 MG/100ML; MG/100ML; MG/100ML; MG/100ML
INJECTION, SOLUTION INTRAVENOUS CONTINUOUS PRN
Status: DISCONTINUED | OUTPATIENT
Start: 2024-08-07 | End: 2024-08-07 | Stop reason: SDUPTHER

## 2024-08-07 RX ORDER — ENOXAPARIN SODIUM 100 MG/ML
30 INJECTION SUBCUTANEOUS 2 TIMES DAILY
Status: DISCONTINUED | OUTPATIENT
Start: 2024-08-08 | End: 2024-08-13 | Stop reason: HOSPADM

## 2024-08-07 RX ORDER — SODIUM CHLORIDE 0.9 % (FLUSH) 0.9 %
5-40 SYRINGE (ML) INJECTION EVERY 12 HOURS SCHEDULED
Status: DISCONTINUED | OUTPATIENT
Start: 2024-08-07 | End: 2024-08-07 | Stop reason: HOSPADM

## 2024-08-07 RX ORDER — ROCURONIUM BROMIDE 10 MG/ML
INJECTION, SOLUTION INTRAVENOUS PRN
Status: DISCONTINUED | OUTPATIENT
Start: 2024-08-07 | End: 2024-08-07 | Stop reason: SDUPTHER

## 2024-08-07 RX ORDER — SODIUM CHLORIDE 9 MG/ML
INJECTION, SOLUTION INTRAVENOUS PRN
Status: DISCONTINUED | OUTPATIENT
Start: 2024-08-07 | End: 2024-08-07 | Stop reason: HOSPADM

## 2024-08-07 RX ORDER — ONDANSETRON 2 MG/ML
INJECTION INTRAMUSCULAR; INTRAVENOUS PRN
Status: DISCONTINUED | OUTPATIENT
Start: 2024-08-07 | End: 2024-08-07 | Stop reason: SDUPTHER

## 2024-08-07 RX ORDER — GLYCOPYRROLATE 1 MG/5 ML
SYRINGE (ML) INTRAVENOUS PRN
Status: DISCONTINUED | OUTPATIENT
Start: 2024-08-07 | End: 2024-08-07 | Stop reason: SDUPTHER

## 2024-08-07 RX ORDER — SODIUM CHLORIDE 0.9 % (FLUSH) 0.9 %
5-40 SYRINGE (ML) INJECTION PRN
Status: DISCONTINUED | OUTPATIENT
Start: 2024-08-07 | End: 2024-08-07 | Stop reason: HOSPADM

## 2024-08-07 RX ORDER — FENTANYL CITRATE 50 UG/ML
INJECTION, SOLUTION INTRAMUSCULAR; INTRAVENOUS PRN
Status: DISCONTINUED | OUTPATIENT
Start: 2024-08-07 | End: 2024-08-07 | Stop reason: SDUPTHER

## 2024-08-07 RX ORDER — CEFAZOLIN SODIUM 1 G/3ML
INJECTION, POWDER, FOR SOLUTION INTRAMUSCULAR; INTRAVENOUS PRN
Status: DISCONTINUED | OUTPATIENT
Start: 2024-08-07 | End: 2024-08-07 | Stop reason: SDUPTHER

## 2024-08-07 RX ORDER — EPHEDRINE SULFATE/0.9% NACL/PF 25 MG/5 ML
SYRINGE (ML) INTRAVENOUS PRN
Status: DISCONTINUED | OUTPATIENT
Start: 2024-08-07 | End: 2024-08-07 | Stop reason: SDUPTHER

## 2024-08-07 RX ORDER — MEPERIDINE HYDROCHLORIDE 25 MG/ML
12.5 INJECTION INTRAMUSCULAR; INTRAVENOUS; SUBCUTANEOUS EVERY 5 MIN PRN
Status: DISCONTINUED | OUTPATIENT
Start: 2024-08-07 | End: 2024-08-07 | Stop reason: HOSPADM

## 2024-08-07 RX ORDER — HYDROMORPHONE HYDROCHLORIDE 1 MG/ML
0.25 INJECTION, SOLUTION INTRAMUSCULAR; INTRAVENOUS; SUBCUTANEOUS EVERY 5 MIN PRN
Status: DISCONTINUED | OUTPATIENT
Start: 2024-08-07 | End: 2024-08-07 | Stop reason: HOSPADM

## 2024-08-07 RX ORDER — MIDAZOLAM HYDROCHLORIDE 1 MG/ML
INJECTION INTRAMUSCULAR; INTRAVENOUS PRN
Status: DISCONTINUED | OUTPATIENT
Start: 2024-08-07 | End: 2024-08-07 | Stop reason: SDUPTHER

## 2024-08-07 RX ORDER — FIBRINOGEN HUMAN AND THROMBIN HUMAN 1 ML
KIT TOPICAL PRN
Status: DISCONTINUED | OUTPATIENT
Start: 2024-08-07 | End: 2024-08-07 | Stop reason: HOSPADM

## 2024-08-07 RX ORDER — OXYCODONE HYDROCHLORIDE AND ACETAMINOPHEN 5; 325 MG/1; MG/1
1 TABLET ORAL EVERY 6 HOURS PRN
Qty: 28 TABLET | Refills: 0 | Status: ON HOLD | OUTPATIENT
Start: 2024-08-07 | End: 2024-08-14

## 2024-08-07 RX ORDER — DEXAMETHASONE SODIUM PHOSPHATE 10 MG/ML
INJECTION INTRAMUSCULAR; INTRAVENOUS PRN
Status: DISCONTINUED | OUTPATIENT
Start: 2024-08-07 | End: 2024-08-07 | Stop reason: SDUPTHER

## 2024-08-07 RX ORDER — ENOXAPARIN SODIUM 100 MG/ML
30 INJECTION SUBCUTANEOUS 2 TIMES DAILY
Qty: 18 ML | Refills: 0 | Status: ON HOLD | OUTPATIENT
Start: 2024-08-07 | End: 2024-09-06

## 2024-08-07 RX ORDER — HYDROMORPHONE HYDROCHLORIDE 1 MG/ML
0.5 INJECTION, SOLUTION INTRAMUSCULAR; INTRAVENOUS; SUBCUTANEOUS EVERY 5 MIN PRN
Status: COMPLETED | OUTPATIENT
Start: 2024-08-07 | End: 2024-08-07

## 2024-08-07 RX ORDER — LIDOCAINE HYDROCHLORIDE 20 MG/ML
INJECTION, SOLUTION INTRAVENOUS PRN
Status: DISCONTINUED | OUTPATIENT
Start: 2024-08-07 | End: 2024-08-07 | Stop reason: SDUPTHER

## 2024-08-07 RX ORDER — DIPHENHYDRAMINE HYDROCHLORIDE 50 MG/ML
12.5 INJECTION INTRAMUSCULAR; INTRAVENOUS
Status: DISCONTINUED | OUTPATIENT
Start: 2024-08-07 | End: 2024-08-07 | Stop reason: HOSPADM

## 2024-08-07 RX ORDER — NALOXONE HYDROCHLORIDE 0.4 MG/ML
INJECTION, SOLUTION INTRAMUSCULAR; INTRAVENOUS; SUBCUTANEOUS PRN
Status: DISCONTINUED | OUTPATIENT
Start: 2024-08-07 | End: 2024-08-07 | Stop reason: HOSPADM

## 2024-08-07 RX ADMIN — Medication 15 MG: at 09:16

## 2024-08-07 RX ADMIN — ROCURONIUM BROMIDE 40 MG: 10 INJECTION, SOLUTION INTRAVENOUS at 08:31

## 2024-08-07 RX ADMIN — ACETAMINOPHEN 650 MG: 325 TABLET ORAL at 11:57

## 2024-08-07 RX ADMIN — SUGAMMADEX 213 MG: 100 INJECTION, SOLUTION INTRAVENOUS at 09:57

## 2024-08-07 RX ADMIN — FENTANYL CITRATE 100 MCG: 50 INJECTION, SOLUTION INTRAMUSCULAR; INTRAVENOUS at 08:31

## 2024-08-07 RX ADMIN — EPHEDRINE SULFATE 10 MG: 5 INJECTION INTRAVENOUS at 08:41

## 2024-08-07 RX ADMIN — HYDROMORPHONE HYDROCHLORIDE 0.5 MG: 1 INJECTION, SOLUTION INTRAMUSCULAR; INTRAVENOUS; SUBCUTANEOUS at 11:03

## 2024-08-07 RX ADMIN — ACETAMINOPHEN 650 MG: 325 TABLET ORAL at 20:06

## 2024-08-07 RX ADMIN — EPHEDRINE SULFATE 5 MG: 5 INJECTION INTRAVENOUS at 08:45

## 2024-08-07 RX ADMIN — Medication 35 MG: at 08:31

## 2024-08-07 RX ADMIN — HYDROMORPHONE HYDROCHLORIDE 0.5 MG: 1 INJECTION, SOLUTION INTRAMUSCULAR; INTRAVENOUS; SUBCUTANEOUS at 11:09

## 2024-08-07 RX ADMIN — FENTANYL CITRATE 25 MCG: 50 INJECTION, SOLUTION INTRAMUSCULAR; INTRAVENOUS at 10:07

## 2024-08-07 RX ADMIN — ROCURONIUM BROMIDE 30 MG: 10 INJECTION, SOLUTION INTRAVENOUS at 09:05

## 2024-08-07 RX ADMIN — DEXAMETHASONE SODIUM PHOSPHATE 10 MG: 10 INJECTION INTRAMUSCULAR; INTRAVENOUS at 09:00

## 2024-08-07 RX ADMIN — ONDANSETRON HYDROCHLORIDE 4 MG: 2 SOLUTION INTRAMUSCULAR; INTRAVENOUS at 09:45

## 2024-08-07 RX ADMIN — PROPOFOL 90 MG: 10 INJECTION, EMULSION INTRAVENOUS at 08:31

## 2024-08-07 RX ADMIN — CEFAZOLIN 2 G: 1 INJECTION, POWDER, FOR SOLUTION INTRAMUSCULAR; INTRAVENOUS at 08:25

## 2024-08-07 RX ADMIN — PANTOPRAZOLE SODIUM 40 MG: 40 TABLET, DELAYED RELEASE ORAL at 06:55

## 2024-08-07 RX ADMIN — ACETAMINOPHEN 650 MG: 325 TABLET ORAL at 02:30

## 2024-08-07 RX ADMIN — CARVEDILOL 12.5 MG: 6.25 TABLET, FILM COATED ORAL at 20:06

## 2024-08-07 RX ADMIN — OXYCODONE HYDROCHLORIDE 10 MG: 10 TABLET ORAL at 11:57

## 2024-08-07 RX ADMIN — LIDOCAINE HYDROCHLORIDE 100 MG: 20 INJECTION, SOLUTION INTRAVENOUS at 08:31

## 2024-08-07 RX ADMIN — MIDAZOLAM 2 MG: 1 INJECTION INTRAMUSCULAR; INTRAVENOUS at 08:21

## 2024-08-07 RX ADMIN — TRANEXAMIC ACID 1000 MG: 10 INJECTION, SOLUTION INTRAVENOUS at 08:35

## 2024-08-07 RX ADMIN — OXYCODONE HYDROCHLORIDE 10 MG: 10 TABLET ORAL at 02:29

## 2024-08-07 RX ADMIN — WATER 2000 MG: 1 INJECTION INTRAMUSCULAR; INTRAVENOUS; SUBCUTANEOUS at 16:25

## 2024-08-07 RX ADMIN — SODIUM CHLORIDE: 9 INJECTION, SOLUTION INTRAVENOUS at 09:24

## 2024-08-07 RX ADMIN — SODIUM CHLORIDE, POTASSIUM CHLORIDE, SODIUM LACTATE AND CALCIUM CHLORIDE: 600; 310; 30; 20 INJECTION, SOLUTION INTRAVENOUS at 08:21

## 2024-08-07 RX ADMIN — PHENYLEPHRINE HYDROCHLORIDE 200 MCG: 10 INJECTION INTRAVENOUS at 08:37

## 2024-08-07 RX ADMIN — OXYCODONE HYDROCHLORIDE 10 MG: 10 TABLET ORAL at 22:30

## 2024-08-07 RX ADMIN — SODIUM CHLORIDE, PRESERVATIVE FREE 10 ML: 5 INJECTION INTRAVENOUS at 20:27

## 2024-08-07 RX ADMIN — Medication 0.4 MG: at 08:42

## 2024-08-07 RX ADMIN — ACETAMINOPHEN 650 MG: 325 TABLET ORAL at 06:55

## 2024-08-07 RX ADMIN — OXYCODONE HYDROCHLORIDE 10 MG: 10 TABLET ORAL at 16:59

## 2024-08-07 RX ADMIN — PHENYLEPHRINE HYDROCHLORIDE 200 MCG: 10 INJECTION INTRAVENOUS at 08:35

## 2024-08-07 ASSESSMENT — PAIN DESCRIPTION - LOCATION
LOCATION: HIP
LOCATION: ABDOMEN
LOCATION: ABDOMEN
LOCATION: HIP
LOCATION: HIP;PELVIS

## 2024-08-07 ASSESSMENT — PAIN SCALES - GENERAL
PAINLEVEL_OUTOF10: 7
PAINLEVEL_OUTOF10: 6
PAINLEVEL_OUTOF10: 4
PAINLEVEL_OUTOF10: 4
PAINLEVEL_OUTOF10: 7
PAINLEVEL_OUTOF10: 7
PAINLEVEL_OUTOF10: 5
PAINLEVEL_OUTOF10: 7
PAINLEVEL_OUTOF10: 2
PAINLEVEL_OUTOF10: 5
PAINLEVEL_OUTOF10: 3
PAINLEVEL_OUTOF10: 6
PAINLEVEL_OUTOF10: 2

## 2024-08-07 ASSESSMENT — PAIN DESCRIPTION - ORIENTATION
ORIENTATION: LEFT
ORIENTATION: RIGHT

## 2024-08-07 ASSESSMENT — PAIN - FUNCTIONAL ASSESSMENT
PAIN_FUNCTIONAL_ASSESSMENT: NONE - DENIES PAIN
PAIN_FUNCTIONAL_ASSESSMENT: PREVENTS OR INTERFERES SOME ACTIVE ACTIVITIES AND ADLS
PAIN_FUNCTIONAL_ASSESSMENT: ACTIVITIES ARE NOT PREVENTED

## 2024-08-07 ASSESSMENT — PAIN DESCRIPTION - DESCRIPTORS
DESCRIPTORS: ACHING;THROBBING;TEARING
DESCRIPTORS: ACHING
DESCRIPTORS: DULL;ACHING;DISCOMFORT
DESCRIPTORS: ACHING;HEAVINESS
DESCRIPTORS: ACHING;DISCOMFORT;SORE
DESCRIPTORS: ACHING;DISCOMFORT
DESCRIPTORS: ACHING;SORE;THROBBING
DESCRIPTORS: ACHING

## 2024-08-07 ASSESSMENT — PAIN SCALES - WONG BAKER
WONGBAKER_NUMERICALRESPONSE: NO HURT
WONGBAKER_NUMERICALRESPONSE: NO HURT

## 2024-08-07 ASSESSMENT — PAIN DESCRIPTION - PAIN TYPE
TYPE: SURGICAL PAIN
TYPE: SURGICAL PAIN

## 2024-08-07 NOTE — PROGRESS NOTES
Trauma Tertiary Survey    Admit Date: 8/6/2024  Hospital day 1    CC:  Fall 12 feet    Alcohol pre-screening:  Men: How many times in the past year have you had 5 or more drinks in a day?  none  How much do you drink on a daily basis? 1 beer per day    Drug Pre-screening:    How many times in the past year have you used a recreational drug or used a prescription medication for non medical reasons?  Occasional THC gummy     Mood Prescreening:    During the past two weeks, have you been bothered by little interest or pleasure doing things?  No  During the past two weeks, have you been bothered by feeling down, depressed or hopeless?  No      Scheduled Meds:   tranexamic acid-NaCl  1,000 mg IntraVENous See Admin Instructions    sodium chloride flush  5-40 mL IntraVENous 2 times per day    acetaminophen  650 mg Oral 4 times per day    buPROPion  150 mg Oral QAM    carvedilol  12.5 mg Oral BID    losartan  100 mg Oral Daily    pantoprazole  40 mg Oral QAM AC     Continuous Infusions:   sodium chloride 100 mL/hr at 08/06/24 2207    sodium chloride       PRN Meds:sodium chloride flush, sodium chloride, ondansetron **OR** ondansetron, polyethylene glycol, oxyCODONE **OR** oxyCODONE, HYDROmorphone    Subjective:     Patient is pain is well-controlled.  He has been n.p.o. since midnight.  OR today for ORIF of right acetabular fracture    Objective:   Patient Vitals for the past 8 hrs:   Resp   08/07/24 0229 16       I/O last 3 completed shifts:  In: 250 [P.O.:250]  Out: 150 [Urine:150]  No intake/output data recorded.    Past Medical History:   Diagnosis Date    Acid reflux     Arthritis     BPH (benign prostatic hyperplasia)     Cancer (HCC) 11/2018    skin    COVID-19 09/2022    Heart murmur     Hypertension     Left knee pain     for OR 11/10/2022    Mitral valve disorder     Palpitations        @[email or web address]@    Radiology:  CT 3D RECONSTRUCTION   Final Result   1. Comminuted right anterior acetabular fracture extending into

## 2024-08-07 NOTE — ANESTHESIA POSTPROCEDURE EVALUATION
Department of Anesthesiology  Postprocedure Note    Patient: Rosas Corbin  MRN: [id number]  YOB: 1950  Date of evaluation: 8/7/2024    Procedure Summary       Date: 08/07/24 Room / Location: [address]    Anesthesia Start: 0821 Anesthesia Stop: 1020    Procedure: RIGHT ACETABULUM OPEN REDUCTION INTERNAL FIXATION (Right: Hip) Diagnosis:       Disp fx of anterior column of right acetab, init for opn fx (HCC)      (Disp fx of anterior column of right acetab, init for opn fx (HCC) [S32.431B])    Surgeons: Perry Alcocer MD Responsible Provider: Memo Carroll MD    Anesthesia Type: General ASA Status: 2            Anesthesia Type: General    Kane Phase I: Kane Score: 10    Kane Phase II:      Anesthesia Post Evaluation    Patient location during evaluation: PACU  Patient participation: complete - patient participated  Level of consciousness: awake and alert  Airway patency: patent  Nausea & Vomiting: no nausea and no vomiting  Cardiovascular status: hemodynamically stable  Respiratory status: acceptable  Hydration status: euvolemic  Multimodal analgesia pain management approach  Pain management: adequate    No notable events documented.

## 2024-08-07 NOTE — PLAN OF CARE
I came to the room for geriatric trauma assessment, however, the patient is currently in the OR.     Electronically signed by Nathan Hernández MD on 8/7/2024 at 10:39 AM

## 2024-08-07 NOTE — PROGRESS NOTES
4 Eyes Skin Assessment     NAME:  Rosas Corbin  YOB: 1950  MEDICAL RECORD NUMBER:  [id number]    The patient is being assessed for  Admission    I agree that at least one RN has performed a thorough Head to Toe Skin Assessment on the patient. ALL assessment sites listed below have been assessed.      Areas assessed by both nurses:    Head, Face, Ears, Shoulders, Back, Chest, Arms, Elbows, Hands, Sacrum. Buttock, Coccyx, Ischium, Legs. Feet and Heels, and Under Medical Devices         Does the Patient have a Wound? No noted wound(s)       Chung Prevention initiated by RN: Yes  Wound Care Orders initiated by RN: No    Pressure Injury (Stage 3,4, Unstageable, DTI, NWPT, and Complex wounds) if present, place Wound referral order by RN under : No    New Ostomies, if present place, Ostomy referral order under : No     Nurse 1 eSignature: Electronically signed by Jam Sylvester RN on 8/6/24 at 11:36 PM EDT    **SHARE this note so that the co-signing nurse can place an eSignature**    Nurse 2 eSignature: Electronically signed by January Sullivan RN on 8/7/24 at 11:54 PM EDT

## 2024-08-07 NOTE — ANESTHESIA PRE PROCEDURE
Department of Anesthesiology  Preprocedure Note       Name:  Rosas Corbin   Age:  74 y.o.  :  1950                                          MRN:  [id number]         Date:  2024      Surgeon: Surgeon(s):  Perry Alcocer MD    Procedure: RIGHT ACETABULUM OPEN REDUCTION INTERNAL FIXATION (Right)    Medications prior to admission:   Prior to Admission medications    Medication Sig Start Date End Date Taking? Authorizing Provider   Multiple Vitamins-Minerals (MULTIVITAMIN GUMMIES ADULT PO) Take by mouth daily 1 gummy/daily    ProviderAurora MD   omeprazole (PRILOSEC) 40 MG delayed release capsule Take 1 capsule by mouth daily 24   Monroe Claudio MD   olmesartan (BENICAR) 40 MG tablet take 1 tablet by mouth once daily 24   Monroe Claudio MD   carvedilol (COREG) 12.5 MG tablet take 1 tablet by mouth every morning then take 1 tablet every evening 24   Monroe Claudio MD   buPROPion (WELLBUTRIN XL) 150 MG extended release tablet TAKE ONE TABLET BY MOUTH EVERY MORNING  Patient not taking: Reported on 2024   Monroe Claudio MD   ASPIRIN 81 PO Take by mouth    ProviderAurora MD   ibuprofen (ADVIL;MOTRIN) 200 MG tablet Take 1 tablet by mouth every 6 hours as needed for Pain    ProviderAurora MD   cetirizine (ZYRTEC) 10 MG tablet Take 1 tablet by mouth daily 12/3/20   Michael Barriga DO   Omega-3 Fatty Acids (FISH OIL PO) Take by mouth daily    ProviderAurora MD       Current medications:    Current Facility-Administered Medications   Medication Dose Route Frequency Provider Last Rate Last Admin    0.9 % sodium chloride infusion   IntraVENous Continuous Tl Murray  mL/hr at 24 New Bag at 24    tranexamic acid-NaCl IVPB premix 1,000 mg  1,000 mg IntraVENous See Admin Instructions Tl Murray DO        sodium chloride flush 0.9 % injection 5-40 mL  5-40 mL IntraVENous 2 times per day Tl Murray DO     
04:37 AM    RBC 3.26 08/07/2024 04:37 AM    HGB 10.7 08/07/2024 04:37 AM    HCT 32.0 08/07/2024 04:37 AM    MCV 98.2 08/07/2024 04:37 AM    RDW 13.5 08/07/2024 04:37 AM     08/07/2024 04:37 AM       CMP:   Lab Results   Component Value Date/Time     08/07/2024 04:37 AM    K 4.4 08/07/2024 04:37 AM    K 4.4 11/11/2022 06:07 AM     08/07/2024 04:37 AM    CO2 20 08/07/2024 04:37 AM    BUN PENDING 08/07/2024 04:37 AM    CREATININE 1.0 08/07/2024 04:37 AM    GFRAA >60 05/17/2022 12:49 PM    LABGLOM 78 08/07/2024 04:37 AM    LABGLOM >60 10/03/2023 02:36 PM    GLUCOSE 109 08/07/2024 04:37 AM    CALCIUM 8.5 08/07/2024 04:37 AM    BILITOT 0.9 08/07/2024 04:37 AM    ALKPHOS 51 08/07/2024 04:37 AM    AST 37 08/07/2024 04:37 AM    ALT 30 08/07/2024 04:37 AM       POC Tests: No results for input(s): \"POCGLU\", \"POCNA\", \"POCK\", \"POCCL\", \"POCBUN\", \"POCHEMO\", \"POCHCT\" in the last 72 hours.    Coags:   Lab Results   Component Value Date/Time    PROTIME 13.9 08/06/2024 12:04 PM    INR 1.3 08/06/2024 12:04 PM       HCG (If Applicable): No results found for: \"PREGTESTUR\", \"PREGSERUM\", \"HCG\", \"HCGQUANT\"     ABGs: No results found for: \"PHART\", \"PO2ART\", \"OOW1WSA\", \"HNQ8WGE\", \"BEART\", \"E7OPVGNL\"     Type & Screen (If Applicable):  No results found for: \"LABABO\"    Anesthesia Evaluation  Patient summary reviewed and Nursing notes reviewed   no history of anesthetic complications (denies):   Airway: Mallampati: III  TM distance: >3 FB   Neck ROM: full  Mouth opening: > = 3 FB   Dental: normal exam         Pulmonary:Negative Pulmonary ROS and normal exam  breath sounds clear to auscultation                             Cardiovascular:  Exercise tolerance: good (>4 METS)  (+) hypertension:      ECG reviewed  Rhythm: regular  Rate: normal           Beta Blocker:  Dose within 24 Hrs      ROS comment: Sinus  Rhythm   Low voltage in limb leads.    -Incomplete right bundle branch block.   Essential hypertension  Well controlled

## 2024-08-07 NOTE — DISCHARGE INSTRUCTIONS
or off when you clap your hands) to make it easier to turn lights on if you have to get up during the night.  Install sturdy handrails on stairways.  Move items in your cabinets so that the things you use a lot are on the lower shelves (about waist level).  Keep a cordless phone and a flashlight with new batteries by your bed. If possible, put a phone in each of the main rooms of your house, or carry a cell phone in case you fall and cannot reach a phone. Or, you can wear a device around your neck or wrist. You push a button that sends a signal for help.  Wear low-heeled shoes that fit well and give your feet good support. Use footwear with non-skid soles. Check the heels and soles of your shoes for wear. Repair or replace worn heels or soles.  Do not wear socks without shoes on wood floors.  Walk on the grass when the sidewalks are slippery. If you live in an area that gets snow and ice in the winter, sprinkle salt on slippery steps and sidewalks.    Preventing falls in the bath  Install grab bars and non-skid mats inside and outside your shower or tub and near the toilet and sinks.  Use shower chairs and bath benches.  Use a hand-held shower head that will allow you to sit while showering.  Get into a tub or shower by putting the weaker leg in first. Get out of a tub or shower with your strong side first.  Repair loose toilet seats and consider installing a raised toilet seat to make getting on and off the toilet easier.  Keep your washroom door unlocked while you are in the shower.    Follow-up:  Trauma Clinic: [phone]--press option 2  [facility] - [facility]  [address] [place] [address]  [phone]

## 2024-08-07 NOTE — PLAN OF CARE
Problem: Discharge Planning  Goal: Discharge to home or other facility with appropriate resources  Outcome: Progressing  Flowsheets (Taken 8/6/2024 4062)  Discharge to home or other facility with appropriate resources: Identify barriers to discharge with patient and caregiver     Problem: Pain  Goal: Verbalizes/displays adequate comfort level or baseline comfort level  Outcome: Progressing     Problem: Skin/Tissue Integrity  Goal: Absence of new skin breakdown  Description: 1.  Monitor for areas of redness and/or skin breakdown  2.  Assess vascular access sites hourly  3.  Every 4-6 hours minimum:  Change oxygen saturation probe site  4.  Every 4-6 hours:  If on nasal continuous positive airway pressure, respiratory therapy assess nares and determine need for appliance change or resting period.  Outcome: Progressing     Problem: ABCDS Injury Assessment  Goal: Absence of physical injury  Outcome: Progressing     Problem: Safety - Adult  Goal: Free from fall injury  Outcome: Progressing

## 2024-08-07 NOTE — DISCHARGE SUMMARY
Physician Discharge Summary     Patient ID:  Rosas Corbin  [id number]  74 y.o.  1950    Admit date: 8/6/2024    Discharge date: 08/12/24 1:18 PM    Admitting Physician: Michael Croft MD     Admission Diagnoses: Retroperitoneal hematoma [K68.3]  Closed fracture of one rib of right side, initial encounter [S22.31XA]  Closed nondisplaced fracture of right acetabulum, unspecified portion of acetabulum, initial encounter ([facility]) [S32.401A]  Closed nondisplaced fracture of pelvis, unspecified part of pelvis, initial encounter ([facility]) [S32.9XXA]  Nondisplaced fracture of anterior column (iliopubic) of right acetabulum, initial encounter for closed fracture (HCC) [S32.434A]  Fall from height of greater than 3 feet [W17.89XA]    Discharge Diagnoses: Principal Problem:    Nondisplaced fracture of anterior column (iliopubic) of right acetabulum, initial encounter for closed fracture (HCC)  Active Problems:    Fall from height of greater than 3 feet    Closed fracture of one rib of right side    Closed nondisplaced fracture of pelvis (HCC)    Retroperitoneal hematoma  Resolved Problems:    * No resolved hospital problems. *      Admission Condition: fair    Discharged Condition: stable    Indication for Admission: fall from ladder    Hospital Course/Procedures/Operation/treatments:   8/6: Presented to the ED after falling 12 feet from his ladder while trimming trees.  Denies any loss of consciousness.  Says he fell on his right side.  Found to have right acetabular fracture and right lateral rib fracture.  Admitted for pain control  8/7: Patient is pain is well-controlled.  He has been n.p.o. since midnight.  To OR today for ORIF of right acetabular fracture  8/8: Pain is well controlled. Tolerating diet. Constipated. Senna added to GI regimen.  8/9: Patient is feeling nauseous. Having abdominal cramps and feeling bloated. KUB ordered.  8/10:Still feel bloated, but had multiple BM yesterday. Ileus seems to be resolving. No  floor.  Move furniture and electrical cords to keep them out of walking paths.  Use non-skid floor wax, and wipe up spills right away, especially on ceramic tile floors.  If you use a walker or cane, put rubber tips on it. If you use crutches, clean the bottoms of them regularly with an abrasive pad, such as steel wool.  Keep your house well lit, especially stairways, porches, and outside walkways. Use night-lights in areas such as hallways and washrooms. Add extra light switches or use remote switches (such as switches that go on or off when you clap your hands) to make it easier to turn lights on if you have to get up during the night.  Install sturdy handrails on stairways.  Move items in your cabinets so that the things you use a lot are on the lower shelves (about waist level).  Keep a cordless phone and a flashlight with new batteries by your bed. If possible, put a phone in each of the main rooms of your house, or carry a cell phone in case you fall and cannot reach a phone. Or, you can wear a device around your neck or wrist. You push a button that sends a signal for help.  Wear low-heeled shoes that fit well and give your feet good support. Use footwear with non-skid soles. Check the heels and soles of your shoes for wear. Repair or replace worn heels or soles.  Do not wear socks without shoes on wood floors.  Walk on the grass when the sidewalks are slippery. If you live in an area that gets snow and ice in the winter, sprinkle salt on slippery steps and sidewalks.    Preventing falls in the bath  Install grab bars and non-skid mats inside and outside your shower or tub and near the toilet and sinks.  Use shower chairs and bath benches.  Use a hand-held shower head that will allow you to sit while showering.  Get into a tub or shower by putting the weaker leg in first. Get out of a tub or shower with your strong side first.  Repair loose toilet seats and consider installing a raised toilet seat to make

## 2024-08-07 NOTE — OP NOTE
Operative Note      Patient: Rosas Corbin  YOB: 1950  MRN: [id number]    Date of Procedure: 8/7/2024    Preoperative diagnosis:  Right anterior column posterior hemitransverse variant with a high posterior column fracture of the acetabulum    Post-Op Diagnosis: Same       Procedure:  Open reduction internal fixation right both column acetabulum fracture    Surgeon(s):  Perry Alcocer MD    Assistant:   Resident: Elmo Pisano DO; Lanre Mancilla DO    Anesthesia: General    Estimated Blood Loss (mL): less than 100     Complications: None    Specimens:   * No specimens in log *    Implants:  Implant Name Type Inv. Item Serial No.  Lot No. LRB No. Used Action   PLATE BNE [id number] RAD 108MM 6 H BILAT PELV S STL - [id number]  PLATE BNE [id number] RAD 108MM 6 H BILAT PELV S STL  DEPUY [employer]  Right 1 Implanted   SCREW BNE L55MM DIA3.5MM STD KYUNG S STL ST NONCANNULATED - [id number]  SCREW BNE L55MM DIA3.5MM STD KYUNG S STL ST NONCANNULATED  DEPUY [employer]WD  Right 1 Implanted   SCREW BNE L42MM DIA3.5MM KYUNG S STL ST NONCANNULATED REBECCA - [id number]  SCREW BNE L42MM DIA3.5MM KYUNG S STL ST NONCANNULATED REBECCA  DEPUY [employer]WD  Right 1 Implanted   SCREW BNE L44MM DIA3.5MM KYUNG S STL ST NONCANNULATED REBECCA - [id number]  SCREW BNE L44MM DIA3.5MM KYUNG S STL ST NONCANNULATED REBECCA  DEPUY [employer]WD  Right 1 Implanted   SCREW BNE L50MM DIA3.5MM STD KYUNG S STL ST NONCANNULATED - [id number]  SCREW BNE L50MM DIA3.5MM STD KYUNG S STL ST NONCANNULATED  DEPUY [employer]WD  Right 1 Implanted   SCREW BNE L20MM DIA3.5MM KYUNG S STL ST NONCANNULATED REBECCA - [id number]  SCREW BNE L20MM DIA3.5MM KYUNG S STL ST NONCANNULATED REBECCA  DEPUY [employer]WD  Right 1 Implanted   SCREW BNE L110MM DIA3.5MM PELV KYUNG S STL ST THRD SM HEX - [id number]  SCREW BNE L110MM DIA3.5MM PELV KYUNG S STL ST THRD SM HEX  DEPUY [employer]WD  Right 1 Implanted         Drains:   Urinary  screws were violating the joint.  After multiple screws were lag to the posterior column we got final Judet views showing good positioning of the fragment and positioning of the plate.  The wound was teddy irrigated with sterile normal saline.  Antibiotic powder including vancomycin and tobramycin were placed in the wound.  The fascia was closed with 0 Vicryl in a watertight fashion.  Subcutaneous tissue with 2-0 Monocryl and skin with 4-0 Monocryl in the subcuticular fashion with Dermabond and Steri-Strips.  Sterile dressing was put in position.  The abdomen is soft and compressible.  Patient was then safely transferred back to the hospital room bed and taken to the PACU in stable condition.      Postoperative plan:  1.  Touchdown weightbearing right lower extremity    2.  Pain control    3.  DVT prophylaxis in the form of Lovenox for 1 month and may transition to aspirin orally    4.  Continue to monitor for occult injury.    Electronically signed by Perry Alcocer MD on 8/7/2024 at 9:41 AM

## 2024-08-07 NOTE — CARE COORDINATION
8/7/2024Social work transition of care planning  Sw met with pt and family at bedside. Pt is from home with family and had been independent. Pt pcp is Dr. Monroe Claudio and pharmacy is [facility] in [place]. Pt reported no hx of snf, has hx of hhc-unable to remember provider. Explained sw role in transition of care planning.Pt would like to return home,if appropriate. Will need further evals and recs to determine transition of care planning. Sw left [facility] ([facility]) and hhc list with pt. Sw will follow.  The Plan for Transition of Care is related to the following treatment goals: possible skilled v hhc    The Patient and/or patient representative  was provided with a choice of provider and agrees   with the discharge plan. [x] Yes [] No    Freedom of choice list was provided with basic dialogue that supports the patient's individualized plan of care/goals, treatment preferences and shares the quality data associated with the providers. [x] Yes [] No    Electronically signed by TIGIST Rios on 8/7/2024 at 2:27 PM

## 2024-08-08 LAB
ALBUMIN SERPL-MCNC: 3.6 G/DL (ref 3.5–5.2)
ALP SERPL-CCNC: 52 U/L (ref 40–129)
ALT SERPL-CCNC: 24 U/L (ref 0–40)
ANION GAP SERPL CALCULATED.3IONS-SCNC: 9 MMOL/L (ref 7–16)
AST SERPL-CCNC: 36 U/L (ref 0–39)
BASOPHILS # BLD: 0.01 K/UL (ref 0–0.2)
BASOPHILS NFR BLD: 0 % (ref 0–2)
BILIRUB SERPL-MCNC: 0.5 MG/DL (ref 0–1.2)
BUN SERPL-MCNC: 11 MG/DL (ref 6–23)
CALCIUM SERPL-MCNC: 9.2 MG/DL (ref 8.6–10.2)
CHLORIDE SERPL-SCNC: 100 MMOL/L (ref 98–107)
CO2 SERPL-SCNC: 22 MMOL/L (ref 22–29)
CREAT SERPL-MCNC: 0.8 MG/DL (ref 0.7–1.2)
EOSINOPHIL # BLD: 0 K/UL (ref 0.05–0.5)
EOSINOPHILS RELATIVE PERCENT: 0 % (ref 0–6)
ERYTHROCYTE [DISTWIDTH] IN BLOOD BY AUTOMATED COUNT: 13.2 % (ref 11.5–15)
GFR, ESTIMATED: >90 ML/MIN/1.73M2
GLUCOSE SERPL-MCNC: 162 MG/DL (ref 74–99)
HCT VFR BLD AUTO: 30.4 % (ref 37–54)
HGB BLD-MCNC: 10 G/DL (ref 12.5–16.5)
IMM GRANULOCYTES # BLD AUTO: 0.08 K/UL (ref 0–0.58)
IMM GRANULOCYTES NFR BLD: 1 % (ref 0–5)
LYMPHOCYTES NFR BLD: 0.62 K/UL (ref 1.5–4)
LYMPHOCYTES RELATIVE PERCENT: 6 % (ref 20–42)
MCH RBC QN AUTO: 32.4 PG (ref 26–35)
MCHC RBC AUTO-ENTMCNC: 32.9 G/DL (ref 32–34.5)
MCV RBC AUTO: 98.4 FL (ref 80–99.9)
MONOCYTES NFR BLD: 1.11 K/UL (ref 0.1–0.95)
MONOCYTES NFR BLD: 11 % (ref 2–12)
NEUTROPHILS NFR BLD: 82 % (ref 43–80)
NEUTS SEG NFR BLD: 8 K/UL (ref 1.8–7.3)
PLATELET # BLD AUTO: 128 K/UL (ref 130–450)
PMV BLD AUTO: 9.1 FL (ref 7–12)
POTASSIUM SERPL-SCNC: 4.6 MMOL/L (ref 3.5–5)
PROT SERPL-MCNC: 6.2 G/DL (ref 6.4–8.3)
RBC # BLD AUTO: 3.09 M/UL (ref 3.8–5.8)
SODIUM SERPL-SCNC: 131 MMOL/L (ref 132–146)
WBC OTHER # BLD: 9.8 K/UL (ref 4.5–11.5)

## 2024-08-08 PROCEDURE — 2700000000 HC OXYGEN THERAPY PER DAY

## 2024-08-08 PROCEDURE — 6370000000 HC RX 637 (ALT 250 FOR IP)

## 2024-08-08 PROCEDURE — 97161 PT EVAL LOW COMPLEX 20 MIN: CPT

## 2024-08-08 PROCEDURE — 85025 COMPLETE CBC W/AUTO DIFF WBC: CPT

## 2024-08-08 PROCEDURE — 97165 OT EVAL LOW COMPLEX 30 MIN: CPT

## 2024-08-08 PROCEDURE — 97530 THERAPEUTIC ACTIVITIES: CPT

## 2024-08-08 PROCEDURE — 99232 SBSQ HOSP IP/OBS MODERATE 35: CPT | Performed by: STUDENT IN AN ORGANIZED HEALTH CARE EDUCATION/TRAINING PROGRAM

## 2024-08-08 PROCEDURE — 2580000003 HC RX 258

## 2024-08-08 PROCEDURE — 36415 COLL VENOUS BLD VENIPUNCTURE: CPT

## 2024-08-08 PROCEDURE — 6360000002 HC RX W HCPCS

## 2024-08-08 PROCEDURE — 1200000000 HC SEMI PRIVATE

## 2024-08-08 PROCEDURE — 80053 COMPREHEN METABOLIC PANEL: CPT

## 2024-08-08 RX ORDER — SENNOSIDES A AND B 8.6 MG/1
1 TABLET, FILM COATED ORAL NIGHTLY
Status: DISCONTINUED | OUTPATIENT
Start: 2024-08-08 | End: 2024-08-10

## 2024-08-08 RX ADMIN — OXYCODONE HYDROCHLORIDE 10 MG: 10 TABLET ORAL at 08:37

## 2024-08-08 RX ADMIN — ENOXAPARIN SODIUM 30 MG: 100 INJECTION SUBCUTANEOUS at 20:58

## 2024-08-08 RX ADMIN — ACETAMINOPHEN 650 MG: 325 TABLET ORAL at 06:15

## 2024-08-08 RX ADMIN — SODIUM CHLORIDE, PRESERVATIVE FREE 10 ML: 5 INJECTION INTRAVENOUS at 01:48

## 2024-08-08 RX ADMIN — LOSARTAN POTASSIUM 100 MG: 50 TABLET, FILM COATED ORAL at 08:33

## 2024-08-08 RX ADMIN — ACETAMINOPHEN 650 MG: 325 TABLET ORAL at 01:45

## 2024-08-08 RX ADMIN — WATER 2000 MG: 1 INJECTION INTRAMUSCULAR; INTRAVENOUS; SUBCUTANEOUS at 01:47

## 2024-08-08 RX ADMIN — SODIUM CHLORIDE, PRESERVATIVE FREE 10 ML: 5 INJECTION INTRAVENOUS at 08:35

## 2024-08-08 RX ADMIN — OXYCODONE HYDROCHLORIDE 10 MG: 10 TABLET ORAL at 20:59

## 2024-08-08 RX ADMIN — ACETAMINOPHEN 650 MG: 325 TABLET ORAL at 20:59

## 2024-08-08 RX ADMIN — ACETAMINOPHEN 650 MG: 325 TABLET ORAL at 12:09

## 2024-08-08 RX ADMIN — BUPROPION HYDROCHLORIDE 150 MG: 150 TABLET, EXTENDED RELEASE ORAL at 08:33

## 2024-08-08 RX ADMIN — CARVEDILOL 12.5 MG: 6.25 TABLET, FILM COATED ORAL at 08:33

## 2024-08-08 RX ADMIN — CARVEDILOL 12.5 MG: 6.25 TABLET, FILM COATED ORAL at 20:59

## 2024-08-08 RX ADMIN — ENOXAPARIN SODIUM 30 MG: 100 INJECTION SUBCUTANEOUS at 08:32

## 2024-08-08 RX ADMIN — SENNOSIDES 8.6 MG: 8.6 TABLET, FILM COATED ORAL at 20:59

## 2024-08-08 RX ADMIN — PANTOPRAZOLE SODIUM 40 MG: 40 TABLET, DELAYED RELEASE ORAL at 06:16

## 2024-08-08 ASSESSMENT — PAIN DESCRIPTION - LOCATION
LOCATION: HIP;LEG
LOCATION: HIP

## 2024-08-08 ASSESSMENT — PAIN DESCRIPTION - ORIENTATION
ORIENTATION: RIGHT
ORIENTATION: LEFT
ORIENTATION: RIGHT

## 2024-08-08 ASSESSMENT — PAIN - FUNCTIONAL ASSESSMENT
PAIN_FUNCTIONAL_ASSESSMENT: PREVENTS OR INTERFERES SOME ACTIVE ACTIVITIES AND ADLS
PAIN_FUNCTIONAL_ASSESSMENT: ACTIVITIES ARE NOT PREVENTED
PAIN_FUNCTIONAL_ASSESSMENT: ACTIVITIES ARE NOT PREVENTED
PAIN_FUNCTIONAL_ASSESSMENT: PREVENTS OR INTERFERES SOME ACTIVE ACTIVITIES AND ADLS
PAIN_FUNCTIONAL_ASSESSMENT: ACTIVITIES ARE NOT PREVENTED

## 2024-08-08 ASSESSMENT — PAIN SCALES - GENERAL
PAINLEVEL_OUTOF10: 6
PAINLEVEL_OUTOF10: 7
PAINLEVEL_OUTOF10: 5
PAINLEVEL_OUTOF10: 4
PAINLEVEL_OUTOF10: 5
PAINLEVEL_OUTOF10: 1
PAINLEVEL_OUTOF10: 5
PAINLEVEL_OUTOF10: 3
PAINLEVEL_OUTOF10: 1
PAINLEVEL_OUTOF10: 9
PAINLEVEL_OUTOF10: 2

## 2024-08-08 ASSESSMENT — PAIN SCALES - WONG BAKER
WONGBAKER_NUMERICALRESPONSE: NO HURT

## 2024-08-08 ASSESSMENT — PAIN DESCRIPTION - DESCRIPTORS
DESCRIPTORS: ACHING;DISCOMFORT;SORE;TENDER
DESCRIPTORS: ACHING;SORE;DULL
DESCRIPTORS: ACHING;SORE;DULL
DESCRIPTORS: ACHING;PRESSURE

## 2024-08-08 NOTE — PROGRESS NOTES
TRAUMA SURGERY  DAILY PROGRESS NOTE  8/8/2024    CHIEF COMPLAINT:  Chief Complaint   Patient presents with    Fall     Patient fell 8-10 feet off of ladder while cutting trees on to right side. C/o right shoulder, rib, and hip pain.       SUBJECTIVE:  No acute event overnight. Pain is well controlled and is tolerating the diet. Patient is complaining about constipation, stating that he did not pass any stool since surgery.    OBJECTIVE:  /72   Pulse 92   Temp (!) 96.7 °F (35.9 °C) (Temporal)   Resp 20   Ht 1.829 m (6' 0.01\")   Wt 106.6 kg (235 lb)   SpO2 96%   BMI 31.86 kg/m²     GENERAL:  NAD. Awake and alert.  HEAD:  Normocephalic, atraumatic.  EYES: No scleral icterus, pupil equal.  LUNGS:  No increased work of breathing. Normal breath sounds. Room air.  CARDIOVASCULAR: Normal rate and regular rhythm. Normal heart sounds.  ABDOMEN:  Soft, non-distended, non-tender.  EXTREMITIES:  No edema or swelling.   - RLE: sensation intact, able to wiggles his toes.  - RUE: bruising, swelling, feeling sore.  SKIN:  Warm and dry.    ASSESSMENT/PLAN:  74 y.o. male s/p mechanical fall from ladder (approx 12ft) w a R iliac wing/acetabular fx and R  6th rib fx.    - Multimodal pain management  - DVT prophylaxis: Lovenox, Tranexamic acid-NaCl  - Bowel regimen: Glycolax ordered but patient has not been taking it. Adding Senna.  - Antinausea regimen: Zofran, Protonix  - Diet: regular as tolerated  - Discharge planning: per PT/OT and Orthopedic Surgery    Sabine Davila MD  Family Medicine Resident PGY-1  8/8/2024  6:20 AM     Attending Attestation      I have seen and examined this patient.  I have personally reviewed and interpreted all relevant labs and imaging.  I agree with the resident documentation.     73yo man s/p mechanical fall from ladder (approx 12ft) found to have a right iliac wing/acetabular fracture, and right 6th rib fracture      BP (!) 105/57   Pulse 77   Temp 97.3 °F (36.3 °C) (Temporal)   Resp    ORTHO  PT/OT     DVT PPX: SCD and lovenox   ++Will need to be discharged with ASA DVT Ppx for 1 month per orthopedic surgery++  Diet: regular      Dispo: ARU dispo 8/9     Michael Croft MD   Trauma/Critical care

## 2024-08-08 NOTE — CARE COORDINATION
Spoke with the patient at the bedside.  Patient is agreeable in coming to ARU. Patient currently lives with his wife and daughter in their trilevel with 7 steps to the main level of home.  Patient was independent PTA.  Patient states he has children and grandchildren who can also help at discharge.     Reviewed with Dr. Paulino.  Patient is appropriate for ARU and we can plan on admitting patient to ARU tomorrow 08/09 if patient continues to be medically stable and pain under good control.

## 2024-08-08 NOTE — CARE COORDINATION
8/8/2024Social work transition of care planning  Sw and Cm followed up with pt and family at bedside for transition of care planning. Pt agreeable to rehab. Pt first choice is acute here. Snf choices 1) [facility],2) [facility], and 3) [facility]. Referral to first two choices.  Electronically signed by TIGIST Rios on 8/8/2024 at 11:02 AM

## 2024-08-08 NOTE — PROGRESS NOTES
Occupational Therapy  OCCUPATIONAL THERAPY INITIAL EVALUATION    [facility]  [address], [place]      Date:2024                                                Patient Name: Rosas Corbin  MRN: [id number]  : 1950  Room: [address] [place] [address]    Evaluating OT: Sammy Gandhi OTR/L #[id number]     Referring Provider: Michael Croft MD   Specific Provider Orders/Date: OT eval and treat 24    Diagnosis: Retroperitoneal hematoma [K68.3]  Closed fracture of one rib of right side, initial encounter [S22.31XA]  Closed nondisplaced fracture of right acetabulum, unspecified portion of acetabulum, initial encounter ([facility]) [S32.401A]  Closed nondisplaced fracture of pelvis, unspecified part of pelvis, initial encounter ([facility]) [S32.9XXA]  Nondisplaced fracture of anterior column (iliopubic) of right acetabulum, initial encounter for closed fracture ([facility]) [S32.434A]  Fall from height of greater than 3 feet [W17.89XA]   Pt admitted to hospital following fall from ladder; R iliac wing/acetabular fx, R 6th rib fx and L5 TP fx    Surgery / Procedure:  Open reduction internal fixation right both column acetabulum fracture  24    Pertinent Medical History:  has a past medical history of Acid reflux, Arthritis, BPH (benign prostatic hyperplasia), Cancer (HCC), COVID-19, Heart murmur, Hypertension, Left knee pain, Mitral valve disorder, and Palpitations.       Precautions:  Fall Risk, TTWB R LE, R rib fracture, spine neutrality (L5 TP fx)    Assessment of current deficits    [x] Functional mobility  [x]ADLs  [x] Strength               []Cognition    [x] Functional transfers   [x] IADLs         [x] Safety Awareness   [x]Endurance    [] Fine Coordination              [x] Balance      [] Vision/perception   []Sensation     []Gross Motor Coordination  [] ROM  [] Delirium                   [] Motor Control     OT PLAN OF CARE   OT POC based on physician orders, patient

## 2024-08-08 NOTE — PLAN OF CARE
Problem: Discharge Planning  Goal: Discharge to home or other facility with appropriate resources  8/7/2024 0935 by Snehal Jeffries RN  Outcome: Progressing     Problem: Pain  Goal: Verbalizes/displays adequate comfort level or baseline comfort level  8/7/2024 0935 by Snehal Jeffries, RN  Outcome: Progressing     Problem: Skin/Tissue Integrity  Goal: Absence of new skin breakdown  Description: 1.  Monitor for areas of redness and/or skin breakdown  2.  Assess vascular access sites hourly  3.  Every 4-6 hours minimum:  Change oxygen saturation probe site  4.  Every 4-6 hours:  If on nasal continuous positive airway pressure, respiratory therapy assess nares and determine need for appliance change or resting period.  8/7/2024 0935 by Snehal Jeffries RN  Outcome: Progressing     Problem: ABCDS Injury Assessment  Goal: Absence of physical injury  8/7/2024 0935 by Snehal Jeffries, RN  Outcome: Progressing     Problem: Safety - Adult  Goal: Free from fall injury  8/7/2024 0935 by Snehal Jeffries, RN  Outcome: Progressing

## 2024-08-08 NOTE — PROGRESS NOTES
Department of Orthopedic Surgery  Resident Progress Note    Patient seen and examined. Pain controlled. No new complaints.  Resting in bed comfortably this morning.  Overall the patient is doing well.  We discussed his surgery at length as well as the postoperative course.  All of his questions were addressed.    VITALS:  /72   Pulse 92   Temp (!) 96.7 °F (35.9 °C) (Temporal)   Resp 20   Ht 1.829 m (6' 0.01\")   Wt 106.6 kg (235 lb)   SpO2 96%   BMI 31.86 kg/m²     General: Awake alert cooperative in no acute distress    MUSCULOSKELETAL:   right lower extremity:  Dressing C/D/I  Compartments soft and compressible  +PF/DF/EHL  +2/4 DP & PT pulses, Brisk Cap refill, Toes warm and perfused  Distal sensation grossly intact to Peroneals, Sural, Saphenous, and tibial nrs    CBC:   Lab Results   Component Value Date/Time    WBC 5.8 08/07/2024 04:37 AM    HGB 10.7 08/07/2024 04:37 AM    HCT 32.0 08/07/2024 04:37 AM     08/07/2024 04:37 AM     PT/INR:    Lab Results   Component Value Date/Time    PROTIME 13.9 08/06/2024 12:04 PM    INR 1.3 08/06/2024 12:04 PM         ASSESSMENT  S/P right anterior column posterior hemitransverse acetabular fracture open reduction internal fixation-8/7/2024    PLAN      Continue physical therapy and protocol: Toe-touch weightbearing- RLE  24 hour abx coverage to be completed today  Deep venous thrombosis prophylaxis -recommend Lovenox both inpatient and outpatient okay to resume today from an orthopedic standpoint, early mobilization  PT/OT  Pain Control: IV and PO wean narcotics as able  Monitor H&H drawn and pending this morning still  D/C Plan: Per PT/OT/social work recommendations.  Likely will need rehab for time at least as he does not have much help at home.

## 2024-08-08 NOTE — PROGRESS NOTES
Physical Therapy  Facility/Department: [address] ORTHO-TRAUMA  Physical Therapy Initial Assessment    Name: Rosas Corbin  : 1950  MRN: [id number]  Date of Service: 2024  Evaluating PT:  Harini Celeste PT, DPT [id number]    Room #:  5420/5420-A  Diagnosis:  Retroperitoneal hematoma [K68.3]  Closed fracture of one rib of right side, initial encounter [S22.31XA]  Closed nondisplaced fracture of right acetabulum, unspecified portion of acetabulum, initial encounter ([facility]) [S32.401A]  Closed nondisplaced fracture of pelvis, unspecified part of pelvis, initial encounter ([facility]) [S32.9XXA]  Nondisplaced fracture of anterior column (iliopubic) of right acetabulum, initial encounter for closed fracture (HCC) [S32.434A]  Fall from height of greater than 3 feet [W17.89XA]  PMHx/PSHx:    Past Medical History:   Diagnosis Date    Acid reflux     Arthritis     BPH (benign prostatic hyperplasia)     Cancer (HCC) 2018    skin    COVID-19 2022    Heart murmur     Hypertension     Left knee pain     for OR 11/10/2022    Mitral valve disorder     Palpitations      Procedure/Surgery:   Open reduction internal fixation right both column acetabulum fracture  Reason for admission: Retroperitoneal hematoma [K68.3]  Closed fracture of one rib of right side, initial encounter [S22.31XA]  Closed nondisplaced fracture of right acetabulum, unspecified portion of acetabulum, initial encounter ([facility]CC) [S32.401A]  Closed nondisplaced fracture of pelvis, unspecified part of pelvis, initial encounter ([facility]) [S32.9XXA]  Nondisplaced fracture of anterior column (iliopubic) of right acetabulum, initial encounter for closed fracture (HCC) [S32.434A]  Fall from height of greater than 3 feet [W17.89XA]  Precautions:  fall risk, TTWB RLE, spinal precautions, R rib fx  Equipment Needs:  TBD    SUBJECTIVE:  Pt lives in tri-level/split with wife with 7 [address] main floor where he notes he can sleep. Pt ambulated with no AD ind PTA.    OBJECTIVE:    requiring heavy assist x2 to transfer to EOB for trunk and BLE management d/t R hip pain. No dizziness noted initially sitting EOB today. Extensive education provided for TTWB status and pt showed improved compliance as session progressed but did require heavy assist x2 to stand safely to FWW. Upon transferring to bedside chair EOB but noted some dizziness that improved with seated rest. Pt required decreased assistance to stand from bedside chair vs bed. Pt encouraged to increase OOB activity with staff as able and educated on benefits. Pt left with all needs met and call light in reach at end of session.     Treatment:  Patient practiced and was instructed in the following treatment:    Bed mobility: performed with cues for safety awareness and proper hand placement to promote improved functional independence.   Transfer Training: STS, SPT performed with FWW with max cuing to maintain TTWB  Gait training: small steps forward/backward from chair with cuing for WB status     Pt's/ family goals   1. Return home safely.    Prognosis is good for reaching above PT goals.    Patient and or family understand(s) diagnosis, prognosis, and plan of care.  yews    PHYSICAL THERAPY PLAN OF CARE:    PT POC is established based on physician order and patient diagnosis     Referring provider/PT Order:    08/08/24 0900  PT eval and treat  Start:  08/08/24 0900,   End:  08/08/24 0900,   ONE TIME,   Standing Count:  1 Occurrences,   R         Michael Croft MD     Diagnosis:  Retroperitoneal hematoma [K68.3]  Closed fracture of one rib of right side, initial encounter [S22.31XA]  Closed nondisplaced fracture of right acetabulum, unspecified portion of acetabulum, initial encounter ([facility]) [S32.401A]  Closed nondisplaced fracture of pelvis, unspecified part of pelvis, initial encounter ([facility]) [S32.9XXA]  Nondisplaced fracture of anterior column (iliopubic) of right acetabulum, initial encounter for closed fracture (HCC)

## 2024-08-09 ENCOUNTER — APPOINTMENT (OUTPATIENT)
Dept: GENERAL RADIOLOGY | Age: 74
DRG: 515 | End: 2024-08-09
Payer: MEDICARE

## 2024-08-09 LAB
25(OH)D3 SERPL-MCNC: 23 NG/ML (ref 30–100)
ALBUMIN SERPL-MCNC: 3.4 G/DL (ref 3.5–5.2)
ALP SERPL-CCNC: 51 U/L (ref 40–129)
ALT SERPL-CCNC: 17 U/L (ref 0–40)
ANION GAP SERPL CALCULATED.3IONS-SCNC: 11 MMOL/L (ref 7–16)
AST SERPL-CCNC: 31 U/L (ref 0–39)
BASOPHILS # BLD: 0 K/UL (ref 0–0.2)
BASOPHILS NFR BLD: 0 % (ref 0–2)
BILIRUB SERPL-MCNC: 0.8 MG/DL (ref 0–1.2)
BUN SERPL-MCNC: 16 MG/DL (ref 6–23)
CALCIUM SERPL-MCNC: 9.1 MG/DL (ref 8.6–10.2)
CHLORIDE SERPL-SCNC: 102 MMOL/L (ref 98–107)
CO2 SERPL-SCNC: 21 MMOL/L (ref 22–29)
CREAT SERPL-MCNC: 0.6 MG/DL (ref 0.7–1.2)
EOSINOPHIL # BLD: 0.02 K/UL (ref 0.05–0.5)
EOSINOPHILS RELATIVE PERCENT: 0 % (ref 0–6)
ERYTHROCYTE [DISTWIDTH] IN BLOOD BY AUTOMATED COUNT: 13.3 % (ref 11.5–15)
GFR, ESTIMATED: >90 ML/MIN/1.73M2
GLUCOSE SERPL-MCNC: 130 MG/DL (ref 74–99)
HCT VFR BLD AUTO: 29.4 % (ref 37–54)
HGB BLD-MCNC: 10.3 G/DL (ref 12.5–16.5)
IMM GRANULOCYTES # BLD AUTO: 0.03 K/UL (ref 0–0.58)
IMM GRANULOCYTES NFR BLD: 0 % (ref 0–5)
LYMPHOCYTES NFR BLD: 0.63 K/UL (ref 1.5–4)
LYMPHOCYTES RELATIVE PERCENT: 9 % (ref 20–42)
MCH RBC QN AUTO: 34.3 PG (ref 26–35)
MCHC RBC AUTO-ENTMCNC: 35 G/DL (ref 32–34.5)
MCV RBC AUTO: 98 FL (ref 80–99.9)
MONOCYTES NFR BLD: 0.72 K/UL (ref 0.1–0.95)
MONOCYTES NFR BLD: 10 % (ref 2–12)
NEUTROPHILS NFR BLD: 81 % (ref 43–80)
NEUTS SEG NFR BLD: 5.82 K/UL (ref 1.8–7.3)
PLATELET # BLD AUTO: 116 K/UL (ref 130–450)
PMV BLD AUTO: 9.2 FL (ref 7–12)
POTASSIUM SERPL-SCNC: 4 MMOL/L (ref 3.5–5)
PROT SERPL-MCNC: 6.1 G/DL (ref 6.4–8.3)
RBC # BLD AUTO: 3 M/UL (ref 3.8–5.8)
SODIUM SERPL-SCNC: 134 MMOL/L (ref 132–146)
WBC OTHER # BLD: 7.2 K/UL (ref 4.5–11.5)

## 2024-08-09 PROCEDURE — 6370000000 HC RX 637 (ALT 250 FOR IP)

## 2024-08-09 PROCEDURE — 80053 COMPREHEN METABOLIC PANEL: CPT

## 2024-08-09 PROCEDURE — 85025 COMPLETE CBC W/AUTO DIFF WBC: CPT

## 2024-08-09 PROCEDURE — 6360000002 HC RX W HCPCS

## 2024-08-09 PROCEDURE — 1200000000 HC SEMI PRIVATE

## 2024-08-09 PROCEDURE — 99232 SBSQ HOSP IP/OBS MODERATE 35: CPT | Performed by: STUDENT IN AN ORGANIZED HEALTH CARE EDUCATION/TRAINING PROGRAM

## 2024-08-09 PROCEDURE — 74018 RADEX ABDOMEN 1 VIEW: CPT

## 2024-08-09 PROCEDURE — 36415 COLL VENOUS BLD VENIPUNCTURE: CPT

## 2024-08-09 PROCEDURE — 2580000003 HC RX 258

## 2024-08-09 PROCEDURE — 82306 VITAMIN D 25 HYDROXY: CPT

## 2024-08-09 RX ORDER — POLYETHYLENE GLYCOL 3350 17 G/17G
17 POWDER, FOR SOLUTION ORAL DAILY
Status: DISCONTINUED | OUTPATIENT
Start: 2024-08-10 | End: 2024-08-13 | Stop reason: HOSPADM

## 2024-08-09 RX ORDER — VITAMIN B COMPLEX
1000 TABLET ORAL DAILY
Status: DISCONTINUED | OUTPATIENT
Start: 2024-08-09 | End: 2024-08-13 | Stop reason: HOSPADM

## 2024-08-09 RX ADMIN — LOSARTAN POTASSIUM 100 MG: 50 TABLET, FILM COATED ORAL at 09:07

## 2024-08-09 RX ADMIN — CARVEDILOL 12.5 MG: 6.25 TABLET, FILM COATED ORAL at 21:54

## 2024-08-09 RX ADMIN — SODIUM CHLORIDE, PRESERVATIVE FREE 10 ML: 5 INJECTION INTRAVENOUS at 21:55

## 2024-08-09 RX ADMIN — SENNOSIDES 8.6 MG: 8.6 TABLET, FILM COATED ORAL at 21:54

## 2024-08-09 RX ADMIN — CARVEDILOL 12.5 MG: 6.25 TABLET, FILM COATED ORAL at 09:07

## 2024-08-09 RX ADMIN — PANTOPRAZOLE SODIUM 40 MG: 40 TABLET, DELAYED RELEASE ORAL at 06:46

## 2024-08-09 RX ADMIN — ENOXAPARIN SODIUM 30 MG: 100 INJECTION SUBCUTANEOUS at 09:07

## 2024-08-09 RX ADMIN — POLYETHYLENE GLYCOL 3350 17 G: 17 POWDER, FOR SOLUTION ORAL at 02:15

## 2024-08-09 RX ADMIN — BUPROPION HYDROCHLORIDE 150 MG: 150 TABLET, EXTENDED RELEASE ORAL at 09:15

## 2024-08-09 RX ADMIN — ACETAMINOPHEN 650 MG: 325 TABLET ORAL at 09:08

## 2024-08-09 RX ADMIN — ACETAMINOPHEN 650 MG: 325 TABLET ORAL at 03:33

## 2024-08-09 RX ADMIN — Medication 1000 UNITS: at 15:26

## 2024-08-09 RX ADMIN — SODIUM CHLORIDE, PRESERVATIVE FREE 10 ML: 5 INJECTION INTRAVENOUS at 09:07

## 2024-08-09 RX ADMIN — MAJOR MAGNESIUM CITRATE ORAL SOLUTION - LEMON 296 ML: 1.75 LIQUID ORAL at 12:00

## 2024-08-09 RX ADMIN — ACETAMINOPHEN 650 MG: 325 TABLET ORAL at 12:05

## 2024-08-09 RX ADMIN — ENOXAPARIN SODIUM 30 MG: 100 INJECTION SUBCUTANEOUS at 21:54

## 2024-08-09 RX ADMIN — ONDANSETRON 4 MG: 2 INJECTION INTRAMUSCULAR; INTRAVENOUS at 09:22

## 2024-08-09 RX ADMIN — ACETAMINOPHEN 650 MG: 325 TABLET ORAL at 21:54

## 2024-08-09 ASSESSMENT — PAIN DESCRIPTION - LOCATION
LOCATION: HIP
LOCATION: LEG

## 2024-08-09 ASSESSMENT — PAIN SCALES - GENERAL
PAINLEVEL_OUTOF10: 0
PAINLEVEL_OUTOF10: 5
PAINLEVEL_OUTOF10: 0
PAINLEVEL_OUTOF10: 4

## 2024-08-09 ASSESSMENT — PAIN DESCRIPTION - DESCRIPTORS
DESCRIPTORS: ACHING
DESCRIPTORS: ACHING

## 2024-08-09 ASSESSMENT — PAIN DESCRIPTION - ORIENTATION
ORIENTATION: RIGHT
ORIENTATION: RIGHT

## 2024-08-09 NOTE — PROGRESS NOTES
Department of Orthopedic Surgery  Resident Progress Note    Patient seen and examined. Pain controlled.  Complaining of swelling and pain in his abdomen this morning.  KUB has already been ordered per the general surgery team.    VITALS:  BP (!) 105/57   Pulse 90   Temp 97.6 °F (36.4 °C) (Temporal)   Resp 18   Ht 1.829 m (6' 0.01\")   Wt 106.6 kg (235 lb)   SpO2 97%   BMI 31.86 kg/m²     General: Awake alert cooperative in no acute distress    MUSCULOSKELETAL:   right lower extremity:  Dressing C/D/I  Compartments soft and compressible  +PF/DF/EHL  +2/4 DP & PT pulses, Brisk Cap refill, Toes warm and perfused  Distal sensation grossly intact to Peroneals, Sural, Saphenous, and tibial nrs    CBC:   Lab Results   Component Value Date/Time    WBC 9.8 08/08/2024 05:06 AM    HGB 10.0 08/08/2024 05:06 AM    HCT 30.4 08/08/2024 05:06 AM     08/08/2024 05:06 AM     PT/INR:    Lab Results   Component Value Date/Time    PROTIME 13.9 08/06/2024 12:04 PM    INR 1.3 08/06/2024 12:04 PM         ASSESSMENT  S/P right anterior column posterior hemitransverse acetabular fracture open reduction internal fixation-8/7/2024    PLAN      Continue physical therapy and protocol: Toe-touch weightbearing- RLE  24 hour abx coverage to be completed today  Deep venous thrombosis prophylaxis -recommend Lovenox both inpatient and outpatient okay to resume today from an orthopedic standpoint, early mobilization  PT/OT  Pain Control: IV and PO wean narcotics as able  Monitor H&H 10 this morning  D/C Plan: Per PT/OT/social work recommendations.  Likely will need rehab for time at least as he does not have much help at home.  Orthopedics will follow peripherally at this point please call with any questions or concerns.

## 2024-08-09 NOTE — PROGRESS NOTES
PCP is Monroe Claudio MD  Office notified of admission.      Electronically signed by Rayna Sanders RN on 8/9/2024 at 11:27 AM

## 2024-08-09 NOTE — PROGRESS NOTES
GERIATRIC TRAUMA RESIDENT INITIAL ASSESSMENT    Chief Complaint   Patient presents with    Fall     Patient fell 8-10 feet off of ladder while cutting trees on to right side. C/o right shoulder, rib, and hip pain.       Mechanism of Injury:  Fell off a ladder    Loss of consciousness:  No    HPI: Mr. Rosas Corbin is a 74-year-old male with PMH of GERD, BPH, HTN was brought to the emergency department s/p fall while he was trimming trees on a ladder.  The patient states that he is very active and he does everything at home and he was trying to trim the trees when the chainsaw swung back, hit and knocked him down falling 12 feet. The patient is s/p right anterior column posterior hemitransverse acetabular fracture ORIF on 08/07/2024.  He has no acute complaint this morning except for constipation which he states that has been going on now for 3 days. Patient states that he has been getting pain medications and that might contributing to his constipation.  He also states that they made some adjustments in his bowel regimen. He is not usually constipated at home though.    Past Medical History:   Diagnosis Date    Acid reflux     Arthritis     BPH (benign prostatic hyperplasia)     Cancer (HCC) 11/2018    skin    COVID-19 09/2022    Heart murmur     Hypertension     Left knee pain     for OR 11/10/2022    Mitral valve disorder     Palpitations        Past Surgical History:   Procedure Laterality Date    BACK SURGERY      COLONOSCOPY  2018    HERNIA REPAIR      HIP FRACTURE SURGERY Right 8/7/2024    RIGHT ACETABULUM OPEN REDUCTION INTERNAL FIXATION performed by Perry Alcocer [place] [facility] [place]    KNEE ARTHROSCOPY Left 5/2/2019    LEFT KNEE ARTHROSCOPY WITH PARTIAL MEDIAL MENISECTOMY performed by Michael Gibson MD at [facility] OR    SKIN BIOPSY  11/2018    TOTAL KNEE ARTHROPLASTY Left 11/10/2022    ROBOTIC JAGUAR ASSISTED LEFT KNEE TOTAL ARTHROPLASTY +PNB performed by Michael Gibson MD at [facility] OR    UPPER  disintegrating tablet 4 mg  4 mg Oral Q8H PRN Lanre Mancilla DO        Or    ondansetron (ZOFRAN) injection 4 mg  4 mg IntraVENous Q6H PRN Lanre Mancilla DO        acetaminophen (TYLENOL) tablet 650 mg  650 mg Oral 4 times per day Lanre Mancilla DO   650 mg at 08/09/24 0333    oxyCODONE (ROXICODONE) immediate release tablet 5 mg  5 mg Oral Q4H PRN Lanre Mancilla DO   5 mg at 08/06/24 1834    Or    oxyCODONE HCl (OXY-IR) immediate release tablet 10 mg  10 mg Oral Q4H PRN Lanre Mancilla DO   10 mg at 08/08/24 2059    HYDROmorphone (DILAUDID) injection 0.25 mg  0.25 mg IntraVENous Q2H PRN Lanre Mancilla DO        buPROPion (WELLBUTRIN XL) extended release tablet 150 mg  150 mg Oral QAM Lanre Mancilla DO   150 mg at 08/08/24 0833    carvedilol (COREG) tablet 12.5 mg  12.5 mg Oral BID Lanre Mancilla DO   12.5 mg at 08/08/24 2059    losartan (COZAAR) tablet 100 mg  100 mg Oral Daily Lanre Mancilla DO   100 mg at 08/08/24 0833    pantoprazole (PROTONIX) tablet 40 mg  40 mg Oral QAM AC Lanre Mancilla DO   40 mg at 08/09/24 0646         Review of Systems   Constitutional: Negative.    HENT: Negative.     Eyes: Negative.    Respiratory: Negative.     Cardiovascular: Negative.    Gastrointestinal:  Positive for constipation.   Genitourinary: Negative.    Musculoskeletal: Negative.    Allergic/Immunologic: Negative.    Neurological: Negative.    Hematological: Negative.    Psychiatric/Behavioral: Negative.       Physical Exam:  Vitals: BP (!) 167/84   Pulse 73   Temp 96.8 °F (36 °C) (Temporal)   Resp 18   Ht 1.829 m (6' 0.01\")   Wt 106.6 kg (235 lb)   SpO2 98%   BMI 31.86 kg/m²     I & O - 24hr: I/O this shift:  In: 120 [P.O.:120]  Out: -    General Appearance: alert, appears stated age, and cooperative  Lung: clear to auscultation bilaterally  Heart: regular rate and rhythm, S1, S2 normal, no murmur, click, rub or gallop  Abdomen: Soft, no mass appreciated,  distended abdomen (mildly hyperactive bowel sounds)

## 2024-08-09 NOTE — PROGRESS NOTES
Discussed patient with Dr. Paulino. New post op ileus will need to be resolved for him to be able to admit to acute rehab. Will need updated therapies on Sunday or Monday for us to look at admitting him to ARU on Monday. SW updated.

## 2024-08-09 NOTE — PROGRESS NOTES
Ambulated to bathroom with 1 person assist. Patient had a moderate size formed stool. Verbalizes that his stomach feels less bloated.

## 2024-08-09 NOTE — PROGRESS NOTES
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  Highest level of education:  2-3 years at [employer] and [employer]      Emotional:  [x]  Within normal limits     []  Dec mood     []  Depression  []  Anxiety  []  Fatigue    []  Hallucination     []  Delusion  []  Other  Motivation:    [x]  High    []  Usual  []  Low  Health attitude:     []  Excellent    [x]  Good    []  Fair    []  Poor     []  Couldn't say  Communication:  Speech:   [x]  WNL   []  Impaired Hearing:   []  WNL   [x]  Impaired           Vision:     [x]  WNL   []  Impaired Understanding:   [x]  WNL   []  Impaired  Strength:   [x]  WNL   []  Weak Upper:   Normal  Lower:   Normal   Exercise:   []  Frequent    [x]  Occasional    []  None   What type of exercise?  Walking  Balance:   [x]  WNL   []  Impaired  # falls in last month:  1 #falls in the last 6 months:  1  Mobility:  walk inside      [x]  Independent    []  Slow    []  Assisted    []  Can't         Walk outside   [x]  Independent    []  Slow    []  Assisted    []  Can't         Transfers         [x]  Independent    []  Slow    []  Assisted    []  Can't                    Bed (in/out)     [x]  Independent    []  Slow    []  Assisted    []  Can't                    Aid use            [x]  Independent    []  Slow    []  Assisted    []  Can't   Nutrition:  Weight:  []  Normal    []  Under    [x]  Over    []  Obese   []  Dietary consult           Appetite: [x]  WNL    []  Fair    []  Poor         Patient reported symptoms of dysphagia?  No Nurse reported? No         Patient passed a bedside swallow if

## 2024-08-09 NOTE — PROGRESS NOTES
TRAUMA SURGERY  DAILY PROGRESS NOTE  8/9/2024    CHIEF COMPLAINT:  Chief Complaint   Patient presents with    Fall     Patient fell 8-10 feet off of ladder while cutting trees on to right side. C/o right shoulder, rib, and hip pain.       SUBJECTIVE:  Patient did not sleep well. Patient is having abdominal cramps, feeling bloating, and having pain in his abdomen. Patient is able to pass gas but did not have any bowel movement since surgery.     OBJECTIVE:  BP (!) 105/57   Pulse 90   Temp 97.6 °F (36.4 °C) (Temporal)   Resp 18   Ht 1.829 m (6' 0.01\")   Wt 106.6 kg (235 lb)   SpO2 97%   BMI 31.86 kg/m²     GENERAL:  Nauseous. In discomfort.  HEAD:  Normocephalic, atraumatic.  EYES: No scleral icterus.  LUNGS:  No increased work of breathing. Normal breath sounds. Room air.  CARDIOVASCULAR: Normal rate and regular rhythm. Normal heart sounds.  ABDOMEN:  Distended and tender. Abdominal cramps.   EXTREMITIES:  No edema or swelling. RLE: sensation intact.  SKIN:  Warm and dry.    ASSESSMENT/PLAN:  74 y.o. male s/p mechanical fall from ladder (approx 12ft) w a R iliac wing/acetabular fx and R  6th rib fx.     - Abdominal pain/distension: KUB ordered  - Multimodal pain management  - DVT prophylaxis: Lovenox  - Bowel regimen: Glycolax. Senna  - Antinausea regimen: Zofran, Protonix   - Diet: regular as tolerated  - PT/OT:   - [facility] score of 11/24  - Protocol: toe-touch weightbearing for RLE per Orthopedic Surgery  - Discharge planning: ARU depending on patient's symptoms resolution     Sabine Davila MD  Family Medicine Resident PGY-1  8/9/2024  6:17 AM     Attending Attestation      I have seen and examined this patient.  I have personally reviewed and interpreted all relevant labs and imaging.  I agree with the resident documentation.     73yo man s/p mechanical fall from ladder (approx 12ft) found to have a right iliac wing/acetabular fracture, and right 6th rib fracture     Mild post op ileus, pt placed on CLD.

## 2024-08-09 NOTE — PROGRESS NOTES
Acute Rehab Pre-Admission Screen      Referral date: 8/8/2024  Onset/Hospital Admit Date: 8/6/2024 11:49 AM    Current Location: [facility][note 3]/5420-A    Name: Rosas Corbin  YOB: 1950  Age: 74 y.o.  Admitting Diagnosis: Pelvic fracture   Address: [address] [place] [address]  Home Phone: [phone] (home)  Social Security #:     Sex: male  Race:A. White  Ethnicity: A. No, not of ,  or Italian origin    Marital Status:      Ethnic/Cultural/Adventism Considerations: Jehovah's witness                ADVANCED DIRECTIVES:     Full code   Copies are in the chart                 COVERAGE INFORMATION   Primary Insurer: Medicare Part A & B  Secondary Insurance: [employer]  Medicare #: [id number]    Verified coverage with : Financial department      MEDICAL UPDATE:  History of present admission: Rosas is a 74 y.o. male who was a mechanical fall from a ladder approximately 12 feet on 8/7/2024. He fell onto his right side. He denied loss of consciousness. He was found to have a right iliac wing/acetabular fracture, L5 TP fracture and a right 6th rib fracture. Also on 8/7/2024 he went to the OR and had and  Open reduction internal fixation right both column acetabulum fracture. He has a past medical history of Acid reflux, arthritis, BPH, skin cancer, Hypertension, Mitral valve disorder, Heart murmur and palpitations. His pain is controlled and he is now ready to start rehab.     PHYSICIAN / REFERRAL INFORMATION  Referring Physician: Dr. Guerda MD  Attending Physician: Michael Croft MD  Primary Care Physician: Monroe Claudio MD  Consultants/Opinions (see full consult notes on chart): Orthopedic surgery and physical medicine and rehab.     SOCIAL INFORMATION  Primary  Contact: Uma Emerald  Relationship: spouse  Primary Phone: [phone]    Secondary  Contact: Parker Corbin  Relationship: child  Primary Phone: [phone]      Previous Community Services: none  Caregiver available:   Adapter         Oxygen:  Room air       Wound care:   Wound or incision care     Pain management:  (level of pain, meds): Oxycodone PRN     Bladder/Bowel:  No current bladder/bowel issues    Substance use history: alcohol    Special rehabilitation needs:   no special needs needed    Special equipment:  No special equipment needed                    FUNCTIONAL STATUS PT / OT / SPEECH EVALS:      FIM / EVAL Discipline Initial: 8/8/2024 Follow Up:  Current:    Eating OT Independent     Grooming OT Standby Assistance     Bathing OT Maximal Assistance     Dressing Upper Extremity OT Minimal Assistance     Dressing Lower Extremity OT Total Assistance     Toileting OT Maximal Assistance     Toilet Transfers OT Maximal Assistance     Tub/Shower Transfers OT not tested     Homemaking OT not tested            Bed Mobility PT Maximal Assistance     Bed/Wheelchair Transfers PT Moderate Assistance     Locomotion Walk / Wheelchair  Device:  Distance: PT Moderate Assistance  2 feet with FWW     Endurance PT fair -            Expression SP not tested     Social Interaction SP not tested     Problem Solving SP good     Memory SP good     Comprehension SP fair     Swallowing SP NA       Comments on Functional Status:      [x] Able to participate a minimum of 3 hours per day of therapy intervention    Required treatments/services: Rehabilitation Nursing, Case Management, and Social work       Required Therapy:  Therapy Hours per Day Days per Week Therapeutic Interventions Required   [x] Physical Therapy 1 1/2 5-7 Gait, transfers, Safety, strength, education, endurance   [x] Occupational Therapy 1 1/2 5-7 ADLs, IADLs, Safety, strength, education, endurance   [] Speech Pathology      [] Prosthetics / Orthotics       []                         DISCHARGE NEEDS  Anticipated Discharge Plan:    Home with supervision      Anticipated DME Needs:    To be determined         Anticipated Home Health Services:    To be determined     Anticipated

## 2024-08-09 NOTE — PLAN OF CARE
Problem: Discharge Planning  Goal: Discharge to home or other facility with appropriate resources  8/8/2024 2242 by Eduarda Helms RN  Outcome: Progressing  8/8/2024 0937 by Snehal Jeffries RN  Outcome: Progressing     Problem: Pain  Goal: Verbalizes/displays adequate comfort level or baseline comfort level  8/8/2024 2242 by Eduarda Helms RN  Outcome: Progressing  8/8/2024 0937 by Snehal Jeffries RN  Outcome: Progressing     Problem: Skin/Tissue Integrity  Goal: Absence of new skin breakdown  Description: 1.  Monitor for areas of redness and/or skin breakdown  2.  Assess vascular access sites hourly  3.  Every 4-6 hours minimum:  Change oxygen saturation probe site  4.  Every 4-6 hours:  If on nasal continuous positive airway pressure, respiratory therapy assess nares and determine need for appliance change or resting period.  8/8/2024 2242 by Eduarda Helms RN  Outcome: Progressing  8/8/2024 0937 by Snehal Jeffries RN  Outcome: Progressing     Problem: ABCDS Injury Assessment  Goal: Absence of physical injury  8/8/2024 2242 by Eduarda Helms RN  Outcome: Progressing  8/8/2024 0937 by Snehal Jeffries RN  Outcome: Progressing     Problem: Safety - Adult  Goal: Free from fall injury  8/8/2024 2242 by Eduarda Helms RN  Outcome: Progressing  8/8/2024 0937 by Snehal Jeffries RN  Outcome: Progressing

## 2024-08-09 NOTE — CARE COORDINATION
8/9/2024Social work transition of care planning  Pt plan is to acute rehab v [facility]. Sw will follow.  Electronically signed by TIGIST Rios on 8/9/2024 at 9:05 AM    Addendum: Stan notified that pt ok to admit to acute rehab today,if kub negative. RN updated.  Electronically signed by TIGIST Rios on 8/9/2024 at 11:29 AM

## 2024-08-10 LAB
ALBUMIN SERPL-MCNC: 3.1 G/DL (ref 3.5–5.2)
ALP SERPL-CCNC: 44 U/L (ref 40–129)
ALT SERPL-CCNC: 16 U/L (ref 0–40)
ANION GAP SERPL CALCULATED.3IONS-SCNC: 9 MMOL/L (ref 7–16)
AST SERPL-CCNC: 28 U/L (ref 0–39)
BASOPHILS # BLD: 0.01 K/UL (ref 0–0.2)
BASOPHILS NFR BLD: 0 % (ref 0–2)
BILIRUB SERPL-MCNC: 0.9 MG/DL (ref 0–1.2)
BUN SERPL-MCNC: 15 MG/DL (ref 6–23)
CALCIUM SERPL-MCNC: 9.2 MG/DL (ref 8.6–10.2)
CHLORIDE SERPL-SCNC: 100 MMOL/L (ref 98–107)
CO2 SERPL-SCNC: 25 MMOL/L (ref 22–29)
CREAT SERPL-MCNC: 0.8 MG/DL (ref 0.7–1.2)
EOSINOPHIL # BLD: 0.08 K/UL (ref 0.05–0.5)
EOSINOPHILS RELATIVE PERCENT: 1 % (ref 0–6)
ERYTHROCYTE [DISTWIDTH] IN BLOOD BY AUTOMATED COUNT: 13.5 % (ref 11.5–15)
GFR, ESTIMATED: >90 ML/MIN/1.73M2
GLUCOSE SERPL-MCNC: 97 MG/DL (ref 74–99)
HCT VFR BLD AUTO: 27.7 % (ref 37–54)
HGB BLD-MCNC: 9.3 G/DL (ref 12.5–16.5)
IMM GRANULOCYTES # BLD AUTO: 0.03 K/UL (ref 0–0.58)
IMM GRANULOCYTES NFR BLD: 1 % (ref 0–5)
LYMPHOCYTES NFR BLD: 1.04 K/UL (ref 1.5–4)
LYMPHOCYTES RELATIVE PERCENT: 18 % (ref 20–42)
MCH RBC QN AUTO: 32.7 PG (ref 26–35)
MCHC RBC AUTO-ENTMCNC: 33.6 G/DL (ref 32–34.5)
MCV RBC AUTO: 97.5 FL (ref 80–99.9)
MONOCYTES NFR BLD: 0.66 K/UL (ref 0.1–0.95)
MONOCYTES NFR BLD: 11 % (ref 2–12)
NEUTROPHILS NFR BLD: 69 % (ref 43–80)
NEUTS SEG NFR BLD: 3.99 K/UL (ref 1.8–7.3)
PLATELET # BLD AUTO: 131 K/UL (ref 130–450)
PMV BLD AUTO: 9.1 FL (ref 7–12)
POTASSIUM SERPL-SCNC: 4.1 MMOL/L (ref 3.5–5)
PROT SERPL-MCNC: 5.7 G/DL (ref 6.4–8.3)
RBC # BLD AUTO: 2.84 M/UL (ref 3.8–5.8)
SODIUM SERPL-SCNC: 134 MMOL/L (ref 132–146)
WBC OTHER # BLD: 5.8 K/UL (ref 4.5–11.5)

## 2024-08-10 PROCEDURE — 99232 SBSQ HOSP IP/OBS MODERATE 35: CPT | Performed by: STUDENT IN AN ORGANIZED HEALTH CARE EDUCATION/TRAINING PROGRAM

## 2024-08-10 PROCEDURE — 36415 COLL VENOUS BLD VENIPUNCTURE: CPT

## 2024-08-10 PROCEDURE — 6360000002 HC RX W HCPCS

## 2024-08-10 PROCEDURE — 80053 COMPREHEN METABOLIC PANEL: CPT

## 2024-08-10 PROCEDURE — 85025 COMPLETE CBC W/AUTO DIFF WBC: CPT

## 2024-08-10 PROCEDURE — 1200000000 HC SEMI PRIVATE

## 2024-08-10 PROCEDURE — 6370000000 HC RX 637 (ALT 250 FOR IP)

## 2024-08-10 PROCEDURE — 2580000003 HC RX 258

## 2024-08-10 RX ADMIN — LOSARTAN POTASSIUM 100 MG: 50 TABLET, FILM COATED ORAL at 08:55

## 2024-08-10 RX ADMIN — CARVEDILOL 12.5 MG: 6.25 TABLET, FILM COATED ORAL at 21:24

## 2024-08-10 RX ADMIN — CARVEDILOL 12.5 MG: 6.25 TABLET, FILM COATED ORAL at 08:55

## 2024-08-10 RX ADMIN — PANTOPRAZOLE SODIUM 40 MG: 40 TABLET, DELAYED RELEASE ORAL at 05:50

## 2024-08-10 RX ADMIN — ENOXAPARIN SODIUM 30 MG: 100 INJECTION SUBCUTANEOUS at 21:23

## 2024-08-10 RX ADMIN — SODIUM CHLORIDE, PRESERVATIVE FREE 10 ML: 5 INJECTION INTRAVENOUS at 08:55

## 2024-08-10 RX ADMIN — BUPROPION HYDROCHLORIDE 150 MG: 150 TABLET, EXTENDED RELEASE ORAL at 08:55

## 2024-08-10 RX ADMIN — ACETAMINOPHEN 650 MG: 325 TABLET ORAL at 05:50

## 2024-08-10 RX ADMIN — Medication 1000 UNITS: at 08:55

## 2024-08-10 RX ADMIN — ACETAMINOPHEN 650 MG: 325 TABLET ORAL at 11:47

## 2024-08-10 RX ADMIN — ENOXAPARIN SODIUM 30 MG: 100 INJECTION SUBCUTANEOUS at 08:54

## 2024-08-10 RX ADMIN — ACETAMINOPHEN 650 MG: 325 TABLET ORAL at 21:24

## 2024-08-10 RX ADMIN — SODIUM CHLORIDE, PRESERVATIVE FREE 10 ML: 5 INJECTION INTRAVENOUS at 21:24

## 2024-08-10 ASSESSMENT — PAIN DESCRIPTION - ORIENTATION
ORIENTATION: RIGHT
ORIENTATION: LOWER

## 2024-08-10 ASSESSMENT — PAIN DESCRIPTION - LOCATION
LOCATION: HIP
LOCATION: BACK

## 2024-08-10 ASSESSMENT — PAIN SCALES - GENERAL
PAINLEVEL_OUTOF10: 6
PAINLEVEL_OUTOF10: 1

## 2024-08-10 ASSESSMENT — PAIN DESCRIPTION - DESCRIPTORS
DESCRIPTORS: DISCOMFORT;ACHING;SORE
DESCRIPTORS: ACHING

## 2024-08-10 NOTE — PLAN OF CARE
Problem: Discharge Planning  Goal: Discharge to home or other facility with appropriate resources  8/10/2024 0448 by Eduarda Helms RN  Outcome: Progressing  8/10/2024 0442 by Eduarda Helms RN  Outcome: Progressing     Problem: Pain  Goal: Verbalizes/displays adequate comfort level or baseline comfort level  8/10/2024 0448 by Eduarda Helms RN  Outcome: Progressing  8/10/2024 0442 by Eduarda Helms RN  Outcome: Progressing     Problem: Skin/Tissue Integrity  Goal: Absence of new skin breakdown  Description: 1.  Monitor for areas of redness and/or skin breakdown  2.  Assess vascular access sites hourly  3.  Every 4-6 hours minimum:  Change oxygen saturation probe site  4.  Every 4-6 hours:  If on nasal continuous positive airway pressure, respiratory therapy assess nares and determine need for appliance change or resting period.  8/10/2024 0448 by Eduarda Helms RN  Outcome: Progressing  8/10/2024 0442 by Eduarda Helms RN  Outcome: Progressing     Problem: ABCDS Injury Assessment  Goal: Absence of physical injury  8/10/2024 0448 by Eduarda Helms RN  Outcome: Progressing  8/10/2024 0442 by Eduarda Helms RN  Outcome: Progressing     Problem: Safety - Adult  Goal: Free from fall injury  8/10/2024 0448 by Eduarda Helms RN  Outcome: Progressing  8/10/2024 0442 by Eduarda Helms RN  Outcome: Progressing

## 2024-08-10 NOTE — PROGRESS NOTES
TRAUMA SURGERY  DAILY PROGRESS NOTE  8/10/2024    CHIEF COMPLAINT:  Chief Complaint   Patient presents with    Fall     Patient fell 8-10 feet off of ladder while cutting trees on to right side. C/o right shoulder, rib, and hip pain.       SUBJECTIVE:    Still feel bloated, but had multiple BM yesterday. Ileus seems to be resolving. No other issues. Plan for ARU on Monday.     OBJECTIVE:  BP (!) 118/58   Pulse 68   Temp 97.7 °F (36.5 °C) (Temporal)   Resp 18   Ht 1.829 m (6' 0.01\")   Wt 106.6 kg (235 lb)   SpO2 96%   BMI 31.86 kg/m²     GENERAL:  Nauseous. In discomfort.  HEAD:  Normocephalic, atraumatic.  EYES: No scleral icterus.  LUNGS:  No increased work of breathing. Normal breath sounds. Room air.  CARDIOVASCULAR: Normal rate and regular rhythm. Normal heart sounds.  ABDOMEN Mildly distended, non tender   EXTREMITIES:  No edema or swelling. RLE: sensation intact.  SKIN:  Warm and dry.    ASSESSMENT/PLAN:  74 y.o. male s/p mechanical fall from ladder (approx 12ft) w a R iliac wing/acetabular fx and R  6th rib fx.     - C//w restricted CLD for now   - Multimodal pain management  - DVT prophylaxis: Lovenox  - Bowel regimen: Glycolax.  - Antinausea regimen: Zofran, Protonix   - Diet: regular as tolerated  - [facility]   [facility]  - Protocol: toe-touch weightbearing for RLE per Orthopedic Surgery  - Discharge planning: ARU on 8/12     Bharti Newton DO  Family Medicine Resident PGY-2  8/10/2024  6:34 AM     Attending Attestation      I have seen and examined this patient.  I have personally reviewed and interpreted all relevant labs and imaging.  I agree with the resident documentation.     73yo man s/p mechanical fall from ladder (approx 12ft) found to have a right iliac wing/acetabular fracture, and right 6th rib fracture      Felling better this morning.  Denies nausea.  Tolerating liquids and had a BM.  Advancing to a regular diet.       BP (!) 118/58   Pulse 68   Temp 97.7 °F (36.5 °C)  (Temporal)   Resp 18   Ht 1.829 m (6' 0.01\")   Wt 106.6 kg (235 lb)   SpO2 96%   BMI 31.86 kg/m²     CBC:   Lab Results   Component Value Date/Time    WBC 5.8 08/10/2024 05:15 AM    RBC 2.84 08/10/2024 05:15 AM    HGB 9.3 08/10/2024 05:15 AM    HCT 27.7 08/10/2024 05:15 AM    MCV 97.5 08/10/2024 05:15 AM    MCH 32.7 08/10/2024 05:15 AM    MCHC 33.6 08/10/2024 05:15 AM    RDW 13.5 08/10/2024 05:15 AM     08/10/2024 05:15 AM    MPV 9.1 08/10/2024 05:15 AM     CMP:    Lab Results   Component Value Date/Time     08/10/2024 05:15 AM    K 4.1 08/10/2024 05:15 AM    K 4.4 11/11/2022 06:07 AM     08/10/2024 05:15 AM    CO2 25 08/10/2024 05:15 AM    BUN 15 08/10/2024 05:15 AM    CREATININE 0.8 08/10/2024 05:15 AM    GFRAA >60 05/17/2022 12:49 PM    LABGLOM >90 08/10/2024 05:15 AM    LABGLOM >60 10/03/2023 02:36 PM    GLUCOSE 97 08/10/2024 05:15 AM    CALCIUM 9.2 08/10/2024 05:15 AM    BILITOT 0.9 08/10/2024 05:15 AM    ALKPHOS 44 08/10/2024 05:15 AM    AST 28 08/10/2024 05:15 AM    ALT 16 08/10/2024 05:15 AM          Injuries/Active problems   ++Pelvic fracture++  Right inferior pubic ramus  Right iliac wing/acetabular fracture  Orthopedic Surgery PLAN      Continue physical therapy and protocol: Toe-touch weightbearing- RLE  24 hour abx coverage to be completed today  Deep venous thrombosis prophylaxis -recommend Lovenox both inpatient and outpatient okay to resume today from an orthopedic standpoint, early mobilization  PT/OT  Pain Control: IV and PO wean narcotics as able  Monitor H&H 10 this morning  D/C Plan: Per PT/OT/social work recommendations.  Likely will need rehab for time at least as he does not have much help at home.  Orthopedics will follow peripherally at this point please call with any questions or concerns.        ++Right L5 TP fracture++  - Multimodal pain control     ++Right 6th rib Fx++  -Multimodal pain control, IS, pulmonary toilet, monitor resp status      Nutrition- CLD,

## 2024-08-10 NOTE — CARE COORDINATION
8/10/2024Social work transition of care planning  Sw notified on Friday that pt can admit into acute rehab,once medically stable (possibly Monday).  Electronically signed by TIGIST Rios on 8/10/2024 at 12:41 PM

## 2024-08-11 PROCEDURE — 99232 SBSQ HOSP IP/OBS MODERATE 35: CPT | Performed by: STUDENT IN AN ORGANIZED HEALTH CARE EDUCATION/TRAINING PROGRAM

## 2024-08-11 PROCEDURE — 97530 THERAPEUTIC ACTIVITIES: CPT

## 2024-08-11 PROCEDURE — 97535 SELF CARE MNGMENT TRAINING: CPT

## 2024-08-11 PROCEDURE — 1200000000 HC SEMI PRIVATE

## 2024-08-11 PROCEDURE — 6370000000 HC RX 637 (ALT 250 FOR IP)

## 2024-08-11 PROCEDURE — 2580000003 HC RX 258

## 2024-08-11 PROCEDURE — 6360000002 HC RX W HCPCS

## 2024-08-11 RX ADMIN — BUPROPION HYDROCHLORIDE 150 MG: 150 TABLET, EXTENDED RELEASE ORAL at 08:15

## 2024-08-11 RX ADMIN — Medication 1000 UNITS: at 08:15

## 2024-08-11 RX ADMIN — ENOXAPARIN SODIUM 30 MG: 100 INJECTION SUBCUTANEOUS at 08:13

## 2024-08-11 RX ADMIN — ENOXAPARIN SODIUM 30 MG: 100 INJECTION SUBCUTANEOUS at 20:55

## 2024-08-11 RX ADMIN — LOSARTAN POTASSIUM 100 MG: 50 TABLET, FILM COATED ORAL at 08:13

## 2024-08-11 RX ADMIN — ACETAMINOPHEN 650 MG: 325 TABLET ORAL at 12:36

## 2024-08-11 RX ADMIN — SODIUM CHLORIDE, PRESERVATIVE FREE 10 ML: 5 INJECTION INTRAVENOUS at 20:56

## 2024-08-11 RX ADMIN — CARVEDILOL 12.5 MG: 6.25 TABLET, FILM COATED ORAL at 08:13

## 2024-08-11 RX ADMIN — ACETAMINOPHEN 650 MG: 325 TABLET ORAL at 20:56

## 2024-08-11 RX ADMIN — CARVEDILOL 12.5 MG: 6.25 TABLET, FILM COATED ORAL at 20:57

## 2024-08-11 RX ADMIN — SODIUM CHLORIDE, PRESERVATIVE FREE 10 ML: 5 INJECTION INTRAVENOUS at 08:16

## 2024-08-11 RX ADMIN — PANTOPRAZOLE SODIUM 40 MG: 40 TABLET, DELAYED RELEASE ORAL at 05:09

## 2024-08-11 RX ADMIN — ACETAMINOPHEN 650 MG: 325 TABLET ORAL at 05:08

## 2024-08-11 ASSESSMENT — PAIN DESCRIPTION - LOCATION
LOCATION: BACK
LOCATION: BACK
LOCATION: PELVIS;LEG
LOCATION: GROIN

## 2024-08-11 ASSESSMENT — PAIN DESCRIPTION - DESCRIPTORS
DESCRIPTORS: DISCOMFORT;ACHING;SORE
DESCRIPTORS: DISCOMFORT;ACHING;SORE
DESCRIPTORS: ACHING;SORE;DISCOMFORT
DESCRIPTORS: ACHING;DISCOMFORT;THROBBING

## 2024-08-11 ASSESSMENT — PAIN DESCRIPTION - PAIN TYPE: TYPE: SURGICAL PAIN

## 2024-08-11 ASSESSMENT — PAIN DESCRIPTION - FREQUENCY: FREQUENCY: CONTINUOUS

## 2024-08-11 ASSESSMENT — PAIN SCALES - GENERAL
PAINLEVEL_OUTOF10: 3
PAINLEVEL_OUTOF10: 2
PAINLEVEL_OUTOF10: 0
PAINLEVEL_OUTOF10: 2
PAINLEVEL_OUTOF10: 0

## 2024-08-11 ASSESSMENT — PAIN DESCRIPTION - ORIENTATION
ORIENTATION: RIGHT;LOWER
ORIENTATION: LOWER
ORIENTATION: RIGHT
ORIENTATION: LOWER

## 2024-08-11 ASSESSMENT — PAIN SCALES - WONG BAKER
WONGBAKER_NUMERICALRESPONSE: NO HURT
WONGBAKER_NUMERICALRESPONSE: NO HURT

## 2024-08-11 ASSESSMENT — PAIN - FUNCTIONAL ASSESSMENT
PAIN_FUNCTIONAL_ASSESSMENT: PREVENTS OR INTERFERES SOME ACTIVE ACTIVITIES AND ADLS
PAIN_FUNCTIONAL_ASSESSMENT: PREVENTS OR INTERFERES SOME ACTIVE ACTIVITIES AND ADLS

## 2024-08-11 NOTE — PROGRESS NOTES
Physical Therapy  Facility/Department: [address] ORTHO-TRAUMA  Physical Therapy Initial Assessment    Name: Rosas Corbin  : 1950  MRN: [id number]  Date of Service: 2024  Evaluating PT:  Harini Celeste PT, DPT [id number]    Room #:  5420/5420-A  Diagnosis:  Retroperitoneal hematoma [K68.3]  Closed fracture of one rib of right side, initial encounter [S22.31XA]  Closed nondisplaced fracture of right acetabulum, unspecified portion of acetabulum, initial encounter ([facility]) [S32.401A]  Closed nondisplaced fracture of pelvis, unspecified part of pelvis, initial encounter ([facility]) [S32.9XXA]  Nondisplaced fracture of anterior column (iliopubic) of right acetabulum, initial encounter for closed fracture (HCC) [S32.434A]  Fall from height of greater than 3 feet [W17.89XA]  PMHx/PSHx:    Past Medical History:   Diagnosis Date    Acid reflux     Arthritis     BPH (benign prostatic hyperplasia)     Cancer (HCC) 2018    skin    COVID-19 2022    Heart murmur     Hypertension     Left knee pain     for OR 11/10/2022    Mitral valve disorder     Palpitations      Procedure/Surgery:   Open reduction internal fixation right both column acetabulum fracture  Reason for admission: Retroperitoneal hematoma [K68.3]  Closed fracture of one rib of right side, initial encounter [S22.31XA]  Closed nondisplaced fracture of right acetabulum, unspecified portion of acetabulum, initial encounter (HCC) [S32.401A]  Closed nondisplaced fracture of pelvis, unspecified part of pelvis, initial encounter ([facility]) [S32.9XXA]  Nondisplaced fracture of anterior column (iliopubic) of right acetabulum, initial encounter for closed fracture (HCC) [S32.434A]  Fall from height of greater than 3 feet [W17.89XA]  Precautions:  fall risk, TTWB RLE, spinal precautions, R rib fx  Equipment Needs:  TBD    SUBJECTIVE:  Pt lives in tri-level/split with wife with 7 [address] main floor where he notes he can sleep. Pt ambulated with no AD ind PTA.    OBJECTIVE:        [x] Strengthening to improve independence with functional mobility   [] ROM to improve independence with functional mobility   [x] Balance Training to improve static/dynamic balance and to reduce fall risk  [x] Endurance Training to improve activity tolerance during functional mobility   [x] Transfer Training to improve safety and independence with all functional transfers   [x] Gait Training to improve gait mechanics, endurance and assess need for appropriate assistive device  [x] Stair Training in preparation for safe discharge home and/or into the community   [x] Positioning to prevent skin breakdown and contractures  [x] Safety and Education Training   [x] Patient/Caregiver Education   [] HEP  [] Other     PT long term treatment goals are located in above grid    Frequency of treatments: 2-5x/week x 1-2 weeks.    Time in  1040  Time out  1105    Total Treatment Time  25 minutes     Evaluation Time includes thorough review of current medical information, gathering information on past medical history/social history and prior level of function, completion of standardized testing/informal observation of tasks, assessment of data and education on plan of care and goals.    CPT codes:  [] Low Complexity PT evaluation 99432  [] Moderate Complexity PT evaluation 14382  [] High Complexity PT evaluation 93199  [] PT Re-evaluation 40951  [] Gait training 99437 -- minutes  [] Manual therapy 49715 -- minutes  [x] Therapeutic activities 49533 25 minutes  [] Therapeutic exercises 17120 -- minutes  [] Neuromuscular reeducation 08758 -- minutes     Michael Salcido, PT [id number]

## 2024-08-11 NOTE — PROGRESS NOTES
Trauma Surgery Progress Note     I have seen and examined this patient.  I have personally reviewed and interpreted all relevant labs and imaging.      HPI:     Patient fell 8-10 feet off of ladder while cutting trees on to right side. C/o right shoulder, rib, and hip pain.        75yo man s/p mechanical fall from ladder (approx 12ft) found to have a right iliac wing/acetabular fracture, and right 6th rib fracture      Felling better this morning.  Denies nausea.  Tolerating liquids and had a BM.  Tolerating regular diet.       BP (!) 118/58   Pulse 68   Temp 97.7 °F (36.5 °C) (Temporal)   Resp 18   Ht 1.829 m (6' 0.01\")   Wt 106.6 kg (235 lb)   SpO2 96%   BMI 31.86 kg/m²   GENERAL:  Nauseous. In discomfort.  HEAD:  Normocephalic, atraumatic.  EYES: No scleral icterus.  LUNGS:  No increased work of breathing. Normal breath sounds. Room air.  CARDIOVASCULAR: Normal rate and regular rhythm. Normal heart sounds.  ABDOMEN Mildly distended, non tender   EXTREMITIES:  No edema or swelling. RLE: sensation intact.  SKIN:  Warm and dry.     CBC:   Lab Results   Component Value Date/Time    WBC 5.8 08/10/2024 05:15 AM    RBC 2.84 08/10/2024 05:15 AM    HGB 9.3 08/10/2024 05:15 AM    HCT 27.7 08/10/2024 05:15 AM    MCV 97.5 08/10/2024 05:15 AM    MCH 32.7 08/10/2024 05:15 AM    MCHC 33.6 08/10/2024 05:15 AM    RDW 13.5 08/10/2024 05:15 AM     08/10/2024 05:15 AM    MPV 9.1 08/10/2024 05:15 AM     CMP:    Lab Results   Component Value Date/Time     08/10/2024 05:15 AM    K 4.1 08/10/2024 05:15 AM    K 4.4 11/11/2022 06:07 AM     08/10/2024 05:15 AM    CO2 25 08/10/2024 05:15 AM    BUN 15 08/10/2024 05:15 AM    CREATININE 0.8 08/10/2024 05:15 AM    GFRAA >60 05/17/2022 12:49 PM    LABGLOM >90 08/10/2024 05:15 AM    LABGLOM >60 10/03/2023 02:36 PM    GLUCOSE 97 08/10/2024 05:15 AM    CALCIUM 9.2 08/10/2024 05:15 AM    BILITOT 0.9 08/10/2024 05:15 AM    ALKPHOS 44 08/10/2024 05:15 AM    AST 28 08/10/2024

## 2024-08-11 NOTE — PROGRESS NOTES
Pt has made good progress towards set goals.   Continue with current plan of care       Time In:1340              Time Out: 1105         Total Treatment Time: 25      Treatment Charges: Mins Units   Ther Ex  95316     Manual Therapy 27299     Thera Activities 61833 15 1   ADL/Home Mgt 08965 10 1   Neuro Re-ed 38456     Orthotic manage/training  97705     Non-Billable Time         Ysabel Gottlieb, OTR/L [id number]

## 2024-08-12 VITALS
OXYGEN SATURATION: 97 % | RESPIRATION RATE: 18 BRPM | HEIGHT: 72 IN | HEART RATE: 68 BPM | TEMPERATURE: 97.4 F | SYSTOLIC BLOOD PRESSURE: 151 MMHG | BODY MASS INDEX: 31.83 KG/M2 | WEIGHT: 235 LBS | DIASTOLIC BLOOD PRESSURE: 80 MMHG

## 2024-08-12 LAB
ALBUMIN SERPL-MCNC: 3.4 G/DL (ref 3.5–5.2)
ALP SERPL-CCNC: 50 U/L (ref 40–129)
ALT SERPL-CCNC: 14 U/L (ref 0–40)
ANION GAP SERPL CALCULATED.3IONS-SCNC: 8 MMOL/L (ref 7–16)
AST SERPL-CCNC: 21 U/L (ref 0–39)
BASOPHILS # BLD: 0.03 K/UL (ref 0–0.2)
BASOPHILS NFR BLD: 1 % (ref 0–2)
BILIRUB SERPL-MCNC: 0.9 MG/DL (ref 0–1.2)
BUN SERPL-MCNC: 16 MG/DL (ref 6–23)
CALCIUM SERPL-MCNC: 9.3 MG/DL (ref 8.6–10.2)
CHLORIDE SERPL-SCNC: 103 MMOL/L (ref 98–107)
CO2 SERPL-SCNC: 26 MMOL/L (ref 22–29)
CREAT SERPL-MCNC: 0.9 MG/DL (ref 0.7–1.2)
EOSINOPHIL # BLD: 0.15 K/UL (ref 0.05–0.5)
EOSINOPHILS RELATIVE PERCENT: 3 % (ref 0–6)
ERYTHROCYTE [DISTWIDTH] IN BLOOD BY AUTOMATED COUNT: 13.7 % (ref 11.5–15)
GFR, ESTIMATED: >90 ML/MIN/1.73M2
GLUCOSE SERPL-MCNC: 98 MG/DL (ref 74–99)
HCT VFR BLD AUTO: 29 % (ref 37–54)
HGB BLD-MCNC: 9.8 G/DL (ref 12.5–16.5)
IMM GRANULOCYTES # BLD AUTO: 0.06 K/UL (ref 0–0.58)
IMM GRANULOCYTES NFR BLD: 1 % (ref 0–5)
LYMPHOCYTES NFR BLD: 0.82 K/UL (ref 1.5–4)
LYMPHOCYTES RELATIVE PERCENT: 17 % (ref 20–42)
MCH RBC QN AUTO: 33.7 PG (ref 26–35)
MCHC RBC AUTO-ENTMCNC: 33.8 G/DL (ref 32–34.5)
MCV RBC AUTO: 99.7 FL (ref 80–99.9)
MONOCYTES NFR BLD: 0.61 K/UL (ref 0.1–0.95)
MONOCYTES NFR BLD: 13 % (ref 2–12)
NEUTROPHILS NFR BLD: 66 % (ref 43–80)
NEUTS SEG NFR BLD: 3.22 K/UL (ref 1.8–7.3)
PLATELET # BLD AUTO: 167 K/UL (ref 130–450)
PMV BLD AUTO: 9.3 FL (ref 7–12)
POTASSIUM SERPL-SCNC: 4.3 MMOL/L (ref 3.5–5)
PROT SERPL-MCNC: 6 G/DL (ref 6.4–8.3)
RBC # BLD AUTO: 2.91 M/UL (ref 3.8–5.8)
SODIUM SERPL-SCNC: 137 MMOL/L (ref 132–146)
WBC OTHER # BLD: 4.9 K/UL (ref 4.5–11.5)

## 2024-08-12 PROCEDURE — 6370000000 HC RX 637 (ALT 250 FOR IP)

## 2024-08-12 PROCEDURE — 36415 COLL VENOUS BLD VENIPUNCTURE: CPT

## 2024-08-12 PROCEDURE — 85025 COMPLETE CBC W/AUTO DIFF WBC: CPT

## 2024-08-12 PROCEDURE — 99238 HOSP IP/OBS DSCHRG MGMT 30/<: CPT | Performed by: SURGERY

## 2024-08-12 PROCEDURE — 1200000000 HC SEMI PRIVATE

## 2024-08-12 PROCEDURE — 2580000003 HC RX 258

## 2024-08-12 PROCEDURE — 80053 COMPREHEN METABOLIC PANEL: CPT

## 2024-08-12 PROCEDURE — 6360000002 HC RX W HCPCS

## 2024-08-12 RX ORDER — PANTOPRAZOLE SODIUM 40 MG/1
40 TABLET, DELAYED RELEASE ORAL
Status: CANCELLED | OUTPATIENT
Start: 2024-08-13

## 2024-08-12 RX ORDER — SODIUM CHLORIDE 0.9 % (FLUSH) 0.9 %
5-40 SYRINGE (ML) INJECTION PRN
Status: CANCELLED | OUTPATIENT
Start: 2024-08-12

## 2024-08-12 RX ORDER — SODIUM CHLORIDE 0.9 % (FLUSH) 0.9 %
5-40 SYRINGE (ML) INJECTION EVERY 12 HOURS SCHEDULED
Status: CANCELLED | OUTPATIENT
Start: 2024-08-12

## 2024-08-12 RX ORDER — ONDANSETRON 2 MG/ML
4 INJECTION INTRAMUSCULAR; INTRAVENOUS EVERY 6 HOURS PRN
Status: CANCELLED | OUTPATIENT
Start: 2024-08-12

## 2024-08-12 RX ORDER — ENOXAPARIN SODIUM 100 MG/ML
30 INJECTION SUBCUTANEOUS 2 TIMES DAILY
Status: CANCELLED | OUTPATIENT
Start: 2024-08-12

## 2024-08-12 RX ORDER — OXYCODONE HYDROCHLORIDE 5 MG/1
5 TABLET ORAL EVERY 4 HOURS PRN
Status: CANCELLED | OUTPATIENT
Start: 2024-08-12

## 2024-08-12 RX ORDER — POLYETHYLENE GLYCOL 3350 17 G/17G
17 POWDER, FOR SOLUTION ORAL DAILY
Status: CANCELLED | OUTPATIENT
Start: 2024-08-13

## 2024-08-12 RX ORDER — ACETAMINOPHEN 325 MG/1
650 TABLET ORAL EVERY 6 HOURS SCHEDULED
Status: CANCELLED | OUTPATIENT
Start: 2024-08-12

## 2024-08-12 RX ORDER — OXYCODONE HYDROCHLORIDE 10 MG/1
10 TABLET ORAL EVERY 4 HOURS PRN
Status: CANCELLED | OUTPATIENT
Start: 2024-08-12

## 2024-08-12 RX ORDER — ONDANSETRON 4 MG/1
4 TABLET, ORALLY DISINTEGRATING ORAL EVERY 8 HOURS PRN
Status: CANCELLED | OUTPATIENT
Start: 2024-08-12

## 2024-08-12 RX ORDER — LOSARTAN POTASSIUM 50 MG/1
100 TABLET ORAL DAILY
Status: CANCELLED | OUTPATIENT
Start: 2024-08-13

## 2024-08-12 RX ORDER — VITAMIN B COMPLEX
1000 TABLET ORAL DAILY
Status: CANCELLED | OUTPATIENT
Start: 2024-08-13

## 2024-08-12 RX ORDER — SODIUM CHLORIDE 9 MG/ML
INJECTION, SOLUTION INTRAVENOUS PRN
Status: CANCELLED | OUTPATIENT
Start: 2024-08-12

## 2024-08-12 RX ORDER — BUPROPION HYDROCHLORIDE 150 MG/1
150 TABLET ORAL EVERY MORNING
Status: CANCELLED | OUTPATIENT
Start: 2024-08-13

## 2024-08-12 RX ORDER — CARVEDILOL 6.25 MG/1
12.5 TABLET ORAL 2 TIMES DAILY
Status: CANCELLED | OUTPATIENT
Start: 2024-08-12

## 2024-08-12 RX ADMIN — ACETAMINOPHEN 650 MG: 325 TABLET ORAL at 06:10

## 2024-08-12 RX ADMIN — LOSARTAN POTASSIUM 100 MG: 50 TABLET, FILM COATED ORAL at 08:49

## 2024-08-12 RX ADMIN — SODIUM CHLORIDE, PRESERVATIVE FREE 10 ML: 5 INJECTION INTRAVENOUS at 08:50

## 2024-08-12 RX ADMIN — CARVEDILOL 12.5 MG: 6.25 TABLET, FILM COATED ORAL at 21:18

## 2024-08-12 RX ADMIN — PANTOPRAZOLE SODIUM 40 MG: 40 TABLET, DELAYED RELEASE ORAL at 06:10

## 2024-08-12 RX ADMIN — ENOXAPARIN SODIUM 30 MG: 100 INJECTION SUBCUTANEOUS at 08:49

## 2024-08-12 RX ADMIN — ACETAMINOPHEN 650 MG: 325 TABLET ORAL at 21:18

## 2024-08-12 RX ADMIN — CARVEDILOL 12.5 MG: 6.25 TABLET, FILM COATED ORAL at 08:50

## 2024-08-12 RX ADMIN — BUPROPION HYDROCHLORIDE 150 MG: 150 TABLET, EXTENDED RELEASE ORAL at 08:50

## 2024-08-12 RX ADMIN — ACETAMINOPHEN 650 MG: 325 TABLET ORAL at 12:34

## 2024-08-12 RX ADMIN — ENOXAPARIN SODIUM 30 MG: 100 INJECTION SUBCUTANEOUS at 21:19

## 2024-08-12 RX ADMIN — SODIUM CHLORIDE, PRESERVATIVE FREE 10 ML: 5 INJECTION INTRAVENOUS at 21:19

## 2024-08-12 RX ADMIN — Medication 1000 UNITS: at 08:49

## 2024-08-12 ASSESSMENT — PAIN DESCRIPTION - DESCRIPTORS
DESCRIPTORS: ACHING;DISCOMFORT;SORE
DESCRIPTORS: ACHING;DISCOMFORT;SORE
DESCRIPTORS: ACHING;NAGGING
DESCRIPTORS: ACHING;HEAVINESS

## 2024-08-12 ASSESSMENT — PAIN - FUNCTIONAL ASSESSMENT
PAIN_FUNCTIONAL_ASSESSMENT: ACTIVITIES ARE NOT PREVENTED
PAIN_FUNCTIONAL_ASSESSMENT: PREVENTS OR INTERFERES SOME ACTIVE ACTIVITIES AND ADLS

## 2024-08-12 ASSESSMENT — PAIN DESCRIPTION - PAIN TYPE
TYPE: SURGICAL PAIN
TYPE: SURGICAL PAIN;ACUTE PAIN
TYPE: SURGICAL PAIN

## 2024-08-12 ASSESSMENT — PAIN DESCRIPTION - LOCATION
LOCATION: HIP
LOCATION: HIP;HEAD
LOCATION: HIP
LOCATION: HIP

## 2024-08-12 ASSESSMENT — PAIN DESCRIPTION - ONSET
ONSET: GRADUAL
ONSET: ON-GOING
ONSET: ON-GOING

## 2024-08-12 ASSESSMENT — PAIN SCALES - GENERAL
PAINLEVEL_OUTOF10: 3
PAINLEVEL_OUTOF10: 6
PAINLEVEL_OUTOF10: 1
PAINLEVEL_OUTOF10: 6
PAINLEVEL_OUTOF10: 0
PAINLEVEL_OUTOF10: 2

## 2024-08-12 ASSESSMENT — PAIN DESCRIPTION - ORIENTATION
ORIENTATION: RIGHT

## 2024-08-12 ASSESSMENT — PAIN DESCRIPTION - FREQUENCY
FREQUENCY: INTERMITTENT
FREQUENCY: CONTINUOUS
FREQUENCY: CONTINUOUS

## 2024-08-12 ASSESSMENT — PAIN SCALES - WONG BAKER: WONGBAKER_NUMERICALRESPONSE: NO HURT

## 2024-08-12 NOTE — CARE COORDINATION
Met with patient as well as family at the bedside.  Gave them ARU room number and visiting hours.

## 2024-08-12 NOTE — DISCHARGE INSTR - COC
09/12/2017    Influenza, AFLURIA (age 3 yrs+), FLUZONE, (age 6 mo+), MDV, 0.5mL 09/20/2011, 09/17/2013    Influenza, FLUAD, (age 65 y+), Adjuvanted, 0.5mL 10/05/2020, 09/15/2021, 10/20/2022, 10/03/2023    Influenza, High Dose (Fluzone 65 yrs and older) 09/28/2015, 08/29/2016, 09/12/2017, 11/21/2018    Influenza, Triv, inactivated, subunit, adjuvanted, IM (Fluad 65 yrs and older) 12/11/2019    Pneumococcal, PCV-13, PREVNAR 13, (age 6w+), IM, 0.5mL 10/21/2015    Pneumococcal, PPSV23, PNEUMOVAX 23, (age 2y+), SC/IM, 0.5mL 06/23/2003, 10/13/2010, 05/11/2017    RSV, ABRYSVO, (Pregnant or age 60y+), PF, IM, 0.5mL 01/03/2024    TDaP, ADACEL (age 10y-64y), BOOSTRIX (age 10y+), IM, 0.5mL 02/12/2015, 08/17/2022    Td vaccine (adult) 08/16/2005    Td, unspecified formulation 08/16/2005    Zoster Recombinant (Shingrix) 04/19/2021, 08/23/2021       Active Problems:  Patient Active Problem List   Diagnosis Code    Disorder of prostate N42.9    Essential hypertension I10    H/O colonoscopy with polypectomy Z98.890, Z86.010    Status post total knee replacement, left Z96.652    RBBB I45.10    Nondisplaced fracture of anterior column (iliopubic) of right acetabulum, initial encounter for closed fracture (HCC) S32.434A    Fall from height of greater than 3 feet W17.89XA    Closed fracture of one rib of right side S22.31XA    Closed nondisplaced fracture of pelvis (HCC) S32.9XXA    Retroperitoneal hematoma K68.3       Isolation/Infection:   Isolation            No Isolation          Patient Infection Status       None to display                     Nurse Assessment:  Last Vital Signs: /82   Pulse 76   Temp 98.1 °F (36.7 °C)   Resp 18   Ht 1.829 m (6' 0.01\")   Wt 106.6 kg (235 lb)   SpO2 95%   BMI 31.86 kg/m²     Last documented pain score (0-10 scale): Pain Level: 0  Last Weight:   Wt Readings from Last 1 Encounters:   08/06/24 106.6 kg (235 lb)     Mental Status:  oriented, alert, coherent, logical, thought processes  intact, and able to concentrate and follow conversation    IV Access:      Nursing Mobility/ADLs:  Walking   Assisted  Transfer  Assisted  Bathing  Assisted  Dressing  Assisted  Toileting  Assisted  Feeding  Assisted  Med Admin  Assisted  Med Delivery   whole    Wound Care Documentation and Therapy:  Incision 08/07/24 Thigh Proximal;Right (Active)   Dressing Status Clean;Dry;Intact 08/12/24 1200   Incision Cleansed Cleansed with saline 08/07/24 0947   Dressing/Treatment Other (comment) 08/07/24 1100   Closure Steri-Strips;Sutures;Surgical glue 08/07/24 1100   Incision Assessment Dry 08/10/24 2124   Drainage Amount None (dry) 08/10/24 2124   Number of days: 5        Elimination:  Continence:   Bowel: Yes  Bladder: Yes  Urinary Catheter: None   Colostomy/Ileostomy/Ileal Conduit: No       Date of Last BM: 8/12/24    Intake/Output Summary (Last 24 hours) at 8/12/2024 1629  Last data filed at 8/12/2024 0614  Gross per 24 hour   Intake 120 ml   Output 350 ml   Net -230 ml     I/O last 3 completed shifts:  In: 130 [P.O.:120; I.V.:10]  Out: 350 [Urine:350]    Safety Concerns:     None    Impairments/Disabilities:      None    Nutrition Therapy:  Current Nutrition Therapy:   - Oral Diet:  General    Routes of Feeding: Oral  Liquids: No Restrictions  Daily Fluid Restriction: no  Last Modified Barium Swallow with Video (Video Swallowing Test): not done    Treatments at the Time of Hospital Discharge:   Respiratory Treatments: na  Oxygen Therapy:  is not on home oxygen therapy.  Ventilator:    - No ventilator support    Rehab Therapies: Physical Therapy and Occupational Therapy  Weight Bearing Status/Restrictions: Non-weight bearing on right leg  Other Medical Equipment (for information only, NOT a DME order):  walker  Other Treatments: na    Patient's personal belongings (please select all that are sent with patient):  None    RN SIGNATURE:  Electronically signed by Surjit Bonilla RN on 8/12/24 at 4:33 PM EDT    CASE

## 2024-08-12 NOTE — PROGRESS NOTES
Acute Rehab Pre-Admission Screen      Referral date: 08/08/2024  Onset/Hospital Admit Date: 8/6/2024 11:49 AM    Current Location: 5420/5420-A    Name: Rosas Corbin  YOB: 1950  Age: 74 y.o.  Admitting Diagnosis: Right Acetabular Fracture   Address: [address] [place] [address]  Home Phone: [phone] (home)  Social Security #:     Sex: male  Race:A. White  Ethnicity: A. No, not of ,  or Armenian origin    Marital Status:      Ethnic/Cultural/Church Considerations: Gnosticism                ADVANCED DIRECTIVES:     Full code   Copies are in the chart                 COVERAGE INFORMATION   Primary Insurer: Medicare A/B  Medicare #: [id number]  Verified coverage with : Patient, Family/Caregiver, Financial department, and Insurance carrier    MEDICAL UPDATE:  History of present admission: Patient is a 74 y.o. male who presents on 08/06/2024 as a trauma with a right acetabulum fracture, right L5 TP fracture and right 6th rib fracture after a fall from 8 to 10 feet off of a ladder.  Patient stated he was cutting tree limbs when he fell off of a ladder directly onto his right side.  Orthopedic was consulted for management after imaging showed above listed reason for consult. On 08/07/2024 patient underwent an ORIF right both column acetabular fracture.  Hospital course has been complicated by the development of post op ileus.  Patient had a bowel movement 08/12/2024.       PHYSICIAN / REFERRAL INFORMATION  Referring Physician: Guerda  Attending Physician: Michael Croft MD  Primary Care Physician: Monroe Claudio MD  Consultants/Opinions (see full consult notes on chart): Orthopedic Surgery    SOCIAL INFORMATION  Primary  Contact: Uma Corbin  Relationship: Wife  Primary Phone: [phone]    Secondary  Contact: Parker Corbin  Relationship: Child  Primary Phone: [phone]      Previous Community Services: none  Caregiver available:  Yes Hours per day      CT CHEST W CONTRAST    Result Date: 8/6/2024      1. Nondisplaced lateral right 6th rib fracture. 2. No acute process in the chest.     XR FEMUR RIGHT (MIN 2 VIEWS)    Result Date: 8/6/2024      Fractures of right iliac wing and superior pubic ramus. Degenerative change, right knee.     CT PELVIS WO CONTRAST Additional Contrast? None    Result Date: 8/6/2024    1. Comminuted fracture involving right iliac wing with moderate displacement of fracture fragments extending to right sacroiliac joint and superior margin of right acetabulum. 2. Comminuted fracture extends along medial and anterior margin of the right acetabulum and lateral margin of right superior pubic ramus. 3. Nondisplaced, comminuted fracture is seen involving right inferior pubic ramus. 4. Subtle, nondisplaced, comminuted fractures seen involving right transverse process of L5. 5. Small to moderate hemorrhage is present within the right aspect of the pelvis along margin of psoas and iliacus muscle and along margin of the right pelvis fracture.     CT ABDOMEN PELVIS W IV CONTRAST Additional Contrast? None    Result Date: 8/6/2024      Comminuted fracture right iliac wing extending from superior margin caudally to involve right acetabulum and right hip joint.  Associated small right retroperitoneal hematoma and inflammatory change.     CT THORACIC SPINE WO CONTRAST    Result Date: 8/6/2024    No fracture. No malalignment. Multilevel degenerative change thoracic spine     CT CERVICAL SPINE WO CONTRAST    Result Date: 8/6/2024      1. There is no acute compression fracture or subluxation of the cervical spine. 2. Multilevel degenerative disc and degenerative joint disease.     CT HEAD WO CONTRAST    Result Date: 8/6/2024      No acute intracranial abnormality.       Additional labs or diagnostic studies needed before admission to rehabilitation unit:      Medications:   polyethylene glycol  17 g Oral Daily    Vitamin D  1,000 Units Oral Daily

## 2024-08-12 NOTE — PROGRESS NOTES
Trauma  DAILY PROGRESS NOTE  8/12/2024  Subjective:  No acute events overnight. Patient is having bowel movements and tolerating diet.     No intake/output data recorded.  I/O last 3 completed shifts:  In: 130 [P.O.:120; I.V.:10]  Out: 350 [Urine:350]    Objective:  BP (!) 141/67   Pulse 79   Temp 97.8 °F (36.6 °C) (Temporal)   Resp 18   Ht 1.829 m (6' 0.01\")   Wt 106.6 kg (235 lb)   SpO2 97%   BMI 31.86 kg/m²     - General: No acute distress. Awake and conversant.  - CV: Regular rate.  - Respiratory: Respirations are non-labored   - Abdomen: Soft, non-tender, non-distended.  - Skin: Warm. No rashes or ulcers.  - Extremities: No cyanosis or edema.    Lab Results   Component Value Date    WBC 5.8 08/10/2024    HGB 9.3 (L) 08/10/2024    HCT 27.7 (L) 08/10/2024    MCV 97.5 08/10/2024     08/10/2024     Lab Results   Component Value Date     08/10/2024    K 4.1 08/10/2024     08/10/2024    CO2 25 08/10/2024    BUN 15 08/10/2024    CREATININE 0.8 08/10/2024    GLUCOSE 97 08/10/2024    CALCIUM 9.2 08/10/2024    BILITOT 0.9 08/10/2024    ALKPHOS 44 08/10/2024    AST 28 08/10/2024    ALT 16 08/10/2024    LABGLOM >90 08/10/2024    GFRAA >60 05/17/2022       ASSESSMENT/PLAN:    74 y.o. male s/p fall from ladder 12 feet     R acetabular, iliac, inferior pubic ramus fx s/p ORIF 8/7 - multimodal pain control, TTWB RLE, ASA on dc per ortho  R 6th rib fx - on room air, multimodal pain control, IS, pulmonary toilet   R L5 TP fx - multimodal pain control  RP hematoma: monitor H&H, am labs pending   Ortho recs: 24 hr abx completed, dc on asa, okay for rehab  Dvt ppx: SCDs and lovenox   PT/OT: [facility] 14/24  Dispo: dc to ARU likely 8/12    -Discussed with Dr. Imtiaz Murray DO  General Surgery Resident, PGY-1    Electronically signed by Tl Murray DO on 8/12/24 at 7:37 AM EDT      [facility]   ATTENDING PHYSICIAN PROGRESS NOTE     I have personally examined the patient,  personally reviewed the record, and personally discussed the case with the resident. I have personally reviewed all relevant labs and imaging data. I am actively managing this patient's medications.  Please refer to the resident's note. I agree with the assessment and plan with the following corrections/additions. The following summarizes my clinical findings and independent assessment.     CC: fall    Patient reports soreness all over.    Awake and alert  Follows commands  Heart: Regular  Lungs: Fairly clear  Abdomen: Soft; bowel sound active; nontender/nondistended  Skin: Warm/dry; extensive areas of ecchymosis    Labs personally reviewed--normal BMP; albumin 3.4; WBC 4.9; hemoglobin 9.8    Patient Active Problem List    Diagnosis Date Noted    Status post total knee replacement, left 11/10/2022    Fall from height of greater than 3 feet 08/07/2024    Closed fracture of one rib of right side 08/07/2024    Closed nondisplaced fracture of pelvis (HCC) 08/07/2024    Retroperitoneal hematoma 08/07/2024    Nondisplaced fracture of anterior column (iliopubic) of right acetabulum, initial encounter for closed fracture (HCC) 08/06/2024    RBBB 07/09/2024    H/O colonoscopy with polypectomy 06/15/2021    Disorder of prostate 09/21/2006    Essential hypertension 09/21/2006     Status post fall  Right acetabular fracture/pelvic fractures--status post ORIF  Right rib fracture--IS/cough/deep breathing  Lumbar transverse process fracture--Multimodal pain control (including IV pain meds)  Retroperitoneal hematoma--monitor H/H  Diet as tolerated  Hypertension--continue Coreg/losartan  PT/OT eval  PCDs/Lovenox  Discharge planning    Juan M Kitchen MD, FACS  8/12/2024  4:05 PM    NOTE: This report was transcribed using voice recognition software. Every effort was made to ensure accuracy; however, inadvertent computerized transcription errors may be present.

## 2024-08-13 ENCOUNTER — HOSPITAL ENCOUNTER (INPATIENT)
Age: 74
LOS: 16 days | Discharge: HOME | End: 2024-08-29
Attending: PHYSICAL MEDICINE & REHABILITATION | Admitting: PHYSICAL MEDICINE & REHABILITATION
Payer: MEDICARE

## 2024-08-13 DIAGNOSIS — Z87.81 STATUS POST FRACTURE OF PELVIS: ICD-10-CM

## 2024-08-13 DIAGNOSIS — G89.18 POST-OP PAIN: Primary | ICD-10-CM

## 2024-08-13 LAB
ALBUMIN SERPL-MCNC: 3.6 G/DL (ref 3.5–5.2)
ALP SERPL-CCNC: 57 U/L (ref 40–129)
ALT SERPL-CCNC: 14 U/L (ref 0–40)
ANION GAP SERPL CALCULATED.3IONS-SCNC: 11 MMOL/L (ref 7–16)
AST SERPL-CCNC: 23 U/L (ref 0–39)
BASOPHILS # BLD: 0.04 K/UL (ref 0–0.2)
BASOPHILS NFR BLD: 1 % (ref 0–2)
BILIRUB SERPL-MCNC: 1 MG/DL (ref 0–1.2)
BUN SERPL-MCNC: 14 MG/DL (ref 6–23)
CALCIUM SERPL-MCNC: 9.2 MG/DL (ref 8.6–10.2)
CHLORIDE SERPL-SCNC: 102 MMOL/L (ref 98–107)
CO2 SERPL-SCNC: 22 MMOL/L (ref 22–29)
CREAT SERPL-MCNC: 0.8 MG/DL (ref 0.7–1.2)
EOSINOPHIL # BLD: 0.17 K/UL (ref 0.05–0.5)
EOSINOPHILS RELATIVE PERCENT: 4 % (ref 0–6)
ERYTHROCYTE [DISTWIDTH] IN BLOOD BY AUTOMATED COUNT: 13.9 % (ref 11.5–15)
GFR, ESTIMATED: >90 ML/MIN/1.73M2
GLUCOSE SERPL-MCNC: 158 MG/DL (ref 74–99)
HCT VFR BLD AUTO: 30.6 % (ref 37–54)
HGB BLD-MCNC: 10 G/DL (ref 12.5–16.5)
IMM GRANULOCYTES # BLD AUTO: 0.05 K/UL (ref 0–0.58)
IMM GRANULOCYTES NFR BLD: 1 % (ref 0–5)
LYMPHOCYTES NFR BLD: 0.71 K/UL (ref 1.5–4)
LYMPHOCYTES RELATIVE PERCENT: 17 % (ref 20–42)
MCH RBC QN AUTO: 32.8 PG (ref 26–35)
MCHC RBC AUTO-ENTMCNC: 32.7 G/DL (ref 32–34.5)
MCV RBC AUTO: 100.3 FL (ref 80–99.9)
MONOCYTES NFR BLD: 0.42 K/UL (ref 0.1–0.95)
MONOCYTES NFR BLD: 10 % (ref 2–12)
NEUTROPHILS NFR BLD: 68 % (ref 43–80)
NEUTS SEG NFR BLD: 2.92 K/UL (ref 1.8–7.3)
PLATELET # BLD AUTO: 183 K/UL (ref 130–450)
PMV BLD AUTO: 9.1 FL (ref 7–12)
POTASSIUM SERPL-SCNC: 4.3 MMOL/L (ref 3.5–5)
PROT SERPL-MCNC: 6.2 G/DL (ref 6.4–8.3)
RBC # BLD AUTO: 3.05 M/UL (ref 3.8–5.8)
SODIUM SERPL-SCNC: 135 MMOL/L (ref 132–146)
WBC OTHER # BLD: 4.3 K/UL (ref 4.5–11.5)

## 2024-08-13 PROCEDURE — 36415 COLL VENOUS BLD VENIPUNCTURE: CPT

## 2024-08-13 PROCEDURE — 6360000002 HC RX W HCPCS

## 2024-08-13 PROCEDURE — 1280000000 HC REHAB R&B

## 2024-08-13 PROCEDURE — 97166 OT EVAL MOD COMPLEX 45 MIN: CPT

## 2024-08-13 PROCEDURE — 6370000000 HC RX 637 (ALT 250 FOR IP)

## 2024-08-13 PROCEDURE — 97110 THERAPEUTIC EXERCISES: CPT

## 2024-08-13 PROCEDURE — 97535 SELF CARE MNGMENT TRAINING: CPT

## 2024-08-13 PROCEDURE — 6370000000 HC RX 637 (ALT 250 FOR IP): Performed by: PHYSICAL MEDICINE & REHABILITATION

## 2024-08-13 PROCEDURE — 97161 PT EVAL LOW COMPLEX 20 MIN: CPT

## 2024-08-13 PROCEDURE — 80053 COMPREHEN METABOLIC PANEL: CPT

## 2024-08-13 PROCEDURE — 85025 COMPLETE CBC W/AUTO DIFF WBC: CPT

## 2024-08-13 PROCEDURE — 97530 THERAPEUTIC ACTIVITIES: CPT

## 2024-08-13 RX ORDER — OXYCODONE HYDROCHLORIDE 10 MG/1
10 TABLET ORAL EVERY 4 HOURS PRN
Status: DISCONTINUED | OUTPATIENT
Start: 2024-08-13 | End: 2024-08-13

## 2024-08-13 RX ORDER — ENOXAPARIN SODIUM 100 MG/ML
30 INJECTION SUBCUTANEOUS 2 TIMES DAILY
Status: DISCONTINUED | OUTPATIENT
Start: 2024-08-13 | End: 2024-08-29 | Stop reason: HOSPADM

## 2024-08-13 RX ORDER — SODIUM CHLORIDE 0.9 % (FLUSH) 0.9 %
5-40 SYRINGE (ML) INJECTION PRN
Status: DISCONTINUED | OUTPATIENT
Start: 2024-08-13 | End: 2024-08-29 | Stop reason: HOSPADM

## 2024-08-13 RX ORDER — CARVEDILOL 6.25 MG/1
12.5 TABLET ORAL 2 TIMES DAILY
Status: DISCONTINUED | OUTPATIENT
Start: 2024-08-13 | End: 2024-08-29 | Stop reason: HOSPADM

## 2024-08-13 RX ORDER — PANTOPRAZOLE SODIUM 40 MG/1
40 TABLET, DELAYED RELEASE ORAL
Status: DISCONTINUED | OUTPATIENT
Start: 2024-08-13 | End: 2024-08-29 | Stop reason: HOSPADM

## 2024-08-13 RX ORDER — SODIUM CHLORIDE 0.9 % (FLUSH) 0.9 %
5-40 SYRINGE (ML) INJECTION EVERY 12 HOURS SCHEDULED
Status: DISCONTINUED | OUTPATIENT
Start: 2024-08-13 | End: 2024-08-17

## 2024-08-13 RX ORDER — BUPROPION HYDROCHLORIDE 150 MG/1
150 TABLET ORAL EVERY MORNING
Status: DISCONTINUED | OUTPATIENT
Start: 2024-08-13 | End: 2024-08-29 | Stop reason: HOSPADM

## 2024-08-13 RX ORDER — TRAMADOL HYDROCHLORIDE 50 MG/1
50 TABLET ORAL EVERY 4 HOURS PRN
Status: DISCONTINUED | OUTPATIENT
Start: 2024-08-13 | End: 2024-08-29 | Stop reason: HOSPADM

## 2024-08-13 RX ORDER — ONDANSETRON 4 MG/1
4 TABLET, ORALLY DISINTEGRATING ORAL EVERY 8 HOURS PRN
Status: DISCONTINUED | OUTPATIENT
Start: 2024-08-13 | End: 2024-08-29 | Stop reason: HOSPADM

## 2024-08-13 RX ORDER — ACETAMINOPHEN 325 MG/1
650 TABLET ORAL EVERY 6 HOURS SCHEDULED
Status: DISCONTINUED | OUTPATIENT
Start: 2024-08-13 | End: 2024-08-29 | Stop reason: HOSPADM

## 2024-08-13 RX ORDER — SODIUM CHLORIDE 9 MG/ML
INJECTION, SOLUTION INTRAVENOUS PRN
Status: DISCONTINUED | OUTPATIENT
Start: 2024-08-13 | End: 2024-08-29 | Stop reason: HOSPADM

## 2024-08-13 RX ORDER — ACETAMINOPHEN 325 MG/1
650 TABLET ORAL EVERY 4 HOURS PRN
Status: DISCONTINUED | OUTPATIENT
Start: 2024-08-13 | End: 2024-08-29 | Stop reason: HOSPADM

## 2024-08-13 RX ORDER — VITAMIN B COMPLEX
1000 TABLET ORAL DAILY
Status: DISCONTINUED | OUTPATIENT
Start: 2024-08-13 | End: 2024-08-29 | Stop reason: HOSPADM

## 2024-08-13 RX ORDER — OXYCODONE HYDROCHLORIDE 5 MG/1
5 TABLET ORAL EVERY 4 HOURS PRN
Status: DISCONTINUED | OUTPATIENT
Start: 2024-08-13 | End: 2024-08-13

## 2024-08-13 RX ORDER — LOSARTAN POTASSIUM 50 MG/1
100 TABLET ORAL DAILY
Status: DISCONTINUED | OUTPATIENT
Start: 2024-08-13 | End: 2024-08-29 | Stop reason: HOSPADM

## 2024-08-13 RX ORDER — POLYETHYLENE GLYCOL 3350 17 G/17G
17 POWDER, FOR SOLUTION ORAL DAILY PRN
Status: DISCONTINUED | OUTPATIENT
Start: 2024-08-13 | End: 2024-08-29 | Stop reason: HOSPADM

## 2024-08-13 RX ORDER — ONDANSETRON 2 MG/ML
4 INJECTION INTRAMUSCULAR; INTRAVENOUS EVERY 6 HOURS PRN
Status: DISCONTINUED | OUTPATIENT
Start: 2024-08-13 | End: 2024-08-23

## 2024-08-13 RX ORDER — POLYETHYLENE GLYCOL 3350 17 G/17G
17 POWDER, FOR SOLUTION ORAL DAILY
Status: DISCONTINUED | OUTPATIENT
Start: 2024-08-13 | End: 2024-08-13

## 2024-08-13 RX ADMIN — CARVEDILOL 12.5 MG: 6.25 TABLET, FILM COATED ORAL at 09:06

## 2024-08-13 RX ADMIN — Medication 1000 UNITS: at 09:06

## 2024-08-13 RX ADMIN — ENOXAPARIN SODIUM 30 MG: 100 INJECTION SUBCUTANEOUS at 21:24

## 2024-08-13 RX ADMIN — ACETAMINOPHEN 650 MG: 325 TABLET ORAL at 11:20

## 2024-08-13 RX ADMIN — OXYCODONE HYDROCHLORIDE 5 MG: 5 TABLET ORAL at 02:26

## 2024-08-13 RX ADMIN — ACETAMINOPHEN 650 MG: 325 TABLET ORAL at 05:28

## 2024-08-13 RX ADMIN — BUPROPION HYDROCHLORIDE 150 MG: 150 TABLET, EXTENDED RELEASE ORAL at 09:06

## 2024-08-13 RX ADMIN — CARVEDILOL 12.5 MG: 6.25 TABLET, FILM COATED ORAL at 21:22

## 2024-08-13 RX ADMIN — LOSARTAN POTASSIUM 100 MG: 50 TABLET, FILM COATED ORAL at 09:06

## 2024-08-13 RX ADMIN — ACETAMINOPHEN 650 MG: 325 TABLET ORAL at 21:23

## 2024-08-13 RX ADMIN — TRAMADOL HYDROCHLORIDE 50 MG: 50 TABLET, FILM COATED ORAL at 11:21

## 2024-08-13 RX ADMIN — TRAMADOL HYDROCHLORIDE 50 MG: 50 TABLET, FILM COATED ORAL at 21:22

## 2024-08-13 RX ADMIN — PANTOPRAZOLE SODIUM 40 MG: 40 TABLET, DELAYED RELEASE ORAL at 05:28

## 2024-08-13 RX ADMIN — TRAMADOL HYDROCHLORIDE 50 MG: 50 TABLET, FILM COATED ORAL at 16:13

## 2024-08-13 RX ADMIN — ENOXAPARIN SODIUM 30 MG: 100 INJECTION SUBCUTANEOUS at 09:05

## 2024-08-13 ASSESSMENT — LIFESTYLE VARIABLES
HOW MANY STANDARD DRINKS CONTAINING ALCOHOL DO YOU HAVE ON A TYPICAL DAY: 1 OR 2
HOW OFTEN DO YOU HAVE A DRINK CONTAINING ALCOHOL: 4 OR MORE TIMES A WEEK

## 2024-08-13 ASSESSMENT — PAIN DESCRIPTION - LOCATION
LOCATION: HIP
LOCATION: GENERALIZED;HIP
LOCATION: HIP;LEG
LOCATION: BACK
LOCATION: HIP
LOCATION: BACK

## 2024-08-13 ASSESSMENT — PAIN SCALES - GENERAL
PAINLEVEL_OUTOF10: 7
PAINLEVEL_OUTOF10: 5
PAINLEVEL_OUTOF10: 1
PAINLEVEL_OUTOF10: 1
PAINLEVEL_OUTOF10: 2
PAINLEVEL_OUTOF10: 4
PAINLEVEL_OUTOF10: 1
PAINLEVEL_OUTOF10: 3
PAINLEVEL_OUTOF10: 2

## 2024-08-13 ASSESSMENT — PAIN SCALES - WONG BAKER
WONGBAKER_NUMERICALRESPONSE: NO HURT
WONGBAKER_NUMERICALRESPONSE: NO HURT

## 2024-08-13 ASSESSMENT — PAIN DESCRIPTION - ORIENTATION
ORIENTATION: RIGHT
ORIENTATION: RIGHT;LEFT
ORIENTATION: RIGHT
ORIENTATION: RIGHT

## 2024-08-13 ASSESSMENT — PAIN DESCRIPTION - DESCRIPTORS
DESCRIPTORS: ACHING
DESCRIPTORS: ACHING;SORE
DESCRIPTORS: ACHING
DESCRIPTORS: ACHING;SORE

## 2024-08-13 ASSESSMENT — PAIN - FUNCTIONAL ASSESSMENT
PAIN_FUNCTIONAL_ASSESSMENT: PREVENTS OR INTERFERES SOME ACTIVE ACTIVITIES AND ADLS
PAIN_FUNCTIONAL_ASSESSMENT: ACTIVITIES ARE NOT PREVENTED
PAIN_FUNCTIONAL_ASSESSMENT: NONE - DENIES PAIN

## 2024-08-13 NOTE — PROGRESS NOTES
Physical Therapy  ARU initial evaluation     Evaluating Therapist: Ibeth Tubbs, PT, DPT [id number]    ROOM: [address] [place] [address]  DIAGNOSIS: Multiple Trauma   PRECAUTIONS: Falls, TTWB R LE, spinal neutrality (L5 TP fx), R rib fx, adult diet 2000 ml  HPI: Patient is a 74 y.o. male who presents on 08/06/2024 as a trauma with a right acetabulum fracture, right L5 TP fracture and right 6th rib fracture after a fall from 8 to 10 feet off of a ladder.  Patient stated he was cutting tree limbs when he fell off of a ladder directly onto his right side.  Orthopedic was consulted. On 08/07/2024 patient underwent a right anterior column posterior hemitransverse acetabular fracture open reduction internal fixation.  Hospital course has been complicated by the development of post op ileus.  Patient had a bowel movement 08/12/2024.       PMH of GERD, BPH, HTN, mitral valve disorder, skin cancer, arthritis, L TKA 22'.     Social:  Pt lives in split level home with wife (unable to assist at VT, L brachial plexus injury per pt) and daughter (has MS, unable to assist per pt) with 3 DANITZA with 2 rails to living room, kitchen, full bathroom. Pt then has 7 steps up with 2 rails to main bedroom. Pt also reports he may borrow a ramp for entry steps from friend at VT.   Prior to admission: Pt was independent with no AD. Pt is retired from [employer]. Pt enjoys attending grandchildren's sporting events.      Initial Evaluation  Date: 8/13/24 AM     PM    Short Term Goals Long Term Goals    Was pt agreeable to Eval/treatment? Yes        Does pt have pain? 3-4/10 R hip        Bed Mobility  Rolling: ModA  Supine to sit: ModA  Sit to supine: ModA  Scooting: ModA  (Assistance with B LE)   SBA Modified Independent     Transfers Sit to stand: ModA  Stand to sit: ModA  Stand pivot: ModA with WW       SBA with WW   Modified Independent  With WW     Ambulation    20 feet x2 reps, 10 feet x1 rep with WW with ModA       WC follow     10mWT: NT  6mWT: NT   150 feet  d/t R hip pain. Pt primarily maintains NWB R LE with transfers and ambulation per pt preference despite education on TTWB. Pt reports R hip weakness and fatigue and prefers to ambulate with R knee flexed and underneath trunk. Pt does admit to some short term memory deficits that were likely present PTA. Pt will have limited support at DC and needs to be modified independent prior to DC if feasible. Patient would benefit from continued skilled PT to maximize functional mobility independence.       Patient education  Pt educated on ARU expectations, safety with mobility, precautions.     Patient response to education:   Pt verbalized understanding Pt demonstrated skill Pt requires further education in this area   Yes  Yes  Reinforce      Rehab potential is Good for reaching above PT goals.    Pt’s/ family goals   1. Return home independent.     Patient and or family understand(s) diagnosis, prognosis, and plan of care.  Yes     PLAN  PT care will be provided in accordance with the objectives noted above.  Whenever appropriate, clear delegation orders will be provided for nursing staff.  Exercises and functional mobility practice will be used as well as appropriate assistive devices or modalities to obtain goals. Patient and family education will also be administered as needed.    Frequency of treatments will be 2x/day M-F and 1x/day Sat or Sun x 14 days.    Time in: 1000  Time out: 1040    Ibeth Fuentes PT, DPT  [id number]

## 2024-08-13 NOTE — PLAN OF CARE
Problem: Discharge Planning  Goal: Discharge to home or other facility with appropriate resources  Outcome: Progressing  Flowsheets (Taken 8/12/2024 2000)  Discharge to home or other facility with appropriate resources: Identify barriers to discharge with patient and caregiver     Problem: Pain  Goal: Verbalizes/displays adequate comfort level or baseline comfort level  Outcome: Progressing  Flowsheets (Taken 8/12/2024 2000)  Verbalizes/displays adequate comfort level or baseline comfort level: Encourage patient to monitor pain and request assistance     Problem: Skin/Tissue Integrity  Goal: Absence of new skin breakdown  Description: 1.  Monitor for areas of redness and/or skin breakdown  2.  Assess vascular access sites hourly  3.  Every 4-6 hours minimum:  Change oxygen saturation probe site  4.  Every 4-6 hours:  If on nasal continuous positive airway pressure, respiratory therapy assess nares and determine need for appliance change or resting period.  Outcome: Progressing     Problem: ABCDS Injury Assessment  Goal: Absence of physical injury  Outcome: Progressing     Problem: Safety - Adult  Goal: Free from fall injury  Outcome: Progressing  Flowsheets (Taken 8/12/2024 2000)  Free From Fall Injury: Instruct family/caregiver on patient safety

## 2024-08-13 NOTE — H&P
PM&R Admission History & Physical Examination    Patient: Rosas Corbin  Age/sex: 74 y.o. male  Medical Record #: [id number]    Admission to Acute Rehab Unit: Date: 8/13/2024 diagnosis: Status post fracture of pelvis [Z87.81]  Admitting Physician: Adi Tse MD  Consulting physician: sami  Primary care provider: Monroe Claudio MD    Procedures performed on/related to this admission:  ORIF acetabulum    Chief complaint:   Impairments and acitivities limitations in ADLs, mobility, secondary to fractured acetabulum    HPI:   Rosas Corbin is a 74 y.o. male with past medical history significant for hypertension who presented to [facility] on 8/6/2024 following a fall from a ladder.  He had a fractured acetabulum and pelvis.  He was admitted to [facility] and underwent ORIF of the acetabular fracture.  He tolerated the surgery but is still having significant functional deficits and is felt to be a good candidate for further rehab.      Hospital course was complicated by pain and limited movement, diarrhea.     Present status: stable  Pain: mod  PO intake: fair  BM: diarrhea  Micturition: voiding   DVT prophylaxis with lovenox.   Weight bearing status: Toe-touch weightbearing right side    Past Medical History:   Diagnosis Date    Acid reflux     Arthritis     BPH (benign prostatic hyperplasia)     Cancer (HCC) 11/2018    skin    COVID-19 09/2022    Heart murmur     Hypertension     Left knee pain     for OR 11/10/2022    Mitral valve disorder     Palpitations        Past Surgical History:   Procedure Laterality Date    BACK SURGERY      COLONOSCOPY  2018    HERNIA REPAIR      HIP FRACTURE SURGERY Right 8/7/2024    RIGHT ACETABULUM OPEN REDUCTION INTERNAL FIXATION performed by Perry Alcocer MD at [facility] [place]    KNEE ARTHROSCOPY Left 5/2/2019    LEFT KNEE ARTHROSCOPY WITH PARTIAL MEDIAL MENISECTOMY performed by Michael Gibson MD at [facility] OR    SKIN BIOPSY  11/2018    TOTAL KNEE

## 2024-08-13 NOTE — PLAN OF CARE
Problem: Discharge Planning  Goal: Discharge to home or other facility with appropriate resources  8/13/2024 1258 by Kingston Seals RN  Outcome: Progressing  8/13/2024 1022 by Margarita Grace LPN  Outcome: Progressing  8/13/2024 0111 by Lorena Blanco RN  Outcome: Progressing  Flowsheets (Taken 8/13/2024 0109)  Discharge to home or other facility with appropriate resources:   Identify barriers to discharge with patient and caregiver   Identify discharge learning needs (meds, wound care, etc)   Arrange for needed discharge resources and transportation as appropriate     Problem: Pain  Goal: Verbalizes/displays adequate comfort level or baseline comfort level  8/13/2024 1258 by Kingston Seals RN  Outcome: Progressing  8/13/2024 1022 by Margarita Grace LPN  Outcome: Progressing  8/13/2024 0111 by Lorena lBanco RN  Outcome: Progressing     Problem: Safety - Adult  Goal: Free from fall injury  8/13/2024 1258 by Kingston Seals RN  Outcome: Progressing  8/13/2024 1022 by Margarita Grace LPN  Outcome: Progressing  8/13/2024 0111 by Lorena Blanco RN  Outcome: Progressing     Problem: ABCDS Injury Assessment  Goal: Absence of physical injury  8/13/2024 1258 by Kingston Seals RN  Outcome: Progressing  8/13/2024 1022 by Margarita Grace LPN  Outcome: Progressing  8/13/2024 0111 by Lorena Blanco RN  Outcome: Progressing

## 2024-08-13 NOTE — PLAN OF CARE
Problem: Discharge Planning  Goal: Discharge to home or other facility with appropriate resources  Outcome: Progressing  Flowsheets (Taken 8/13/2024 0109)  Discharge to home or other facility with appropriate resources:   Identify barriers to discharge with patient and caregiver   Identify discharge learning needs (meds, wound care, etc)   Arrange for needed discharge resources and transportation as appropriate     Problem: Pain  Goal: Verbalizes/displays adequate comfort level or baseline comfort level  Outcome: Progressing     Problem: Safety - Adult  Goal: Free from fall injury  Outcome: Progressing     Problem: ABCDS Injury Assessment  Goal: Absence of physical injury  Outcome: Progressing

## 2024-08-13 NOTE — PLAN OF CARE
Problem: Discharge Planning  Goal: Discharge to home or other facility with appropriate resources  8/13/2024 1022 by Margarita Grace LPN  Outcome: Progressing  8/13/2024 0111 by Lorena Blanco RN  Outcome: Progressing  Flowsheets (Taken 8/13/2024 0109)  Discharge to home or other facility with appropriate resources:   Identify barriers to discharge with patient and caregiver   Identify discharge learning needs (meds, wound care, etc)   Arrange for needed discharge resources and transportation as appropriate     Problem: Pain  Goal: Verbalizes/displays adequate comfort level or baseline comfort level  8/13/2024 1022 by Margarita Grace LPN  Outcome: Progressing  8/13/2024 0111 by Lorena Blanco RN  Outcome: Progressing     Problem: Safety - Adult  Goal: Free from fall injury  8/13/2024 1022 by Margarita Grace LPN  Outcome: Progressing  8/13/2024 0111 by Lorena Blanco RN  Outcome: Progressing     Problem: ABCDS Injury Assessment  Goal: Absence of physical injury  8/13/2024 1022 by Margarita Grace LPN  Outcome: Progressing  8/13/2024 0111 by Lorena Blanco RN  Outcome: Progressing

## 2024-08-13 NOTE — CARE COORDINATION
Acute Rehab Social Work Assessment     PCP: JAVIER Claudio    Dx: Acetabular fracture secondary to fall.     Patient Resides: own home in  [facility]    Home Architecture : Split level with 3 steps to enter with 2 rails into level with living room , kitchen and a full bath.  7 additional steps with 2 rails to the bedroom.  Walk in shower,he has a seat.  States he has access to a portable ramp if needed.     Will you return to your own home? yes       If not- where will you go / address?     Primary Caregivers: Patient lives with his wife and daughter.  Wife is disabled . She has limited to no use of her left arm.  She is independent in self care, assists in cooking and does not drive.  Daughter also in the home has MS.  She can perform her own self care and assist with some household tasks.   Patient was primary support for both of them.  He has 3 adult sons and a few adult grandchildren who live with in 5 miles and will provide additional assist .       Level of Function PTA : Independent and drives.  Primary responsibility for home management.     Employment: Retired     Receives SSD  Reason:      DME Pta  : He states the have a walker and wheelchair and tub seat available at home.  He did not use any DME prior to fall     Community Agency Involvement PTA : none     Family Teaching: TBS    NAME RELATION PRIMARY # ADDITIONAL #    Uma wife [phone]     Parker  son [phone]              Height: 6'  Weight: 240  SSN#:

## 2024-08-13 NOTE — PROGRESS NOTES
Occupational Therapy  OCCUPATIONAL THERAPY DAILY NOTE    Date:2024  Patient Name: Rosas Corbin  MRN: [id number]  : 1950  Room: [address] [place] [address]     Referring Practitioner: MD Jefferson  Diagnosis: Fell off ladder 8-10 feet 24; R acetabular fx, R L5 TP fx, 6th rib fx, retroperitoneal hematoma 24 ORIF R acetabulum &post op ileus  Additional Pertinent Hx: arthritis, acid reflux, BPH, hx COVID, mitral valve disorder  Restrictions/Precautions: Fall Risk, Spinal neutrality, TTWB RLE,  fluid restriction      Functional Assessment:   Date Status AE  Comments   Feeding 24   Set up      Grooming 24 Set up             Oral Care 24 Set up       Bathing 24 Mod A       UB Dressing 24 Set up       LB Dressing 24 Max A       Footwear 24 Max A       Toileting 24 Max A       Homemaking 24  TBA        Functional Transfers / Balance:   Date Status DME  Comments   Sit Balance 24  Mod I      Stand Balance 24 Mod A       [] Tub  [] Shower   Transfer 24 TBA       Commode   Transfer 24 Max A       Functional   Mobility 24 Mod A   ww    Other: sit to stand from w/c to ww  24  Min/Mod A  ww Verbal cues for hand placement      Functional Exercises / Activity:   BUE strength exercises: 4 # dowel tho 3 sets 10 reps in all planes                                            Green can do bar 3 sets 10 reps     Sensory / Neuromuscular Re-Education:      Cognitive Skills:   Status Comments   Problem   Solving fair     Memory Fair +    Sequencing fair     Safety fair  Cues for hand placement on ww      Visual Perception:    Education:  Pt was educated on safe functional transfers with ww     [] Family teach completed on:    Pain Level: 5/10 RLE     Additional Notes:       Patient has made good  progress during treatment sessions toward set goals. Therapy emphasis to obtain goals:Current Treatment Recommendations: Strengthening, Coordination training, ROM, Balance

## 2024-08-13 NOTE — PROGRESS NOTES
place    Toilet Transfers  Toilet - Technique: Stand pivot  Equipment Used: rw  Toilet Transfer: Mod A to sit on commode & Max A to get off commode  Toilet Transfers Comments: vc's for TTWB RLE, hand placement & safety  Functional Mobility- rw  Pt ambulated from his bed to a standard commode Mod A & a close wc follow. Pt educated with regards to proper hand hand placement & TTWB RLE. Pt tends to keep NWB RLE during mobility     ADL  Feeding: s/u  Grooming: Sup seated  Grooming Skilled Clinical Factors: seated   UE Bathing: Min assistance;Setup;Verbal cueing;Increased time to complete  LE Bathing: Mod assistance;Increased time to complete;Verbal cueing;Setup  LE Bathing Skilled Clinical Factors: sponge bathing both seated & standing & assist when standing UE Dressing: Sup;Setup;Verbal cueing;Increased time to complete  LE Dressing: Max assist;Setup;Verbal cueing;Increased time to complete  LE Dressing Skilled Clinical Factors: Introduced & practiced how to use AE to facilitate pt ability to  depends, shorts, socks & shoes  Toileting: Max  assistance;Setup;Increased time to complete  Toileting Skilled Clinical Factors: vc's for hand placement & to ensure pt safety  Additional Comments: Pt require vc's for posture, balance & safety throughout the ADL  Coordination  Movements Are Fluid And Coordinated: Yes     Bed mobility  Rolling to Left: Mod A  Supine to Sit: Mod assistance  Scooting: Mod  assistance  Bed Mobility Comments: vc's for posture & safety  Transfers  Stand Pivot Transfers: Mod  assistance  Sit <> stand: Mod assistance  Transfer Comments: vc's for hand placement & safety    Vision - Basic Assessment  Prior Vision: Wears glasses only for reading  Visual History: None listed  Vision Comments: WFL    Hearing  Hearing: WFL    Cognition  Overall Cognitive Status: Exceptions  Problem Solving:  Assistance required to generate solutions;Assistance required to correct errors made    Orientation  Overall Orientation  Status:  Impaired  Orientation  Orientation Level: Oriented to place;Oriented to time;Oriented to person     Sensation  Overall Sensation Status: grossly intact to touch      BUE AROM: Pt demo the following AROM 3/4 AROM in all planes  Left & Right Hand AROM: Pt has a gross grasp & release &   is able to oppose their thumb to each digit    BUE Strength  B Hand General:4-/5  BUE Strength Comment: shoulder 4-/5, elbow 4-/5, wrist 4-/5     Hand Dominance  Hand Dominance:  Right      Fine Motor Skills  Left 9-Hole Peg Test:  25.5 sec & norm for tp age & gender is 26.0 sec  Right 9-Hole Peg Test:  25.3 sec & norm for pt age & gender is 25.8 sec     Assessment:  Performance deficits / Impairments: Decreased functional mobility ;Decreased endurance;Decreased coordination;Decreased ADL status;Decreased posture;Decreased ROM;Decreased balance;Decreased strength;Decreased safe awareness;Decreased high-level IADLs;Decreased fine motor control  Prognosis:  Good  Decision Making:  Medium Complexity    Plan  Times per Week: OT twice a day for 2 weeks to address above problem areas  Times per Day: Twice a day  Current Treatment Recommendations: Strengthening, Coordination training, ROM, Balance training, Self-Care / ADL, Functional mobility training, Safety education & training, Home management training, Endurance training, Patient/Caregiver education & training, Gait training, Neuromuscular re-education, Equipment evaluation, education, & procurement, Positioning    Safety Devices  Type of Devices: All fall risk precautions in place; Call light within reach   Comments: Pt demo ability to activate his call light    Education:   Pt educated with regards to POC & goals established. Pt  participated in OT goal planning. Patient was educated with regards to  POC & goals established in OT. Pt is in agreement with POC & goals established in OT. Pt education to be ongoing with patient & her family throughout patient stay.    Goals  Long

## 2024-08-13 NOTE — PROGRESS NOTES
4 Eyes Skin Assessment     NAME:  Rosas Corbin  YOB: 1950  MEDICAL RECORD NUMBER:  [id number]    The patient is being assessed for  Transfer to New Unit    I agree that at least one RN has performed a thorough Head to Toe Skin Assessment on the patient. ALL assessment sites listed below have been assessed.      Areas assessed by both nurses:    Head, Face, Ears, Shoulders, Back, Chest, Arms, Elbows, Hands, Sacrum. Buttock, Coccyx, Ischium, Legs. Feet and Heels, and Under Medical Devices         Does the Patient have a Wound? No noted wound(s)       Chung Prevention initiated by RN: No  Wound Care Orders initiated by RN: No    Pressure Injury (Stage 3,4, Unstageable, DTI, NWPT, and Complex wounds) if present, place Wound referral order by RN under : No    New Ostomies, if present place, Ostomy referral order under : No     Nurse 1 eSignature: Electronically signed by Lorena Blanco RN on 8/13/24 at 1:30 AM EDT    **SHARE this note so that the co-signing nurse can place an eSignature**    Nurse 2 eSignature: {Esignature:[id number]}

## 2024-08-13 NOTE — PROGRESS NOTES
Physical Therapy  ARU initial evaluation     Evaluating Therapist: Ibeth Tubbs, PT, DPT [id number]    ROOM: [address] [place] [address]  DIAGNOSIS: Multiple Trauma   PRECAUTIONS: Falls, TTWB R LE, spinal neutrality (L5 TP fx), R rib fx, adult diet 2000 ml  HPI: Patient is a 74 y.o. male who presents on 08/06/2024 as a trauma with a right acetabulum fracture, right L5 TP fracture and right 6th rib fracture after a fall from 8 to 10 feet off of a ladder.  Patient stated he was cutting tree limbs when he fell off of a ladder directly onto his right side.  Orthopedic was consulted. On 08/07/2024 patient underwent a right anterior column posterior hemitransverse acetabular fracture open reduction internal fixation.  Hospital course has been complicated by the development of post op ileus.  Patient had a bowel movement 08/12/2024.       PMH of GERD, BPH, HTN, mitral valve disorder, skin cancer, arthritis, L TKA 22'.     Social:  Pt lives in split level home with wife (unable to assist at [place], L brachial plexus injury per pt) and daughter (has MS, unable to assist per pt) with 3 DANITZA with 2 rails to living room, kitchen, full bathroom. Pt then has 7 steps up with 2 rails to main bedroom. Pt also reports he may borrow a ramp for entry steps from friend at [place].   Prior to admission: Pt was independent with no AD. Pt is retired from [employer]. Pt enjoys attending grandchildren's sporting events.      Initial Evaluation  Date: 8/13/24 AM     PM    Short Term Goals Long Term Goals    Was pt agreeable to Eval/treatment? Yes  IE Yes      Does pt have pain? 3-4/10 R hip   R hip  pain      Bed Mobility  Rolling: ModA  Supine to sit: ModA  Sit to supine: ModA  Scooting: ModA  (Assistance with B LE)  Sit to supine mod A   Supine to sit mod A  Scooting mod A SBA Modified Independent     Transfers Sit to stand: ModA  Stand to sit: ModA  Stand pivot: ModA with WW      Sit to stand mod A   Stand to sit mod A   Stand pivot with ww with mod A  SBA with WW

## 2024-08-14 LAB
ALBUMIN SERPL-MCNC: 3.3 G/DL (ref 3.5–5.2)
ALP SERPL-CCNC: 60 U/L (ref 40–129)
ALT SERPL-CCNC: 15 U/L (ref 0–40)
ANION GAP SERPL CALCULATED.3IONS-SCNC: 9 MMOL/L (ref 7–16)
AST SERPL-CCNC: 19 U/L (ref 0–39)
BASOPHILS # BLD: 0.03 K/UL (ref 0–0.2)
BASOPHILS NFR BLD: 1 % (ref 0–2)
BILIRUB SERPL-MCNC: 0.9 MG/DL (ref 0–1.2)
BUN SERPL-MCNC: 14 MG/DL (ref 6–23)
CALCIUM SERPL-MCNC: 9 MG/DL (ref 8.6–10.2)
CHLORIDE SERPL-SCNC: 102 MMOL/L (ref 98–107)
CO2 SERPL-SCNC: 23 MMOL/L (ref 22–29)
CREAT SERPL-MCNC: 0.8 MG/DL (ref 0.7–1.2)
EOSINOPHIL # BLD: 0.23 K/UL (ref 0.05–0.5)
EOSINOPHILS RELATIVE PERCENT: 5 % (ref 0–6)
ERYTHROCYTE [DISTWIDTH] IN BLOOD BY AUTOMATED COUNT: 13.8 % (ref 11.5–15)
GFR, ESTIMATED: >90 ML/MIN/1.73M2
GLUCOSE SERPL-MCNC: 100 MG/DL (ref 74–99)
HCT VFR BLD AUTO: 28.1 % (ref 37–54)
HGB BLD-MCNC: 9.5 G/DL (ref 12.5–16.5)
IMM GRANULOCYTES # BLD AUTO: 0.05 K/UL (ref 0–0.58)
IMM GRANULOCYTES NFR BLD: 1 % (ref 0–5)
LYMPHOCYTES NFR BLD: 0.95 K/UL (ref 1.5–4)
LYMPHOCYTES RELATIVE PERCENT: 20 % (ref 20–42)
MCH RBC QN AUTO: 32.8 PG (ref 26–35)
MCHC RBC AUTO-ENTMCNC: 33.8 G/DL (ref 32–34.5)
MCV RBC AUTO: 96.9 FL (ref 80–99.9)
MONOCYTES NFR BLD: 0.56 K/UL (ref 0.1–0.95)
MONOCYTES NFR BLD: 12 % (ref 2–12)
NEUTROPHILS NFR BLD: 63 % (ref 43–80)
NEUTS SEG NFR BLD: 3.06 K/UL (ref 1.8–7.3)
PLATELET # BLD AUTO: 176 K/UL (ref 130–450)
PMV BLD AUTO: 8.7 FL (ref 7–12)
POTASSIUM SERPL-SCNC: 4.3 MMOL/L (ref 3.5–5)
PROT SERPL-MCNC: 5.9 G/DL (ref 6.4–8.3)
RBC # BLD AUTO: 2.9 M/UL (ref 3.8–5.8)
SODIUM SERPL-SCNC: 134 MMOL/L (ref 132–146)
WBC OTHER # BLD: 4.9 K/UL (ref 4.5–11.5)

## 2024-08-14 PROCEDURE — 80053 COMPREHEN METABOLIC PANEL: CPT

## 2024-08-14 PROCEDURE — 6370000000 HC RX 637 (ALT 250 FOR IP)

## 2024-08-14 PROCEDURE — 97535 SELF CARE MNGMENT TRAINING: CPT

## 2024-08-14 PROCEDURE — 6370000000 HC RX 637 (ALT 250 FOR IP): Performed by: PHYSICAL MEDICINE & REHABILITATION

## 2024-08-14 PROCEDURE — 97530 THERAPEUTIC ACTIVITIES: CPT

## 2024-08-14 PROCEDURE — 6360000002 HC RX W HCPCS

## 2024-08-14 PROCEDURE — 85025 COMPLETE CBC W/AUTO DIFF WBC: CPT

## 2024-08-14 PROCEDURE — 36415 COLL VENOUS BLD VENIPUNCTURE: CPT

## 2024-08-14 PROCEDURE — 97110 THERAPEUTIC EXERCISES: CPT

## 2024-08-14 PROCEDURE — 1280000000 HC REHAB R&B

## 2024-08-14 RX ADMIN — CARVEDILOL 12.5 MG: 6.25 TABLET, FILM COATED ORAL at 08:33

## 2024-08-14 RX ADMIN — CARVEDILOL 12.5 MG: 6.25 TABLET, FILM COATED ORAL at 20:26

## 2024-08-14 RX ADMIN — ACETAMINOPHEN 650 MG: 325 TABLET ORAL at 05:58

## 2024-08-14 RX ADMIN — BUPROPION HYDROCHLORIDE 150 MG: 150 TABLET, EXTENDED RELEASE ORAL at 08:33

## 2024-08-14 RX ADMIN — TRAMADOL HYDROCHLORIDE 50 MG: 50 TABLET, FILM COATED ORAL at 02:24

## 2024-08-14 RX ADMIN — ACETAMINOPHEN 650 MG: 325 TABLET ORAL at 20:25

## 2024-08-14 RX ADMIN — ENOXAPARIN SODIUM 30 MG: 100 INJECTION SUBCUTANEOUS at 20:25

## 2024-08-14 RX ADMIN — PANTOPRAZOLE SODIUM 40 MG: 40 TABLET, DELAYED RELEASE ORAL at 05:59

## 2024-08-14 RX ADMIN — ACETAMINOPHEN 650 MG: 325 TABLET ORAL at 12:15

## 2024-08-14 RX ADMIN — TRAMADOL HYDROCHLORIDE 50 MG: 50 TABLET, FILM COATED ORAL at 14:34

## 2024-08-14 RX ADMIN — ENOXAPARIN SODIUM 30 MG: 100 INJECTION SUBCUTANEOUS at 08:33

## 2024-08-14 RX ADMIN — TRAMADOL HYDROCHLORIDE 50 MG: 50 TABLET, FILM COATED ORAL at 10:29

## 2024-08-14 RX ADMIN — TRAMADOL HYDROCHLORIDE 50 MG: 50 TABLET, FILM COATED ORAL at 06:00

## 2024-08-14 RX ADMIN — Medication 1000 UNITS: at 08:33

## 2024-08-14 RX ADMIN — ACETAMINOPHEN 650 MG: 325 TABLET ORAL at 01:24

## 2024-08-14 RX ADMIN — TRAMADOL HYDROCHLORIDE 50 MG: 50 TABLET, FILM COATED ORAL at 20:25

## 2024-08-14 RX ADMIN — LOSARTAN POTASSIUM 100 MG: 50 TABLET, FILM COATED ORAL at 08:33

## 2024-08-14 ASSESSMENT — PAIN SCALES - GENERAL
PAINLEVEL_OUTOF10: 1
PAINLEVEL_OUTOF10: 8
PAINLEVEL_OUTOF10: 7
PAINLEVEL_OUTOF10: 6
PAINLEVEL_OUTOF10: 1
PAINLEVEL_OUTOF10: 7
PAINLEVEL_OUTOF10: 1

## 2024-08-14 ASSESSMENT — PAIN DESCRIPTION - DESCRIPTORS
DESCRIPTORS: ACHING;DISCOMFORT
DESCRIPTORS: ACHING
DESCRIPTORS: ACHING;DISCOMFORT;SORE
DESCRIPTORS: ACHING;DISCOMFORT;SORE

## 2024-08-14 ASSESSMENT — PAIN DESCRIPTION - ORIENTATION
ORIENTATION: RIGHT
ORIENTATION: RIGHT;LEFT
ORIENTATION: RIGHT;LEFT
ORIENTATION: RIGHT
ORIENTATION: RIGHT
ORIENTATION: LEFT;RIGHT

## 2024-08-14 ASSESSMENT — PAIN SCALES - WONG BAKER
WONGBAKER_NUMERICALRESPONSE: NO HURT

## 2024-08-14 ASSESSMENT — PAIN DESCRIPTION - LOCATION
LOCATION: HIP;LEG
LOCATION: HIP
LOCATION: LEG;KNEE;HIP
LOCATION: HIP;LEG
LOCATION: HIP
LOCATION: HIP

## 2024-08-14 NOTE — PLAN OF CARE
Problem: Discharge Planning  Goal: Discharge to home or other facility with appropriate resources  8/14/2024 1050 by Kingston Seals RN  Outcome: Progressing  8/14/2024 0038 by Sushila Pierce RN  Outcome: Progressing  8/14/2024 0037 by Sushila Pierce RN  Outcome: Progressing     Problem: Pain  Goal: Verbalizes/displays adequate comfort level or baseline comfort level  8/14/2024 1050 by Kingston Seals RN  Outcome: Progressing  8/14/2024 0038 by Sushila Pierce RN  Outcome: Progressing  8/14/2024 0037 by Sushila Pierce RN  Outcome: Progressing     Problem: Safety - Adult  Goal: Free from fall injury  8/14/2024 1050 by Kingston Seals RN  Outcome: Progressing  8/14/2024 0038 by Sushila Pierce RN  Outcome: Progressing  8/14/2024 0037 by Sushila Pierce RN  Outcome: Progressing     Problem: ABCDS Injury Assessment  Goal: Absence of physical injury  8/14/2024 1050 by Kingston Seals RN  Outcome: Progressing  8/14/2024 0037 by Sushila Pierce RN  Outcome: Progressing

## 2024-08-14 NOTE — PROGRESS NOTES
Copy of disclosure statement and \"Privacy Act Statement-Health Care Records\" given to patient along with signed copy of admit Medicare Important Message.

## 2024-08-14 NOTE — PROGRESS NOTES
Occupational Therapy  OCCUPATIONAL THERAPY DAILY NOTE    Date:2024  Patient Name: Rosas Corbin  MRN: [id number]  : 1950  Room: [address] [place], [place] [address]     Referring Practitioner: MD Jefferson  Diagnosis: Fell off ladder 8-10 feet 24; R acetabular fx, R L5 TP fx, 6th rib fx, retroperitoneal hematoma 24 ORIF R acetabulum &post op ileus  Additional Pertinent Hx: arthritis, acid reflux, BPH, hx COVID, mitral valve disorder  Restrictions/Precautions: Fall Risk, Spinal neutrality, TTWB RLE, 2000 fluid restriction      Functional Assessment:   Date Status AE  Comments   Feeding 24   Set up      Grooming 24 Set up             Oral Care 24 Set up       Bathing 24 Mod A       UB Dressing 24 Set up       LB Dressing 24 Max A       Footwear 24 Smith  Pt doffed shoes, using of reacher to doff socks, using of sockaide to darwin socks, using of shoe horn to darwin shoes with assistance to L shoe   Toileting 24  Mod A   Pt assisted with pulling pants up and down.Pt needed assist for hygiene following BM    Homemaking 24  TBA        Functional Transfers / Balance:   Date Status DME  Comments   Sit Balance 24  Mod I   Pt sitting supported upright in w/c   Stand Balance 24  Min/Mod A    During toileting    [] Tub  [] Shower   Transfer 24 TBA       Commode   Transfer 24  Max A   Grab rail  Transfer on standard commode- Mod A  Transfer off standard commode- Max A   Pt needed verbal cues for hand placement    Functional   Mobility 24  Mod A   ww From bed to bathroom with pt having RLE be NWB instead of TTWB.    Other: sit to stand from w/c to ww     Supine to sit edge of bed  24  Min/Mod A       Mod A  ww Verbal cues for hand placement       Assist to lift RLE on/off of bed      Functional Exercises / Activity:  -BUE theraband exercises for ~15reps x2 in 5 planes, with rest breaks, focusing on increasing of strength and ROM of BUE's  BUE strength

## 2024-08-14 NOTE — PROGRESS NOTES
Physical Therapy  Treatment Note     Evaluating Therapist: Ibeth Tubbs, PT, DPT [id number]    ROOM: [address] [place] [address]  DIAGNOSIS: Multiple Trauma   PRECAUTIONS: Falls, TTWB R LE, spinal neutrality (L5 TP fx), R rib fx, adult diet 2000 ml  HPI: Patient is a 74 y.o. male who presents on 08/06/2024 as a trauma with a right acetabulum fracture, right L5 TP fracture and right 6th rib fracture after a fall from 8 to 10 feet off of a ladder.  Patient stated he was cutting tree limbs when he fell off of a ladder directly onto his right side.  Orthopedic was consulted. On 08/07/2024 patient underwent a right anterior column posterior hemitransverse acetabular fracture open reduction internal fixation.  Hospital course has been complicated by the development of post op ileus.  Patient had a bowel movement 08/12/2024.       PMH of GERD, BPH, HTN, mitral valve disorder, skin cancer, arthritis, L TKA 22'.     Social:  Pt lives in split level home with wife (unable to assist at NH, L brachial plexus injury per pt) and daughter (has MS, unable to assist per pt) with 3 DANITZA with 2 rails to living room, kitchen, full bathroom. Pt then has 7 steps up with 2 rails to main bedroom. Pt also reports he may borrow a ramp for entry steps from friend at [place].   Prior to admission: Pt was independent with no AD. Pt is retired from [employer]. Pt enjoys attending grandchildren's sporting events.      Initial Evaluation  Date: 8/13/24 AM     PM    Short Term Goals Long Term Goals    Was pt agreeable to Eval/treatment? Yes  Yes  Yes      Does pt have pain? 3-4/10 R hip  R hip pain and nursing notified  R hip  pain      Bed Mobility  Rolling: ModA  Supine to sit: ModA  Sit to supine: ModA  Scooting: ModA  (Assistance with B LE) Rolling NT  Supine to sit mod A   Sit to supine mod A   Scooting min A   SBA Modified Independent     Transfers Sit to stand: ModA  Stand to sit: ModA  Stand pivot: ModA with WW     Sit to stand mod A   Stand to sit mod A   Stand      Patient response to education:   Pt verbalized understanding Pt demonstrated skill Pt requires further education in this area   x X partially with verbal instruction  x     Additional Comments: Pt completed functional mobility as noted above.  Verbal instruction for hand placement and technique with transfers to improve balance and ensure maintenance of TTWB R LE.    Pt tends to demonstrated NWB R LE with stand pivot. Pt continues to require heavy assist with bed mobility.  Session focused on educating pt on alternative methods and using sheet for R LE management with bed mobility.  Pt still having difficulty with bed mobility despite using sheet for R LE management.  Will continue to practice using sheet/leg  for R LE management with bed mobility.  Pt continued to reported soreness/pain in R groin and hip.  This therapist explained to pt that he fractures and surgery in that area and to experience pain is common.  Continue to progress pt as tolerated.  PM Pt in bed upon arrival and agreed to participate in therapy.  Pt reported R hip pain and nursing notified. Nursing arrived to medicate pt during session.  Pt completed functional mobility as noted above. Continues to have difficulty with maintaining TTWB R LE with sit <>stand transfers. Attempted 2 inch step in parallel bars however pt unable to clear L LE safely on step and maintain TTWB R LE.  Recommend ramp to enter and 1st floor set up at this time.  Will continue to progress pt as tolerated.       Time in: 1000  Time out: 1045  Time in 1430  Time out 1515  Pt is making  progress toward established Physical Therapy goals.  Continue with physical therapy current plan of care.    Beth Lombardo   License Number: [id number]

## 2024-08-14 NOTE — PLAN OF CARE
Problem: Discharge Planning  Goal: Discharge to home or other facility with appropriate resources  8/14/2024 0037 by Sushila Pierce RN  Outcome: Progressing  8/13/2024 1258 by Kingston Seals RN  Outcome: Progressing     Problem: Pain  Goal: Verbalizes/displays adequate comfort level or baseline comfort level  8/14/2024 0037 by Sushila Pierce RN  Outcome: Progressing  8/13/2024 1258 by Kingston Seals RN  Outcome: Progressing     Problem: Safety - Adult  Goal: Free from fall injury  8/14/2024 0037 by Sushila Pierce RN  Outcome: Progressing  8/13/2024 1258 by Kingston Seals RN  Outcome: Progressing     Problem: ABCDS Injury Assessment  Goal: Absence of physical injury  8/14/2024 0037 by Sushila Pierce RN  Outcome: Progressing  8/13/2024 1258 by Kingston Seals RN  Outcome: Progressing

## 2024-08-14 NOTE — PROGRESS NOTES
Physical Therapy  Weekly Note     Evaluating Therapist: Ibeth Tubbs, PT, DPT [id number]    ROOM: [address] [place] [address]  DIAGNOSIS: Multiple Trauma   PRECAUTIONS: Falls, TTWB R LE, spinal neutrality (L5 TP fx), R rib fx, adult diet 2000 ml  HPI: Patient is a 74 y.o. male who presents on 08/06/2024 as a trauma with a right acetabulum fracture, right L5 TP fracture and right 6th rib fracture after a fall from 8 to 10 feet off of a ladder.  Patient stated he was cutting tree limbs when he fell off of a ladder directly onto his right side.  Orthopedic was consulted. On 08/07/2024 patient underwent a right anterior column posterior hemitransverse acetabular fracture open reduction internal fixation.  Hospital course has been complicated by the development of post op ileus.  Patient had a bowel movement 08/12/2024.       PMH of GERD, BPH, HTN, mitral valve disorder, skin cancer, arthritis, L TKA 22'.     Social:  Pt lives in split level home with wife (unable to assist at [place], L brachial plexus injury per pt) and daughter (has MS, unable to assist per pt) with 3 DANITZA with 2 rails to living room, kitchen, full bathroom. Pt then has 7 steps up with 2 rails to main bedroom. Pt also reports he may borrow a ramp for entry steps from friend at [place].   Prior to admission: Pt was independent with no AD. Pt is retired from [employer]. Pt enjoys attending grandchildren's sporting events.      Initial Evaluation  Date: 8/13/24 8/14/24 Comments  Short Term Goals Long Term Goals    Was pt agreeable to Eval/treatment? Yes  Yes       Does pt have pain? 3-4/10 R hip  R hip and groin       Bed Mobility  Rolling: ModA  Supine to sit: ModA  Sit to supine: ModA  Scooting: ModA  (Assistance with B LE) Rolling mod A  Sit to supine mod A   Supine to sit mod A   Scooting min A  Assistance with  B LE management  SBA Modified Independent     Transfers Sit to stand: ModA  Stand to sit: ModA  Stand pivot: ModA with WW     Sit to stand mod A   Stand to sit mod A

## 2024-08-14 NOTE — PROGRESS NOTES
Progress Note  Date:2024       Room:5514/5514-B  Patient Name:Rosas Corbin     YOB: 1950     Age:74 y.o.        Subjective    Subjective:  Symptoms:  Stable.  He reports weakness.    Diet:  Adequate intake.    Activity level: Impaired due to weakness.    Pain:  He complains of pain that is mild.       Review of Systems   Neurological:  Positive for weakness.     Objective         Vitals Last 24 Hours:  TEMPERATURE:  Temp  Av.7 °F (36.5 °C)  Min: 97.1 °F (36.2 °C)  Max: 98.2 °F (36.8 °C)  RESPIRATIONS RANGE: Resp  Av  Min: 16  Max: 20  PULSE OXIMETRY RANGE: SpO2  Av.7 %  Min: 97 %  Max: 98 %  PULSE RANGE: Pulse  Av.7  Min: 76  Max: 82  BLOOD PRESSURE RANGE: Systolic (24hrs), Av , Min:141 , Max:144   ; Diastolic (24hrs), Av, Min:55, Max:76    I/O (24Hr):    Intake/Output Summary (Last 24 hours) at 2024 0846  Last data filed at 2024 0840  Gross per 24 hour   Intake 120 ml   Output 800 ml   Net -680 ml     Objective:  General Appearance:  In no acute distress.    Vital signs: (most recent): Blood pressure (!) 141/55, pulse 82, temperature 97.9 °F (36.6 °C), resp. rate 20, height 1.829 m (6'), weight 109.1 kg (240 lb 8.4 oz), SpO2 98%.  Vital signs are normal.    Output: Producing urine and producing stool.    Lungs:  Normal effort and normal respiratory rate.  Breath sounds clear to auscultation.    Heart: Normal rate.  Regular rhythm.  S1 normal and S2 normal.    Abdomen: Abdomen is soft.  Bowel sounds are normal.   There is no abdominal tenderness.     Extremities: Decreased range of motion.    Neurological: Patient is alert.      Labs/Imaging/Diagnostics    Labs:  CBC:  Recent Labs     24  1053 24  0847 24  0609   WBC 4.9 4.3* 4.9   RBC 2.91* 3.05* 2.90*   HGB 9.8* 10.0* 9.5*   HCT 29.0* 30.6* 28.1*   MCV 99.7 100.3* 96.9   RDW 13.7 13.9 13.8    183 176     CHEMISTRIES:  Recent Labs     24  1053 24  0847 24  0609   NA

## 2024-08-14 NOTE — PLAN OF CARE
Problem: Discharge Planning  Goal: Discharge to home or other facility with appropriate resources  8/14/2024 0038 by Sushila Pierce RN  Outcome: Progressing  8/14/2024 0037 by Sushila Pierce RN  Outcome: Progressing  8/13/2024 1258 by Kignston Seals RN  Outcome: Progressing     Problem: Pain  Goal: Verbalizes/displays adequate comfort level or baseline comfort level  8/14/2024 0038 by Sushila Pierce RN  Outcome: Progressing  8/14/2024 0037 by Sushila Pierce RN  Outcome: Progressing  8/13/2024 1258 by Kingston Seals RN  Outcome: Progressing     Problem: Safety - Adult  Goal: Free from fall injury  8/14/2024 0038 by Sushila Pierce RN  Outcome: Progressing  8/14/2024 0037 by Sushila Pierce RN  Outcome: Progressing  8/13/2024 1258 by Kingston Seals RN  Outcome: Progressing     Problem: ABCDS Injury Assessment  Goal: Absence of physical injury  8/14/2024 0037 by Sushila Pierce RN  Outcome: Progressing  8/13/2024 1258 by Kingston Seals RN  Outcome: Progressing

## 2024-08-15 LAB
ALBUMIN SERPL-MCNC: 3.5 G/DL (ref 3.5–5.2)
ALP SERPL-CCNC: 69 U/L (ref 40–129)
ALT SERPL-CCNC: 15 U/L (ref 0–40)
ANION GAP SERPL CALCULATED.3IONS-SCNC: 11 MMOL/L (ref 7–16)
AST SERPL-CCNC: 17 U/L (ref 0–39)
BASOPHILS # BLD: 0.02 K/UL (ref 0–0.2)
BASOPHILS NFR BLD: 0 % (ref 0–2)
BILIRUB SERPL-MCNC: 0.8 MG/DL (ref 0–1.2)
BUN SERPL-MCNC: 13 MG/DL (ref 6–23)
CALCIUM SERPL-MCNC: 9.2 MG/DL (ref 8.6–10.2)
CHLORIDE SERPL-SCNC: 101 MMOL/L (ref 98–107)
CO2 SERPL-SCNC: 22 MMOL/L (ref 22–29)
CREAT SERPL-MCNC: 0.8 MG/DL (ref 0.7–1.2)
EOSINOPHIL # BLD: 0.25 K/UL (ref 0.05–0.5)
EOSINOPHILS RELATIVE PERCENT: 6 % (ref 0–6)
ERYTHROCYTE [DISTWIDTH] IN BLOOD BY AUTOMATED COUNT: 13.9 % (ref 11.5–15)
GFR, ESTIMATED: >90 ML/MIN/1.73M2
GLUCOSE SERPL-MCNC: 91 MG/DL (ref 74–99)
HCT VFR BLD AUTO: 28.9 % (ref 37–54)
HGB BLD-MCNC: 9.7 G/DL (ref 12.5–16.5)
IMM GRANULOCYTES # BLD AUTO: 0.07 K/UL (ref 0–0.58)
IMM GRANULOCYTES NFR BLD: 2 % (ref 0–5)
LYMPHOCYTES NFR BLD: 0.84 K/UL (ref 1.5–4)
LYMPHOCYTES RELATIVE PERCENT: 19 % (ref 20–42)
MCH RBC QN AUTO: 32.7 PG (ref 26–35)
MCHC RBC AUTO-ENTMCNC: 33.6 G/DL (ref 32–34.5)
MCV RBC AUTO: 97.3 FL (ref 80–99.9)
MONOCYTES NFR BLD: 0.52 K/UL (ref 0.1–0.95)
MONOCYTES NFR BLD: 12 % (ref 2–12)
NEUTROPHILS NFR BLD: 62 % (ref 43–80)
NEUTS SEG NFR BLD: 2.8 K/UL (ref 1.8–7.3)
PLATELET # BLD AUTO: 184 K/UL (ref 130–450)
PMV BLD AUTO: 8.8 FL (ref 7–12)
POTASSIUM SERPL-SCNC: 4.3 MMOL/L (ref 3.5–5)
PROT SERPL-MCNC: 5.8 G/DL (ref 6.4–8.3)
RBC # BLD AUTO: 2.97 M/UL (ref 3.8–5.8)
SODIUM SERPL-SCNC: 134 MMOL/L (ref 132–146)
WBC OTHER # BLD: 4.5 K/UL (ref 4.5–11.5)

## 2024-08-15 PROCEDURE — 97530 THERAPEUTIC ACTIVITIES: CPT

## 2024-08-15 PROCEDURE — 1280000000 HC REHAB R&B

## 2024-08-15 PROCEDURE — 80053 COMPREHEN METABOLIC PANEL: CPT

## 2024-08-15 PROCEDURE — 6360000002 HC RX W HCPCS

## 2024-08-15 PROCEDURE — 85025 COMPLETE CBC W/AUTO DIFF WBC: CPT

## 2024-08-15 PROCEDURE — 97129 THER IVNTJ 1ST 15 MIN: CPT

## 2024-08-15 PROCEDURE — 99232 SBSQ HOSP IP/OBS MODERATE 35: CPT | Performed by: PHYSICAL MEDICINE & REHABILITATION

## 2024-08-15 PROCEDURE — 97110 THERAPEUTIC EXERCISES: CPT

## 2024-08-15 PROCEDURE — 6370000000 HC RX 637 (ALT 250 FOR IP): Performed by: PHYSICAL MEDICINE & REHABILITATION

## 2024-08-15 PROCEDURE — 92523 SPEECH SOUND LANG COMPREHEN: CPT

## 2024-08-15 PROCEDURE — 36415 COLL VENOUS BLD VENIPUNCTURE: CPT

## 2024-08-15 PROCEDURE — 6370000000 HC RX 637 (ALT 250 FOR IP)

## 2024-08-15 RX ADMIN — ACETAMINOPHEN 650 MG: 325 TABLET ORAL at 12:02

## 2024-08-15 RX ADMIN — TRAMADOL HYDROCHLORIDE 50 MG: 50 TABLET, FILM COATED ORAL at 05:53

## 2024-08-15 RX ADMIN — LOSARTAN POTASSIUM 100 MG: 50 TABLET, FILM COATED ORAL at 08:11

## 2024-08-15 RX ADMIN — TRAMADOL HYDROCHLORIDE 50 MG: 50 TABLET, FILM COATED ORAL at 10:09

## 2024-08-15 RX ADMIN — TRAMADOL HYDROCHLORIDE 50 MG: 50 TABLET, FILM COATED ORAL at 14:23

## 2024-08-15 RX ADMIN — ACETAMINOPHEN 650 MG: 325 TABLET ORAL at 05:52

## 2024-08-15 RX ADMIN — ACETAMINOPHEN 650 MG: 325 TABLET ORAL at 20:11

## 2024-08-15 RX ADMIN — CARVEDILOL 12.5 MG: 6.25 TABLET, FILM COATED ORAL at 20:11

## 2024-08-15 RX ADMIN — CARVEDILOL 12.5 MG: 6.25 TABLET, FILM COATED ORAL at 08:10

## 2024-08-15 RX ADMIN — ACETAMINOPHEN 650 MG: 325 TABLET ORAL at 23:37

## 2024-08-15 RX ADMIN — PANTOPRAZOLE SODIUM 40 MG: 40 TABLET, DELAYED RELEASE ORAL at 05:53

## 2024-08-15 RX ADMIN — ENOXAPARIN SODIUM 30 MG: 100 INJECTION SUBCUTANEOUS at 08:10

## 2024-08-15 RX ADMIN — ACETAMINOPHEN 650 MG: 325 TABLET ORAL at 02:10

## 2024-08-15 RX ADMIN — ENOXAPARIN SODIUM 30 MG: 100 INJECTION SUBCUTANEOUS at 20:11

## 2024-08-15 RX ADMIN — TRAMADOL HYDROCHLORIDE 50 MG: 50 TABLET, FILM COATED ORAL at 02:10

## 2024-08-15 RX ADMIN — TRAMADOL HYDROCHLORIDE 50 MG: 50 TABLET, FILM COATED ORAL at 20:46

## 2024-08-15 RX ADMIN — BUPROPION HYDROCHLORIDE 150 MG: 150 TABLET, EXTENDED RELEASE ORAL at 08:11

## 2024-08-15 RX ADMIN — Medication 1000 UNITS: at 08:11

## 2024-08-15 ASSESSMENT — PAIN SCALES - GENERAL
PAINLEVEL_OUTOF10: 7
PAINLEVEL_OUTOF10: 4
PAINLEVEL_OUTOF10: 6
PAINLEVEL_OUTOF10: 2
PAINLEVEL_OUTOF10: 4
PAINLEVEL_OUTOF10: 1
PAINLEVEL_OUTOF10: 1
PAINLEVEL_OUTOF10: 3

## 2024-08-15 ASSESSMENT — PAIN DESCRIPTION - DESCRIPTORS
DESCRIPTORS: ACHING
DESCRIPTORS: ACHING;DISCOMFORT;SORE
DESCRIPTORS: ACHING;DISCOMFORT
DESCRIPTORS: ACHING;DISCOMFORT;DULL
DESCRIPTORS: ACHING
DESCRIPTORS: ACHING
DESCRIPTORS: ACHING;DISCOMFORT
DESCRIPTORS: ACHING;DISCOMFORT;DULL

## 2024-08-15 ASSESSMENT — PAIN DESCRIPTION - LOCATION
LOCATION: GROIN
LOCATION: ARM
LOCATION: HIP;LEG;BACK
LOCATION: ARM
LOCATION: HIP
LOCATION: BACK
LOCATION: HIP;HEAD;LEG

## 2024-08-15 ASSESSMENT — PAIN DESCRIPTION - ORIENTATION
ORIENTATION: RIGHT
ORIENTATION: RIGHT;LEFT
ORIENTATION: RIGHT
ORIENTATION: LOWER

## 2024-08-15 ASSESSMENT — PAIN SCALES - WONG BAKER
WONGBAKER_NUMERICALRESPONSE: NO HURT
WONGBAKER_NUMERICALRESPONSE: NO HURT

## 2024-08-15 ASSESSMENT — PAIN DESCRIPTION - ONSET
ONSET: ON-GOING
ONSET: ON-GOING

## 2024-08-15 ASSESSMENT — PAIN - FUNCTIONAL ASSESSMENT
PAIN_FUNCTIONAL_ASSESSMENT: ACTIVITIES ARE NOT PREVENTED

## 2024-08-15 ASSESSMENT — PAIN DESCRIPTION - FREQUENCY
FREQUENCY: CONTINUOUS
FREQUENCY: CONTINUOUS

## 2024-08-15 ASSESSMENT — PAIN DESCRIPTION - PAIN TYPE
TYPE: ACUTE PAIN
TYPE: ACUTE PAIN

## 2024-08-15 NOTE — PROGRESS NOTES
Physical Therapy  Treatment Note     Evaluating Therapist: Ibeth Tubbs, PT, DPT [id number]    ROOM: [address] [place] [address]  DIAGNOSIS: Multiple Trauma   PRECAUTIONS: Falls, TTWB R LE, spinal neutrality (L5 TP fx), R rib fx, adult diet 2000 ml  HPI: Patient is a 74 y.o. male who presents on 08/06/2024 as a trauma with a right acetabulum fracture, right L5 TP fracture and right 6th rib fracture after a fall from 8 to 10 feet off of a ladder.  Patient stated he was cutting tree limbs when he fell off of a ladder directly onto his right side.  Orthopedic was consulted. On 08/07/2024 patient underwent a right anterior column posterior hemitransverse acetabular fracture open reduction internal fixation.  Hospital course has been complicated by the development of post op ileus.  Patient had a bowel movement 08/12/2024.       PMH of GERD, BPH, HTN, mitral valve disorder, skin cancer, arthritis, L TKA 22'.     Social:  Pt lives in split level home with wife (unable to assist at [place], L brachial plexus injury per pt) and daughter (has MS, unable to assist per pt) with 3 DANITZA with 2 rails to living room, kitchen, full bathroom. Pt then has 7 steps up with 2 rails to main bedroom. Pt also reports he may borrow a ramp for entry steps from friend at [place].   Prior to admission: Pt was independent with no AD. Pt is retired from [employer]. Pt enjoys attending grandchildren's sporting events.      Initial Evaluation  Date: 8/13/24 AM     PM    Short Term Goals Long Term Goals    Was pt agreeable to Eval/treatment? Yes  Yes  Yes      Does pt have pain? 3-4/10 R hip  R hip pain and nursing notified  R hip  pain      Bed Mobility  Rolling: ModA  Supine to sit: ModA  Sit to supine: ModA  Scooting: ModA  (Assistance with B LE) Rolling NT  Supine to sit min A ( with leg  )   Sit to supine mod A   Scooting min A  Sit to supine with min A ( leg  for R LE)   Supine to sit min A ( with leg )     Scooting min A SBA Modified Independent    2x10)   Heel slides ( R LE AAROM/AROM, L LE AROM2 x10)   Hip abduction ( B LE AAROM 2x10)       Patient education  Pt was educated on TTWB R LE with sit <>Stand transfers     Patient response to education:   Pt verbalized understanding Pt demonstrated skill Pt requires further education in this area   x X partially with verbal instruction  x     Additional Comments: Pt reported R hip/ groin pain and nursing staff notified/arrived to medicate pt. Pt completed functional mobility as noted above.  Improved ability to maintain TTWB R LE with sit <>stand transfers. Verbal instruction for foot placement with transfers to maintain TTWB R LE.    Improved ability noted with using leg  for R LE management with bed mobility. Verbal instruction for technique with bed mobility.   Pt continues to demonstrate NWB R LE with short distance gait.       Time in: 1000  Time out: 1045      Time in 1430  Time out 1515  Pt is making  progress toward established Physical Therapy goals.  Continue with physical therapy current plan of care.    Beth Lombardo   License Number: [id number]

## 2024-08-15 NOTE — PLAN OF CARE
Problem: Discharge Planning  Goal: Discharge to home or other facility with appropriate resources  8/14/2024 2019 by Sushila Pierce RN  Outcome: Progressing  8/14/2024 1050 by Kingston Seals RN  Outcome: Progressing     Problem: Pain  Goal: Verbalizes/displays adequate comfort level or baseline comfort level  8/14/2024 2019 by Sushila Pierce RN  Outcome: Progressing  8/14/2024 1050 by Kingston Seals RN  Outcome: Progressing     Problem: Safety - Adult  Goal: Free from fall injury  8/14/2024 2019 by Sushila Pierce RN  Outcome: Progressing  8/14/2024 1050 by Kingston Seals RN  Outcome: Progressing     Problem: ABCDS Injury Assessment  Goal: Absence of physical injury  8/14/2024 2019 by Sushila Pierce RN  Outcome: Progressing  8/14/2024 1050 by Kingston Seals RN  Outcome: Progressing

## 2024-08-15 NOTE — PROGRESS NOTES
[facility]  Comprehensive Progress Note    Subjective:      Rosas Corbin is a 74 y.o. male admitted to inpatient rehabilitation for impairments and activities limitations in ADLs and mobility secondary to pelvic fractures.      No acute events overnight. No CP, SOB, N/V. Pain is adequately controlled. Tolerating therapy. Therapy is noticing some mild processing issues that were probably underlying, will have Speech therapy evaluate cognition.     The patient's medical records have been reviewed.    Scheduled Meds:sodium chloride flush, 5-40 mL, 2 times per day  acetaminophen, 650 mg, 4 times per day  buPROPion, 150 mg, QAM  carvedilol, 12.5 mg, BID  losartan, 100 mg, Daily  pantoprazole, 40 mg, QAM AC  enoxaparin, 30 mg, BID  Vitamin D, 1,000 Units, Daily      Continuous Infusions:   sodium chloride       PRN Meds:sodium chloride flush, 5-40 mL, PRN  sodium chloride, , PRN  ondansetron, 4 mg, Q8H PRN   Or  ondansetron, 4 mg, Q6H PRN  acetaminophen, 650 mg, Q4H PRN  polyethylene glycol, 17 g, Daily PRN  traMADol, 50 mg, Q4H PRN         Objective:      Vitals:    08/15/24 0240 08/15/24 0623 08/15/24 0800 08/15/24 1039   BP:   (!) 153/69    Pulse:   77    Resp: 18 18 16 16   Temp:   98.3 °F (36.8 °C)    TempSrc:   Temporal    SpO2:   98%    Weight:       Height:         General appearance: alert, appears stated age, and cooperative, NAD  Head: Normocephalic, without obvious abnormality, atraumatic  Eyes: conjunctivae/corneas clear. PERRL, EOM's intact.   Neck: supple, symmetrical, trachea midline  Lungs: clear to auscultation bilaterally  Chest wall: no tenderness  Heart: regular rate and rhythm, S1, S2 normal  Abdomen: soft, non-tender, normal bowel sounds  Musculoskeletal: Range of motion abnormal right hip, hip precautions. ROM otherwise wnl BUE and LLE.   Skin: No rashes. Incision is healing, c/d/I.   Psych: Appropriate mood and affect   Neurologic: Awake, alert, SILT BUE and  inpatient and outpatient.       Pain adequately controlled  Other medical issues stable  Speech therapy consult to evaluate cognition  Team Conference today      Electronically signed by Lin Paulino MD on 8/15/2024 at 12:47 PM

## 2024-08-15 NOTE — PROGRESS NOTES
Occupational Therapy  OCCUPATIONAL THERAPY DAILY NOTE    Date:8/15/2024  Patient Name: Rosas Corbin  MRN: [id number]  : 1950  Room: [address] [place] [address]     Referring Practitioner: MD Jefferson  Diagnosis: Fell off ladder 8-10 feet 24; R acetabular fx, R L5 TP fx, 6th rib fx, retroperitoneal hematoma 24 ORIF R acetabulum &post op ileus  Additional Pertinent Hx: arthritis, acid reflux, BPH, hx COVID, mitral valve disorder  Restrictions/Precautions: Fall Risk, Spinal neutrality, TTWB RLE, 2000 fluid restriction      Functional Assessment:   Date Status AE  Comments   Feeding 24   Set up      Grooming 24 Set up             Oral Care 24 Set up       Bathing 24 Mod A       UB Dressing 24 Set up       LB Dressing 24 Max A       Footwear 24 Smith     Toileting 24  Mod A      Homemaking 24  TBA        Functional Transfers / Balance:   Date Status DME  Comments   Sit Balance 8/15/24   Mod I      Stand Balance 8/15/24  Min A  ww At table top level alternating use of RUE and LUE in tx activity    [] Tub  [] Shower   Transfer 24 TBA       Commode   Transfer 8/15/24  Mod A   Ww, 3 in 1 commode placed over standard commode Cues for hand placement and for pt to place RLE so that he maintains TTWB    Functional   Mobility 24  Mod A  ww Back and forth to the bathroom with ww.Pt required verbal cues for safe ww management   Other: sit to stand from w/c to ww     Supine to sit edge of bed  8/15/24       8/14/24  Min A       Mod A  ww      Functional Exercises / Activity:  BUE strength exercise: arm bike 5 mins for 3 reps                                         Large green can do bar 3 sets 1 reps   Standing balance/endurance with pt completing shoulder ROM arc x2 reps at CGA at table top level.Pt able to stand 3-4 mins before needing a seated rest break   Pt completed seated dynamic balance activity of playing corn hole wearing 1 # cuff wrist weights     Sensory / Neuromuscular  precautions & TTWB RLE with all ADLs, IADLs, transfers & mobility with a 100% accuracy to ensure pt safety @ discharge    8/15/24 OT plan of care reviewed on this date. Tentative length of stay is 2 weeks.Ellen Ty OTR/L [id number]        DISCHARGE RECOMMENDATIONS  Recommended DME:    Post Discharge Care:   []Home Independently  []Home with 24hr Care / Supervision []Home with Partial Supervision [x]Home with Home Health OT []Home with Out Pt OT []Other: ___   Comments:         Time in Time out Tx Time Breakdown  Variance:   First Session  1045 1130 [x] Individual Tx- 45  [] Concurrent Tx -  [] Co-Tx -   [] Group Tx -   [] Time Missed -     Second Session 1300  1345 [x] Individual Tx-45   [] Concurrent Tx -  [] Co-Tx -   [] Group Tx -   [] Time Missed -     Third Session    [] Individual Tx-   [] Concurrent Tx -  [] Co-Tx -   [] Group Tx -   [] Time Missed -         Total Tx Time: 90 mins       Ellen Ty OTR/L [id number]

## 2024-08-15 NOTE — PATIENT CARE CONFERENCE
[facility]  INPATIENT ACUTE REHABILITATION  TEAM CONFERENCE NOTE/PATIENT PLAN OF CARE    The physician was present and led this team conference    Date: 8/15/2024  Admission date: 2024  Patient Name: Rosas Corbin        MRN: [id number]    : 1950  (74 y.o.)  Gender: male   Rehab diagnosis/surgery with date:  right acetabular fracture after fall from ladder at home 24  ORIF 24  Impairment Group Code:  8.3      MEDICAL/FUNCTIONAL HISTORY/STATUS:  allowed toe touch wt. bearing to the right lower extremity, also suffered a right rib fracture    Consultations/Labs/X-rays:    Latest Reference Range & Units 08/15/24 06:01   Sodium 132 - 146 mmol/L 134   Potassium 3.5 - 5.0 mmol/L 4.3   Chloride 98 - 107 mmol/L 101   CARBON DIOXIDE 22 - 29 mmol/L 22   BUN,BUNPL 6 - 23 mg/dL 13   Creatinine 0.70 - 1.20 mg/dL 0.8      Latest Reference Range & Units 08/15/24 06:01   WBC 4.5 - 11.5 k/uL 4.5   RBC 3.80 - 5.80 m/uL 2.97 (L)   Hemoglobin Quant 12.5 - 16.5 g/dL 9.7 (L)   Hematocrit 37.0 - 54.0 % 28.9 (L)       MEDICATION UPDATE:  no recent changes      NURSING :    Bowel:   Always Continent  [x]   Occasionally incontinent  []   Frequently incontinent  []   Always incontinent  []   Not occurred  []     Bladder:   Always Continent  [x]    Incontinent less than daily[]   Incontinent  daily []   Always incontinent  []   No urine output    []   Indwelling catheter []       Toilet Hygiene:   Current level : mod assist  Short term Toilet hygiene goal: min assist  Long term toilet hygiene goal:  Modified independent    Skin integrity: intact  Pain: right hip, Ultram has been effective    NUTRITION    Diet  regular  Liquid consistency   thin    SOCIAL INFORMATION:  Lives with: spouse and adult daughter-both are disabled, they are able to do own self care, other family members  live close  Prior community services:  none  Home Architecture:  split level, 3 entry with 2 rails to main level with kitchen, bath,

## 2024-08-15 NOTE — PLAN OF CARE
Problem: Discharge Planning  Goal: Discharge to home or other facility with appropriate resources  8/15/2024 0944 by Diamond Cisneros LPN  Outcome: Progressing  8/14/2024 2019 by Sushila Pierce, RN  Outcome: Progressing     Problem: Pain  Goal: Verbalizes/displays adequate comfort level or baseline comfort level  8/15/2024 0944 by Diamond Cisneros LPN  Outcome: Progressing  8/14/2024 2019 by Sushila Pierce, RN  Outcome: Progressing     Problem: Safety - Adult  Goal: Free from fall injury  8/15/2024 0944 by Diamond Cisneros LPN  Outcome: Progressing  8/14/2024 2019 by Sushila Pierce RN  Outcome: Progressing     Problem: ABCDS Injury Assessment  Goal: Absence of physical injury  8/15/2024 0944 by Diamond Cisneros LPN  Outcome: Progressing  Flowsheets (Taken 8/15/2024 0943)  Absence of Physical Injury: Implement safety measures based on patient assessment  8/14/2024 2019 by Sushila Pierce, RN  Outcome: Progressing

## 2024-08-15 NOTE — PROGRESS NOTES
SPEECH/LANGUAGE PATHOLOGY  SPEECH/LANGUAGE/COGNITIVE EVALUATION      PATIENT NAME:  Rosas Corbin  (male)     MRN:  [id number]    :  1950  (74 y.o.)  STATUS:  Inpatient: Room 5514/5514-B    TODAY'S DATE:  8/15/2024  REFERRING PROVIDER:  Dr. Paulino   SPECIFIC PROVIDER ORDER: SLP eval and treat  Date of order:  8/15/24  REASON FOR REFERRAL: processing issues, eval cognition  EVALUATING THERAPIST: RONY Lucero    ADMITTING DIAGNOSIS: Status post fracture of pelvis [Z87.81]    VISIT DIAGNOSIS:      SPEECH THERAPY  PLAN OF CARE   The speech therapy  POC is established based on physician order, speech pathology diagnosis and results of clinical assessment     SPEECH PATHOLOGY DIAGNOSIS:    Mild cognitive-linguistic deficits    Speech Pathology intervention is recommended up to 6 times per week for LOS or when goals are met with emphasis on the following:      Conditions Requiring Skilled Therapeutic Intervention for speech, language and/or cognition    Cognitive linguistic impairment  Decreased short term memory  Decreased problem solving skills   Decreased thought organization    Specific Speech Therapy Interventions to Include:   Therapeutic tasks for Cognition    Specific instructions for next treatment:     To initiate POC    SHORT/LONG TERM GOALS  Pt will improve immediate, short term, recent memory during structured and unstructured tasks with 90% accuracy   Pt will improve problem solving/thought organization during structured and unstructured tasks with 90% accuracy     Patient stated goals: Agreed with above,     Rehabilitation Potential/Prognosis: good     _______________________________________________________________________  ASSESSMENT:  MOTOR SPEECH       Oral Peripheral Examination   Adequate lingual/labial strength     Parameters of Speech Production  Respiration:  Adequate for speech production  Articulation:  Within functional limits  Resonance:  Within functional limits  Quality:   Within  functional limits  Pitch:    Within functional limits  Intensity: Within functional limits  Fluency:  Intact  Prosody Intact    Speech Intelligibility      Good given unstructured task and unknown context     RECEPTIVE LANGUAGE Understanding Verbal and Non-Verbal Content: Understands    Comprehension of Yes/No Questions:   Within functional limits    Process  Simple Verbal Commands:   Within functional limits  Process Intermediate Verbal Commands:   Within functional limits  Process Complex Verbal Commands:     Within functional limits    Comprehension of Conversation:      Required repetition of questions/information       EXPRESSIVE LANGUAGE Expression of Ideas and Wants: Without difficulty      Serials: Functional    Imitation:  Words   Functional   Sentences Functional    Naming:  (Modality used:  Verbal)  Confrontation Naming  Functional  Functional Description  Functional  Response Naming: Functional    Conversation:      Conversation was within functional limits    COGNITION       Attention/Orientation  Attention: Sustained attention     General  Orientation:   Person:  oriented      Place:  oriented      Situation:  accurately described    Temporal Orientation:  Temporal Orientation: Year: Correct      Temporal Orientation: Month: Accurate within 5 days      Temporal Orientation: Day: Correct    Memory   Long Term Recall:   Address:  recalled without cuing  Birthdate:  recalled without cuing  Age: recalled without cuing  Family: recalled without cuing    Immediate Recall: Repetition of Three Words (First Attempt): 3    Delayed Recall:   Able to recall \"sock”: Yes, no cue required     Able to recall \"blue\": Yes, no cue required     Able to recall \"bed\": Yes, no cue required    Organization/Problem Solving/Reasoning   Sequencing:    Verbal: Functional      Motor:  To be assessed    Problem solving: Verbal: Impaired      Motor:  To be assessed    Mathematics:  Simple:  Functional     Complex:

## 2024-08-16 PROCEDURE — 97530 THERAPEUTIC ACTIVITIES: CPT

## 2024-08-16 PROCEDURE — 6370000000 HC RX 637 (ALT 250 FOR IP): Performed by: PHYSICAL MEDICINE & REHABILITATION

## 2024-08-16 PROCEDURE — 97129 THER IVNTJ 1ST 15 MIN: CPT

## 2024-08-16 PROCEDURE — 97535 SELF CARE MNGMENT TRAINING: CPT

## 2024-08-16 PROCEDURE — 6360000002 HC RX W HCPCS

## 2024-08-16 PROCEDURE — 6370000000 HC RX 637 (ALT 250 FOR IP)

## 2024-08-16 PROCEDURE — 97110 THERAPEUTIC EXERCISES: CPT

## 2024-08-16 PROCEDURE — 99232 SBSQ HOSP IP/OBS MODERATE 35: CPT | Performed by: PHYSICAL MEDICINE & REHABILITATION

## 2024-08-16 PROCEDURE — 1280000000 HC REHAB R&B

## 2024-08-16 PROCEDURE — 97130 THER IVNTJ EA ADDL 15 MIN: CPT

## 2024-08-16 RX ADMIN — ENOXAPARIN SODIUM 30 MG: 100 INJECTION SUBCUTANEOUS at 08:36

## 2024-08-16 RX ADMIN — TRAMADOL HYDROCHLORIDE 50 MG: 50 TABLET, FILM COATED ORAL at 10:36

## 2024-08-16 RX ADMIN — TRAMADOL HYDROCHLORIDE 50 MG: 50 TABLET, FILM COATED ORAL at 16:28

## 2024-08-16 RX ADMIN — CARVEDILOL 12.5 MG: 6.25 TABLET, FILM COATED ORAL at 20:44

## 2024-08-16 RX ADMIN — LOSARTAN POTASSIUM 100 MG: 50 TABLET, FILM COATED ORAL at 08:37

## 2024-08-16 RX ADMIN — PANTOPRAZOLE SODIUM 40 MG: 40 TABLET, DELAYED RELEASE ORAL at 05:41

## 2024-08-16 RX ADMIN — Medication 1000 UNITS: at 08:37

## 2024-08-16 RX ADMIN — TRAMADOL HYDROCHLORIDE 50 MG: 50 TABLET, FILM COATED ORAL at 03:48

## 2024-08-16 RX ADMIN — CARVEDILOL 12.5 MG: 6.25 TABLET, FILM COATED ORAL at 08:37

## 2024-08-16 RX ADMIN — ACETAMINOPHEN 650 MG: 325 TABLET ORAL at 20:44

## 2024-08-16 RX ADMIN — ACETAMINOPHEN 650 MG: 325 TABLET ORAL at 05:40

## 2024-08-16 RX ADMIN — ENOXAPARIN SODIUM 30 MG: 100 INJECTION SUBCUTANEOUS at 20:44

## 2024-08-16 RX ADMIN — BUPROPION HYDROCHLORIDE 150 MG: 150 TABLET, EXTENDED RELEASE ORAL at 08:40

## 2024-08-16 RX ADMIN — ACETAMINOPHEN 650 MG: 325 TABLET ORAL at 12:25

## 2024-08-16 ASSESSMENT — PAIN SCALES - GENERAL
PAINLEVEL_OUTOF10: 5
PAINLEVEL_OUTOF10: 7
PAINLEVEL_OUTOF10: 8
PAINLEVEL_OUTOF10: 2
PAINLEVEL_OUTOF10: 0
PAINLEVEL_OUTOF10: 0
PAINLEVEL_OUTOF10: 5
PAINLEVEL_OUTOF10: 6
PAINLEVEL_OUTOF10: 5

## 2024-08-16 ASSESSMENT — PAIN DESCRIPTION - DESCRIPTORS
DESCRIPTORS: ACHING;DISCOMFORT
DESCRIPTORS: SORE
DESCRIPTORS: ACHING

## 2024-08-16 ASSESSMENT — PAIN DESCRIPTION - LOCATION
LOCATION: HIP
LOCATION: HIP;GROIN
LOCATION: GROIN
LOCATION: GENERALIZED;LEG
LOCATION: SHOULDER

## 2024-08-16 ASSESSMENT — PAIN DESCRIPTION - ORIENTATION
ORIENTATION: RIGHT
ORIENTATION: RIGHT
ORIENTATION: RIGHT;LEFT

## 2024-08-16 ASSESSMENT — PAIN DESCRIPTION - PAIN TYPE: TYPE: ACUTE PAIN

## 2024-08-16 ASSESSMENT — PAIN DESCRIPTION - ONSET: ONSET: ON-GOING

## 2024-08-16 ASSESSMENT — PAIN DESCRIPTION - FREQUENCY: FREQUENCY: CONTINUOUS

## 2024-08-16 ASSESSMENT — PAIN SCALES - WONG BAKER: WONGBAKER_NUMERICALRESPONSE: HURTS A LITTLE BIT

## 2024-08-16 NOTE — CARE COORDINATION
Team meeting yesterday.  Met with patient and discussed rehab plan and discharge planning.  Patient is progressing and verbalized good insight re progress and barriers.  Follow thru with toe touch weight bearing status  in activity is an acknowledged issue.  Pain is also a limiting factor.  Current functional levels are mod assist , max for stairs and lower body dressing.  Goals are to modified independence.  Team estimates LOS at 2 weeks.  Patient states he will communicate results to his wife.  Discharge plan discussed with details as below.     D/c Plan/After Care:  will return home. HHC as recommended, prefers [facility]. Patient friend will place a ramp at entrance and once in patient will stay on that floor where he will have access to a bathroom.  Patient verbalized need for a hospital bed to accomodates transfers.   See DME below.     DME  he has a bedside commode, walker  and wheelchair that they obtained for wife that she no longer uses .  He believes these items will work for him.  Will need hospital bed and prefers to use [facility]  that he will use on the toilet at home.  He will place his bed near to the bathroom .  He is unsure if a wheelchair will fit into the bathroom.     FT :  Will ask son and grandson to attend prior to discharge.

## 2024-08-16 NOTE — PROGRESS NOTES
Occupational Therapy  OCCUPATIONAL THERAPY DAILY NOTE    Date:2024  Patient Name: Rosas Corbin  MRN: [id number]  : 1950  Room: [address] [place] [address]     Referring Practitioner: MD Jefferson  Diagnosis: Fell off ladder 8-10 feet 24; R acetabular fx, R L5 TP fx, 6th rib fx, retroperitoneal hematoma 24 ORIF R acetabulum &post op ileus  Additional Pertinent Hx: arthritis, acid reflux, BPH, hx COVID, mitral valve disorder  Restrictions/Precautions: Fall Risk, Spinal neutrality, TTWB RLE, , acetabular precautions ,2000 fluid restriction      Functional Assessment:   Date Status AE  Comments   Feeding 24   Independent      Grooming 24 Mod I   Seated at sink pt washed hands, face and combed hair          Oral Care 24  Mod I   Seated at sink pt brushed teeth and used mouth wash    Bathing 24  UB-Mod I   LB- Mod A   Sponge bath completed. Assist to wash lower legs and to stand and wash buttocks    UB Dressing 24 Set up    Pt donned pull over shirt after setup    LB Dressing 24  Mod A  Reacher  Pt assisted with donning shorts on over BLE's. Assist to stand and pull up pants    Footwear 24 Smith Sock aid, long shoe horn  Pt donned socks with sock aid. Pt needed some assist to don pre tied shoe fully on over RLE using long shoe horn    Toileting 24  Min A   Pt completed hygiene following BM. Assist for stand balance and pulling pants up    Homemaking 24  TBA        Functional Transfers / Balance:   Date Status DME  Comments   Sit Balance 24   Mod I   During am ADL    Stand Balance 24  Min A  ww During am self care and toileting    [] Tub  [] Shower   Transfer 24 TBA       Commode   Transfer 24  Min  A   Ww, 3 in 1 commode placed over standard commode SPT from w/c to 3 in 1 commode placed over standard commode.Verbal cues for hand placement     Functional   Mobility 24  Min A  ww Short distance in pt's room with ww with pt having RLE be NWB    Other: sit

## 2024-08-16 NOTE — PROGRESS NOTES
Physical Therapy  Treatment Note     Evaluating Therapist: Ibeth Tubbs, PT, DPT [id number]    ROOM: [address] [place] [address]  DIAGNOSIS: Multiple Trauma   PRECAUTIONS: Falls, TTWB R LE, spinal neutrality (L5 TP fx), R rib fx, adult diet 2000 ml  HPI: Patient is a 74 y.o. male who presents on 08/06/2024 as a trauma with a right acetabulum fracture, right L5 TP fracture and right 6th rib fracture after a fall from 8 to 10 feet off of a ladder.  Patient stated he was cutting tree limbs when he fell off of a ladder directly onto his right side.  Orthopedic was consulted. On 08/07/2024 patient underwent a right anterior column posterior hemitransverse acetabular fracture open reduction internal fixation.  Hospital course has been complicated by the development of post op ileus.  Patient had a bowel movement 08/12/2024.       PMH of GERD, BPH, HTN, mitral valve disorder, skin cancer, arthritis, L TKA 22'.     Social:  Pt lives in split level home with wife (unable to assist at [place], L brachial plexus injury per pt) and daughter (has MS, unable to assist per pt) with 3 DANITZA with 2 rails to living room, kitchen, full bathroom. Pt then has 7 steps up with 2 rails to main bedroom. Pt also reports he may borrow a ramp for entry steps from friend at [place].   Prior to admission: Pt was independent with no AD. Pt is retired from [employer]. Pt enjoys attending grandchildren's sporting events.      Initial Evaluation  Date: 8/13/24 AM     PM    Short Term Goals Long Term Goals    Was pt agreeable to Eval/treatment? Yes  Yes  Yes      Does pt have pain? 3-4/10 R hip  R hip pain and nursing notified  R hip  pain      Bed Mobility  Rolling: ModA  Supine to sit: ModA  Sit to supine: ModA  Scooting: ModA  (Assistance with B LE) Rolling NT  Supine to sit min A ( with leg  )   Sit to supine min A (with leg  )   Scooting min A  NT SBA Modified Independent     Transfers Sit to stand: ModA  Stand to sit: ModA  Stand pivot: ModA with WW     Sit to

## 2024-08-16 NOTE — PROGRESS NOTES
Speech Language Pathology  ACUTE REHABILITATION--DAILY PROGRESS NOTE            SWALLOWING:      Diet:  Regular consistency solids with thin liquids (IDDSI level 0)    No CSE completed. Referral for cognition only.      LANGUAGE:    WFL     COGNITION:      Patient seen in room for skilled cognitive tx. Patient seen as a co-tx with OT to address sequencing/processing difficulties reported to SLP by OT staff. Patient alert and oriented to all concepts. Patient completed sit-to-stand and stand-to-sit sequences with 90% acc w/ occ verbal cues required to increase attention to detail. Patient able to recall TTWB precautions with good accuracy. Patient demonstrated fair safety awareness and insight into deficits during all functional tasks (transfers, ambulation, hygiene, grooming, and dressing). Patient able to recall all precautions/sequences at end of session with 80% acc w/ min verbal cues required.           SPEECH:    WFL      Minute Tracking:    Individual minutes:     0 minutes  Concurrent minutes:    0 minutes  Co-treatment minutes: 45 minutes    Total minutes for 8/16/2024: 45 minutes      SAFETY:      Fair    EDUCATION:    Patient educated on all goals of SP POC.     OUTCOME:    Progress made towards goals. Will continue SP intervention as per previously established POC.    SP recommended after discharge:  TBD  Supervision recommended at discharge: Other TBD      FIMS SCORES        Swallowing                          Current Status             7--Independent                         Short Term Goal         7--Independent                         Long Term Goal          7--Independent                Receptive                          Current Status             5--Supervision               Short Term Goal         6--Modified Independent                       Long Term Goal          7--Independent                Expressive                          Current Status             7--Independent                          Short Term Goal         7--Independent                         Long Term Goal          7--Independent                                                                           Problem Solving                          Current Status             5--Supervision               Short Term Goal         6--Modified Independent                       Long Term Goal          7--Independent                            Memory                          Current Status             5--Supervision               Short Term Goal         6--Modified Independent                       Long Term Goal          7--Independent                Social Interaction              Current Status             7--Independent                         Short Term Goal         7--Independent                         Long Term Goal          7--Independent             SPEECH THERAPY  PLAN OF CARE   The speech therapy  POC is established based on physician order, speech pathology diagnosis and results of clinical assessment       SHORT/LONG TERM GOALS    Pt will improve immediate, short term, recent memory during structured and unstructured tasks with 90% accuracy   Pt will improve problem solving/thought organization during structured and unstructured tasks with 90% accuracy

## 2024-08-16 NOTE — PROGRESS NOTES
[facility]  Comprehensive Progress Note    Subjective:      Rosas Corbin is a 74 y.o. male admitted to inpatient rehabilitation for impairments and activities limitations in ADLs and mobility secondary to pelvic fractures.      No acute events overnight. No CP, SOB, N/V. Pain is adequately controlled. Speech therapy evaluation noting mild cognitive deficits. Tolerating therapy. VSS.    The patient's medical records have been reviewed.    Scheduled Meds:sodium chloride flush, 5-40 mL, 2 times per day  acetaminophen, 650 mg, 4 times per day  buPROPion, 150 mg, QAM  carvedilol, 12.5 mg, BID  losartan, 100 mg, Daily  pantoprazole, 40 mg, QAM AC  enoxaparin, 30 mg, BID  Vitamin D, 1,000 Units, Daily      Continuous Infusions:   sodium chloride       PRN Meds:sodium chloride flush, 5-40 mL, PRN  sodium chloride, , PRN  ondansetron, 4 mg, Q8H PRN   Or  ondansetron, 4 mg, Q6H PRN  acetaminophen, 650 mg, Q4H PRN  polyethylene glycol, 17 g, Daily PRN  traMADol, 50 mg, Q4H PRN         Objective:      Vitals:    08/16/24 0348 08/16/24 0418 08/16/24 0830 08/16/24 1036   BP:   131/71    Pulse:   86    Resp: 18 17 18 16   Temp:   97.3 °F (36.3 °C)    TempSrc:   Temporal    SpO2:   98%    Weight:       Height:         General appearance: alert, appears stated age, and cooperative, NAD  Head: Normocephalic, without obvious abnormality, atraumatic  Eyes: conjunctivae/corneas clear. PERRL, EOM's intact.   Neck: supple, symmetrical, trachea midline  Lungs: clear to auscultation bilaterally  Chest wall: no tenderness  Heart: regular rate and rhythm, S1, S2 normal  Abdomen: soft, non-tender, normal bowel sounds  Musculoskeletal: Range of motion abnormal right hip, hip precautions. ROM otherwise wnl BUE and LLE.   Skin: No rashes. Incision is healing, c/d/I.   Psych: Appropriate mood and affect   Neurologic: Awake, alert, SILT BUE and BLE. Strength 5/5 in BUE; 4/5 throughout LLE; 2/5 right HF and  completed, mild cognitive deficits noted. Patient will continue to work with SLP.    Continue rehab program      Electronically signed by Lin Paulino MD on 8/16/2024 at 12:33 PM

## 2024-08-16 NOTE — PLAN OF CARE
Problem: Discharge Planning  Goal: Discharge to home or other facility with appropriate resources  8/15/2024 2028 by Lucila Oliva RN  Outcome: Progressing  8/15/2024 0944 by Diamond Cisneros LPN  Outcome: Progressing     Problem: Pain  Goal: Verbalizes/displays adequate comfort level or baseline comfort level  8/15/2024 2028 by Lucila Oliva RN  Outcome: Progressing  8/15/2024 0944 by Diamond Cisneros LPN  Outcome: Progressing     Problem: Safety - Adult  Goal: Free from fall injury  8/15/2024 2028 by Lucila Oliva RN  Outcome: Progressing  Flowsheets (Taken 8/15/2024 2000)  Free From Fall Injury: Instruct family/caregiver on patient safety  8/15/2024 0944 by Diamond Cisneros LPN  Outcome: Progressing     Problem: ABCDS Injury Assessment  Goal: Absence of physical injury  8/15/2024 2028 by Lucila Oliva RN  Outcome: Progressing  Flowsheets (Taken 8/15/2024 2000)  Absence of Physical Injury: Implement safety measures based on patient assessment  8/15/2024 0944 by Diamond Cisneros LPN  Outcome: Progressing  Flowsheets (Taken 8/15/2024 0943)  Absence of Physical Injury: Implement safety measures based on patient assessment

## 2024-08-17 PROCEDURE — 6360000002 HC RX W HCPCS

## 2024-08-17 PROCEDURE — 6370000000 HC RX 637 (ALT 250 FOR IP)

## 2024-08-17 PROCEDURE — 6370000000 HC RX 637 (ALT 250 FOR IP): Performed by: PHYSICAL MEDICINE & REHABILITATION

## 2024-08-17 PROCEDURE — 97129 THER IVNTJ 1ST 15 MIN: CPT

## 2024-08-17 PROCEDURE — 97530 THERAPEUTIC ACTIVITIES: CPT

## 2024-08-17 PROCEDURE — 1280000000 HC REHAB R&B

## 2024-08-17 PROCEDURE — 97130 THER IVNTJ EA ADDL 15 MIN: CPT

## 2024-08-17 PROCEDURE — 99232 SBSQ HOSP IP/OBS MODERATE 35: CPT | Performed by: PHYSICAL MEDICINE & REHABILITATION

## 2024-08-17 RX ADMIN — TRAMADOL HYDROCHLORIDE 50 MG: 50 TABLET, FILM COATED ORAL at 10:54

## 2024-08-17 RX ADMIN — TRAMADOL HYDROCHLORIDE 50 MG: 50 TABLET, FILM COATED ORAL at 18:18

## 2024-08-17 RX ADMIN — Medication 1000 UNITS: at 09:50

## 2024-08-17 RX ADMIN — ACETAMINOPHEN 650 MG: 325 TABLET ORAL at 10:54

## 2024-08-17 RX ADMIN — ENOXAPARIN SODIUM 30 MG: 100 INJECTION SUBCUTANEOUS at 09:49

## 2024-08-17 RX ADMIN — LOSARTAN POTASSIUM 100 MG: 50 TABLET, FILM COATED ORAL at 09:50

## 2024-08-17 RX ADMIN — BUPROPION HYDROCHLORIDE 150 MG: 150 TABLET, EXTENDED RELEASE ORAL at 09:50

## 2024-08-17 RX ADMIN — ACETAMINOPHEN 650 MG: 325 TABLET ORAL at 20:43

## 2024-08-17 RX ADMIN — ACETAMINOPHEN 650 MG: 325 TABLET ORAL at 05:02

## 2024-08-17 RX ADMIN — ENOXAPARIN SODIUM 30 MG: 100 INJECTION SUBCUTANEOUS at 20:43

## 2024-08-17 RX ADMIN — CARVEDILOL 12.5 MG: 6.25 TABLET, FILM COATED ORAL at 09:50

## 2024-08-17 RX ADMIN — TRAMADOL HYDROCHLORIDE 50 MG: 50 TABLET, FILM COATED ORAL at 23:48

## 2024-08-17 RX ADMIN — PANTOPRAZOLE SODIUM 40 MG: 40 TABLET, DELAYED RELEASE ORAL at 05:02

## 2024-08-17 RX ADMIN — CARVEDILOL 12.5 MG: 6.25 TABLET, FILM COATED ORAL at 20:44

## 2024-08-17 ASSESSMENT — PAIN DESCRIPTION - ORIENTATION
ORIENTATION: RIGHT

## 2024-08-17 ASSESSMENT — PAIN - FUNCTIONAL ASSESSMENT: PAIN_FUNCTIONAL_ASSESSMENT: ACTIVITIES ARE NOT PREVENTED

## 2024-08-17 ASSESSMENT — PAIN DESCRIPTION - LOCATION
LOCATION: HIP
LOCATION: HIP
LOCATION: LEG;HIP
LOCATION: HIP
LOCATION: HIP

## 2024-08-17 ASSESSMENT — PAIN SCALES - GENERAL
PAINLEVEL_OUTOF10: 4
PAINLEVEL_OUTOF10: 5
PAINLEVEL_OUTOF10: 2
PAINLEVEL_OUTOF10: 5
PAINLEVEL_OUTOF10: 5
PAINLEVEL_OUTOF10: 6

## 2024-08-17 ASSESSMENT — PAIN DESCRIPTION - DESCRIPTORS
DESCRIPTORS: ACHING;JABBING;SHARP
DESCRIPTORS: ACHING;THROBBING
DESCRIPTORS: ACHING;DISCOMFORT;SORE

## 2024-08-17 NOTE — PROGRESS NOTES
[facility]  Comprehensive Progress Note    Subjective:      Rosas Corbin is a 74 y.o. male admitted to inpatient rehabilitation for impairments and activities limitations in ADLs and mobility secondary to pelvic fractures.     No acute events overnight. No CP, SOB, N/V. Pain is controlled. Tolerating therapy. Patient voices no new complaints.     The patient's medical records have been reviewed.    Scheduled Meds:acetaminophen, 650 mg, 4 times per day  buPROPion, 150 mg, QAM  carvedilol, 12.5 mg, BID  losartan, 100 mg, Daily  pantoprazole, 40 mg, QAM AC  enoxaparin, 30 mg, BID  Vitamin D, 1,000 Units, Daily      Continuous Infusions:   sodium chloride       PRN Meds:sodium chloride flush, 5-40 mL, PRN  sodium chloride, , PRN  ondansetron, 4 mg, Q8H PRN   Or  ondansetron, 4 mg, Q6H PRN  acetaminophen, 650 mg, Q4H PRN  polyethylene glycol, 17 g, Daily PRN  traMADol, 50 mg, Q4H PRN         Objective:      Vitals:    08/16/24 1628 08/16/24 1658 08/16/24 2039 08/17/24 0940   BP:   (!) 149/78 (!) 154/75   Pulse:   72 77   Resp: 18 18 17 18   Temp:   97.5 °F (36.4 °C) 97.3 °F (36.3 °C)   TempSrc:   Oral Temporal   SpO2:   95% 97%   Weight:       Height:         General appearance: alert, appears stated age, and cooperative, NAD  Head: Normocephalic, without obvious abnormality, atraumatic  Eyes: conjunctivae/corneas clear. PERRL, EOM's intact.   Neck: supple, symmetrical, trachea midline  Lungs: clear to auscultation bilaterally  Chest wall: no tenderness  Heart: regular rate and rhythm, S1, S2 normal  Abdomen: soft, non-tender, normal bowel sounds  Musculoskeletal: Range of motion abnormal right hip, hip precautions. ROM otherwise wnl BUE and LLE.   Skin: No rashes. Incision is healing, c/d/I.   Psych: Appropriate mood and affect   Neurologic: Awake, alert, SILT BUE and BLE. Strength 5/5 in BUE; 4/5 throughout LLE; 2/5 right HF and KE; 4/5 right ankle DF and PF.     Exam

## 2024-08-17 NOTE — PROGRESS NOTES
Physical Therapy  Treatment Note     Evaluating Therapist: Ibeth Tubbs, PT, DPT [id number]    ROOM: [address] [place] [address]  DIAGNOSIS: Multiple Trauma   PRECAUTIONS: Falls, TTWB R LE, spinal neutrality (L5 TP fx), R rib fx, adult diet 2000 ml  HPI: Patient is a 74 y.o. male who presents on 08/06/2024 as a trauma with a right acetabulum fracture, right L5 TP fracture and right 6th rib fracture after a fall from 8 to 10 feet off of a ladder.  Patient stated he was cutting tree limbs when he fell off of a ladder directly onto his right side.  Orthopedic was consulted. On 08/07/2024 patient underwent a right anterior column posterior hemitransverse acetabular fracture open reduction internal fixation.  Hospital course has been complicated by the development of post op ileus.  Patient had a bowel movement 08/12/2024.       PMH of GERD, BPH, HTN, mitral valve disorder, skin cancer, arthritis, L TKA 22'.     Social:  Pt lives in split level home with wife (unable to assist at [place], L brachial plexus injury per pt) and daughter (has MS, unable to assist per pt) with 3 DANITZA with 2 rails to living room, kitchen, full bathroom. Pt then has 7 steps up with 2 rails to main bedroom. Pt also reports he may borrow a ramp for entry steps from friend at [place].   Prior to admission: Pt was independent with no AD. Pt is retired from [employer]. Pt enjoys attending grandchildren's sporting events.      Initial Evaluation  Date: 8/13/24 AM     Short Term Goals Long Term Goals    Was pt agreeable to Eval/treatment? Yes  Yes      Does pt have pain? 3-4/10 R hip  R hip pain and nursing notified      Bed Mobility  Rolling: ModA  Supine to sit: ModA  Sit to supine: ModA  Scooting: ModA  (Assistance with B LE) NT  SBA Modified Independent     Transfers Sit to stand: ModA  Stand to sit: ModA  Stand pivot: ModA with WW     Sit to stand min A  Stand to sit min A   Stand pivot with with min A  SBA with WW   Modified Independent  With WW     Ambulation    20 feet x2

## 2024-08-17 NOTE — PROGRESS NOTES
Speech Language Pathology  ACUTE REHABILITATION--DAILY PROGRESS NOTE            SWALLOWING:      Diet:  Regular consistency solids with thin liquids (IDDSI level 0)    No CSE completed. Referral for cognition only.      LANGUAGE:    Not addressed in this session.       COGNITION:      Understanding, predicting and recalling time was targeted.  Patient was presented problem scenarios related to time.  He correctly solved the problems with 90% accuracy.    Temporal orientation (recent and remote) demonstrated 94% accuracy.          SPEECH:    WFL      Minute Tracking:    Individual minutes:     15 minutes  Concurrent minutes:    0 minutes  Co-treatment minutes: 15 minutes    Total minutes for 8/17/2024: 30 minutes      SAFETY:      Fair    EDUCATION:    Patient educated on all goals of SP POC.     OUTCOME:    Progress made towards goals. Will continue SP intervention as per previously established POC.    SP recommended after discharge:  TBD  Supervision recommended at discharge: Other TBD      FIMS SCORES        Swallowing                          Current Status             7--Independent                         Short Term Goal         7--Independent                         Long Term Goal          7--Independent                Receptive                          Current Status             5--Supervision               Short Term Goal         6--Modified Independent                       Long Term Goal          7--Independent                Expressive                          Current Status             7--Independent                         Short Term Goal         7--Independent                         Long Term Goal          7--Independent                                                                           Problem Solving                          Current Status             5--Supervision               Short Term Goal         6--Modified Independent                       Long Term Goal          7--Independent                             Memory                          Current Status             5--Supervision               Short Term Goal         6--Modified Independent                       Long Term Goal          7--Independent                Social Interaction              Current Status             7--Independent                         Short Term Goal         7--Independent                         Long Term Goal          7--Independent             SPEECH THERAPY  PLAN OF CARE   The speech therapy  POC is established based on physician order, speech pathology diagnosis and results of clinical assessment       SHORT/LONG TERM GOALS    Pt will improve immediate, short term, recent memory during structured and unstructured tasks with 90% accuracy   Pt will improve problem solving/thought organization during structured and unstructured tasks with 90% accuracy

## 2024-08-17 NOTE — PLAN OF CARE
Problem: Discharge Planning  Goal: Discharge to home or other facility with appropriate resources  8/16/2024 2250 by Jay Lake RN  Outcome: Progressing  8/16/2024 1323 by Cintia Oliver RN  Outcome: Progressing     Problem: Pain  Goal: Verbalizes/displays adequate comfort level or baseline comfort level  8/16/2024 2250 by Jay Lake RN  Outcome: Progressing  8/16/2024 1323 by Cnitia Oliver RN  Outcome: Progressing     Problem: Safety - Adult  Goal: Free from fall injury  8/16/2024 2250 by Jay Lake RN  Outcome: Progressing  8/16/2024 1323 by Cintia Oliver RN  Outcome: Progressing     Problem: ABCDS Injury Assessment  Goal: Absence of physical injury  8/16/2024 2250 by Jay Lake RN  Outcome: Progressing  8/16/2024 1323 by Cintia Oliver RN  Outcome: Progressing

## 2024-08-18 PROCEDURE — 97535 SELF CARE MNGMENT TRAINING: CPT

## 2024-08-18 PROCEDURE — 97129 THER IVNTJ 1ST 15 MIN: CPT

## 2024-08-18 PROCEDURE — 1280000000 HC REHAB R&B

## 2024-08-18 PROCEDURE — 6370000000 HC RX 637 (ALT 250 FOR IP)

## 2024-08-18 PROCEDURE — 97530 THERAPEUTIC ACTIVITIES: CPT

## 2024-08-18 PROCEDURE — 97130 THER IVNTJ EA ADDL 15 MIN: CPT

## 2024-08-18 PROCEDURE — 6360000002 HC RX W HCPCS

## 2024-08-18 PROCEDURE — 6370000000 HC RX 637 (ALT 250 FOR IP): Performed by: PHYSICAL MEDICINE & REHABILITATION

## 2024-08-18 RX ADMIN — CARVEDILOL 12.5 MG: 6.25 TABLET, FILM COATED ORAL at 19:49

## 2024-08-18 RX ADMIN — Medication 1000 UNITS: at 09:40

## 2024-08-18 RX ADMIN — TRAMADOL HYDROCHLORIDE 50 MG: 50 TABLET, FILM COATED ORAL at 23:28

## 2024-08-18 RX ADMIN — LOSARTAN POTASSIUM 100 MG: 50 TABLET, FILM COATED ORAL at 09:40

## 2024-08-18 RX ADMIN — TRAMADOL HYDROCHLORIDE 50 MG: 50 TABLET, FILM COATED ORAL at 18:28

## 2024-08-18 RX ADMIN — PANTOPRAZOLE SODIUM 40 MG: 40 TABLET, DELAYED RELEASE ORAL at 05:55

## 2024-08-18 RX ADMIN — BUPROPION HYDROCHLORIDE 150 MG: 150 TABLET, EXTENDED RELEASE ORAL at 09:40

## 2024-08-18 RX ADMIN — ENOXAPARIN SODIUM 30 MG: 100 INJECTION SUBCUTANEOUS at 09:40

## 2024-08-18 RX ADMIN — ACETAMINOPHEN 650 MG: 325 TABLET ORAL at 05:55

## 2024-08-18 RX ADMIN — TRAMADOL HYDROCHLORIDE 50 MG: 50 TABLET, FILM COATED ORAL at 09:39

## 2024-08-18 RX ADMIN — ACETAMINOPHEN 650 MG: 325 TABLET ORAL at 12:00

## 2024-08-18 RX ADMIN — CARVEDILOL 12.5 MG: 6.25 TABLET, FILM COATED ORAL at 09:40

## 2024-08-18 RX ADMIN — ACETAMINOPHEN 650 MG: 325 TABLET ORAL at 23:27

## 2024-08-18 RX ADMIN — ACETAMINOPHEN 650 MG: 325 TABLET ORAL at 19:49

## 2024-08-18 RX ADMIN — ENOXAPARIN SODIUM 30 MG: 100 INJECTION SUBCUTANEOUS at 21:32

## 2024-08-18 ASSESSMENT — PAIN SCALES - GENERAL
PAINLEVEL_OUTOF10: 8
PAINLEVEL_OUTOF10: 3
PAINLEVEL_OUTOF10: 6
PAINLEVEL_OUTOF10: 3
PAINLEVEL_OUTOF10: 3
PAINLEVEL_OUTOF10: 0
PAINLEVEL_OUTOF10: 3

## 2024-08-18 ASSESSMENT — PAIN DESCRIPTION - DESCRIPTORS
DESCRIPTORS: ACHING

## 2024-08-18 ASSESSMENT — PAIN DESCRIPTION - ORIENTATION
ORIENTATION: RIGHT

## 2024-08-18 ASSESSMENT — PAIN DESCRIPTION - LOCATION
LOCATION: HIP

## 2024-08-18 NOTE — PROGRESS NOTES
Speech Language Pathology  ACUTE REHABILITATION--DAILY PROGRESS NOTE            SWALLOWING:      Diet:  Regular consistency solids with thin liquids (IDDSI level 0)    No CSE completed. Referral for cognition only.      LANGUAGE:    WFL     COGNITION:      Patient seen in room for cognitive therapy.Patient alert and oriented to all concepts. Patient given problem solving scenarios related to safety. Patient demonstrated good recall of this morning and last night events as well as yesterday's activities in speech therapy. Patient given problem solving scenarios related to safety. Patient achieved 87% accuracy independently, 93% accuracy given min verbal cues              SPEECH:    WFL      Minute Tracking:    Individual minutes:     30 minutes  Concurrent minutes:    0 minutes  Co-treatment minutes:   0 minutes    Total minutes for 8/18/2024: 30 minutes      SAFETY:      Fair    EDUCATION:    Patient educated on all goals of SP POC.     OUTCOME:    Progress made towards goals. Will continue SP intervention as per previously established POC.    SP recommended after discharge:  TBD  Supervision recommended at discharge: Other TBD      FIMS SCORES        Swallowing                          Current Status             7--Independent                         Short Term Goal         7--Independent                         Long Term Goal          7--Independent                Receptive                          Current Status             5--Supervision               Short Term Goal         6--Modified Independent                       Long Term Goal          7--Independent                Expressive                          Current Status             7--Independent                         Short Term Goal         7--Independent                         Long Term Goal          7--Independent                                                                           Problem Solving                          Current Status

## 2024-08-18 NOTE — PLAN OF CARE
Problem: Discharge Planning  Goal: Discharge to home or other facility with appropriate resources  8/18/2024 1039 by Cintia Kidd RN  Outcome: Progressing  8/18/2024 0329 by Eduarda Glass RN  Outcome: Progressing     Problem: Pain  Goal: Verbalizes/displays adequate comfort level or baseline comfort level  8/18/2024 1039 by Cintia Kidd RN  Outcome: Progressing  8/18/2024 0329 by Eduarda Glass RN  Outcome: Progressing     Problem: Safety - Adult  Goal: Free from fall injury  8/18/2024 1039 by Cintia Kidd RN  Outcome: Progressing  Flowsheets (Taken 8/18/2024 1034)  Free From Fall Injury: Instruct family/caregiver on patient safety  8/18/2024 0329 by Eduarda Glass RN  Outcome: Progressing     Problem: ABCDS Injury Assessment  Goal: Absence of physical injury  Recent Flowsheet Documentation  Taken 8/18/2024 1034 by Cintia Kidd RN  Absence of Physical Injury: Implement safety measures based on patient assessment  8/18/2024 0329 by Eduarda Glass RN  Outcome: Progressing

## 2024-08-18 NOTE — PROGRESS NOTES
Occupational Therapy  OCCUPATIONAL THERAPY DAILY NOTE    Date:2024  Patient Name: Rosas Corbin  MRN: [id number]  : 1950  Room: [address] [place] [address]     Referring Practitioner: MD Jefferson  Diagnosis: Fell off ladder 8-10 feet 24; R acetabular fx, R L5 TP fx, 6th rib fx, retroperitoneal hematoma 24 ORIF R acetabulum &post op ileus  Additional Pertinent Hx: arthritis, acid reflux, BPH, hx COVID, mitral valve disorder  Restrictions/Precautions: Fall Risk, Spinal neutrality, TTWB RLE, , acetabular precautions ,2000 fluid restriction      Functional Assessment:   Date Status AE  Comments   Feeding 24   Independent      Grooming 24 Mod I  W/c  Pt seated at sink to wash face, hand then applied deodorant.  Pt asked this therapist to wash his hair and done at w/c level in sink while following precautions using adapted method for patient safety.           Oral Care 24  Mod I  W/c Pt brushed teeth and used mouth wash seated at sink.    Bathing 24  UB-Mod I   LB-Min A W/c Pt washed UB skills seated at sink for sponge bath.    Pt needed assist to wash below knees and feet.  Pt stood for froy care using sink for balance following TTWB RLE and washed both front and buttocks needing occ cues to follow TTWB RLE precautions. Pt educated about using LH sponge for LB bathing in order to follow precautions.    UB Dressing 24 S/set up   seated Pt donned/doffed t- shirt seated in w/c.    LB Dressing 24  CGA/Min A Reacher  Pt educated using reacher to darwin/doff shorts needing assist and cues for follow thru in order to follow precautions. Assist for standing balance to pull shorts up/down over hips with one cue for TTWB RLE.    Footwear 24 CGA/Smith Sock aid, long shoe horn  Pt donned own socks using sock aid needing one cue for threading sock on device.  Pt donned Left pre tied shoe using LH shoe horn.  Pt required assist to darwin R shoe and positioning so R foot could slide into to complete shoe  Time Missed -     Third Session    [] Individual Tx-   [] Concurrent Tx -  [] Co-Tx -   [] Group Tx -   [] Time Missed -         Total Tx Time: 75mins       Kathleen J Saladino COTA/KARLA [id number]

## 2024-08-19 LAB
ANION GAP SERPL CALCULATED.3IONS-SCNC: 9 MMOL/L (ref 7–16)
BASOPHILS # BLD: 0.05 K/UL (ref 0–0.2)
BASOPHILS NFR BLD: 1 % (ref 0–2)
BUN SERPL-MCNC: 12 MG/DL (ref 6–23)
CALCIUM SERPL-MCNC: 9.8 MG/DL (ref 8.6–10.2)
CHLORIDE SERPL-SCNC: 98 MMOL/L (ref 98–107)
CO2 SERPL-SCNC: 24 MMOL/L (ref 22–29)
CREAT SERPL-MCNC: 0.9 MG/DL (ref 0.7–1.2)
EOSINOPHIL # BLD: 0.27 K/UL (ref 0.05–0.5)
EOSINOPHILS RELATIVE PERCENT: 5 % (ref 0–6)
ERYTHROCYTE [DISTWIDTH] IN BLOOD BY AUTOMATED COUNT: 14.5 % (ref 11.5–15)
GFR, ESTIMATED: 90 ML/MIN/1.73M2
GLUCOSE SERPL-MCNC: 105 MG/DL (ref 74–99)
HCT VFR BLD AUTO: 35.5 % (ref 37–54)
HGB BLD-MCNC: 12.1 G/DL (ref 12.5–16.5)
IMM GRANULOCYTES # BLD AUTO: 0.06 K/UL (ref 0–0.58)
IMM GRANULOCYTES NFR BLD: 1 % (ref 0–5)
LYMPHOCYTES NFR BLD: 1.16 K/UL (ref 1.5–4)
LYMPHOCYTES RELATIVE PERCENT: 22 % (ref 20–42)
MCH RBC QN AUTO: 34.1 PG (ref 26–35)
MCHC RBC AUTO-ENTMCNC: 34.1 G/DL (ref 32–34.5)
MCV RBC AUTO: 100 FL (ref 80–99.9)
MONOCYTES NFR BLD: 0.52 K/UL (ref 0.1–0.95)
MONOCYTES NFR BLD: 10 % (ref 2–12)
NEUTROPHILS NFR BLD: 62 % (ref 43–80)
NEUTS SEG NFR BLD: 3.31 K/UL (ref 1.8–7.3)
PLATELET # BLD AUTO: 257 K/UL (ref 130–450)
PMV BLD AUTO: 8.5 FL (ref 7–12)
POTASSIUM SERPL-SCNC: 4.4 MMOL/L (ref 3.5–5)
RBC # BLD AUTO: 3.55 M/UL (ref 3.8–5.8)
SODIUM SERPL-SCNC: 131 MMOL/L (ref 132–146)
WBC OTHER # BLD: 5.4 K/UL (ref 4.5–11.5)

## 2024-08-19 PROCEDURE — 1280000000 HC REHAB R&B

## 2024-08-19 PROCEDURE — 80048 BASIC METABOLIC PNL TOTAL CA: CPT

## 2024-08-19 PROCEDURE — 97129 THER IVNTJ 1ST 15 MIN: CPT

## 2024-08-19 PROCEDURE — 97110 THERAPEUTIC EXERCISES: CPT

## 2024-08-19 PROCEDURE — 85025 COMPLETE CBC W/AUTO DIFF WBC: CPT

## 2024-08-19 PROCEDURE — 36415 COLL VENOUS BLD VENIPUNCTURE: CPT

## 2024-08-19 PROCEDURE — 97530 THERAPEUTIC ACTIVITIES: CPT

## 2024-08-19 PROCEDURE — 6370000000 HC RX 637 (ALT 250 FOR IP)

## 2024-08-19 PROCEDURE — 6370000000 HC RX 637 (ALT 250 FOR IP): Performed by: PHYSICAL MEDICINE & REHABILITATION

## 2024-08-19 PROCEDURE — 6360000002 HC RX W HCPCS

## 2024-08-19 PROCEDURE — 97130 THER IVNTJ EA ADDL 15 MIN: CPT

## 2024-08-19 RX ADMIN — ACETAMINOPHEN 650 MG: 325 TABLET ORAL at 05:14

## 2024-08-19 RX ADMIN — BUPROPION HYDROCHLORIDE 150 MG: 150 TABLET, EXTENDED RELEASE ORAL at 08:39

## 2024-08-19 RX ADMIN — ACETAMINOPHEN 650 MG: 325 TABLET ORAL at 23:34

## 2024-08-19 RX ADMIN — ENOXAPARIN SODIUM 30 MG: 100 INJECTION SUBCUTANEOUS at 20:57

## 2024-08-19 RX ADMIN — TRAMADOL HYDROCHLORIDE 50 MG: 50 TABLET, FILM COATED ORAL at 08:42

## 2024-08-19 RX ADMIN — CARVEDILOL 12.5 MG: 6.25 TABLET, FILM COATED ORAL at 08:39

## 2024-08-19 RX ADMIN — ACETAMINOPHEN 650 MG: 325 TABLET ORAL at 20:58

## 2024-08-19 RX ADMIN — TRAMADOL HYDROCHLORIDE 50 MG: 50 TABLET, FILM COATED ORAL at 18:14

## 2024-08-19 RX ADMIN — LOSARTAN POTASSIUM 100 MG: 50 TABLET, FILM COATED ORAL at 08:39

## 2024-08-19 RX ADMIN — ACETAMINOPHEN 650 MG: 325 TABLET ORAL at 12:16

## 2024-08-19 RX ADMIN — TRAMADOL HYDROCHLORIDE 50 MG: 50 TABLET, FILM COATED ORAL at 04:37

## 2024-08-19 RX ADMIN — TRAMADOL HYDROCHLORIDE 50 MG: 50 TABLET, FILM COATED ORAL at 23:27

## 2024-08-19 RX ADMIN — CARVEDILOL 12.5 MG: 6.25 TABLET, FILM COATED ORAL at 20:57

## 2024-08-19 RX ADMIN — PANTOPRAZOLE SODIUM 40 MG: 40 TABLET, DELAYED RELEASE ORAL at 05:46

## 2024-08-19 RX ADMIN — ENOXAPARIN SODIUM 30 MG: 100 INJECTION SUBCUTANEOUS at 08:39

## 2024-08-19 RX ADMIN — Medication 1000 UNITS: at 08:39

## 2024-08-19 RX ADMIN — TRAMADOL HYDROCHLORIDE 50 MG: 50 TABLET, FILM COATED ORAL at 12:49

## 2024-08-19 ASSESSMENT — PAIN SCALES - GENERAL
PAINLEVEL_OUTOF10: 5
PAINLEVEL_OUTOF10: 5
PAINLEVEL_OUTOF10: 7
PAINLEVEL_OUTOF10: 1
PAINLEVEL_OUTOF10: 0
PAINLEVEL_OUTOF10: 3
PAINLEVEL_OUTOF10: 7
PAINLEVEL_OUTOF10: 8
PAINLEVEL_OUTOF10: 3
PAINLEVEL_OUTOF10: 3
PAINLEVEL_OUTOF10: 4
PAINLEVEL_OUTOF10: 7

## 2024-08-19 ASSESSMENT — PAIN - FUNCTIONAL ASSESSMENT: PAIN_FUNCTIONAL_ASSESSMENT: ACTIVITIES ARE NOT PREVENTED

## 2024-08-19 ASSESSMENT — PAIN DESCRIPTION - LOCATION
LOCATION: HIP
LOCATION: HIP
LOCATION: LEG;HIP
LOCATION: LEG
LOCATION: HIP
LOCATION: HIP;LEG
LOCATION: HIP

## 2024-08-19 ASSESSMENT — PAIN DESCRIPTION - ORIENTATION
ORIENTATION: LEFT
ORIENTATION: RIGHT

## 2024-08-19 ASSESSMENT — PAIN DESCRIPTION - DESCRIPTORS
DESCRIPTORS: ACHING;DISCOMFORT;DULL
DESCRIPTORS: ACHING;DISCOMFORT;SORE
DESCRIPTORS: ACHING;THROBBING
DESCRIPTORS: ACHING
DESCRIPTORS: ACHING;DISCOMFORT;DULL
DESCRIPTORS: ACHING

## 2024-08-19 ASSESSMENT — PAIN SCALES - WONG BAKER: WONGBAKER_NUMERICALRESPONSE: NO HURT

## 2024-08-19 ASSESSMENT — PAIN DESCRIPTION - FREQUENCY: FREQUENCY: INTERMITTENT

## 2024-08-19 ASSESSMENT — PAIN DESCRIPTION - ONSET: ONSET: GRADUAL

## 2024-08-19 ASSESSMENT — PAIN DESCRIPTION - PAIN TYPE: TYPE: ACUTE PAIN

## 2024-08-19 NOTE — PROGRESS NOTES
Speech Language Pathology  ACUTE REHABILITATION--DAILY PROGRESS NOTE            SWALLOWING:      Diet:  Regular consistency solids with thin liquids (IDDSI level 0)    No CSE completed. Referral for cognition only.      LANGUAGE:    WFL     COGNITION:      Patient seen in therapy space for skilled cognitive tx. Patient alert and oriented to all concepts. Patient able to complete sequencing tasks assoc w/ cooking (I.e. making toast) with 100% acc. Patient able to recall precautions with good accuracy and follow through noted during functional cooking task. Patient able to complete problem solving task in which patient had to identify problems within various scenarios. Patient completed w/ 100% acc, indep.               SPEECH:    WFL      Minute Tracking:    Individual minutes:     0 minutes  Concurrent minutes:    45 minutes  Co-treatment minutes:   0 minutes    Total minutes for 8/19/2024: 45 minutes      SAFETY:      Fair    EDUCATION:    Patient educated on all goals of SP POC.     OUTCOME:    Progress made towards goals. Will continue SP intervention as per previously established POC.    SP recommended after discharge:  TBD  Supervision recommended at discharge: Other TBD      FIMS SCORES        Swallowing                          Current Status             7--Independent                         Short Term Goal         7--Independent                         Long Term Goal          7--Independent                Receptive                          Current Status             5--Supervision               Short Term Goal         6--Modified Independent                       Long Term Goal          7--Independent                Expressive                          Current Status             7--Independent                         Short Term Goal         7--Independent                         Long Term Goal          7--Independent                                                                           Problem Solving

## 2024-08-19 NOTE — PLAN OF CARE
Problem: Discharge Planning  Goal: Discharge to home or other facility with appropriate resources  Outcome: Progressing     Problem: Pain  Goal: Verbalizes/displays adequate comfort level or baseline comfort level  Outcome: Progressing     Problem: Safety - Adult  Goal: Free from fall injury  Outcome: Progressing  Flowsheets (Taken 8/19/2024 1018)  Free From Fall Injury: Instruct family/caregiver on patient safety     Problem: ABCDS Injury Assessment  Goal: Absence of physical injury  Outcome: Progressing  Flowsheets (Taken 8/19/2024 1018)  Absence of Physical Injury: Implement safety measures based on patient assessment

## 2024-08-19 NOTE — PROGRESS NOTES
CGA  Stand pivot: Min A with FWW Sit to stand: CGA  Stand to sit: CGA  Stand pivot: Min A with FWW SBA with WW   Modified Independent  With WW     Ambulation    20 feet x2 reps, 10 feet x1 rep with WW with ModA       WC follow     10mWT: NT  6mWT: NT 40' x 2 with FWW CGA  50' with FWW CGA  150 feet with WW with SBA    10mWT: TBD  6mWT: TBD >250 feet with WW Modified Independent    10mWT: TBD  6mWT: TBD   Walking 10 feet on uneven surface NT NT NT 10 feet with WW with CGA 10 feet with WW Modified Independent   Wheel Chair Mobility 100 feet with B UE and L LE SBA NT NT  > 300 feet Modified Independent     Car Transfers NT Min A to enter; unable to position L LE into car due to pain  NT CGA Modified Independent     Stair negotiation: ascended and descended  NT NT NT 4-8 steps with 2 rails with Tanvir 8-12 steps with 2 rails Modified Independent   Curb Step:   ascended and descended NT NT NT 4 inch step with WW and CGA 4 inch step with WW Modified Independent     Picking up object off the floor NT NT NT Will  cone/shoe with reacher with CG assist Will  cone/shoe with reacher with no assist   BLE ROM WFL, R LE limited by pain  NT NT     BLE Strength R LE limited by pain, precautions     L LE: 4+/5  NT NT  Improve 1 MMT   Balance  Dynamic sitting: supervision at EOB    Dynamic standing: ModA at WW    BBS: NT   NT  CG/SBA with WW for dynamic mobility     BBS: TBD   Modified Independent with WW for dynamic mobility     BBS: TBD     Date Family Teach Completed None to date  None to date  None to date      Is additional Family Teaching Needed?  Y or N Y  Y  Y      Hindering Progress Pain, WB restrictions  Pain, WB restrictions Pain, WB restrictions     PT recommended ELOS 2 weeks TBD TBD     Team's Discharge Plan        Therapist at Team Meeting          Vitals:  AM:  NT  PM:   NT    Therapeutic Activity:   AM:  Functional Mobility   PM:   Functional Mobility  Therapeutic exercise (3 sets, 10 repetitions):    -Gluteal sets  -Quad sets       Patient education  Pt was educated on implications for stair training if ramp will not be installed     Patient response to education:   Pt verbalized understanding Pt demonstrated skill Pt requires further education in this area   x X partially with verbal instruction  x     Additional Comments: Patient was pleasant and agreeable to session. Ambulation continued with goals of increased ambulatory distance, with good compliance to TTWB. Car transfers were also introduced for practice of technique; patient states he will most likely go home in an SUV that will be more ideal height to improve the ability for the patient to comfortably position his L LE. PM session continued with gait training, however, due to recurrent gluteal spasms the patient requested to attempt activities to alleviate (isometrics, elongating positioning).     Will return to gait training and pre-stair training in upcoming sessions.      AM  Time in: 1000  Time out: 1045    PM  Time in: 1430  Time out: 1515      Pt is making  progress toward established Physical Therapy goals.  Continue with physical therapy current plan of care.      Melinda Rosales, PT, DPT  [id number]

## 2024-08-19 NOTE — PROGRESS NOTES
Occupational Therapy  OCCUPATIONAL THERAPY DAILY NOTE    Date:2024  Patient Name: Rosas Corbin  MRN: [id number]  : 1950  Room: [address] [place] [address]     Referring Practitioner: MD Jefferson  Diagnosis: Fell off ladder 8-10 feet 24; R acetabular fx, R L5 TP fx, 6th rib fx, retroperitoneal hematoma 24 ORIF R acetabulum &post op ileus  Additional Pertinent Hx: arthritis, acid reflux, BPH, hx COVID, mitral valve disorder  Restrictions/Precautions: Fall Risk, Spinal neutrality, TTWB RLE, , acetabular precautions ,2000 fluid restriction      Functional Assessment:   Date Status AE  Comments   Feeding 24   Independent      Grooming 24 Mod I  W/c           Oral Care 24  Mod I  W/c    Bathing 24  UB-Mod I   LB-Min A W/c    UB Dressing 24 S/set up   seated    LB Dressing 24  CGA/Min A Reacher     Footwear 24  CGA/Smith Sock aid, long shoe horn  Pt doffed pre tied shoes at SBA seated edge of bed    Toileting 24  Min A      Homemaking 24   Min A  Ww and w/c Pt completed light meal prep at kitchen counter seated and standing.Pt was able to reach and obtain items from refrigerator , drawers and overhead cupboards standing at ww.Pt was seated in w/c to return items used into refrigerator.Pt demonstrated good follow through with wt bearing precautions during the session.      Functional Transfers / Balance:   Date Status DME  Comments   Sit Balance 24   Mod I  W/c  During item retrieval activity at w/c level    Stand Balance 24  CGA/Min A  Ww   At kitchen counter level    [] Tub  [] Shower   Transfer 24 TBA       Commode   Transfer 24  Min  A   Ww, 3 in 1 commode placed over standard commode    Functional   Mobility 24   Min A     SBA  Ww    W/c  Short distance in therapy kitchen setting with ww   Throughout OT gym at wheelchair level with pt requiring occasional verbal cues for problem solving maneuvering w/c in small spaces      Other: sit to stand from  Positioning       [x] Continue with current OT Plan of care.  [] Prepare for Discharge  Goals  Long Term Goals  Time Frame for Long term goals : 2 weeks to address above problem areas  Long Term Goal 1: Pt demo  independent to eat all meals  Long Term Goal 2: Pt demo  Mod I grooming seated & or standing @ sink level & demo  safety  Long Term Goal 3: Pt demo Sup to bathe @ shower level  & or sponge bathing both seated & standing & demo G- safety & insight  Long Term Goal 4: Pt demo I UE & Mod I LE dress to don underwear, pants, socks & shoes using AE as needed & demo G- safety & insight  Long Term Goal 5: Pt demo Mod I commode trf using appropriate DME to ensure pt safety. Pt demo Mod I toileting & demo G- safety & insight  Long Term Goal 6: Pt demo SBA walk in shower using appropriate DME to esnure pt safety & pt demo G- safety & insight  Long Term Goal 7: Pt demo Mod I light homemaking activity & demo G- safety & insight  Long Term Goal 8: Pt demo G- endurance for a 30 minute functional activity  Long Term Goal 9: Pt will state & adhere to precautions & TTWB RLE with all ADLs, IADLs, transfers & mobility with a 100% accuracy to ensure pt safety @ discharge    8/15/24 OT plan of care reviewed on this date. Tentative length of stay is 2 weeks.Ellen Ty OTR/L [id number]        DISCHARGE RECOMMENDATIONS  Recommended DME:    Post Discharge Care:   []Home Independently  []Home with 24hr Care / Supervision []Home with Partial Supervision [x]Home with Home Health OT []Home with Out Pt OT []Other: ___   Comments:         Time in Time out Tx Time Breakdown  Variance:   First Session  1045 1130  [x] Individual Tx- 45  [] Concurrent Tx -  [] Co-Tx -   [] Group Tx -   [] Time Missed -     Second Session 1300  1345 [] Individual Tx-  [x] Concurrent Tx -45  [] Co-Tx -   [] Group Tx -   [] Time Missed -     Third Session    [] Individual Tx-   [] Concurrent Tx -  [] Co-Tx -   [] Group Tx -   [] Time Missed -         Total Tx

## 2024-08-20 PROCEDURE — 97530 THERAPEUTIC ACTIVITIES: CPT

## 2024-08-20 PROCEDURE — 6370000000 HC RX 637 (ALT 250 FOR IP)

## 2024-08-20 PROCEDURE — 6370000000 HC RX 637 (ALT 250 FOR IP): Performed by: PHYSICAL MEDICINE & REHABILITATION

## 2024-08-20 PROCEDURE — 99232 SBSQ HOSP IP/OBS MODERATE 35: CPT | Performed by: PHYSICAL MEDICINE & REHABILITATION

## 2024-08-20 PROCEDURE — 97130 THER IVNTJ EA ADDL 15 MIN: CPT

## 2024-08-20 PROCEDURE — 0HBRXZZ EXCISION OF TOE NAIL, EXTERNAL APPROACH: ICD-10-PCS | Performed by: PODIATRIST

## 2024-08-20 PROCEDURE — 6360000002 HC RX W HCPCS

## 2024-08-20 PROCEDURE — 97110 THERAPEUTIC EXERCISES: CPT

## 2024-08-20 PROCEDURE — 97129 THER IVNTJ 1ST 15 MIN: CPT

## 2024-08-20 PROCEDURE — 1280000000 HC REHAB R&B

## 2024-08-20 PROCEDURE — 97535 SELF CARE MNGMENT TRAINING: CPT

## 2024-08-20 RX ADMIN — TRAMADOL HYDROCHLORIDE 50 MG: 50 TABLET, FILM COATED ORAL at 09:30

## 2024-08-20 RX ADMIN — PANTOPRAZOLE SODIUM 40 MG: 40 TABLET, DELAYED RELEASE ORAL at 05:30

## 2024-08-20 RX ADMIN — CARVEDILOL 12.5 MG: 6.25 TABLET, FILM COATED ORAL at 20:23

## 2024-08-20 RX ADMIN — TRAMADOL HYDROCHLORIDE 50 MG: 50 TABLET, FILM COATED ORAL at 20:22

## 2024-08-20 RX ADMIN — LOSARTAN POTASSIUM 100 MG: 50 TABLET, FILM COATED ORAL at 09:22

## 2024-08-20 RX ADMIN — ENOXAPARIN SODIUM 30 MG: 100 INJECTION SUBCUTANEOUS at 20:21

## 2024-08-20 RX ADMIN — ACETAMINOPHEN 650 MG: 325 TABLET ORAL at 05:30

## 2024-08-20 RX ADMIN — ACETAMINOPHEN 650 MG: 325 TABLET ORAL at 23:28

## 2024-08-20 RX ADMIN — TRAMADOL HYDROCHLORIDE 50 MG: 50 TABLET, FILM COATED ORAL at 05:30

## 2024-08-20 RX ADMIN — BUPROPION HYDROCHLORIDE 150 MG: 150 TABLET, EXTENDED RELEASE ORAL at 09:30

## 2024-08-20 RX ADMIN — ACETAMINOPHEN 650 MG: 325 TABLET ORAL at 12:22

## 2024-08-20 RX ADMIN — ENOXAPARIN SODIUM 30 MG: 100 INJECTION SUBCUTANEOUS at 09:22

## 2024-08-20 RX ADMIN — CARVEDILOL 12.5 MG: 6.25 TABLET, FILM COATED ORAL at 09:22

## 2024-08-20 RX ADMIN — ACETAMINOPHEN 650 MG: 325 TABLET ORAL at 20:21

## 2024-08-20 RX ADMIN — Medication 1000 UNITS: at 09:22

## 2024-08-20 ASSESSMENT — PAIN SCALES - GENERAL
PAINLEVEL_OUTOF10: 6
PAINLEVEL_OUTOF10: 5
PAINLEVEL_OUTOF10: 7
PAINLEVEL_OUTOF10: 6
PAINLEVEL_OUTOF10: 3
PAINLEVEL_OUTOF10: 5
PAINLEVEL_OUTOF10: 3
PAINLEVEL_OUTOF10: 7
PAINLEVEL_OUTOF10: 0

## 2024-08-20 ASSESSMENT — PAIN SCALES - WONG BAKER
WONGBAKER_NUMERICALRESPONSE: HURTS A LITTLE BIT
WONGBAKER_NUMERICALRESPONSE: NO HURT

## 2024-08-20 ASSESSMENT — PAIN DESCRIPTION - ORIENTATION
ORIENTATION: RIGHT;LEFT
ORIENTATION: RIGHT
ORIENTATION: LEFT
ORIENTATION: RIGHT;LEFT
ORIENTATION: RIGHT;LEFT

## 2024-08-20 ASSESSMENT — PAIN DESCRIPTION - LOCATION
LOCATION: HIP;LEG
LOCATION: HIP

## 2024-08-20 ASSESSMENT — PAIN DESCRIPTION - DESCRIPTORS
DESCRIPTORS: ACHING
DESCRIPTORS: ACHING
DESCRIPTORS: ACHING;DISCOMFORT
DESCRIPTORS: ACHING

## 2024-08-20 NOTE — PROGRESS NOTES
Speech Language Pathology  ACUTE REHABILITATION--DAILY PROGRESS NOTE            SWALLOWING:      Diet:  Regular consistency solids with thin liquids (IDDSI level 0)    No CSE completed. Referral for cognition only.      LANGUAGE:    WFL     COGNITION:      Patient seen in therapy space for skilled cognitive tx. Patient alert and oriented to all concepts. Patient able to recall daily events thus far with excellent accuracy. Patient able to recall precautions/restrictions with 100% acc. Patient able to completed reasoning tasks in which patient had to organize concepts into a specific order. Patient completed task with 96% acc. Patient able to complete clock math/time mgmt task of min complexity w/ 100% acc, indep.               SPEECH:    WFL      Minute Tracking:    Individual minutes:     0 minutes  Concurrent minutes:    0 minutes  Co-treatment minutes:   45 minutes    Total minutes for 8/20/2024: 45 minutes      SAFETY:      Fair    EDUCATION:    Patient educated on all goals of SP POC.     OUTCOME:    Progress made towards goals. Will continue SP intervention as per previously established POC.    SP recommended after discharge:  TBD  Supervision recommended at discharge: Other TBD      FIMS SCORES        Swallowing                          Current Status             7--Independent                         Short Term Goal         7--Independent                         Long Term Goal          7--Independent                Receptive                          Current Status             5--Supervision               Short Term Goal         6--Modified Independent                       Long Term Goal          7--Independent                Expressive                          Current Status             7--Independent                         Short Term Goal         7--Independent                         Long Term Goal          7--Independent                                                                           Problem

## 2024-08-20 NOTE — PROGRESS NOTES
Physical Therapy  Treatment     Evaluating Therapist: Ibeth Tubbs, PT, DPT [id number]    ROOM: [address] [place] [address]  DIAGNOSIS: Multiple Trauma   PRECAUTIONS: Falls, TTWB R LE, spinal neutrality (L5 TP fx), R rib fx, adult diet 2000 ml  HPI: Patient is a 74 y.o. male who presents on 08/06/2024 as a trauma with a right acetabulum fracture, right L5 TP fracture and right 6th rib fracture after a fall from 8 to 10 feet off of a ladder.  Patient stated he was cutting tree limbs when he fell off of a ladder directly onto his right side.  Orthopedic was consulted. On 08/07/2024 patient underwent a right anterior column posterior hemitransverse acetabular fracture open reduction internal fixation.  Hospital course has been complicated by the development of post op ileus.  Patient had a bowel movement 08/12/2024.       PMH of GERD, BPH, HTN, mitral valve disorder, skin cancer, arthritis, L TKA 22'.     Social:  Pt lives in split level home with wife (unable to assist at [place], L brachial plexus injury per pt) and daughter (has MS, unable to assist per pt) with 3 DANITZA with 2 rails to living room, kitchen, full bathroom. Pt then has 7 steps up with 2 rails to main bedroom. Pt also reports he may borrow a ramp for entry steps from friend at [place].   Prior to admission: Pt was independent with no AD. Pt is retired from [employer]. Pt enjoys attending grandchildren's sporting events.      Initial Evaluation  Date: 8/13/24 AM     PM  Short Term Goals Long Term Goals    Was pt agreeable to Eval/treatment? Yes  Yes  Yes      Does pt have pain? 3-4/10 R hip  R hip spasms  R hip pain and nursing notified      Bed Mobility  Rolling: ModA  Supine to sit: ModA  Sit to supine: ModA  Scooting: ModA  (Assistance with B LE) NT  Rolling: SBA  Supine to sit: SBA  Sit to supine: SBA  Scooting: SBA    ( With leg  with R LE management    SBA Modified Independent     Transfers Sit to stand: ModA  Stand to sit: ModA  Stand pivot: ModA with WW     Sit to

## 2024-08-20 NOTE — PROGRESS NOTES
[facility]  Comprehensive Progress Note    Subjective:      Rosas Corbin is a 74 y.o. male admitted to inpatient rehabilitation for impairments and activities limitations in ADLs and mobility secondary to pelvic fractures.     No acute events overnight. No CP, SOB, N/V. Pain adequately controlled. Tolerating therapy. VSS.    The patient's medical records have been reviewed.    Scheduled Meds:acetaminophen, 650 mg, 4 times per day  buPROPion, 150 mg, QAM  carvedilol, 12.5 mg, BID  losartan, 100 mg, Daily  pantoprazole, 40 mg, QAM AC  enoxaparin, 30 mg, BID  Vitamin D, 1,000 Units, Daily      Continuous Infusions:   sodium chloride       PRN Meds:sodium chloride flush, 5-40 mL, PRN  sodium chloride, , PRN  ondansetron, 4 mg, Q8H PRN   Or  ondansetron, 4 mg, Q6H PRN  acetaminophen, 650 mg, Q4H PRN  polyethylene glycol, 17 g, Daily PRN  traMADol, 50 mg, Q4H PRN         Objective:      Vitals:    08/20/24 0600 08/20/24 0922 08/20/24 0930 08/20/24 1000   BP:  117/79     Pulse:  80     Resp: 16 18 18 18   Temp:  98.2 °F (36.8 °C)     TempSrc:  Oral     SpO2:  98%     Weight:       Height:         General appearance: alert, appears stated age, and cooperative, NAD  Head: Normocephalic, without obvious abnormality, atraumatic  Eyes: conjunctivae/corneas clear. PERRL, EOM's intact.   Neck: supple, symmetrical, trachea midline  Lungs: clear to auscultation bilaterally  Heart: regular rate and rhythm, S1, S2 normal  Abdomen: soft, non-tender, normal bowel sounds  Musculoskeletal: Range of motion abnormal right hip, hip precautions. ROM otherwise wnl BUE and LLE.   Skin: No rashes. Incision is healing, c/d/I.   Psych: Appropriate mood and affect   Neurologic: Awake, alert, SILT BUE and BLE. Strength 5/5 in BUE; 4/5 throughout LLE; 2/5 right HF and KE; 4/5 right ankle DF and PF.       Laboratory:    Lab Results   Component Value Date    WBC 5.4 08/19/2024    HGB 12.1 (L) 08/19/2024    HCT  8/20/2024 at 12:18 PM

## 2024-08-20 NOTE — CONSULTS
Comprehensive Nutrition Assessment    Type and Reason for Visit:  Initial, Consult (ONS)    Nutrition Recommendations/Plan:   Continue current diet  Start ONS to promote oral intake and optimize wound healing  Will monitor     Malnutrition Assessment:  Malnutrition Status:  At risk for malnutrition (Comment) (08/20/24 1320)    Context:  Acute Illness     Findings of the 6 clinical characteristics of malnutrition:  Energy Intake:  Mild decrease in energy intake (Comment)  Weight Loss:  Unable to assess (d/t lack of wt hx per EMR)     Body Fat Loss:  No significant body fat loss     Muscle Mass Loss:  No significant muscle mass loss    Fluid Accumulation:  No significant fluid accumulation     Strength:  Not Performed    Nutrition Assessment:    pt adm d/t frx acetabulum/pelvis s/p fall from ladder now s/p ORIF; PMhx of skin CA, BPH, HTN; will start ONS to promote oral intake and optimize surgical wound healing ; will monitor.    Nutrition Related Findings:    A&Ox4; poor dentition; active BS; trace edema; -I/O Wound Type: Surgical Incision       Current Nutrition Intake & Therapies:    Average Meal Intake: 51-75%  Average Supplements Intake: None Ordered  ADULT DIET; Regular  ADULT ORAL NUTRITION SUPPLEMENT; Breakfast, Lunch; Wound Healing Oral Supplement  ADULT ORAL NUTRITION SUPPLEMENT; Lunch, Dinner; Standard High Calorie/High Protein Oral Supplement    Anthropometric Measures:  Height: 182.9 cm (6' 0.01\")  Ideal Body Weight (IBW): 178 lbs (81 kg)    Admission Body Weight: 109.1 kg (240 lb 8.4 oz) (8/13-BS)  Current Body Weight: 109.1 kg (240 lb 8.4 oz) (8/13-BS; UTO updated measured wt as pt in chair at bedside), 135.1 % IBW. Weight Source: Bed Scale  Current BMI (kg/m2): 32.6  Usual Body Weight:  (UTO d/t lack of wt hx per EMR)     Weight Adjustment For: No Adjustment                 BMI Categories: Obese Class 1 (BMI 30.0-34.9)    Estimated Daily Nutrient Needs:  Energy Requirements Based On:

## 2024-08-20 NOTE — PROGRESS NOTES
Occupational Therapy  OCCUPATIONAL THERAPY DAILY NOTE    Date:2024  Patient Name: Rosas Corbin  MRN: [id number]  : 1950  Room: [address] [place] [address]     Referring Practitioner: MD Jefferson  Diagnosis: Fell off ladder 8-10 feet 24; R acetabular fx, R L5 TP fx, 6th rib fx, retroperitoneal hematoma 24 ORIF R acetabulum &post op ileus  Additional Pertinent Hx: arthritis, acid reflux, BPH, hx COVID, mitral valve disorder  Restrictions/Precautions: Fall Risk, Spinal neutrality, TTWB RLE, , acetabular precautions ,2000 fluid restriction      Functional Assessment:   Date Status AE  Comments   Feeding 24   Independent      Grooming 24  Mod I  W/c  Seated at sink pt washed face and hands and combed hair          Oral Care 24   Mod I  W/c Pt brushed teeth seated at sink    Bathing 24  UB-Mod I   LB-Min A W/c    UB Dressing 24  Mod I  seated    LB Dressing 24  CGA  Reacher  Pt donned shorts with reacher. CGA to stand and pull up pants    Footwear 24  SUP  Sock aid, long shoe horn  Pt donned socks with sock aid and donned shoes with long shoe horn    Toileting 24  CGA   Pt completed hygiene following BM. Pt needed CGA to stand and pull up pants    Homemaking 24   Min A  Ww and w/c      Functional Transfers / Balance:   Date Status DME  Comments   Sit Balance 24   Mod I  W/c     Stand Balance 24  CGA/SBA     At table top level engaging BUE's in tx activity with good follow through with TTWB    [] Tub  [x] Shower   Transfer 24  Min A  Grab rails, indwelling shower seat  Transfer in and out of walk in shower with cues for safe technique    Commode   Transfer 24  CGA  Ww, 3 in 1 commode placed over standard commode Steady assist with sit to stand off of 3 in 1 commode    Functional   Mobility 24  Min A     SBA  Ww    W/c     Other: sit to stand from EOB to ww     Sit to stand w/c to ww   Bed mobility sit to supine  24   CGA/Min A       CGA     Min A  Ww      ww      Functional Exercises / Activity:  BUE strength exercises with arm bike 5 mins  for 3 reps   Standing balance/endurance activity at table top level.Pt stood for 5 mins x 2 reps at SBA with good follow through with TTWB of RLE     Sensory / Neuromuscular Re-Education:      Cognitive Skills:   Status Comments   Problem   Solving fair + Demo;d during ADL's, sit to stand, standing balance    Memory Fair + \"   Sequencing fair + \"   Safety fair +  Cues for hand placement with sit to stand transfers     Visual Perception:    Education:  Pt educated on safe transfers in and out of walk in shower and types of DME  Pt education is ongoing.     [] Family teach completed on:    Pain Level: 5/10 R hip/pelvis    Additional Notes:       Patient has made good  progress during treatment sessions toward set goals. Therapy emphasis to obtain goals:Current Treatment Recommendations: Strengthening, Coordination training, ROM, Balance training, Self-Care / ADL, Functional mobility training, Safety education & training, Home management training, Endurance training, Patient/Caregiver education & training, Gait training, Neuromuscular re-education, Equipment evaluation, education, & procurement, Positioning       [x] Continue with current OT Plan of care.  [] Prepare for Discharge  Goals  Long Term Goals  Time Frame for Long term goals : 2 weeks to address above problem areas  Long Term Goal 1: Pt demo  independent to eat all meals  Long Term Goal 2: Pt demo  Mod I grooming seated & or standing @ sink level & demo  safety  Long Term Goal 3: Pt demo Sup to bathe @ shower level  & or sponge bathing both seated & standing & demo G- safety & insight  Long Term Goal 4: Pt demo I UE & Mod I LE dress to don underwear, pants, socks & shoes using AE as needed & demo G- safety & insight  Long Term Goal 5: Pt demo Mod I commode trf using appropriate DME to ensure pt safety. Pt demo Mod I toileting & demo

## 2024-08-21 PROCEDURE — 97110 THERAPEUTIC EXERCISES: CPT

## 2024-08-21 PROCEDURE — 97530 THERAPEUTIC ACTIVITIES: CPT

## 2024-08-21 PROCEDURE — 1280000000 HC REHAB R&B

## 2024-08-21 PROCEDURE — 6370000000 HC RX 637 (ALT 250 FOR IP)

## 2024-08-21 PROCEDURE — 6370000000 HC RX 637 (ALT 250 FOR IP): Performed by: PHYSICAL MEDICINE & REHABILITATION

## 2024-08-21 PROCEDURE — 99232 SBSQ HOSP IP/OBS MODERATE 35: CPT | Performed by: PHYSICAL MEDICINE & REHABILITATION

## 2024-08-21 PROCEDURE — 97535 SELF CARE MNGMENT TRAINING: CPT

## 2024-08-21 PROCEDURE — 6360000002 HC RX W HCPCS

## 2024-08-21 RX ORDER — LIDOCAINE 4 G/G
1 PATCH TOPICAL DAILY
Status: DISCONTINUED | OUTPATIENT
Start: 2024-08-21 | End: 2024-08-22

## 2024-08-21 RX ORDER — MECOBALAMIN 5000 MCG
5 TABLET,DISINTEGRATING ORAL NIGHTLY
Status: DISCONTINUED | OUTPATIENT
Start: 2024-08-21 | End: 2024-08-29 | Stop reason: HOSPADM

## 2024-08-21 RX ADMIN — CARVEDILOL 12.5 MG: 6.25 TABLET, FILM COATED ORAL at 20:13

## 2024-08-21 RX ADMIN — LOSARTAN POTASSIUM 100 MG: 50 TABLET, FILM COATED ORAL at 08:28

## 2024-08-21 RX ADMIN — ENOXAPARIN SODIUM 30 MG: 100 INJECTION SUBCUTANEOUS at 20:13

## 2024-08-21 RX ADMIN — Medication 1000 UNITS: at 08:28

## 2024-08-21 RX ADMIN — TRAMADOL HYDROCHLORIDE 50 MG: 50 TABLET, FILM COATED ORAL at 18:15

## 2024-08-21 RX ADMIN — ACETAMINOPHEN 650 MG: 325 TABLET ORAL at 05:51

## 2024-08-21 RX ADMIN — PANTOPRAZOLE SODIUM 40 MG: 40 TABLET, DELAYED RELEASE ORAL at 05:52

## 2024-08-21 RX ADMIN — BUPROPION HYDROCHLORIDE 150 MG: 150 TABLET, EXTENDED RELEASE ORAL at 08:28

## 2024-08-21 RX ADMIN — ENOXAPARIN SODIUM 30 MG: 100 INJECTION SUBCUTANEOUS at 08:30

## 2024-08-21 RX ADMIN — CARVEDILOL 12.5 MG: 6.25 TABLET, FILM COATED ORAL at 08:28

## 2024-08-21 RX ADMIN — ACETAMINOPHEN 650 MG: 325 TABLET ORAL at 20:13

## 2024-08-21 RX ADMIN — TRAMADOL HYDROCHLORIDE 50 MG: 50 TABLET, FILM COATED ORAL at 12:49

## 2024-08-21 RX ADMIN — ACETAMINOPHEN 650 MG: 325 TABLET ORAL at 11:58

## 2024-08-21 RX ADMIN — TRAMADOL HYDROCHLORIDE 50 MG: 50 TABLET, FILM COATED ORAL at 08:28

## 2024-08-21 RX ADMIN — TRAMADOL HYDROCHLORIDE 50 MG: 50 TABLET, FILM COATED ORAL at 02:04

## 2024-08-21 RX ADMIN — TIZANIDINE 4 MG: 4 TABLET ORAL at 16:12

## 2024-08-21 RX ADMIN — Medication 5 MG: at 20:12

## 2024-08-21 ASSESSMENT — PAIN SCALES - GENERAL
PAINLEVEL_OUTOF10: 4
PAINLEVEL_OUTOF10: 5
PAINLEVEL_OUTOF10: 4
PAINLEVEL_OUTOF10: 4
PAINLEVEL_OUTOF10: 5
PAINLEVEL_OUTOF10: 7
PAINLEVEL_OUTOF10: 7
PAINLEVEL_OUTOF10: 4

## 2024-08-21 ASSESSMENT — PAIN SCALES - WONG BAKER
WONGBAKER_NUMERICALRESPONSE: HURTS A LITTLE BIT
WONGBAKER_NUMERICALRESPONSE: NO HURT
WONGBAKER_NUMERICALRESPONSE: NO HURT

## 2024-08-21 ASSESSMENT — PAIN DESCRIPTION - PAIN TYPE: TYPE: ACUTE PAIN

## 2024-08-21 ASSESSMENT — PAIN DESCRIPTION - DESCRIPTORS
DESCRIPTORS: ACHING;DISCOMFORT;SORE
DESCRIPTORS: ACHING
DESCRIPTORS: ACHING;DISCOMFORT
DESCRIPTORS: ACHING;DISCOMFORT;SHOOTING
DESCRIPTORS: ACHING
DESCRIPTORS: ACHING

## 2024-08-21 ASSESSMENT — PAIN DESCRIPTION - ORIENTATION
ORIENTATION: RIGHT

## 2024-08-21 ASSESSMENT — PAIN DESCRIPTION - LOCATION
LOCATION: PELVIS
LOCATION: HIP
LOCATION: PELVIS
LOCATION: HIP

## 2024-08-21 ASSESSMENT — PAIN - FUNCTIONAL ASSESSMENT: PAIN_FUNCTIONAL_ASSESSMENT: ACTIVITIES ARE NOT PREVENTED

## 2024-08-21 NOTE — PROGRESS NOTES
Patient reported that he would like to discuss with MD tomorrow about PRN for sleep he feels that he has a lot of \" dreams\" and DFA/ staying asleep. He also reported that he feels that PRN Oxycodone he was previously ordered seemed to manage his pain slightly better but he has concerns in regards to constipation with medication. Education provided and offered to  notify MD if PRN medication currently has been ineffective patient declined reporting that he is able to tolerate and will discuss in AM wanted. Will notify MD patient concerns and daylight staffing to follow up as well.

## 2024-08-21 NOTE — PROGRESS NOTES
[facility]  Comprehensive Progress Note    Subjective:      Rosas Corbin is a 74 y.o. male admitted to inpatient rehabilitation for impairments and activities limitations in ADLs and mobility secondary to pelvic fractures.      No acute events overnight. No CP, SOB, N/V. Patient is having some spasms in the right posterior hip region with activity in therapy. He is also requesting something to help him sleep. VSS.    The patient's medical records have been reviewed.    Scheduled Meds:acetaminophen, 650 mg, 4 times per day  buPROPion, 150 mg, QAM  carvedilol, 12.5 mg, BID  losartan, 100 mg, Daily  pantoprazole, 40 mg, QAM AC  enoxaparin, 30 mg, BID  Vitamin D, 1,000 Units, Daily      Continuous Infusions:   sodium chloride       PRN Meds:sodium chloride flush, 5-40 mL, PRN  sodium chloride, , PRN  ondansetron, 4 mg, Q8H PRN   Or  ondansetron, 4 mg, Q6H PRN  acetaminophen, 650 mg, Q4H PRN  polyethylene glycol, 17 g, Daily PRN  traMADol, 50 mg, Q4H PRN         Objective:      Vitals:    08/20/24 2052 08/21/24 0234 08/21/24 0815 08/21/24 0858   BP:   122/60    Pulse:   68    Resp: 18 18 18 18   Temp:   98.1 °F (36.7 °C)    TempSrc:   Temporal    SpO2:   99%    Weight:       Height:         General appearance: alert, appears stated age, and cooperative, NAD  Head: Normocephalic, without obvious abnormality, atraumatic  Eyes: conjunctivae/corneas clear. PERRL, EOM's intact.   Neck: supple, symmetrical, trachea midline  Lungs: clear to auscultation bilaterally  Heart: regular rate and rhythm, S1, S2 normal  Abdomen: soft, non-tender, normal bowel sounds  Musculoskeletal: Range of motion abnormal right hip, hip precautions. ROM otherwise wnl BUE and LLE.   Skin: No rashes. Incision is healing, c/d/I.   Psych: Appropriate mood and affect   Neurologic: Awake, alert, SILT BUE and BLE. Strength 5/5 in BUE; 4/5 throughout LLE; 2/5 right HF and KE; 4/5 right ankle DF and PF.        Laboratory:    Lab Results   Component Value Date    WBC 5.4 08/19/2024    HGB 12.1 (L) 08/19/2024    HCT 35.5 (L) 08/19/2024    .0 (H) 08/19/2024     08/19/2024     Lab Results   Component Value Date/Time     08/19/2024 07:06 AM    K 4.4 08/19/2024 07:06 AM    K 4.4 11/11/2022 06:07 AM    CL 98 08/19/2024 07:06 AM    CO2 24 08/19/2024 07:06 AM    BUN 12 08/19/2024 07:06 AM    CREATININE 0.9 08/19/2024 07:06 AM    GLUCOSE 105 08/19/2024 07:06 AM    CALCIUM 9.8 08/19/2024 07:06 AM    LABGLOM 90 08/19/2024 07:06 AM    LABGLOM >60 10/03/2023 02:36 PM      Lab Results   Component Value Date    ALT 15 08/15/2024    AST 17 08/15/2024    ALKPHOS 69 08/15/2024    BILITOT 0.8 08/15/2024       Lab Results   Component Value Date/Time    COLORU Yellow 10/03/2023 02:36 PM    NITRU NEGATIVE 10/03/2023 02:36 PM    GLUCOSEU NEGATIVE 10/03/2023 02:36 PM    KETUA NEGATIVE 10/03/2023 02:36 PM    UROBILINOGEN 0.2 10/03/2023 02:36 PM    BILIRUBINUR NEGATIVE 10/03/2023 02:36 PM    BILIRUBINUR negative 11/01/2022 11:35 AM     Hemoglobin A1C   Date Value Ref Range Status   04/12/2023 6.1 % Final       Functional Status:   Feeding: Independent   Grooming: Mod I  UB dressing: Mod I  LB dressing: CGA  Bed mobility: SBA  Transfers: CGA  Ambulation: 40 ft WW CGA         Assessment/Plan:       74 y.o. male admitted to inpatient rehabilitation for impairments and activities limitations in ADLs and mobility secondary to pelvic fractures.          -Right anterior column acetabular fracture, right iliac wing fracture: S/p ORIF right acetabular fracture. TTWB RLE. Monitor neurovascular exam. Continue Acute rehab program.  -Pain control: Tylenol scheduled for now, PRN Tramadol, PRN Zanaflex, Lidoderm.  -HTN: Continue Coreg, Cozaar. Monitor  -History of depression: Continue Wellbutrin. Melatonin for sleep.   -Mild cognitive impairment: Continue Speech therapy, improving  -GERD: Continue Protonix   -DVT prophylaxis: Lovenox x 1

## 2024-08-21 NOTE — PLAN OF CARE
Problem: Discharge Planning  Goal: Discharge to home or other facility with appropriate resources  8/21/2024 1326 by Sergei Rivera RN  Outcome: Progressing  8/21/2024 0003 by Mesha Alva RN  Outcome: Progressing     Problem: Pain  Goal: Verbalizes/displays adequate comfort level or baseline comfort level  8/21/2024 1326 by Sergei Rivera RN  Outcome: Progressing  8/21/2024 0003 by Mesha Alva RN  Outcome: Progressing     Problem: Safety - Adult  Goal: Free from fall injury  8/21/2024 1326 by Sergei Rivera RN  Outcome: Progressing  8/21/2024 0003 by Mesha Alva RN  Outcome: Progressing     Problem: ABCDS Injury Assessment  Goal: Absence of physical injury  8/21/2024 1326 by Sergei Rivera RN  Outcome: Progressing  8/21/2024 0003 by Mesha Alva RN  Outcome: Progressing     Problem: Nutrition Deficit:  Goal: Optimize nutritional status  8/21/2024 1326 by Sergei Rivera RN  Outcome: Progressing  8/21/2024 0003 by Mesha Alva RN  Outcome: Progressing

## 2024-08-21 NOTE — PLAN OF CARE
Problem: Pain  Goal: Verbalizes/displays adequate comfort level or baseline comfort level  8/20/2024 1621 by Sergei Rivera, RN  Outcome: Progressing     Problem: Safety - Adult  Goal: Free from fall injury  8/20/2024 1621 by Sergei Rivera, RN  Outcome: Progressing

## 2024-08-21 NOTE — CONSULTS
[facility]              [address] [place] [address]                              CONSULTATION      PATIENT NAME: DORIS KING                 : 1950  MED REC NO: [id number]                        ROOM: 5514  ACCOUNT NO: [id number]                       ADMIT DATE: 2024  PROVIDER: Macario Alvares DPM      CONSULT DATE: 2024    HISTORY OF PRESENT ILLNESS:  I was asked to see this pleasant 74-year-old male today for consultation visit after being referred by Dr. Paulino.  The patient is recovering in the rehab unit at the hospital after previous surgery for fracture acetabulum and pelvis.  He had ORIF, and he is recovering now.  His lower extremity complaint includes painful mycotic ingrown nails, history of trauma to his nails in the past, and he has mycotic nails x10 with ingrown border of the right #1 digit with no signs of any infection.  The nail is partially detached.    PAST MEDICAL HISTORY:  Well documented in the H and P, which I reviewed today.  Positive history of hypertension, osteoarthritis, mitral valve disorder, COVID-19 exposure, skin cancer, BPH, acid reflux, and osteoarthritis.    PREVIOUS SURGERIES:  Include recent hip surgery, colonoscopies, back surgery, knee arthroplasty, GI scope, and hernia repair.    FAMILY HISTORY:  Positive for stroke on his mother's side.  Heart disease on his father's side and breast cancer in his aunt.    ALLERGIES:  HE IS ALLERGIC TO AUGMENTIN, CLINDAMYCIN, AND LINCOMYCIN.      MEDICATIONS:  See list, which I have reviewed today.    REVIEW OF SYSTEMS:  Otherwise, noncontributory.    PHYSICAL EXAM:  EXTREMITIES:  His lower examination today is consistent with palpable pulses.  NEUROLOGICAL:  He has grossly intact sensation.  He has weakness of his right extremity secondary to his recent ORIF.  He has palpable pulses present.  He has mycotic nails x10.  The nails are yellow.  They are

## 2024-08-21 NOTE — PROGRESS NOTES
Physical Therapy  Weekly Note   Evaluating Therapist: Ibeth Tubbs, PT, DPT [id number]    ROOM: [address] [place] [address]  DIAGNOSIS: Multiple Trauma   PRECAUTIONS: Falls, TTWB R LE, spinal neutrality (L5 TP fx), R rib fx, adult diet 2000 ml  HPI: Patient is a 74 y.o. male who presents on 08/06/2024 as a trauma with a right acetabulum fracture, right L5 TP fracture and right 6th rib fracture after a fall from 8 to 10 feet off of a ladder.  Patient stated he was cutting tree limbs when he fell off of a ladder directly onto his right side.  Orthopedic was consulted. On 08/07/2024 patient underwent a right anterior column posterior hemitransverse acetabular fracture open reduction internal fixation.  Hospital course has been complicated by the development of post op ileus.  Patient had a bowel movement 08/12/2024.       PMH of GERD, BPH, HTN, mitral valve disorder, skin cancer, arthritis, L TKA 22'.     Social:  Pt lives in split level home with wife (unable to assist at [place], L brachial plexus injury per pt) and daughter (has MS, unable to assist per pt) with 3 DANITZA with 2 rails to living room, kitchen, full bathroom. Pt then has 7 steps up with 2 rails to main bedroom. Pt also reports he may borrow a ramp for entry steps from friend at [place].   Prior to admission: Pt was independent with no AD. Pt is retired from [employer]. Pt enjoys attending grandchildren's sporting events.      Initial Evaluation  Date: 8/13/24 8/21/24   Comments   Short Term Goals Long Term Goals    Was pt agreeable to Eval/treatment? Yes  Yes       Does pt have pain? 3-4/10 R hip  R gluteal pain (5/10 )       Bed Mobility  Rolling: ModA  Supine to sit: ModA  Sit to supine: ModA  Scooting: ModA  (Assistance with B LE) Sit to supine SBA  Supine to sit SBA  Scooting SBA    ( Using a leg )   SBA Modified Independent     Transfers Sit to stand: ModA  Stand to sit: ModA  Stand pivot: ModA with WW     Sit to stand CG/SBA  Stand to sit CG/SBA  Stand pivot with ww with  Recommend wc level and short distance ambulation upon discharge due to lack of support and increased safety at this wc level at this time.  Pt would benefit from hospital bed due to pressure and proper positioning due to multiple fractures and restrictions.    DME:  WW, wc ( pt reports owning both , may need to get a walker for pt as pt reports equipment is his wife)   After Care:  home PT with supervision as needed     Beth Lombardo [id number]   SOH April 19 8:30am

## 2024-08-21 NOTE — PROGRESS NOTES
Physical Therapy  Treatment     Evaluating Therapist: Ibeth Tubbs, PT, DPT [id number]    ROOM: [address] [place] [address]  DIAGNOSIS: Multiple Trauma   PRECAUTIONS: Falls, TTWB R LE, spinal neutrality (L5 TP fx), R rib fx, adult diet 2000 ml  HPI: Patient is a 74 y.o. male who presents on 08/06/2024 as a trauma with a right acetabulum fracture, right L5 TP fracture and right 6th rib fracture after a fall from 8 to 10 feet off of a ladder.  Patient stated he was cutting tree limbs when he fell off of a ladder directly onto his right side.  Orthopedic was consulted. On 08/07/2024 patient underwent a right anterior column posterior hemitransverse acetabular fracture open reduction internal fixation.  Hospital course has been complicated by the development of post op ileus.  Patient had a bowel movement 08/12/2024.       PMH of GERD, BPH, HTN, mitral valve disorder, skin cancer, arthritis, L TKA 22'.     Social:  Pt lives in split level home with wife (unable to assist at [place], L brachial plexus injury per pt) and daughter (has MS, unable to assist per pt) with 3 DANITZA with 2 rails to living room, kitchen, full bathroom. Pt then has 7 steps up with 2 rails to main bedroom. Pt also reports he may borrow a ramp for entry steps from friend at [place].   Prior to admission: Pt was independent with no AD. Pt is retired from [employer]. Pt enjoys attending grandchildren's sporting events.      Initial Evaluation  Date: 8/13/24 AM     PM  Short Term Goals Long Term Goals    Was pt agreeable to Eval/treatment? Yes  Yes  Yes      Does pt have pain? 3-4/10 R hip  3 R gluteal  pain at rest and 5 R gluteal pain  with activity  R gluteal pain       Bed Mobility  Rolling: ModA  Supine to sit: ModA  Sit to supine: ModA  Scooting: ModA  (Assistance with B LE) NT  Rolling: SBA  Supine to sit: SBA  Sit to supine: SBA  Scooting: SBA    ( With leg  with R LE management    SBA Modified Independent     Transfers Sit to stand: ModA  Stand to sit: ModA  Stand

## 2024-08-21 NOTE — PROGRESS NOTES
Occupational Therapy  OCCUPATIONAL THERAPY DAILY NOTE    Date:2024  Patient Name: Rosas Corbin  MRN: [id number]  : 1950  Room: [address] [place] [address]     Referring Practitioner: MD Jefferson  Diagnosis: Fell off ladder 8-10 feet 24; R acetabular fx, R L5 TP fx, 6th rib fx, retroperitoneal hematoma 24 ORIF R acetabulum &post op ileus  Additional Pertinent Hx: arthritis, acid reflux, BPH, hx COVID, mitral valve disorder  Restrictions/Precautions: Fall Risk, Spinal neutrality, TTWB RLE, , acetabular precautions ,2000 fluid restriction      Functional Assessment:   Date Status AE  Comments   Feeding 24   Independent      Grooming 24  Mod I   SBA  W/c   Standing    Standing at sink to wash hands          Oral Care 24   Mod I  W/c    Bathing 24  UB-Mod I   LB-Min A W/c    UB Dressing 24  Mod I  seated    LB Dressing 24  CGA  Reacher     Footwear 24  SUP  Sock aid, long shoe horn     Toileting 24  SBA      Homemaking 24   Min A  Ww and w/c      Functional Transfers / Balance:   Date Status DME  Comments   Sit Balance 24   Mod I  W/c     Stand Balance 24   SBA     At table top level engaging BUE's in tx activity    [] Tub  [x] Shower   Transfer 24  Min A  Grab rails, indwelling shower seat     Commode   Transfer 24  CGA  Ww, 3 in 1 commode placed over standard commode    Functional   Mobility 24  CGA/SBA     SBA  Ww    W/c  Pt ambulated throughout therapy apt setting  with ww and needed occasional CGA for balance when transitioning from tile to carpet floor surface    Other: sit to stand from EOB to ww     Sit to stand w/c to ww   Bed mobility sit to supine     On/off recliner  24  CGA/Min A       CGA     SBA       SBA  Ww      Ww          Ww            Pt used leg  to assist RLE off of bed      Functional Exercises / Activity:  BUE strength exercises with 5 # weighted ball 3 sets 10 reps

## 2024-08-22 ENCOUNTER — TELEPHONE (OUTPATIENT)
Dept: FAMILY MEDICINE CLINIC | Age: 74
End: 2024-08-22

## 2024-08-22 DIAGNOSIS — T14.8XXA FRACTURE: Primary | ICD-10-CM

## 2024-08-22 LAB
ANION GAP SERPL CALCULATED.3IONS-SCNC: 11 MMOL/L (ref 7–16)
BASOPHILS # BLD: 0.02 K/UL (ref 0–0.2)
BASOPHILS NFR BLD: 0 % (ref 0–2)
BUN SERPL-MCNC: 14 MG/DL (ref 6–23)
CALCIUM SERPL-MCNC: 10 MG/DL (ref 8.6–10.2)
CHLORIDE SERPL-SCNC: 98 MMOL/L (ref 98–107)
CO2 SERPL-SCNC: 24 MMOL/L (ref 22–29)
CREAT SERPL-MCNC: 0.8 MG/DL (ref 0.7–1.2)
EOSINOPHIL # BLD: 0.19 K/UL (ref 0.05–0.5)
EOSINOPHILS RELATIVE PERCENT: 4 % (ref 0–6)
ERYTHROCYTE [DISTWIDTH] IN BLOOD BY AUTOMATED COUNT: 14.6 % (ref 11.5–15)
GFR, ESTIMATED: >90 ML/MIN/1.73M2
GLUCOSE SERPL-MCNC: 111 MG/DL (ref 74–99)
HCT VFR BLD AUTO: 34.9 % (ref 37–54)
HGB BLD-MCNC: 12.1 G/DL (ref 12.5–16.5)
IMM GRANULOCYTES # BLD AUTO: <0.03 K/UL (ref 0–0.58)
IMM GRANULOCYTES NFR BLD: 0 % (ref 0–5)
LYMPHOCYTES NFR BLD: 0.75 K/UL (ref 1.5–4)
LYMPHOCYTES RELATIVE PERCENT: 16 % (ref 20–42)
MCH RBC QN AUTO: 34.2 PG (ref 26–35)
MCHC RBC AUTO-ENTMCNC: 34.7 G/DL (ref 32–34.5)
MCV RBC AUTO: 98.6 FL (ref 80–99.9)
MONOCYTES NFR BLD: 0.51 K/UL (ref 0.1–0.95)
MONOCYTES NFR BLD: 11 % (ref 2–12)
NEUTROPHILS NFR BLD: 68 % (ref 43–80)
NEUTS SEG NFR BLD: 3.13 K/UL (ref 1.8–7.3)
PLATELET # BLD AUTO: 257 K/UL (ref 130–450)
PMV BLD AUTO: 8.6 FL (ref 7–12)
POTASSIUM SERPL-SCNC: 4.4 MMOL/L (ref 3.5–5)
RBC # BLD AUTO: 3.54 M/UL (ref 3.8–5.8)
SODIUM SERPL-SCNC: 133 MMOL/L (ref 132–146)
WBC OTHER # BLD: 4.6 K/UL (ref 4.5–11.5)

## 2024-08-22 PROCEDURE — 97530 THERAPEUTIC ACTIVITIES: CPT

## 2024-08-22 PROCEDURE — 97110 THERAPEUTIC EXERCISES: CPT

## 2024-08-22 PROCEDURE — 97130 THER IVNTJ EA ADDL 15 MIN: CPT

## 2024-08-22 PROCEDURE — 36415 COLL VENOUS BLD VENIPUNCTURE: CPT

## 2024-08-22 PROCEDURE — 97129 THER IVNTJ 1ST 15 MIN: CPT

## 2024-08-22 PROCEDURE — 1280000000 HC REHAB R&B

## 2024-08-22 PROCEDURE — 6370000000 HC RX 637 (ALT 250 FOR IP): Performed by: PHYSICAL MEDICINE & REHABILITATION

## 2024-08-22 PROCEDURE — 85025 COMPLETE CBC W/AUTO DIFF WBC: CPT

## 2024-08-22 PROCEDURE — 80048 BASIC METABOLIC PNL TOTAL CA: CPT

## 2024-08-22 PROCEDURE — 99232 SBSQ HOSP IP/OBS MODERATE 35: CPT | Performed by: PHYSICAL MEDICINE & REHABILITATION

## 2024-08-22 PROCEDURE — 6360000002 HC RX W HCPCS

## 2024-08-22 PROCEDURE — 6370000000 HC RX 637 (ALT 250 FOR IP)

## 2024-08-22 PROCEDURE — 97535 SELF CARE MNGMENT TRAINING: CPT

## 2024-08-22 RX ORDER — LIDOCAINE 4 G/G
1 PATCH TOPICAL EVERY 12 HOURS
Status: DISCONTINUED | OUTPATIENT
Start: 2024-08-23 | End: 2024-08-29 | Stop reason: HOSPADM

## 2024-08-22 RX ADMIN — PANTOPRAZOLE SODIUM 40 MG: 40 TABLET, DELAYED RELEASE ORAL at 06:24

## 2024-08-22 RX ADMIN — TRAMADOL HYDROCHLORIDE 50 MG: 50 TABLET, FILM COATED ORAL at 00:15

## 2024-08-22 RX ADMIN — LOSARTAN POTASSIUM 100 MG: 50 TABLET, FILM COATED ORAL at 08:23

## 2024-08-22 RX ADMIN — BUPROPION HYDROCHLORIDE 150 MG: 150 TABLET, EXTENDED RELEASE ORAL at 08:22

## 2024-08-22 RX ADMIN — TRAMADOL HYDROCHLORIDE 50 MG: 50 TABLET, FILM COATED ORAL at 12:28

## 2024-08-22 RX ADMIN — TRAMADOL HYDROCHLORIDE 50 MG: 50 TABLET, FILM COATED ORAL at 08:22

## 2024-08-22 RX ADMIN — TRAMADOL HYDROCHLORIDE 50 MG: 50 TABLET, FILM COATED ORAL at 04:33

## 2024-08-22 RX ADMIN — TIZANIDINE 4 MG: 4 TABLET ORAL at 20:35

## 2024-08-22 RX ADMIN — ACETAMINOPHEN 650 MG: 325 TABLET ORAL at 12:28

## 2024-08-22 RX ADMIN — ACETAMINOPHEN 650 MG: 325 TABLET ORAL at 20:36

## 2024-08-22 RX ADMIN — Medication 1000 UNITS: at 08:23

## 2024-08-22 RX ADMIN — TRAMADOL HYDROCHLORIDE 50 MG: 50 TABLET, FILM COATED ORAL at 20:35

## 2024-08-22 RX ADMIN — ENOXAPARIN SODIUM 30 MG: 100 INJECTION SUBCUTANEOUS at 08:22

## 2024-08-22 RX ADMIN — TRAMADOL HYDROCHLORIDE 50 MG: 50 TABLET, FILM COATED ORAL at 16:23

## 2024-08-22 RX ADMIN — CARVEDILOL 12.5 MG: 6.25 TABLET, FILM COATED ORAL at 20:35

## 2024-08-22 RX ADMIN — CARVEDILOL 12.5 MG: 6.25 TABLET, FILM COATED ORAL at 08:22

## 2024-08-22 RX ADMIN — TIZANIDINE 4 MG: 4 TABLET ORAL at 10:37

## 2024-08-22 RX ADMIN — Medication 5 MG: at 20:35

## 2024-08-22 RX ADMIN — ENOXAPARIN SODIUM 30 MG: 100 INJECTION SUBCUTANEOUS at 20:35

## 2024-08-22 RX ADMIN — ACETAMINOPHEN 650 MG: 325 TABLET ORAL at 06:24

## 2024-08-22 RX ADMIN — TIZANIDINE 4 MG: 4 TABLET ORAL at 16:23

## 2024-08-22 ASSESSMENT — PAIN SCALES - GENERAL
PAINLEVEL_OUTOF10: 0
PAINLEVEL_OUTOF10: 4
PAINLEVEL_OUTOF10: 5
PAINLEVEL_OUTOF10: 4
PAINLEVEL_OUTOF10: 3
PAINLEVEL_OUTOF10: 5
PAINLEVEL_OUTOF10: 5
PAINLEVEL_OUTOF10: 4
PAINLEVEL_OUTOF10: 7
PAINLEVEL_OUTOF10: 3
PAINLEVEL_OUTOF10: 5

## 2024-08-22 ASSESSMENT — PAIN DESCRIPTION - DESCRIPTORS
DESCRIPTORS: ACHING
DESCRIPTORS: ACHING;CRAMPING
DESCRIPTORS: ACHING

## 2024-08-22 ASSESSMENT — PAIN SCALES - WONG BAKER
WONGBAKER_NUMERICALRESPONSE: NO HURT

## 2024-08-22 ASSESSMENT — PAIN DESCRIPTION - LOCATION
LOCATION: HIP
LOCATION: HIP
LOCATION: HIP;LEG
LOCATION: HIP
LOCATION: HIP

## 2024-08-22 ASSESSMENT — PAIN DESCRIPTION - ORIENTATION
ORIENTATION: RIGHT

## 2024-08-22 NOTE — PROGRESS NOTES
[facility]  Comprehensive Progress Note    Subjective:      Rosas Corbin is a 74 y.o. male admitted to inpatient rehabilitation for impairments and activities limitations in ADLs and mobility secondary to pelvic fractures.      No acute events overnight. No CP, SOB, N/V. Patient and therapy both report that the PRN Zanaflex does seem to be helping with pain and spasms allowing for better therapy participation. No other new complaints.     The patient's medical records have been reviewed.    Scheduled Meds:melatonin, 5 mg, Nightly  lidocaine, 1 patch, Daily  acetaminophen, 650 mg, 4 times per day  buPROPion, 150 mg, QAM  carvedilol, 12.5 mg, BID  losartan, 100 mg, Daily  pantoprazole, 40 mg, QAM AC  enoxaparin, 30 mg, BID  Vitamin D, 1,000 Units, Daily      Continuous Infusions:   sodium chloride       PRN Meds:tiZANidine, 4 mg, Q6H PRN  sodium chloride flush, 5-40 mL, PRN  sodium chloride, , PRN  ondansetron, 4 mg, Q8H PRN   Or  ondansetron, 4 mg, Q6H PRN  acetaminophen, 650 mg, Q4H PRN  polyethylene glycol, 17 g, Daily PRN  traMADol, 50 mg, Q4H PRN         Objective:      Vitals:    08/22/24 0503 08/22/24 0815 08/22/24 0852 08/22/24 1228   BP:  (!) 146/99     Pulse:  81     Resp: 16 18 18 18   Temp:  97.7 °F (36.5 °C)     TempSrc:  Temporal     SpO2:  99%     Weight:       Height:         General appearance: alert, appears stated age, and cooperative, NAD  Head: Normocephalic, without obvious abnormality, atraumatic  Eyes: conjunctivae/corneas clear. PERRL, EOM's intact.   Neck: supple, symmetrical, trachea midline  Lungs: clear to auscultation bilaterally  Heart: regular rate and rhythm, S1, S2 normal  Abdomen: soft, non-tender, normal bowel sounds  Musculoskeletal: Range of motion abnormal right hip, hip precautions. ROM otherwise wnl BUE and LLE.   Skin: No rashes. Incision is healing, c/d/I.   Psych: Appropriate mood and affect   Neurologic: Awake, alert, SILT BUE and

## 2024-08-22 NOTE — PROGRESS NOTES
Physical Therapy  Treatment     Evaluating Therapist: Ibeth Tubbs, PT, DPT [id number]    ROOM: [address] [place] [address]  DIAGNOSIS: Multiple Trauma   PRECAUTIONS: Falls, TTWB R LE, spinal neutrality (L5 TP fx), R rib fx, adult diet 2000 ml  HPI: Patient is a 74 y.o. male who presents on 08/06/2024 as a trauma with a right acetabulum fracture, right L5 TP fracture and right 6th rib fracture after a fall from 8 to 10 feet off of a ladder.  Patient stated he was cutting tree limbs when he fell off of a ladder directly onto his right side.  Orthopedic was consulted. On 08/07/2024 patient underwent a right anterior column posterior hemitransverse acetabular fracture open reduction internal fixation.  Hospital course has been complicated by the development of post op ileus.  Patient had a bowel movement 08/12/2024.       PMH of GERD, BPH, HTN, mitral valve disorder, skin cancer, arthritis, L TKA 22'.     Social:  Pt lives in split level home with wife (unable to assist at [place], L brachial plexus injury per pt) and daughter (has MS, unable to assist per pt) with 3 DANITZA with 2 rails to living room, kitchen, full bathroom. Pt then has 7 steps up with 2 rails to main bedroom. Pt also reports he may borrow a ramp for entry steps from friend at [place].   Prior to admission: Pt was independent with no AD. Pt is retired from [employer]. Pt enjoys attending grandchildren's sporting events.      Initial Evaluation  Date: 8/13/24 AM     PM  Short Term Goals Long Term Goals    Was pt agreeable to Eval/treatment? Yes  Yes  Yes      Does pt have pain? 3-4/10 R hip  R gluteal pain  R gluteal pain       Bed Mobility  Rolling: ModA  Supine to sit: ModA  Sit to supine: ModA  Scooting: ModA  (Assistance with B LE) Sit to supine with supervision   Supine to sit with supervision     ( With use of leg  )  NT    SBA Modified Independent     Transfers Sit to stand: ModA  Stand to sit: ModA  Stand pivot: ModA with WW     Sit to stand:SBA  Stand to

## 2024-08-22 NOTE — TELEPHONE ENCOUNTER
Isabella from [facility] called and wanted to verify that the patient was still active with PCP. She stated that patient will be getting discharged from acute rehab on 8/29 and will start with home care shortly there after

## 2024-08-22 NOTE — PATIENT CARE CONFERENCE
[facility]  INPATIENT ACUTE REHABILITATION  TEAM CONFERENCE NOTE/PATIENT PLAN OF CARE    The physician was present and led this team conference    Date: 2024  Admission date: 2024  Patient Name: Rosas Corbin        MRN: [id number]    : 1950  (74 y.o.)  Gender: male   Rehab diagnosis/surgery with date:  right acetabular fracture after fall from ladder at home 24  ORIF 24  Impairment Group Code:  8.3      MEDICAL/FUNCTIONAL HISTORY/STATUS:  c/o some spasms , zanaflex added and lidocaine patch, remains toe touch wt. bearing in the right lower extremity    Consultations/Labs/X-rays:    Latest Reference Range & Units 24 07:35   Sodium 132 - 146 mmol/L 133   Potassium 3.5 - 5.0 mmol/L 4.4   Chloride 98 - 107 mmol/L 98   CARBON DIOXIDE 22 - 29 mmol/L 24   BUN,BUNPL 6 - 23 mg/dL 14   Creatinine 0.70 - 1.20 mg/dL 0.8      Latest Reference Range & Units 24 07:35   WBC 4.5 - 11.5 k/uL 4.6   RBC 3.80 - 5.80 m/uL 3.54 (L)   Hemoglobin Quant 12.5 - 16.5 g/dL 12.1 (L)   Hematocrit 37.0 - 54.0 % 34.9 (L)       MEDICATION UPDATE:  as above, zanaflex added      NURSING :    Bowel:   Always Continent  [x]   Occasionally incontinent  []   Frequently incontinent  []   Always incontinent  []   Not occurred  []     Bladder:   Always Continent  [x]    Incontinent less than daily[]   Incontinent  daily []   Always incontinent  []   No urine output    []   Indwelling catheter []       Toilet Hygiene:   Current level : standby assist  Short term Toilet hygiene goal: Modified independent  Long term toilet hygiene goal:  Modified independent    Skin integrity: intact  Pain: right hip, lidocaine patch, zanaflex and ultram     NUTRITION    Diet  regular  Liquid consistency   thin    SOCIAL INFORMATION:  Lives with: spouse and adult daughter-both are disabled, they are able to do own self care, other family members  live close  Prior community services:  none  Home Architecture:  split level, 3  TEAM/PHYSICIAN RECOMMENDATION AND/OR REVISIONS OF PLAN OF CARE:  schedule family education in preparation for discharge      I approve the established interdisciplinary plan of care as documented within the medical record of Rosas Corbin.      Electronically signed by Latrice Ramsay RN on 8/22/2024 at 9:40 AM  The following interdisciplinary team members were present:   Linnea Miller, LSW  Elizabet Vaca, RACHELLE Heredia, PT, DPT  Ellen Ty, OTR  Luz Hou, SLP

## 2024-08-22 NOTE — PROGRESS NOTES
Speech Language Pathology  ACUTE REHABILITATION--DAILY PROGRESS NOTE/DISCHARGE SUMMARY            SWALLOWING:      Diet:  Regular consistency solids with thin liquids (IDDSI level 0)    No CSE completed. Referral for cognition only.      LANGUAGE:    WFL     COGNITION:      Patient seen in therapy space for skilled cognitive tx. Patient alert and oriented to all concepts. Patient completed reasoning task in which patient had to place concepts into a specific order. Patient completed with 93% acc, indep. Patient completed task in which he had to read everyday things and identify specific information. Patient completed task with 90% acc, indep.     SLP administered SLUMS examination to further assess cognitive-linguistic abilities. Patient yielded a score of 27/30 which is WNL. Patient WNL in all areas except recall. Patient able to recall 2/5 elements. Patient w/ good insight into deficits with memory w/ increased information to recall. Patient states this is not new and he compensates by utilizing lists, calendars, and alarm reminders via his cell phone at home. Overall, patient has met all his goals for SLP POC. Reviewed w/ attending physician and in agreement with discharge from SLP services at this time.               SPEECH:    WFL      Minute Tracking:    Individual minutes:     45 minutes  Concurrent minutes:    0 minutes  Co-treatment minutes:   0 minutes    Total minutes for 8/22/2024: 45 minutes      SAFETY:      Fair    EDUCATION:    Patient educated on all goals of SP POC.     OUTCOME:    Progress made towards goals. Will continue SP intervention as per previously established POC.    SP recommended after discharge: No  Supervision recommended at discharge: Other TBD, refer to PT/OT for supervision recs.       FIMS SCORES        Swallowing                          Current Status             7--Independent                         Short Term Goal         7--Independent                         Long Term Goal

## 2024-08-22 NOTE — PROGRESS NOTES
Occupational Therapy  OCCUPATIONAL THERAPY DAILY NOTE    Date:2024  Patient Name: Rosas Corbin  MRN: [id number]  : 1950  Room: [address] [place] [address]     Referring Practitioner: MD Jefferson  Diagnosis: Fell off ladder 8-10 feet 24; R acetabular fx, R L5 TP fx, 6th rib fx, retroperitoneal hematoma 24 ORIF R acetabulum &post op ileus  Additional Pertinent Hx: arthritis, acid reflux, BPH, hx COVID, mitral valve disorder  Restrictions/Precautions: Fall Risk, Spinal neutrality, TTWB RLE, , acetabular precautions ,2000 fluid restriction    Functional Assessment:   Date Status AE  Comments   Feeding 24   Independent      Grooming 24  Mod I   SBA  W/c   Standing           Oral Care 24   Mod I  W/c    Bathing 24  UB-Mod I   LB-Min A W/c    UB Dressing 24  Mod I  seated    LB Dressing 24  CGA  Reacher     Footwear 24  SUP  Sock aid, long shoe horn     Toileting 24   SBA   Pt completed hygiene following BM. Pt at SBA for balance when standing to pull up pants    Homemaking 24   Min A  Ww and w/c      Functional Transfers / Balance:   Date Status DME  Comments   Sit Balance 24   Mod I  W/c     Stand Balance 24   SBA        [] Tub  [x] Shower   Transfer 24  Min A  Grab rails, indwelling shower seat     Commode   Transfer 24  CGA  Ww, 3 in 1 commode placed over standard commode    Functional   Mobility 24   CGA/SBA     Mod I  Ww    W/c  Ambulating back and forth to the bathroom    Pt able to propel w/c from his room to OT gym at Mod I level     Other: sit to stand from EOB to ww     Sit to stand w/c to ww   Bed mobility sit to supine     On/off recliner  24  CGA/Min A       CGA     SBA       SBA  Ww      Ww          Ww       Functional Exercises / Activity:  -5# weighted ball exercises completed 3x10 reps seated at table with focus on increasing B UE strength/endurance and trunk  control/core strength for increased independence with functional tasks. Fair tolerance.   -UBE completed seated x5 minutes (forward) to increase B UE strength/endurance for functional activities. Pt deferred second round of task (backward) d/t LBP.   - 2# dowel tho exercises completed 2x15 reps in all planes to tolerance to increase B UE strength and overall endurance for functional activities. Fair+ tolerance.   - Rollercoaster Shoulder arc completed seated at table with B UE's with focus on increasing B UE AROM and providing gentle stretch for increased independence with functional tasks and to decrease pain/stiffness. Fair tolerance.  BUE strength exercise with nuts and bolts and wooden slate activity wearing 1 # cuff wrist weights       Sensory / Neuromuscular Re-Education:      Cognitive Skills:   Status Comments   Problem   Solving fair + Demo;d during ADL's, sit to stand, standing balance    Memory Fair + \"   Sequencing fair + \"   Safety fair +  Cues for hand placement with sit to stand transfers     Visual Perception:    Education:  Pt educated on benefits of UE strengthening for increased independence with functional transfers and ADL tasks.  Pt education is ongoing.     [] Family teach completed on:    Pain Level: Pt c/o pain in low back. Did not rate.     Additional Notes:       Patient has made good  progress during treatment sessions toward set goals. Therapy emphasis to obtain goals:Current Treatment Recommendations: Strengthening, Coordination training, ROM, Balance training, Self-Care / ADL, Functional mobility training, Safety education & training, Home management training, Endurance training, Patient/Caregiver education & training, Gait training, Neuromuscular re-education, Equipment evaluation, education, & procurement, Positioning       [x] Continue with current OT Plan of care.  [] Prepare for Discharge  Goals  Long Term Goals  Time Frame for Long term goals : 2 weeks to address above problem

## 2024-08-22 NOTE — PLAN OF CARE
Problem: Discharge Planning  Goal: Discharge to home or other facility with appropriate resources  Outcome: Progressing  Flowsheets (Taken 8/22/2024 0815)  Discharge to home or other facility with appropriate resources: Identify barriers to discharge with patient and caregiver     Problem: Pain  Goal: Verbalizes/displays adequate comfort level or baseline comfort level  Outcome: Progressing     Problem: Safety - Adult  Goal: Free from fall injury  Outcome: Progressing     Problem: ABCDS Injury Assessment  Goal: Absence of physical injury  Outcome: Progressing  Flowsheets (Taken 8/22/2024 0815)  Absence of Physical Injury: Implement safety measures based on patient assessment     Problem: Nutrition Deficit:  Goal: Optimize nutritional status  Outcome: Progressing     Problem: Skin/Tissue Integrity  Goal: Absence of new skin breakdown  Description: 1.  Monitor for areas of redness and/or skin breakdown  2.  Assess vascular access sites hourly  3.  Every 4-6 hours minimum:  Change oxygen saturation probe site  4.  Every 4-6 hours:  If on nasal continuous positive airway pressure, respiratory therapy assess nares and determine need for appliance change or resting period.  Outcome: Progressing

## 2024-08-22 NOTE — PLAN OF CARE
Problem: Discharge Planning  Goal: Discharge to home or other facility with appropriate resources  8/21/2024 2148 by Mesha Alva, RN  Outcome: Progressing  8/21/2024 2146 by Mesha Alva, RN  Outcome: Progressing  8/21/2024 1326 by Sergei Rivera, RN  Outcome: Progressing

## 2024-08-22 NOTE — CARE COORDINATION
Team meeting this date.  Met with patient and reviewed treatment plan and discussed discharge planning.  Patient has progressed in all areas this week.  Pain an endurance are barriers.  Improved ability to maintain precautions noted.   Functional status at min assist most areas with goals  to modified independence.  Team recommended plan for discharge on 8/29.  Physical therapy present for part of session and discussed need for family education, equipment recommendations and patient showed picture of the ramp placed at his home.  Discharge plan as below    D/cPlan:  Home with wife and daughter .   He will stay on first floor .Both have disabling conditions.  Can manage their own care and some home asking but  no physical assist to patient .  Son will provide transport.    After Care  Home PT OT RN.  Provided choice.  Patient states he wants to use the same agency used after his knee replacement.  Previous chart review identified [facility] as provider.   Referral called to [facility] and case accepted.     DME  He has his wife's walker that he used after knee replacement.  Also has w/ch and BSC.  He will need a hospital bed.  Referral to [facility] sent today per patient choice    FT:  SW and PT educated regarding need for family to reivew and be instructed prior to discharge to insure safet and follow through.  Patient reluctant to burden his sons who work.  He agreed to have on present for instruction pm on day of discharge 8/29.

## 2024-08-23 ENCOUNTER — APPOINTMENT (OUTPATIENT)
Dept: GENERAL RADIOLOGY | Age: 74
End: 2024-08-23
Attending: PHYSICAL MEDICINE & REHABILITATION
Payer: MEDICARE

## 2024-08-23 PROBLEM — G89.18 POST-OP PAIN: Status: ACTIVE | Noted: 2024-08-23

## 2024-08-23 PROBLEM — Z78.9 IMPAIRED MOBILITY AND ADLS: Status: ACTIVE | Noted: 2024-08-23

## 2024-08-23 PROBLEM — Z74.09 IMPAIRED MOBILITY AND ADLS: Status: ACTIVE | Noted: 2024-08-23

## 2024-08-23 PROCEDURE — 6370000000 HC RX 637 (ALT 250 FOR IP)

## 2024-08-23 PROCEDURE — 6370000000 HC RX 637 (ALT 250 FOR IP): Performed by: PHYSICAL MEDICINE & REHABILITATION

## 2024-08-23 PROCEDURE — 97530 THERAPEUTIC ACTIVITIES: CPT

## 2024-08-23 PROCEDURE — 97535 SELF CARE MNGMENT TRAINING: CPT

## 2024-08-23 PROCEDURE — 97110 THERAPEUTIC EXERCISES: CPT

## 2024-08-23 PROCEDURE — 73502 X-RAY EXAM HIP UNI 2-3 VIEWS: CPT

## 2024-08-23 PROCEDURE — 72190 X-RAY EXAM OF PELVIS: CPT

## 2024-08-23 PROCEDURE — 1280000000 HC REHAB R&B

## 2024-08-23 PROCEDURE — 99232 SBSQ HOSP IP/OBS MODERATE 35: CPT | Performed by: PHYSICAL MEDICINE & REHABILITATION

## 2024-08-23 PROCEDURE — 6360000002 HC RX W HCPCS

## 2024-08-23 RX ADMIN — TRAMADOL HYDROCHLORIDE 50 MG: 50 TABLET, FILM COATED ORAL at 09:06

## 2024-08-23 RX ADMIN — PANTOPRAZOLE SODIUM 40 MG: 40 TABLET, DELAYED RELEASE ORAL at 05:03

## 2024-08-23 RX ADMIN — ACETAMINOPHEN 650 MG: 325 TABLET ORAL at 04:47

## 2024-08-23 RX ADMIN — Medication 1000 UNITS: at 08:15

## 2024-08-23 RX ADMIN — BUPROPION HYDROCHLORIDE 150 MG: 150 TABLET, EXTENDED RELEASE ORAL at 08:15

## 2024-08-23 RX ADMIN — TIZANIDINE 4 MG: 4 TABLET ORAL at 04:46

## 2024-08-23 RX ADMIN — TIZANIDINE 4 MG: 4 TABLET ORAL at 20:51

## 2024-08-23 RX ADMIN — TRAMADOL HYDROCHLORIDE 50 MG: 50 TABLET, FILM COATED ORAL at 04:45

## 2024-08-23 RX ADMIN — TRAMADOL HYDROCHLORIDE 50 MG: 50 TABLET, FILM COATED ORAL at 00:30

## 2024-08-23 RX ADMIN — ACETAMINOPHEN 650 MG: 325 TABLET ORAL at 00:32

## 2024-08-23 RX ADMIN — CARVEDILOL 12.5 MG: 6.25 TABLET, FILM COATED ORAL at 20:50

## 2024-08-23 RX ADMIN — Medication 5 MG: at 20:49

## 2024-08-23 RX ADMIN — ENOXAPARIN SODIUM 30 MG: 100 INJECTION SUBCUTANEOUS at 20:49

## 2024-08-23 RX ADMIN — TIZANIDINE 4 MG: 4 TABLET ORAL at 00:30

## 2024-08-23 RX ADMIN — TIZANIDINE 4 MG: 4 TABLET ORAL at 10:48

## 2024-08-23 RX ADMIN — ACETAMINOPHEN 650 MG: 325 TABLET ORAL at 11:09

## 2024-08-23 RX ADMIN — TRAMADOL HYDROCHLORIDE 50 MG: 50 TABLET, FILM COATED ORAL at 20:49

## 2024-08-23 RX ADMIN — CARVEDILOL 12.5 MG: 6.25 TABLET, FILM COATED ORAL at 08:15

## 2024-08-23 RX ADMIN — ACETAMINOPHEN 650 MG: 325 TABLET ORAL at 20:50

## 2024-08-23 RX ADMIN — ENOXAPARIN SODIUM 30 MG: 100 INJECTION SUBCUTANEOUS at 08:15

## 2024-08-23 RX ADMIN — LOSARTAN POTASSIUM 100 MG: 50 TABLET, FILM COATED ORAL at 08:15

## 2024-08-23 ASSESSMENT — PAIN DESCRIPTION - DESCRIPTORS
DESCRIPTORS: ACHING
DESCRIPTORS: ACHING;CRAMPING
DESCRIPTORS: ACHING;DISCOMFORT;SORE
DESCRIPTORS: ACHING
DESCRIPTORS: ACHING

## 2024-08-23 ASSESSMENT — PAIN DESCRIPTION - LOCATION
LOCATION: HIP;LEG
LOCATION: HIP;LEG
LOCATION: HIP
LOCATION: LEG;HIP

## 2024-08-23 ASSESSMENT — PAIN SCALES - GENERAL
PAINLEVEL_OUTOF10: 0
PAINLEVEL_OUTOF10: 0
PAINLEVEL_OUTOF10: 6
PAINLEVEL_OUTOF10: 0
PAINLEVEL_OUTOF10: 0
PAINLEVEL_OUTOF10: 7
PAINLEVEL_OUTOF10: 0
PAINLEVEL_OUTOF10: 7

## 2024-08-23 ASSESSMENT — PAIN SCALES - WONG BAKER
WONGBAKER_NUMERICALRESPONSE: NO HURT
WONGBAKER_NUMERICALRESPONSE: NO HURT

## 2024-08-23 ASSESSMENT — PAIN DESCRIPTION - ORIENTATION
ORIENTATION: RIGHT
ORIENTATION: RIGHT
ORIENTATION: LEFT

## 2024-08-23 NOTE — PLAN OF CARE
Problem: Discharge Planning  Goal: Discharge to home or other facility with appropriate resources  8/22/2024 2257 by Sushila Pierce RN  Outcome: Progressing  8/22/2024 2256 by Sushila Pierce RN  Outcome: Progressing  8/22/2024 1300 by Alex Schmidt RN  Outcome: Progressing  Flowsheets (Taken 8/22/2024 0815)  Discharge to home or other facility with appropriate resources: Identify barriers to discharge with patient and caregiver     Problem: Pain  Goal: Verbalizes/displays adequate comfort level or baseline comfort level  8/22/2024 2257 by Sushila Pierce RN  Outcome: Progressing  8/22/2024 2256 by Sushila Pierce RN  Outcome: Progressing  8/22/2024 1300 by Alex Schmidt RN  Outcome: Progressing     Problem: Safety - Adult  Goal: Free from fall injury  8/22/2024 2257 by Sushila Pierce RN  Outcome: Progressing  8/22/2024 2256 by Sushila Pierce RN  Outcome: Progressing  8/22/2024 1300 by Alex Schmidt RN  Outcome: Progressing     Problem: ABCDS Injury Assessment  Goal: Absence of physical injury  8/22/2024 2257 by Sushila Pierec RN  Outcome: Progressing  8/22/2024 2256 by Sushila Pierce RN  Outcome: Progressing  8/22/2024 1300 by Alex Schmidt RN  Outcome: Progressing  Flowsheets (Taken 8/22/2024 0815)  Absence of Physical Injury: Implement safety measures based on patient assessment     Problem: Nutrition Deficit:  Goal: Optimize nutritional status  8/22/2024 2257 by Sushila Pierce RN  Outcome: Progressing  8/22/2024 2256 by Sushila Pierce RN  Outcome: Progressing  8/22/2024 1300 by Alex Schmidt RN  Outcome: Progressing     Problem: Skin/Tissue Integrity  Goal: Absence of new skin breakdown  Description: 1.  Monitor for areas of redness and/or skin breakdown  2.  Assess vascular access sites hourly  3.  Every 4-6 hours minimum:  Change oxygen saturation probe site  4.  Every 4-6 hours:  If on nasal continuous positive airway pressure, respiratory therapy assess nares and  determine need for appliance change or resting period.  8/22/2024 2257 by Sushila Pierce, RN  Outcome: Progressing  8/22/2024 2256 by Sushila Pierce, RN  Outcome: Progressing  8/22/2024 1300 by Alex Schmidt, RN  Outcome: Progressing

## 2024-08-23 NOTE — PLAN OF CARE
Problem: Discharge Planning  Goal: Discharge to home or other facility with appropriate resources  8/23/2024 0945 by Kingston Seals RN  Outcome: Progressing  8/22/2024 2257 by Sushila Pierce RN  Outcome: Progressing  8/22/2024 2256 by Sushila Pierce RN  Outcome: Progressing     Problem: Pain  Goal: Verbalizes/displays adequate comfort level or baseline comfort level  8/23/2024 0945 by Kingston Seals RN  Outcome: Progressing  8/22/2024 2257 by Sushila Pierce RN  Outcome: Progressing  8/22/2024 2256 by Sushila Pierce RN  Outcome: Progressing     Problem: Safety - Adult  Goal: Free from fall injury  8/23/2024 0945 by Kingston Seals RN  Outcome: Progressing  8/22/2024 2257 by Sushila Pierce RN  Outcome: Progressing  8/22/2024 2256 by Sushila Pierce RN  Outcome: Progressing     Problem: ABCDS Injury Assessment  Goal: Absence of physical injury  8/23/2024 0945 by Kingston Seals RN  Outcome: Progressing  8/22/2024 2257 by Sushila Pierce RN  Outcome: Progressing  8/22/2024 2256 by Sushila Pierce RN  Outcome: Progressing     Problem: Nutrition Deficit:  Goal: Optimize nutritional status  8/23/2024 0945 by Kingston Seals RN  Outcome: Progressing  8/22/2024 2257 by Sushila Pierce RN  Outcome: Progressing  8/22/2024 2256 by Sushila Pierce RN  Outcome: Progressing     Problem: Skin/Tissue Integrity  Goal: Absence of new skin breakdown  Description: 1.  Monitor for areas of redness and/or skin breakdown  2.  Assess vascular access sites hourly  3.  Every 4-6 hours minimum:  Change oxygen saturation probe site  4.  Every 4-6 hours:  If on nasal continuous positive airway pressure, respiratory therapy assess nares and determine need for appliance change or resting period.  8/23/2024 0945 by Kingston Seals RN  Outcome: Progressing  8/22/2024 2257 by Sushila Pierce RN  Outcome: Progressing  8/22/2024 8648 by Sushila Pierce, RN  Outcome: Progressing

## 2024-08-23 NOTE — PROGRESS NOTES
Occupational Therapy  OCCUPATIONAL THERAPY DAILY NOTE    Date:2024  Patient Name: Rosas Corbin  MRN: [id number]  : 1950  Room: [address] [place] [address]     Referring Practitioner: MD Jefferson  Diagnosis: Fell off ladder 8-10 feet 24; R acetabular fx, R L5 TP fx, 6th rib fx, retroperitoneal hematoma 24 ORIF R acetabulum &post op ileus  Additional Pertinent Hx: arthritis, acid reflux, BPH, hx COVID, mitral valve disorder  Restrictions/Precautions: Fall Risk, Spinal neutrality, TTWB RLE, , acetabular precautions ,2000 fluid restriction    Functional Assessment:   Date Status AE  Comments   Feeding 24  Independent      Grooming 24  Mod I      Standing  Pt washed face and hands and combed hair standing at sink          Oral Care 24   Mod I  W/c Pt brushed teeth standing at sink    Bathing 24  SBA   Pt completed sponge bath seated and standing at sink    UB Dressing 24  Mod I  seated Pt donned pull over shirt    LB Dressing 24  Mod I  Reacher  Pt donned shorts on over BLE's with reacher and stood to pull up with no loss of balance    Footwear 24  Mod I  Sock aid, long shoe horn  Pt donned socks and shoes using AE    Toileting 24    SBA      Homemaking 24   Min A  Ww and w/c      Functional Transfers / Balance:   Date Status DME  Comments   Sit Balance 24   Mod I  W/c     Stand Balance 24  SBA  Ww and table top level    At table top level engaging BUE's in tx activity    [] Tub  [x] Shower   Transfer 24  Min A  Grab rails, indwelling shower seat     Commode   Transfer 24   SBA  Ww, 3 in 1 commode placed over standard commode SPT from w/c to 3 in 1 commode placed on over standard commode    Functional   Mobility 24   SBA      Mod I  Ww    W/c  Throughout pt's bathroom     Other: sit to stand from EOB to ww     Sit to stand w/c to ww   Bed mobility sit to supine     On/off recliner  24  SBA   needed & demo G- safety & insight  Long Term Goal 5: Pt demo Mod I commode trf using appropriate DME to ensure pt safety. Pt demo Mod I toileting & demo G- safety & insight  Long Term Goal 6: Pt demo SBA walk in shower using appropriate DME to esnure pt safety & pt demo G- safety & insight  Long Term Goal 7: Pt demo Mod I light homemaking activity & demo G- safety & insight  Long Term Goal 8: Pt demo G- endurance for a 30 minute functional activity  Long Term Goal 9: Pt will state & adhere to precautions & TTWB RLE with all ADLs, IADLs, transfers & mobility with a 100% accuracy to ensure pt safety @ discharge    8/15/24 OT plan of care reviewed on this date. Tentative length of stay is 2 weeks.Ellen Ty OTR/L [id number]   8/22/24 OT plan of care reviewed on this date. Tentative discharge date is 8/29/24 to home with Home Health OT .Ellen Ty OTR/L [id number]        DISCHARGE RECOMMENDATIONS  Recommended DME:    Post Discharge Care:   []Home Independently  []Home with 24hr Care / Supervision []Home with Partial Supervision [x]Home with Home Health OT []Home with Out Pt OT []Other: ___   Comments:         Time in Time out Tx Time Breakdown  Variance:   First Session  0815  0900   [x] Individual Tx-45   [] Concurrent Tx -  [] Co-Tx -   [] Group Tx -   [] Time Missed -     Second Session 1045  1130  [] Individual Tx-  [x] Concurrent Tx -45  [] Co-Tx -   [] Group Tx -   [] Time Missed -     Third Session    [] Individual Tx-   [] Concurrent Tx -  [] Co-Tx -   [] Group Tx -   [] Time Missed -         Total Tx Time: 90 mins         Ellen Ty OTR/L [id number]

## 2024-08-23 NOTE — PROGRESS NOTES
[facility]  Comprehensive Progress Note    Subjective:      Rosas Corbin is a 74 y.o. male admitted to inpatient rehabilitation for impairments and activities limitations in ADLs and mobility secondary to pelvic fractures.      No acute events overnight. No CP, SOB, N/V. Pain is better controlled and patient is now making more rapid progress in therapy. No other new complaints.     The patient's medical records have been reviewed.    Scheduled Meds:lidocaine, 1 patch, Q12H  melatonin, 5 mg, Nightly  acetaminophen, 650 mg, 4 times per day  buPROPion, 150 mg, QAM  carvedilol, 12.5 mg, BID  losartan, 100 mg, Daily  pantoprazole, 40 mg, QAM AC  enoxaparin, 30 mg, BID  Vitamin D, 1,000 Units, Daily      Continuous Infusions:   sodium chloride       PRN Meds:tiZANidine, 4 mg, Q6H PRN  sodium chloride flush, 5-40 mL, PRN  sodium chloride, , PRN  ondansetron, 4 mg, Q8H PRN  acetaminophen, 650 mg, Q4H PRN  polyethylene glycol, 17 g, Daily PRN  traMADol, 50 mg, Q4H PRN         Objective:      Vitals:    08/23/24 0030 08/23/24 0100 08/23/24 0445 08/23/24 0800   BP:    134/72   Pulse:    70   Resp: 18 18 18 20   Temp:    97.8 °F (36.6 °C)   TempSrc:    Temporal   SpO2:    98%   Weight:       Height:         General appearance: alert, appears stated age, and cooperative, NAD  Head: Normocephalic, without obvious abnormality, atraumatic  Eyes: conjunctivae/corneas clear. PERRL, EOM's intact.   Neck: supple, symmetrical, trachea midline  Lungs: clear to auscultation bilaterally  Heart: regular rate and rhythm, S1, S2 normal  Abdomen: soft, non-tender, normal bowel sounds  Musculoskeletal: Range of motion abnormal right hip, hip precautions. ROM otherwise wnl BUE and LLE.   Skin: No rashes. Incision is healing, c/d/I.   Psych: Appropriate mood and affect   Neurologic: Awake, alert, SILT BUE and BLE. Strength 5/5 in BUE; 4/5 throughout LLE; 2/5 right HF and KE; 4/5 right ankle DF and PF.

## 2024-08-23 NOTE — PLAN OF CARE
Problem: Discharge Planning  Goal: Discharge to home or other facility with appropriate resources  8/22/2024 2256 by Sushila Pierce RN  Outcome: Progressing  8/22/2024 1300 by Alex Schmidt RN  Outcome: Progressing  Flowsheets (Taken 8/22/2024 0815)  Discharge to home or other facility with appropriate resources: Identify barriers to discharge with patient and caregiver     Problem: Pain  Goal: Verbalizes/displays adequate comfort level or baseline comfort level  8/22/2024 2256 by Sushila Pierce RN  Outcome: Progressing  8/22/2024 1300 by Alex Schmidt RN  Outcome: Progressing     Problem: Safety - Adult  Goal: Free from fall injury  8/22/2024 2256 by Sushila Pierce RN  Outcome: Progressing  8/22/2024 1300 by Alex Schmidt RN  Outcome: Progressing     Problem: ABCDS Injury Assessment  Goal: Absence of physical injury  8/22/2024 2256 by Sushila Pierce RN  Outcome: Progressing  8/22/2024 1300 by Alex Schmidt RN  Outcome: Progressing  Flowsheets (Taken 8/22/2024 0815)  Absence of Physical Injury: Implement safety measures based on patient assessment     Problem: Nutrition Deficit:  Goal: Optimize nutritional status  8/22/2024 2256 by Sushila Pierce RN  Outcome: Progressing  8/22/2024 1300 by Alex Schmidt RN  Outcome: Progressing     Problem: Skin/Tissue Integrity  Goal: Absence of new skin breakdown  Description: 1.  Monitor for areas of redness and/or skin breakdown  2.  Assess vascular access sites hourly  3.  Every 4-6 hours minimum:  Change oxygen saturation probe site  4.  Every 4-6 hours:  If on nasal continuous positive airway pressure, respiratory therapy assess nares and determine need for appliance change or resting period.  8/22/2024 2256 by Sushila Pierce RN  Outcome: Progressing  8/22/2024 1300 by Alex Schmidt RN  Outcome: Progressing

## 2024-08-23 NOTE — PROGRESS NOTES
Physical Therapy  Treatment     Evaluating Therapist: Ibeth Tubbs, PT, DPT [id number]    ROOM: [address] [place] [address]  DIAGNOSIS: Multiple Trauma   PRECAUTIONS: Falls, TTWB R LE, spinal neutrality (L5 TP fx), R rib fx, adult diet 2000 ml  HPI: Patient is a 74 y.o. male who presents on 08/06/2024 as a trauma with a right acetabulum fracture, right L5 TP fracture and right 6th rib fracture after a fall from 8 to 10 feet off of a ladder.  Patient stated he was cutting tree limbs when he fell off of a ladder directly onto his right side.  Orthopedic was consulted. On 08/07/2024 patient underwent a right anterior column posterior hemitransverse acetabular fracture open reduction internal fixation.  Hospital course has been complicated by the development of post op ileus.  Patient had a bowel movement 08/12/2024.       PMH of GERD, BPH, HTN, mitral valve disorder, skin cancer, arthritis, L TKA 22'.     Social:  Pt lives in split level home with wife (unable to assist at [place], L brachial plexus injury per pt) and daughter (has MS, unable to assist per pt) with 3 DANITZA with 2 rails to living room, kitchen, full bathroom. Pt then has 7 steps up with 2 rails to main bedroom. Pt also reports he may borrow a ramp for entry steps from friend at [place].   Prior to admission: Pt was independent with no AD. Pt is retired from [employer]. Pt enjoys attending grandchildren's sporting events.      Initial Evaluation  Date: 8/13/24 AM     PM  Short Term Goals Long Term Goals    Was pt agreeable to Eval/treatment? Yes  Yes  Yes      Does pt have pain? 3-4/10 R hip  R gluteal pain  )  ( 4/10)  R gluteal pain       Bed Mobility  Rolling: ModA  Supine to sit: ModA  Sit to supine: ModA  Scooting: ModA  (Assistance with B LE) NT NT    SBA Modified Independent     Transfers Sit to stand: ModA  Stand to sit: ModA  Stand pivot: ModA with WW     Sit to stand:SBA  Stand to sit:SBA  Stand pivot: SBA with FWW Sit to stand: SBA  Stand to sit:SBA  Stand pivot with ww

## 2024-08-24 PROCEDURE — 6370000000 HC RX 637 (ALT 250 FOR IP)

## 2024-08-24 PROCEDURE — 97535 SELF CARE MNGMENT TRAINING: CPT

## 2024-08-24 PROCEDURE — 99231 SBSQ HOSP IP/OBS SF/LOW 25: CPT | Performed by: STUDENT IN AN ORGANIZED HEALTH CARE EDUCATION/TRAINING PROGRAM

## 2024-08-24 PROCEDURE — 6370000000 HC RX 637 (ALT 250 FOR IP): Performed by: PHYSICAL MEDICINE & REHABILITATION

## 2024-08-24 PROCEDURE — 6360000002 HC RX W HCPCS

## 2024-08-24 PROCEDURE — 97530 THERAPEUTIC ACTIVITIES: CPT

## 2024-08-24 PROCEDURE — 97110 THERAPEUTIC EXERCISES: CPT

## 2024-08-24 PROCEDURE — 1280000000 HC REHAB R&B

## 2024-08-24 RX ADMIN — BUPROPION HYDROCHLORIDE 150 MG: 150 TABLET, EXTENDED RELEASE ORAL at 08:04

## 2024-08-24 RX ADMIN — TRAMADOL HYDROCHLORIDE 50 MG: 50 TABLET, FILM COATED ORAL at 12:38

## 2024-08-24 RX ADMIN — ACETAMINOPHEN 650 MG: 325 TABLET ORAL at 20:17

## 2024-08-24 RX ADMIN — CARVEDILOL 12.5 MG: 6.25 TABLET, FILM COATED ORAL at 08:04

## 2024-08-24 RX ADMIN — ACETAMINOPHEN 650 MG: 325 TABLET ORAL at 10:46

## 2024-08-24 RX ADMIN — LOSARTAN POTASSIUM 100 MG: 50 TABLET, FILM COATED ORAL at 08:04

## 2024-08-24 RX ADMIN — ACETAMINOPHEN 650 MG: 325 TABLET ORAL at 04:33

## 2024-08-24 RX ADMIN — TRAMADOL HYDROCHLORIDE 50 MG: 50 TABLET, FILM COATED ORAL at 08:38

## 2024-08-24 RX ADMIN — Medication 1000 UNITS: at 08:04

## 2024-08-24 RX ADMIN — TRAMADOL HYDROCHLORIDE 50 MG: 50 TABLET, FILM COATED ORAL at 18:50

## 2024-08-24 RX ADMIN — TRAMADOL HYDROCHLORIDE 50 MG: 50 TABLET, FILM COATED ORAL at 04:36

## 2024-08-24 RX ADMIN — TIZANIDINE 4 MG: 4 TABLET ORAL at 10:46

## 2024-08-24 RX ADMIN — Medication 5 MG: at 20:17

## 2024-08-24 RX ADMIN — PANTOPRAZOLE SODIUM 40 MG: 40 TABLET, DELAYED RELEASE ORAL at 05:40

## 2024-08-24 RX ADMIN — TIZANIDINE 4 MG: 4 TABLET ORAL at 18:52

## 2024-08-24 RX ADMIN — ACETAMINOPHEN 650 MG: 325 TABLET ORAL at 00:33

## 2024-08-24 RX ADMIN — CARVEDILOL 12.5 MG: 6.25 TABLET, FILM COATED ORAL at 20:17

## 2024-08-24 RX ADMIN — TIZANIDINE 4 MG: 4 TABLET ORAL at 04:33

## 2024-08-24 RX ADMIN — ENOXAPARIN SODIUM 30 MG: 100 INJECTION SUBCUTANEOUS at 08:04

## 2024-08-24 RX ADMIN — TRAMADOL HYDROCHLORIDE 50 MG: 50 TABLET, FILM COATED ORAL at 00:34

## 2024-08-24 RX ADMIN — ENOXAPARIN SODIUM 30 MG: 100 INJECTION SUBCUTANEOUS at 20:16

## 2024-08-24 ASSESSMENT — PAIN SCALES - WONG BAKER
WONGBAKER_NUMERICALRESPONSE: NO HURT

## 2024-08-24 ASSESSMENT — PAIN DESCRIPTION - LOCATION
LOCATION: HIP
LOCATION: LEG;HIP
LOCATION: HIP

## 2024-08-24 ASSESSMENT — PAIN DESCRIPTION - ORIENTATION
ORIENTATION: RIGHT

## 2024-08-24 ASSESSMENT — PAIN SCALES - GENERAL
PAINLEVEL_OUTOF10: 8
PAINLEVEL_OUTOF10: 5
PAINLEVEL_OUTOF10: 0
PAINLEVEL_OUTOF10: 8
PAINLEVEL_OUTOF10: 0
PAINLEVEL_OUTOF10: 0
PAINLEVEL_OUTOF10: 2
PAINLEVEL_OUTOF10: 0
PAINLEVEL_OUTOF10: 7
PAINLEVEL_OUTOF10: 8
PAINLEVEL_OUTOF10: 6
PAINLEVEL_OUTOF10: 2

## 2024-08-24 ASSESSMENT — PAIN DESCRIPTION - DESCRIPTORS
DESCRIPTORS: ACHING
DESCRIPTORS: ACHING;DISCOMFORT;SORE
DESCRIPTORS: ACHING;DISCOMFORT;SORE
DESCRIPTORS: ACHING;CRAMPING
DESCRIPTORS: ACHING;DISCOMFORT;SORE
DESCRIPTORS: ACHING;DISCOMFORT;SORE

## 2024-08-24 ASSESSMENT — PAIN DESCRIPTION - PAIN TYPE: TYPE: SURGICAL PAIN

## 2024-08-24 NOTE — PROGRESS NOTES
2-week postop visit orthopedics    Op: Right acetabulum ORIF 8/7/2024  Surgeon: Dr. Perry Alcocer      SUBJECTIVE: Patient seen and examined in acute rehab for 2-week postop visit.  Patient is currently sitting up in wheelchair in physical therapy [facility] states he has been doing very well overall.  Denies any pain to his right hip or right pelvis at this time.  Denies any numbness tingling paresthesias traveling down right lower extremity right foot.  Patient continues to be toe-touch weightbearing right lower extremity and has been continue DVT prophylaxis as instructed.  Denies any fevers chills nausea vomiting chest pain shortness of breath.  Has no other complaints.      Review of Systems -   General ROS: negative for - chills, fatigue, fever or night sweats  Respiratory ROS: no cough, shortness of breath, or wheezing  Cardiovascular ROS: no chest pain or dyspnea on exertion  Gastrointestinal ROS: no abdominal pain, change in bowel habits, or black or bloody stools  Genitourinary: no hematuria, dysuria, or incontinence   Musculoskeletal ROS:see above  Neurological ROS: no TIA or stroke symptoms       OBJECTIVE:   Alert and oriented X 3, no acute distress, respirations easy and unlabored with no audible wheezes, skin warm and dry, speech and dress appropriate for noted age, affect euthymic.    Extremity:  Right lower extremity:  +2/4 DP PT pulse good cap refill extremity warm perfused  Compartment soft compressible  Skin surface intact, surgical incision dressings removed surgical incision is well-healed no signs of infection or drainage or redness.  All sutures are retained sutures inside of the skin there is no staples or sutures outside of the skin to be removed today.  Surgical incision was cleansed with chlorhexidine.  Patient tolerated this well  Positive EHL/DF/PF strength 5 out of 5 when compared to contralateral extremity  Distal sensation grossly tact to deep peroneal/superficial  peroneal/tibial/sural/saphenous nerve distributions  Negative logroll, patient is able to straight leg raise at this time  No tenderness palpation or on right lower extremity or pelvis    XR right hip/pelvis: Demonstrates status post right acetabulum and iliac wing ORIF with all hardware well in place no signs of hardware breakage or pullout.  Fracture lines and hardware appear stable compared to postoperative imaging.  No other abnormalities noted.        BP (!) 148/70   Pulse 77   Temp 98.4 °F (36.9 °C) (Temporal)   Resp 18   Ht 1.829 m (6' 0.01\")   Wt 109.1 kg (240 lb 8.4 oz)   SpO2 99%   BMI 32.61 kg/m²     ASSESSMENT:    S/p ORIF right both column acetabulum fracture on 8/7/2024    PLAN:  Plan discussed patient all patient's questions answered to his satisfaction  Discussed with patient based on physical exam and imaging he appears to be progressing very well overall and he is to continue to maintain his toe-touch weightbearing status to his right lower extremity until instructed otherwise.  He is also to continue DVT prophylaxis as instructed until otherwise noted.  Patient is understandable and agreeable to this plan    Discussed with patient that this was his 2-week postoperative visit and he can follow-up in 3 to 4 weeks for his 6-week postop visit.  Patient is understandable and happy with this plan.  Patient is cleared for discharge from orthopedic standpoint once proper discharge plan is in place.  Will be signing off on this patient.  Please call with any questions you have.    All questions were sought and answered today's visit    Electronically signed by Lanre Mancilla DO on 8/24/2024 at 8:57 AM  Note: This report was completed using [employer] voiced recognition software.  Every effort has been made to ensure accuracy; however, inadvertent computerized transcription errors may be present.

## 2024-08-24 NOTE — PROGRESS NOTES
Physical Therapy  Treatment     Evaluating Therapist: Ibeth Tubbs, PT, DPT [id number]    ROOM: [address] [place] [address]  DIAGNOSIS: Multiple Trauma   PRECAUTIONS: Falls, TTWB R LE, spinal neutrality (L5 TP fx), R rib fx, adult diet 2000 ml  HPI: Patient is a 74 y.o. male who presents on 08/06/2024 as a trauma with a right acetabulum fracture, right L5 TP fracture and right 6th rib fracture after a fall from 8 to 10 feet off of a ladder.  Patient stated he was cutting tree limbs when he fell off of a ladder directly onto his right side.  Orthopedic was consulted. On 08/07/2024 patient underwent a right anterior column posterior hemitransverse acetabular fracture open reduction internal fixation.  Hospital course has been complicated by the development of post op ileus.  Patient had a bowel movement 08/12/2024.       PMH of GERD, BPH, HTN, mitral valve disorder, skin cancer, arthritis, L TKA 22'.     Social:  Pt lives in split level home with wife (unable to assist at NH, L brachial plexus injury per pt) and daughter (has MS, unable to assist per pt) with 3 DANITZA with 2 rails to living room, kitchen, full bathroom. Pt then has 7 steps up with 2 rails to main bedroom. Pt also reports he may borrow a ramp for entry steps from friend at [place].   Prior to admission: Pt was independent with no AD. Pt is retired from [employer]. Pt enjoys attending grandchildren's sporting events.      Initial Evaluation  Date: 8/13/24 AM     PM  Short Term Goals Long Term Goals    Was pt agreeable to Eval/treatment? Yes  Yes  Yes      Does pt have pain? 3-4/10 R hip  R gluteal pain    ( 4/10)  R gluteal pain       Bed Mobility  Rolling: ModA  Supine to sit: ModA  Sit to supine: ModA  Scooting: ModA  (Assistance with B LE) NT NT    SBA Modified Independent     Transfers Sit to stand: ModA  Stand to sit: ModA  Stand pivot: ModA with WW     Sit to stand supervision   Stand to sit supervision   Stand pivot: supervision  with FWW Sit to stand: SBA  Stand to

## 2024-08-24 NOTE — PLAN OF CARE
Problem: Discharge Planning  Goal: Discharge to home or other facility with appropriate resources  8/23/2024 2221 by Sushila Pierce RN  Outcome: Progressing  8/23/2024 0945 by Kingston Seals RN  Outcome: Progressing     Problem: Pain  Goal: Verbalizes/displays adequate comfort level or baseline comfort level  8/23/2024 2221 by Sushila Pierce RN  Outcome: Progressing  8/23/2024 0945 by Kingston Seals RN  Outcome: Progressing     Problem: Safety - Adult  Goal: Free from fall injury  8/23/2024 2221 by Sushila Pierce RN  Outcome: Progressing  8/23/2024 0945 by Kingston Seals RN  Outcome: Progressing     Problem: ABCDS Injury Assessment  Goal: Absence of physical injury  8/23/2024 2221 by Sushila Pierce RN  Outcome: Progressing  8/23/2024 0945 by Kingston Seals RN  Outcome: Progressing     Problem: Nutrition Deficit:  Goal: Optimize nutritional status  8/23/2024 2221 by Sushila Pierce RN  Outcome: Progressing  8/23/2024 0945 by Kingston Seals RN  Outcome: Progressing     Problem: Skin/Tissue Integrity  Goal: Absence of new skin breakdown  Description: 1.  Monitor for areas of redness and/or skin breakdown  2.  Assess vascular access sites hourly  3.  Every 4-6 hours minimum:  Change oxygen saturation probe site  4.  Every 4-6 hours:  If on nasal continuous positive airway pressure, respiratory therapy assess nares and determine need for appliance change or resting period.  8/23/2024 2221 by Sushila Pierce RN  Outcome: Progressing  8/23/2024 0945 by Kingston Seals RN  Outcome: Progressing

## 2024-08-24 NOTE — PLAN OF CARE
Problem: Discharge Planning  Goal: Discharge to home or other facility with appropriate resources  8/24/2024 1200 by Kingston Seals RN  Outcome: Progressing  8/23/2024 2221 by Sushila Pierce RN  Outcome: Progressing     Problem: Pain  Goal: Verbalizes/displays adequate comfort level or baseline comfort level  8/24/2024 1200 by Kingston Seals RN  Outcome: Progressing  8/23/2024 2221 by Sushila Pierce RN  Outcome: Progressing     Problem: Safety - Adult  Goal: Free from fall injury  8/24/2024 1200 by Kingston Seals RN  Outcome: Progressing  8/23/2024 2221 by Sushila Pierce RN  Outcome: Progressing     Problem: ABCDS Injury Assessment  Goal: Absence of physical injury  8/24/2024 1200 by Kingston Seals RN  Outcome: Progressing  8/23/2024 2221 by Sushila Pierce RN  Outcome: Progressing     Problem: Nutrition Deficit:  Goal: Optimize nutritional status  8/24/2024 1200 by Kingston Seals RN  Outcome: Progressing  8/23/2024 2221 by Sushila Pierce RN  Outcome: Progressing     Problem: Skin/Tissue Integrity  Goal: Absence of new skin breakdown  Description: 1.  Monitor for areas of redness and/or skin breakdown  2.  Assess vascular access sites hourly  3.  Every 4-6 hours minimum:  Change oxygen saturation probe site  4.  Every 4-6 hours:  If on nasal continuous positive airway pressure, respiratory therapy assess nares and determine need for appliance change or resting period.  8/24/2024 1200 by Kingston Seals RN  Outcome: Progressing  8/23/2024 2221 by Sushila Pierce RN  Outcome: Progressing

## 2024-08-24 NOTE — DISCHARGE INSTRUCTIONS
[facility] Department of Orthopedic Surgery  [address] [facility] [address]    Dr. Oscar Samayoa, DO         MD Dr. Oscar Samaniego MD Frank Ansevin, PA-C Sara Zatchok, PA-C Tyler Tsangaris PA-C      Orthopaedics Discharge Instructions   Weight bearing Status - Toe touch weight bearing - on right lower Extremity  Pain Medication   Contact Office for Medication Refill- [phone]  Office can refill pain medications no sooner than every 7 days  If patient discharging to facility then pain control will be continued per facility physician  Ice to operative/injured site for 15-30 minutes of each hour for next 5 days    Recommend that you continue to ice the area 2-3 times per day after this   Elevate operative/injured limb on 2 pillows at home  Goal is to have limb above the heart if able  Continue DVT Prophylaxis (blood clot prevention) as prescribed: Lovenox 30 mg daily   Wound care -no specific wound care needed.  Had discussion with patient regarding showering rather than bathing and using regular bar of soap versus scented soaps.      Follow up in office in approximately 2 weeks. Your first follow up appointment is often with one of our physician assistants.     Call the office at [phone] if having any concerns.     Watch for these significant complications.  Call physician if they or any other problems occur:  Fever over 101°, redness, swelling or warmth at the operative site  Unrelieved nausea    Foul smelling or cloudy drainage at the operative site   Unrelieved pain    Blood soaked dressing. (Some oozing may be normal)     Numb, pale, blue, cold or tingling extremity          It is the Department of Orthopaedic Trauma's standard of practice that providers will de-escalate (wean) all patients from narcotic (opioid) medications during the post-operative period.   We provide multimodal pain control, but opioid medications are tapered in all of our patients.  If patient

## 2024-08-24 NOTE — PROGRESS NOTES
order to follow  R acetabular precautions with RLE/hip.  Pt required both demo'd, cues and assist for follow thru to increase safety & balance.         Commode   Transfer 8/23/24   SBA  Ww, 3 in 1 commode placed over standard commode     Functional   Mobility 8/24/24 8/24/24   SBA      Mod I  Ww    W/c  Demo'd mobility using ww in gym with occ cues for TTWB RLE to increase skills.     Pt used BUE;s and LLE only for w/c propulsion from room to OT gym and back to room to increase strength and endurance.      Other: sit to stand from  w/c to ww     Bed mobility sit to supine         On/off recliner  8/24/24 8/23 /24 8/21/24  SBA        SBA            SBA  Ww      Ww  Leg           Ww  For safety and balance for TTWB RLE with sit to stand transfers.   CGA/min A off tub bench edge with 2nd rep attempt.  See above for details.              Functional Exercises / Activity:      Sensory / Neuromuscular Re-Education:      Cognitive Skills:   Status Comments   Problem   Solving fair +/good- Demo;d during sit to stand, standing balance, mobility and tub bench transfers.    Memory Fair +/good- \"   Sequencing fair +/good- \"   Safety fair +/good-  Cues for hand placement with sit to stand transfers     Visual Perception:    Education:  Pt educated on ways to safely enter/exit home shower stall with curtain and using shower bench to increase skills for home environment. .  Pt education is ongoing.     [] Family teach completed on:    Pain Level: RLE pain 4/10.    Additional Notes:       Patient has made good  progress during treatment sessions toward set goals. Therapy emphasis to obtain goals:Current Treatment Recommendations: Strengthening, Coordination training, ROM, Balance training, Self-Care / ADL, Functional mobility training, Safety education & training, Home management training, Endurance training, Patient/Caregiver education & training, Gait training, Neuromuscular re-education, Equipment  Tx -  [] Co-Tx -   [] Group Tx -   [] Time Missed -     Third Session    [] Individual Tx-   [] Concurrent Tx -  [] Co-Tx -   [] Group Tx -   [] Time Missed -         Total Tx Time: 45 mins     Kathleen J Saladino COTA/KARLA [id number]

## 2024-08-25 PROCEDURE — 97530 THERAPEUTIC ACTIVITIES: CPT

## 2024-08-25 PROCEDURE — 6370000000 HC RX 637 (ALT 250 FOR IP): Performed by: PHYSICAL MEDICINE & REHABILITATION

## 2024-08-25 PROCEDURE — 97110 THERAPEUTIC EXERCISES: CPT

## 2024-08-25 PROCEDURE — 6370000000 HC RX 637 (ALT 250 FOR IP)

## 2024-08-25 PROCEDURE — 97535 SELF CARE MNGMENT TRAINING: CPT

## 2024-08-25 PROCEDURE — 6360000002 HC RX W HCPCS

## 2024-08-25 PROCEDURE — 1280000000 HC REHAB R&B

## 2024-08-25 RX ADMIN — TIZANIDINE 4 MG: 4 TABLET ORAL at 20:48

## 2024-08-25 RX ADMIN — ENOXAPARIN SODIUM 30 MG: 100 INJECTION SUBCUTANEOUS at 20:48

## 2024-08-25 RX ADMIN — TRAMADOL HYDROCHLORIDE 50 MG: 50 TABLET, FILM COATED ORAL at 07:06

## 2024-08-25 RX ADMIN — Medication 1000 UNITS: at 08:10

## 2024-08-25 RX ADMIN — CARVEDILOL 12.5 MG: 6.25 TABLET, FILM COATED ORAL at 20:50

## 2024-08-25 RX ADMIN — CARVEDILOL 12.5 MG: 6.25 TABLET, FILM COATED ORAL at 08:10

## 2024-08-25 RX ADMIN — TIZANIDINE 4 MG: 4 TABLET ORAL at 09:14

## 2024-08-25 RX ADMIN — TRAMADOL HYDROCHLORIDE 50 MG: 50 TABLET, FILM COATED ORAL at 12:21

## 2024-08-25 RX ADMIN — ENOXAPARIN SODIUM 30 MG: 100 INJECTION SUBCUTANEOUS at 08:10

## 2024-08-25 RX ADMIN — TRAMADOL HYDROCHLORIDE 50 MG: 50 TABLET, FILM COATED ORAL at 20:51

## 2024-08-25 RX ADMIN — LOSARTAN POTASSIUM 100 MG: 50 TABLET, FILM COATED ORAL at 08:10

## 2024-08-25 RX ADMIN — PANTOPRAZOLE SODIUM 40 MG: 40 TABLET, DELAYED RELEASE ORAL at 06:20

## 2024-08-25 RX ADMIN — ACETAMINOPHEN 650 MG: 325 TABLET ORAL at 11:58

## 2024-08-25 RX ADMIN — Medication 5 MG: at 20:48

## 2024-08-25 RX ADMIN — BUPROPION HYDROCHLORIDE 150 MG: 150 TABLET, EXTENDED RELEASE ORAL at 08:10

## 2024-08-25 RX ADMIN — ACETAMINOPHEN 650 MG: 325 TABLET ORAL at 20:48

## 2024-08-25 ASSESSMENT — PAIN DESCRIPTION - LOCATION
LOCATION: GROIN;HIP
LOCATION: HIP;BACK
LOCATION: HIP
LOCATION: GROIN;HIP

## 2024-08-25 ASSESSMENT — PAIN SCALES - GENERAL
PAINLEVEL_OUTOF10: 4
PAINLEVEL_OUTOF10: 4
PAINLEVEL_OUTOF10: 7
PAINLEVEL_OUTOF10: 4
PAINLEVEL_OUTOF10: 4
PAINLEVEL_OUTOF10: 6

## 2024-08-25 ASSESSMENT — PAIN DESCRIPTION - DESCRIPTORS
DESCRIPTORS: ACHING;CRAMPING
DESCRIPTORS: ACHING;DISCOMFORT
DESCRIPTORS: THROBBING;ACHING;DISCOMFORT
DESCRIPTORS: ACHING;DISCOMFORT;SORE

## 2024-08-25 ASSESSMENT — PAIN SCALES - WONG BAKER: WONGBAKER_NUMERICALRESPONSE: NO HURT

## 2024-08-25 ASSESSMENT — PAIN DESCRIPTION - ORIENTATION
ORIENTATION: RIGHT

## 2024-08-25 ASSESSMENT — PAIN DESCRIPTION - PAIN TYPE: TYPE: SURGICAL PAIN

## 2024-08-25 ASSESSMENT — PAIN DESCRIPTION - FREQUENCY: FREQUENCY: INTERMITTENT

## 2024-08-25 ASSESSMENT — PAIN - FUNCTIONAL ASSESSMENT: PAIN_FUNCTIONAL_ASSESSMENT: PREVENTS OR INTERFERES SOME ACTIVE ACTIVITIES AND ADLS

## 2024-08-25 NOTE — PLAN OF CARE
Problem: Discharge Planning  Goal: Discharge to home or other facility with appropriate resources  8/24/2024 2226 by Ben Lucia RN  Outcome: Progressing  8/24/2024 1200 by Kingston Seals RN  Outcome: Progressing     Problem: Pain  Goal: Verbalizes/displays adequate comfort level or baseline comfort level  8/24/2024 2226 by Ben Lucia RN  Outcome: Progressing  8/24/2024 1200 by Kingston Seals RN  Outcome: Progressing     Problem: Safety - Adult  Goal: Free from fall injury  8/24/2024 2226 by Ben Lucia RN  Outcome: Progressing  8/24/2024 1200 by Kingston Seals RN  Outcome: Progressing     Problem: ABCDS Injury Assessment  Goal: Absence of physical injury  Recent Flowsheet Documentation  Taken 8/24/2024 2225 by Ben Lucia RN  Absence of Physical Injury: Implement safety measures based on patient assessment  8/24/2024 1200 by Kingston Seals RN  Outcome: Progressing     Problem: Nutrition Deficit:  Goal: Optimize nutritional status  8/24/2024 1200 by Kingston Seals RN  Outcome: Progressing     Problem: Skin/Tissue Integrity  Goal: Absence of new skin breakdown  Description: 1.  Monitor for areas of redness and/or skin breakdown  2.  Assess vascular access sites hourly  3.  Every 4-6 hours minimum:  Change oxygen saturation probe site  4.  Every 4-6 hours:  If on nasal continuous positive airway pressure, respiratory therapy assess nares and determine need for appliance change or resting period.  8/24/2024 1200 by Kingston Seals RN  Outcome: Progressing

## 2024-08-25 NOTE — PROGRESS NOTES
Occupational Therapy  OCCUPATIONAL THERAPY DAILY NOTE    Date:2024  Patient Name: Rosas Corbin  MRN: [id number]  : 1950  Room: [address] [place] [address]     Referring Practitioner: MD Jefferson  Diagnosis: Fell off ladder 8-10 feet 24; R acetabular fx, R L5 TP fx, 6th rib fx, retroperitoneal hematoma 24 ORIF R acetabulum &post op ileus  Additional Pertinent Hx: arthritis, acid reflux, BPH, hx COVID, mitral valve disorder  Restrictions/Precautions: Fall Risk, Spinal neutrality, TTWB RLE, , acetabular precautions ,2000 fluid restriction    Functional Assessment:   Date Status AE  Comments   Feeding 24  Independent      Grooming 24  Mod I      Standing           Oral Care 24   Mod I  W/c    Bathing 24  SBA      UB Dressing 24  Mod I  seated    LB Dressing 24  Mod I  Reacher     Footwear 24  Mod I  Sock aid, long shoe horn     Toileting 24    SBA      Homemaking 24   Sup Ww  Pt made grilled cheese sandwich including retrieving items from fridge along with opening/closing drawer for knife and spatula at ww level following TTWB RLE.  Pt stood entire time to prep and use augustin pan on stovetop, cutting sandwich and putting bread, cheese and margarine tub back into fridge using ww.  Pt continued to maintain TTWB RLE during entire task with good follow thru.      Functional Transfers / Balance:   Date Status DME  Comments   Sit Balance 24   Mod I  Demo;d sitting balance up in w/c and during therapeutic table top tasks.    Stand Balance 24  Sup  Ww    Demo'd standing balance during mobility, sit to stand and hmkg using ww following TTWB RLE.    [x] Tub  [x] Shower   Transfer 24  CGA/min A            Min A  Leg    Ext tub bench, 2inch wooden plank       Grab rails, indwelling shower seat    24 Left note for OTR/L spoke with patient about doing shower stall transfer Monday per patient request.       Commode   Transfer 24   Sup  Ww, 3

## 2024-08-25 NOTE — PROGRESS NOTES
[facility]  Comprehensive Progress Note    Subjective:      Rosas Corbin is a 74 y.o. male admitted to inpatient rehabilitation for impairments and activities limitations in ADLs and mobility secondary to pelvic fractures.      No acute events overnight. No CP, SOB, N/V. Excited for discharge home. Pain controlled at this time better with tizanidine.     The patient's medical records have been reviewed.    Scheduled Meds:lidocaine, 1 patch, Q12H  melatonin, 5 mg, Nightly  acetaminophen, 650 mg, 4 times per day  buPROPion, 150 mg, QAM  carvedilol, 12.5 mg, BID  losartan, 100 mg, Daily  pantoprazole, 40 mg, QAM AC  enoxaparin, 30 mg, BID  Vitamin D, 1,000 Units, Daily      Continuous Infusions:   sodium chloride       PRN Meds:tiZANidine, 4 mg, Q6H PRN  sodium chloride flush, 5-40 mL, PRN  sodium chloride, , PRN  ondansetron, 4 mg, Q8H PRN  acetaminophen, 650 mg, Q4H PRN  polyethylene glycol, 17 g, Daily PRN  traMADol, 50 mg, Q4H PRN         Objective:      Vitals:    08/24/24 1238 08/24/24 1308 08/24/24 1920 08/24/24 1953   BP:    (!) 142/64   Pulse:    72   Resp: 18 18 18 18   Temp:    98.1 °F (36.7 °C)   TempSrc:    Temporal   SpO2:    98%   Weight:       Height:         General appearance: alert, appears stated age, and cooperative, NAD  Head: Normocephalic, without obvious abnormality, atraumatic  Eyes: conjunctivae/corneas clear. PERRL, EOM's intact.   Neck: supple, symmetrical, trachea midline  Lungs: clear to auscultation bilaterally  Heart: regular rate and rhythm, S1, S2 normal  Abdomen: soft, non-tender, normal bowel sounds  Musculoskeletal: Range of motion abnormal right hip, hip precautions. ROM otherwise wnl BUE and LLE.   Skin: No rashes. Incision is healing, c/d/I.   Psych: Appropriate mood and affect   Neurologic: Awake, alert, no new neurologic deficits    Laboratory:    Lab Results   Component Value Date    WBC 4.6 08/22/2024    HGB 12.1 (L) 08/22/2024    HCT

## 2024-08-25 NOTE — PROGRESS NOTES
Physical Therapy  Treatment     Evaluating Therapist: Ibeth Tubbs, PT, DPT [id number]    ROOM: [address] [place] [address]  DIAGNOSIS: Multiple Trauma   PRECAUTIONS: Falls, TTWB R LE, spinal neutrality (L5 TP fx), R rib fx, adult diet 2000 ml  HPI: Patient is a 74 y.o. male who presents on 08/06/2024 as a trauma with a right acetabulum fracture, right L5 TP fracture and right 6th rib fracture after a fall from 8 to 10 feet off of a ladder.  Patient stated he was cutting tree limbs when he fell off of a ladder directly onto his right side.  Orthopedic was consulted. On 08/07/2024 patient underwent a right anterior column posterior hemitransverse acetabular fracture open reduction internal fixation.  Hospital course has been complicated by the development of post op ileus.  Patient had a bowel movement 08/12/2024.       PMH of GERD, BPH, HTN, mitral valve disorder, skin cancer, arthritis, L TKA 22'.     Social:  Pt lives in split level home with wife (unable to assist at [place], L brachial plexus injury per pt) and daughter (has MS, unable to assist per pt) with 3 DANITZA with 2 rails to living room, kitchen, full bathroom. Pt then has 7 steps up with 2 rails to main bedroom. Pt also reports he may borrow a ramp for entry steps from friend at [place].   Prior to admission: Pt was independent with no AD. Pt is retired from [employer]. Pt enjoys attending grandchildren's sporting events.      Initial Evaluation  Date: 8/13/24 AM     Short Term Goals Long Term Goals    Was pt agreeable to Eval/treatment? Yes  Yes      Does pt have pain? 3-4/10 R hip  R gluteal pain    ( 4/10)      Bed Mobility  Rolling: ModA  Supine to sit: ModA  Sit to supine: ModA  Scooting: ModA  (Assistance with B LE) NT SBA Modified Independent     Transfers Sit to stand: ModA  Stand to sit: ModA  Stand pivot: ModA with WW     Sit to stand supervision   Stand to sit supervision   Stand pivot: supervision  with FWW SBA with WW   Modified Independent  With WW     Ambulation    20

## 2024-08-25 NOTE — PLAN OF CARE
Problem: Discharge Planning  Goal: Discharge to home or other facility with appropriate resources  8/25/2024 1022 by Frank Capone RN  Outcome: Progressing  8/24/2024 2226 by Ben Lucia RN  Outcome: Progressing     Problem: Pain  Goal: Verbalizes/displays adequate comfort level or baseline comfort level  8/25/2024 1022 by Frank Capone RN  Outcome: Progressing  8/24/2024 2226 by Ben Lucia RN  Outcome: Progressing     Problem: Safety - Adult  Goal: Free from fall injury  8/25/2024 1022 by Frank Capone RN  Outcome: Progressing  8/24/2024 2226 by Ben Lucia RN  Outcome: Progressing     Problem: ABCDS Injury Assessment  Goal: Absence of physical injury  Outcome: Progressing  Flowsheets (Taken 8/24/2024 2225 by Ben Lucia, RN)  Absence of Physical Injury: Implement safety measures based on patient assessment     Problem: Nutrition Deficit:  Goal: Optimize nutritional status  Outcome: Progressing     Problem: Skin/Tissue Integrity  Goal: Absence of new skin breakdown  Description: 1.  Monitor for areas of redness and/or skin breakdown  2.  Assess vascular access sites hourly  3.  Every 4-6 hours minimum:  Change oxygen saturation probe site  4.  Every 4-6 hours:  If on nasal continuous positive airway pressure, respiratory therapy assess nares and determine need for appliance change or resting period.  Outcome: Progressing

## 2024-08-26 LAB
ANION GAP SERPL CALCULATED.3IONS-SCNC: 9 MMOL/L (ref 7–16)
BASOPHILS # BLD: 0.02 K/UL (ref 0–0.2)
BASOPHILS NFR BLD: 1 % (ref 0–2)
BUN SERPL-MCNC: 16 MG/DL (ref 6–23)
CALCIUM SERPL-MCNC: 9.6 MG/DL (ref 8.6–10.2)
CHLORIDE SERPL-SCNC: 98 MMOL/L (ref 98–107)
CO2 SERPL-SCNC: 27 MMOL/L (ref 22–29)
CREAT SERPL-MCNC: 0.8 MG/DL (ref 0.7–1.2)
EOSINOPHIL # BLD: 0.21 K/UL (ref 0.05–0.5)
EOSINOPHILS RELATIVE PERCENT: 6 % (ref 0–6)
ERYTHROCYTE [DISTWIDTH] IN BLOOD BY AUTOMATED COUNT: 14.4 % (ref 11.5–15)
GFR, ESTIMATED: >90 ML/MIN/1.73M2
GLUCOSE SERPL-MCNC: 96 MG/DL (ref 74–99)
HCT VFR BLD AUTO: 33.1 % (ref 37–54)
HGB BLD-MCNC: 11.4 G/DL (ref 12.5–16.5)
IMM GRANULOCYTES # BLD AUTO: <0.03 K/UL (ref 0–0.58)
IMM GRANULOCYTES NFR BLD: 1 % (ref 0–5)
LYMPHOCYTES NFR BLD: 0.97 K/UL (ref 1.5–4)
LYMPHOCYTES RELATIVE PERCENT: 27 % (ref 20–42)
MCH RBC QN AUTO: 34.7 PG (ref 26–35)
MCHC RBC AUTO-ENTMCNC: 34.4 G/DL (ref 32–34.5)
MCV RBC AUTO: 100.6 FL (ref 80–99.9)
MONOCYTES NFR BLD: 0.61 K/UL (ref 0.1–0.95)
MONOCYTES NFR BLD: 17 % (ref 2–12)
NEUTROPHILS NFR BLD: 49 % (ref 43–80)
NEUTS SEG NFR BLD: 1.75 K/UL (ref 1.8–7.3)
PLATELET # BLD AUTO: 216 K/UL (ref 130–450)
PMV BLD AUTO: 8.7 FL (ref 7–12)
POTASSIUM SERPL-SCNC: 4.3 MMOL/L (ref 3.5–5)
RBC # BLD AUTO: 3.29 M/UL (ref 3.8–5.8)
SODIUM SERPL-SCNC: 134 MMOL/L (ref 132–146)
WBC OTHER # BLD: 3.6 K/UL (ref 4.5–11.5)

## 2024-08-26 PROCEDURE — 6370000000 HC RX 637 (ALT 250 FOR IP)

## 2024-08-26 PROCEDURE — 36415 COLL VENOUS BLD VENIPUNCTURE: CPT

## 2024-08-26 PROCEDURE — 97110 THERAPEUTIC EXERCISES: CPT

## 2024-08-26 PROCEDURE — 6370000000 HC RX 637 (ALT 250 FOR IP): Performed by: PHYSICAL MEDICINE & REHABILITATION

## 2024-08-26 PROCEDURE — 97530 THERAPEUTIC ACTIVITIES: CPT

## 2024-08-26 PROCEDURE — 97535 SELF CARE MNGMENT TRAINING: CPT

## 2024-08-26 PROCEDURE — 80048 BASIC METABOLIC PNL TOTAL CA: CPT

## 2024-08-26 PROCEDURE — 1280000000 HC REHAB R&B

## 2024-08-26 PROCEDURE — 85025 COMPLETE CBC W/AUTO DIFF WBC: CPT

## 2024-08-26 PROCEDURE — 6360000002 HC RX W HCPCS

## 2024-08-26 RX ADMIN — TRAMADOL HYDROCHLORIDE 50 MG: 50 TABLET, FILM COATED ORAL at 05:49

## 2024-08-26 RX ADMIN — ACETAMINOPHEN 650 MG: 325 TABLET ORAL at 05:50

## 2024-08-26 RX ADMIN — Medication 5 MG: at 21:00

## 2024-08-26 RX ADMIN — ENOXAPARIN SODIUM 30 MG: 100 INJECTION SUBCUTANEOUS at 21:35

## 2024-08-26 RX ADMIN — TRAMADOL HYDROCHLORIDE 50 MG: 50 TABLET, FILM COATED ORAL at 21:01

## 2024-08-26 RX ADMIN — TIZANIDINE 4 MG: 4 TABLET ORAL at 21:01

## 2024-08-26 RX ADMIN — ENOXAPARIN SODIUM 30 MG: 100 INJECTION SUBCUTANEOUS at 08:49

## 2024-08-26 RX ADMIN — TRAMADOL HYDROCHLORIDE 50 MG: 50 TABLET, FILM COATED ORAL at 00:47

## 2024-08-26 RX ADMIN — PANTOPRAZOLE SODIUM 40 MG: 40 TABLET, DELAYED RELEASE ORAL at 05:49

## 2024-08-26 RX ADMIN — TIZANIDINE 4 MG: 4 TABLET ORAL at 11:38

## 2024-08-26 RX ADMIN — CARVEDILOL 12.5 MG: 6.25 TABLET, FILM COATED ORAL at 08:49

## 2024-08-26 RX ADMIN — TIZANIDINE 4 MG: 4 TABLET ORAL at 05:49

## 2024-08-26 RX ADMIN — TRAMADOL HYDROCHLORIDE 50 MG: 50 TABLET, FILM COATED ORAL at 11:38

## 2024-08-26 RX ADMIN — ACETAMINOPHEN 650 MG: 325 TABLET ORAL at 11:38

## 2024-08-26 RX ADMIN — CARVEDILOL 12.5 MG: 6.25 TABLET, FILM COATED ORAL at 21:01

## 2024-08-26 RX ADMIN — ACETAMINOPHEN 650 MG: 325 TABLET ORAL at 00:47

## 2024-08-26 RX ADMIN — Medication 1000 UNITS: at 08:49

## 2024-08-26 RX ADMIN — TRAMADOL HYDROCHLORIDE 50 MG: 50 TABLET, FILM COATED ORAL at 17:19

## 2024-08-26 RX ADMIN — LOSARTAN POTASSIUM 100 MG: 50 TABLET, FILM COATED ORAL at 08:49

## 2024-08-26 RX ADMIN — ACETAMINOPHEN 650 MG: 325 TABLET ORAL at 21:02

## 2024-08-26 RX ADMIN — BUPROPION HYDROCHLORIDE 150 MG: 150 TABLET, EXTENDED RELEASE ORAL at 08:50

## 2024-08-26 ASSESSMENT — PAIN SCALES - GENERAL
PAINLEVEL_OUTOF10: 4
PAINLEVEL_OUTOF10: 7
PAINLEVEL_OUTOF10: 7
PAINLEVEL_OUTOF10: 0
PAINLEVEL_OUTOF10: 3
PAINLEVEL_OUTOF10: 7
PAINLEVEL_OUTOF10: 4
PAINLEVEL_OUTOF10: 6
PAINLEVEL_OUTOF10: 0
PAINLEVEL_OUTOF10: 7
PAINLEVEL_OUTOF10: 7

## 2024-08-26 ASSESSMENT — PAIN DESCRIPTION - DESCRIPTORS
DESCRIPTORS: ACHING;DISCOMFORT
DESCRIPTORS: ACHING;DISCOMFORT
DESCRIPTORS: ACHING;CRAMPING
DESCRIPTORS: ACHING

## 2024-08-26 ASSESSMENT — PAIN SCALES - WONG BAKER
WONGBAKER_NUMERICALRESPONSE: NO HURT

## 2024-08-26 ASSESSMENT — PAIN DESCRIPTION - ORIENTATION
ORIENTATION: RIGHT

## 2024-08-26 ASSESSMENT — PAIN DESCRIPTION - LOCATION
LOCATION: LEG;HIP
LOCATION: HIP

## 2024-08-26 NOTE — PLAN OF CARE
Problem: Discharge Planning  Goal: Discharge to home or other facility with appropriate resources  8/26/2024 1321 by Alex Schmidt RN  Outcome: Progressing  Flowsheets (Taken 8/26/2024 0815)  Discharge to home or other facility with appropriate resources: Identify barriers to discharge with patient and caregiver  8/26/2024 0003 by Sushila Pierce RN  Outcome: Progressing     Problem: Pain  Goal: Verbalizes/displays adequate comfort level or baseline comfort level  8/26/2024 1321 by Alex Schmidt RN  Outcome: Progressing  8/26/2024 0003 by Sushila Pierce RN  Outcome: Progressing     Problem: Safety - Adult  Goal: Free from fall injury  8/26/2024 1321 by Alex Schmidt RN  Outcome: Progressing  8/26/2024 0003 by Sushila Pierce RN  Outcome: Progressing     Problem: ABCDS Injury Assessment  Goal: Absence of physical injury  8/26/2024 1321 by Alex Schmidt RN  Outcome: Progressing  Flowsheets (Taken 8/26/2024 0847)  Absence of Physical Injury: Implement safety measures based on patient assessment  8/26/2024 0003 by Sushila Pierce RN  Outcome: Progressing     Problem: Nutrition Deficit:  Goal: Optimize nutritional status  8/26/2024 1321 by Alex Schmidt RN  Outcome: Progressing  8/26/2024 0003 by Sushila Pierce RN  Outcome: Progressing     Problem: Skin/Tissue Integrity  Goal: Absence of new skin breakdown  Description: 1.  Monitor for areas of redness and/or skin breakdown  2.  Assess vascular access sites hourly  3.  Every 4-6 hours minimum:  Change oxygen saturation probe site  4.  Every 4-6 hours:  If on nasal continuous positive airway pressure, respiratory therapy assess nares and determine need for appliance change or resting period.  8/26/2024 1321 by Alex Schmidt RN  Outcome: Progressing  8/26/2024 0003 by Sushila Pierce RN  Outcome: Progressing

## 2024-08-26 NOTE — PLAN OF CARE
Problem: Discharge Planning  Goal: Discharge to home or other facility with appropriate resources  8/26/2024 0003 by Sushila Pierce RN  Outcome: Progressing  8/25/2024 1022 by Frank Capone RN  Outcome: Progressing     Problem: Pain  Goal: Verbalizes/displays adequate comfort level or baseline comfort level  8/26/2024 0003 by Sushila Pierce RN  Outcome: Progressing  8/25/2024 1022 by Frank Capone RN  Outcome: Progressing     Problem: Safety - Adult  Goal: Free from fall injury  8/26/2024 0003 by Sushila Pierce RN  Outcome: Progressing  8/25/2024 1022 by Frank Capone RN  Outcome: Progressing     Problem: ABCDS Injury Assessment  Goal: Absence of physical injury  8/26/2024 0003 by Sushila Pierce RN  Outcome: Progressing  8/25/2024 1022 by Frank Capone RN  Outcome: Progressing  Flowsheets (Taken 8/24/2024 2225 by Ben Lucia RN)  Absence of Physical Injury: Implement safety measures based on patient assessment     Problem: Nutrition Deficit:  Goal: Optimize nutritional status  8/26/2024 0003 by Sushila Pierce RN  Outcome: Progressing  8/25/2024 1022 by Frank Capone RN  Outcome: Progressing     Problem: Skin/Tissue Integrity  Goal: Absence of new skin breakdown  Description: 1.  Monitor for areas of redness and/or skin breakdown  2.  Assess vascular access sites hourly  3.  Every 4-6 hours minimum:  Change oxygen saturation probe site  4.  Every 4-6 hours:  If on nasal continuous positive airway pressure, respiratory therapy assess nares and determine need for appliance change or resting period.  8/26/2024 0003 by Sushila Pierce RN  Outcome: Progressing  8/25/2024 1022 by Frank Capone RN  Outcome: Progressing

## 2024-08-26 NOTE — PROGRESS NOTES
Physical Therapy  Treatment     Evaluating Therapist: Ibeth Tubbs, PT, DPT [id number]    ROOM: [address] [place] [address]  DIAGNOSIS: Multiple Trauma   PRECAUTIONS: Falls, TTWB R LE, spinal neutrality (L5 TP fx), R rib fx, adult diet 2000 ml  HPI: Patient is a 74 y.o. male who presents on 08/06/2024 as a trauma with a right acetabulum fracture, right L5 TP fracture and right 6th rib fracture after a fall from 8 to 10 feet off of a ladder.  Patient stated he was cutting tree limbs when he fell off of a ladder directly onto his right side.  Orthopedic was consulted. On 08/07/2024 patient underwent a right anterior column posterior hemitransverse acetabular fracture open reduction internal fixation.  Hospital course has been complicated by the development of post op ileus.  Patient had a bowel movement 08/12/2024.       PMH of GERD, BPH, HTN, mitral valve disorder, skin cancer, arthritis, L TKA 22'.     Social:  Pt lives in split level home with wife (unable to assist at [place], L brachial plexus injury per pt) and daughter (has MS, unable to assist per pt) with 3 DANITZA with 2 rails to living room, kitchen, full bathroom. Pt then has 7 steps up with 2 rails to main bedroom. Pt also reports he may borrow a ramp for entry steps from friend at [place].   Prior to admission: Pt was independent with no AD. Pt is retired from [employer]. Pt enjoys attending grandchildren's sporting events.      Initial Evaluation  Date: 8/13/24 AM     PM Short Term Goals Long Term Goals    Was pt agreeable to Eval/treatment? Yes  Yes  yes     Does pt have pain? 3-4/10 R hip  R hip pain (anterior) 1x R hamstring cramp following 10x standing hamstring curls     Bed Mobility  Rolling: ModA  Supine to sit: ModA  Sit to supine: ModA  Scooting: ModA  (Assistance with B LE) Rolling: Sup  Supine to sit: Sup (leg )  Sit to supine: Sup (leg )  Scooting: Sup   NT SBA Modified Independent     Transfers Sit to stand: ModA  Stand to sit: ModA  Stand pivot: ModA with  WW     Sit to stand: supervision   Stand to sit: supervision   Stand pivot: supervision with FWW Sit to stand: supervision   Stand to sit: supervision   Stand pivot: supervision with FWW SBA with WW   Modified Independent  With WW     Ambulation    20 feet x2 reps, 10 feet x1 rep with WW with ModA       WC follow     10mWT: NT  6mWT: NT 1x10', 2x20', 1x35' with ww with supervision  20 feet x2 reps with FWW with supervision            10mWT: NT  6mWT:  feet with WW with SBA    10mWT: TBD  6mWT: TBD >250 feet with WW Modified Independent    10mWT: TBD  6mWT: TBD   Walking 10 feet on uneven surface NT NT NT 10 feet with WW with CGA 10 feet with WW Modified Independent   Wheel Chair Mobility 100 feet with B UE and L LE  feet x 2 with B UE and L LE with mod I  150 feet x 2 with B UE and L LE with mod I       > 300 feet Modified Independent     Car Transfers NT NT NT CGA Modified Independent     Stair negotiation: ascended and descended  NT NT NT 4-8 steps with 2 rails with Tanvir 8-12 steps with 2 rails Modified Independent   Curb Step:   ascended and descended NT NT NT 4 inch step with WW and CGA 4 inch step with WW Modified Independent     Picking up object off the floor NT NT NT Will  cone/shoe with reacher with CG assist Will  cone/shoe with reacher with no assist   BLE ROM WFL, R LE limited by pain  NT NT     BLE Strength R LE limited by pain, precautions     L LE: 4+/5  NT NT  Improve 1 MMT   Balance  Dynamic sitting: supervision at EOB    Dynamic standing: ModA at WW    BBS: NT   NT NT CG/SBA with WW for dynamic mobility     BBS: TBD   Modified Independent with WW for dynamic mobility     BBS: TBD     Date Family Teach Completed None to date  None to date  None to date      Is additional Family Teaching Needed?  Y or N Y  Y  Y      Hindering Progress Pain, WB restrictions  Pain, WB restrictions Pain, WB restrictions     PT recommended ELOS 2 weeks TBD 08/29/2024     Team's Discharge Plan

## 2024-08-26 NOTE — PROGRESS NOTES
Occupational Therapy  OCCUPATIONAL THERAPY DAILY NOTE    Date:2024  Patient Name: Rosas Corbin  MRN: [id number]  : 1950  Room: [address] [place] [address]     Referring Practitioner: MD Jefferson  Diagnosis: Fell off ladder 8-10 feet 24; R acetabular fx, R L5 TP fx, 6th rib fx, retroperitoneal hematoma 24 ORIF R acetabulum &post op ileus  Additional Pertinent Hx: arthritis, acid reflux, BPH, hx COVID, mitral valve disorder  Restrictions/Precautions: Fall Risk, Spinal neutrality, TTWB RLE, , acetabular precautions ,2000 fluid restriction    Functional Assessment:   Date Status AE  Comments   Feeding 24  Independent      Grooming 24  Mod I      Standing  Pt washed face and hands and combed hair seated at sink.Pt needed assist to wash hair seated leaning over sink          Oral Care 24   Mod I  W/c    Bathing 24  SBA      UB Dressing 24  Mod I  seated    LB Dressing 24  Mod I  Reacher     Footwear 24  Mod I  Sock aid, long shoe horn     Toileting 24    Mod I   Pt completed hygiene and clothing management following BM at Mod I    Homemaking 24   Sup Ww       Functional Transfers / Balance:   Date Status DME  Comments   Sit Balance 24   Mod I     Stand Balance 24 SBA/Mod I  Ww       [x] Tub  [x] Shower   Transfer 24  CGA/min A            CGA  Leg    ETB         Ww,ETB,leg             Transfer in and out of walk in shower using extended tub bench at CGA for balance. Pt needed steady assist with sit to stand off of tub bench surface    Commode   Transfer 24  Distant SUP  Ww, 3 in 1 commode placed over standard commode    Functional   Mobility 24   Distant SUP     Mod I  Ww    W/c  Back and forth to the bathroom   Short distance in hallway    Other: sit to stand from  w/c to ww     Bed mobility sit to supine         On/off recliner  24  Mod I       SBA            SBA   Ww      Ww  Leg           Ww       Functional Exercises / Activity:       Sensory / Neuromuscular Re-Education:      Cognitive Skills:   Status Comments   Problem   Solving good- Demo;d during sit to stand, standing balance, mobility and hmkg     Memory good- \"   Sequencing good- \"   Safety good- \"     Visual Perception:    Education:  Pt educated on safe transfers in and out of walk in shower and types of DME available .  Pt education is ongoing.     [x] Family teach completed on:8/26/24 Pt's Wife present during pm OT session.She was educated on AE that pt is using for LB dressing, and pt's current wt bearing restrictions and precautions. She observed pt completing functional transfers and mobility throughout the room and bathroom with ww. Discussed current DME that they have for walk in shower and Wife confirms that it is an extended tub bench. Pt and his Wife were educated on how to set up extended tub bench so that it can be used in the walk in shower at home.Pt and Wife verbalized good understanding.     Pain Level: R groin pain 4/10.    Additional Notes:       Patient has made good  progress during treatment sessions toward set goals. Therapy emphasis to obtain goals:Current Treatment Recommendations: Strengthening, Coordination training, ROM, Balance training, Self-Care / ADL, Functional mobility training, Safety education & training, Home management training, Endurance training, Patient/Caregiver education & training, Gait training, Neuromuscular re-education, Equipment evaluation, education, & procurement, Positioning       [x] Continue with current OT Plan of care.  [] Prepare for Discharge  Goals  Long Term Goals  Time Frame for Long term goals : 2 weeks to address above problem areas  Long Term Goal 1: Pt demo  independent to eat all meals  Long Term Goal 2: Pt demo  Mod I grooming seated & or standing @ sink level & demo  safety  Long Term Goal 3: Pt demo Sup to bathe @ shower level  & or sponge

## 2024-08-27 PROCEDURE — 6360000002 HC RX W HCPCS

## 2024-08-27 PROCEDURE — 6370000000 HC RX 637 (ALT 250 FOR IP): Performed by: PHYSICAL MEDICINE & REHABILITATION

## 2024-08-27 PROCEDURE — 97530 THERAPEUTIC ACTIVITIES: CPT

## 2024-08-27 PROCEDURE — 1280000000 HC REHAB R&B

## 2024-08-27 PROCEDURE — 97110 THERAPEUTIC EXERCISES: CPT

## 2024-08-27 PROCEDURE — 6370000000 HC RX 637 (ALT 250 FOR IP)

## 2024-08-27 PROCEDURE — 97535 SELF CARE MNGMENT TRAINING: CPT

## 2024-08-27 PROCEDURE — 99232 SBSQ HOSP IP/OBS MODERATE 35: CPT | Performed by: PHYSICAL MEDICINE & REHABILITATION

## 2024-08-27 RX ADMIN — TRAMADOL HYDROCHLORIDE 50 MG: 50 TABLET, FILM COATED ORAL at 11:46

## 2024-08-27 RX ADMIN — BUPROPION HYDROCHLORIDE 150 MG: 150 TABLET, EXTENDED RELEASE ORAL at 08:45

## 2024-08-27 RX ADMIN — TIZANIDINE 4 MG: 4 TABLET ORAL at 21:17

## 2024-08-27 RX ADMIN — CARVEDILOL 12.5 MG: 6.25 TABLET, FILM COATED ORAL at 21:18

## 2024-08-27 RX ADMIN — Medication 1000 UNITS: at 08:45

## 2024-08-27 RX ADMIN — POLYETHYLENE GLYCOL 3350 17 G: 17 POWDER, FOR SOLUTION ORAL at 15:14

## 2024-08-27 RX ADMIN — ACETAMINOPHEN 650 MG: 325 TABLET ORAL at 11:42

## 2024-08-27 RX ADMIN — TRAMADOL HYDROCHLORIDE 50 MG: 50 TABLET, FILM COATED ORAL at 21:17

## 2024-08-27 RX ADMIN — Medication 5 MG: at 21:17

## 2024-08-27 RX ADMIN — TIZANIDINE 4 MG: 4 TABLET ORAL at 06:08

## 2024-08-27 RX ADMIN — LOSARTAN POTASSIUM 100 MG: 50 TABLET, FILM COATED ORAL at 08:45

## 2024-08-27 RX ADMIN — TRAMADOL HYDROCHLORIDE 50 MG: 50 TABLET, FILM COATED ORAL at 06:08

## 2024-08-27 RX ADMIN — PANTOPRAZOLE SODIUM 40 MG: 40 TABLET, DELAYED RELEASE ORAL at 06:07

## 2024-08-27 RX ADMIN — ENOXAPARIN SODIUM 30 MG: 100 INJECTION SUBCUTANEOUS at 08:45

## 2024-08-27 RX ADMIN — ACETAMINOPHEN 650 MG: 325 TABLET ORAL at 02:15

## 2024-08-27 RX ADMIN — ACETAMINOPHEN 650 MG: 325 TABLET ORAL at 06:09

## 2024-08-27 RX ADMIN — ACETAMINOPHEN 650 MG: 325 TABLET ORAL at 21:19

## 2024-08-27 RX ADMIN — TIZANIDINE 4 MG: 4 TABLET ORAL at 02:16

## 2024-08-27 RX ADMIN — TRAMADOL HYDROCHLORIDE 50 MG: 50 TABLET, FILM COATED ORAL at 02:17

## 2024-08-27 RX ADMIN — TIZANIDINE 4 MG: 4 TABLET ORAL at 12:37

## 2024-08-27 RX ADMIN — ENOXAPARIN SODIUM 30 MG: 100 INJECTION SUBCUTANEOUS at 21:17

## 2024-08-27 RX ADMIN — CARVEDILOL 12.5 MG: 6.25 TABLET, FILM COATED ORAL at 08:45

## 2024-08-27 ASSESSMENT — PAIN SCALES - WONG BAKER
WONGBAKER_NUMERICALRESPONSE: NO HURT

## 2024-08-27 ASSESSMENT — PAIN DESCRIPTION - ORIENTATION
ORIENTATION: RIGHT

## 2024-08-27 ASSESSMENT — PAIN DESCRIPTION - DESCRIPTORS
DESCRIPTORS: ACHING;DISCOMFORT;SORE
DESCRIPTORS: ACHING
DESCRIPTORS: ACHING;DISCOMFORT
DESCRIPTORS: ACHING

## 2024-08-27 ASSESSMENT — PAIN SCALES - GENERAL
PAINLEVEL_OUTOF10: 7
PAINLEVEL_OUTOF10: 0
PAINLEVEL_OUTOF10: 7
PAINLEVEL_OUTOF10: 0
PAINLEVEL_OUTOF10: 6

## 2024-08-27 ASSESSMENT — PAIN DESCRIPTION - LOCATION
LOCATION: LEG;HIP
LOCATION: HIP
LOCATION: HIP;LEG

## 2024-08-27 ASSESSMENT — PAIN - FUNCTIONAL ASSESSMENT: PAIN_FUNCTIONAL_ASSESSMENT: PREVENTS OR INTERFERES SOME ACTIVE ACTIVITIES AND ADLS

## 2024-08-27 ASSESSMENT — PAIN DESCRIPTION - PAIN TYPE: TYPE: SURGICAL PAIN

## 2024-08-27 NOTE — PROGRESS NOTES
Attempted to complete lovenox education for self administration as pt is to go home on medication.  Pt stated that his daughter that lives with him is a nurse and that she will be administering the medication.  Pt was not interested in learning how to self administer at this time due to this reason.

## 2024-08-27 NOTE — PLAN OF CARE
Problem: Discharge Planning  Goal: Discharge to home or other facility with appropriate resources  8/27/2024 1011 by Kingston Seals RN  Outcome: Progressing  8/26/2024 2251 by Sushila Pierce RN  Outcome: Progressing     Problem: Pain  Goal: Verbalizes/displays adequate comfort level or baseline comfort level  8/27/2024 1011 by Kingston Seals RN  Outcome: Progressing  8/26/2024 2251 by Sushila Pierce RN  Outcome: Progressing     Problem: Safety - Adult  Goal: Free from fall injury  8/27/2024 1011 by Kingston Seals RN  Outcome: Progressing  8/26/2024 2251 by Sushila Pierce RN  Outcome: Progressing     Problem: ABCDS Injury Assessment  Goal: Absence of physical injury  8/27/2024 1011 by Kingston Seals RN  Outcome: Progressing  8/26/2024 2251 by Sushila Pierce RN  Outcome: Progressing     Problem: Nutrition Deficit:  Goal: Optimize nutritional status  8/27/2024 1011 by Kingston Seals RN  Outcome: Progressing  8/26/2024 2251 by Sushila Pierce RN  Outcome: Progressing     Problem: Skin/Tissue Integrity  Goal: Absence of new skin breakdown  Description: 1.  Monitor for areas of redness and/or skin breakdown  2.  Assess vascular access sites hourly  3.  Every 4-6 hours minimum:  Change oxygen saturation probe site  4.  Every 4-6 hours:  If on nasal continuous positive airway pressure, respiratory therapy assess nares and determine need for appliance change or resting period.  8/27/2024 1011 by Kingston Seals RN  Outcome: Progressing  8/26/2024 2251 by Sushial Pierce RN  Outcome: Progressing

## 2024-08-27 NOTE — PLAN OF CARE
Problem: Discharge Planning  Goal: Discharge to home or other facility with appropriate resources  8/26/2024 2251 by Sushila Pierce RN  Outcome: Progressing  8/26/2024 1321 by Alex Schmidt RN  Outcome: Progressing  Flowsheets (Taken 8/26/2024 0815)  Discharge to home or other facility with appropriate resources: Identify barriers to discharge with patient and caregiver     Problem: Pain  Goal: Verbalizes/displays adequate comfort level or baseline comfort level  8/26/2024 2251 by Sushila Pierce RN  Outcome: Progressing  8/26/2024 1321 by Alex Schmidt RN  Outcome: Progressing     Problem: Safety - Adult  Goal: Free from fall injury  8/26/2024 2251 by Sushila Pierce RN  Outcome: Progressing  8/26/2024 1321 by Alex Schmidt RN  Outcome: Progressing     Problem: ABCDS Injury Assessment  Goal: Absence of physical injury  8/26/2024 2251 by Sushila Pierce RN  Outcome: Progressing  8/26/2024 1321 by Alex Schmidt RN  Outcome: Progressing  Flowsheets (Taken 8/26/2024 0847)  Absence of Physical Injury: Implement safety measures based on patient assessment     Problem: Nutrition Deficit:  Goal: Optimize nutritional status  8/26/2024 2251 by Sushila Pierce RN  Outcome: Progressing  8/26/2024 1321 by Alex Schmidt RN  Outcome: Progressing     Problem: Skin/Tissue Integrity  Goal: Absence of new skin breakdown  Description: 1.  Monitor for areas of redness and/or skin breakdown  2.  Assess vascular access sites hourly  3.  Every 4-6 hours minimum:  Change oxygen saturation probe site  4.  Every 4-6 hours:  If on nasal continuous positive airway pressure, respiratory therapy assess nares and determine need for appliance change or resting period.  8/26/2024 2251 by Sushila Pierce RN  Outcome: Progressing  8/26/2024 1321 by Alex Schmidt RN  Outcome: Progressing

## 2024-08-27 NOTE — PROGRESS NOTES
Occupational Therapy  OCCUPATIONAL THERAPY DAILY NOTE    Date:2024  Patient Name: Rosas Corbin  MRN: [id number]  : 1950  Room: [address] [place], [place] [address]     Referring Practitioner: MD Jefferson  Diagnosis: Fell off ladder 8-10 feet 24; R acetabular fx, R L5 TP fx, 6th rib fx, retroperitoneal hematoma 24 ORIF R acetabulum &post op ileus  Additional Pertinent Hx: arthritis, acid reflux, BPH, hx COVID, mitral valve disorder  Restrictions/Precautions: Fall Risk, Spinal neutrality, TTWB RLE, , acetabular precautions ,2000 fluid restriction  GREEN DOT for room issued on .    Functional Assessment:   Date Status AE  Comments   Feeding 24  Independent      Grooming 24  Mod I      Standing           Oral Care 24   Mod I  W/c    Bathing 24  SBA      UB Dressing 24  Mod I  seated    LB Dressing 24  Mod I  Reacher     Footwear 24  Mod I  Sock aid, long shoe horn     Toileting 24    Mod I      Homemaking 24   Mod I/Sup Ww  Pt engaged in simple hmkg task at ww level removing and placing back various clothes on hangers following TTWB RLE to increase dyn standing balance and strengthening.      Functional Transfers / Balance:   Date Status DME  Comments   Sit Balance 24   Independent   Demo'd sitting balance on ext tub bench, up in w/c and firm recliner.   Stand Balance 24 Mod I/Sup Ww    Demo'd standing balance during mobility, sit to stand and hmkg.    [x] Tub  [x] Shower   Transfer 24  CGA/min A            S/SBA Leg    ETB         Ww,ETB,leg    Grab bar            Pt completed ww on/off ext tub bench placed in  shower stall needing SBA and one cue for hand placement when elevating or sitting down on edge of bench. Pt used trunk scooting over method and able to manage RLE when turning around to follow precautions.  Pt declined using leg  with RLE.    Commode   Transfer 24  Distant SUP  Ww, 3 in 1 commode placed over  current DME that they have for walk in shower and Wife confirms that it is an extended tub bench. Pt and his Wife were educated on how to set up extended tub bench so that it can be used in the walk in shower at home.Pt and Wife verbalized good understanding.     Pain Level: R groin pain 3/10 and pain patch put on by Nurse Corona.     Additional Notes:       Patient has made good  progress during treatment sessions toward set goals. Therapy emphasis to obtain goals:Current Treatment Recommendations: Strengthening, Coordination training, ROM, Balance training, Self-Care / ADL, Functional mobility training, Safety education & training, Home management training, Endurance training, Patient/Caregiver education & training, Gait training, Neuromuscular re-education, Equipment evaluation, education, & procurement, Positioning       [x] Continue with current OT Plan of care.  [] Prepare for Discharge  Goals  Long Term Goals  Time Frame for Long term goals : 2 weeks to address above problem areas  Long Term Goal 1: Pt demo  independent to eat all meals  Long Term Goal 2: Pt demo  Mod I grooming seated & or standing @ sink level & demo  safety  Long Term Goal 3: Pt demo Sup to bathe @ shower level  & or sponge bathing both seated & standing & demo G- safety & insight  Long Term Goal 4: Pt demo I UE & Mod I LE dress to don underwear, pants, socks & shoes using AE as needed & demo G- safety & insight  Long Term Goal 5: Pt demo Mod I commode trf using appropriate DME to ensure pt safety. Pt demo Mod I toileting & demo G- safety & insight  Long Term Goal 6: Pt demo SBA walk in shower using appropriate DME to esnure pt safety & pt demo G- safety & insight  Long Term Goal 7: Pt demo Mod I light homemaking activity & demo G- safety & insight  Long Term Goal 8: Pt demo G- endurance for a 30 minute functional activity  Long Term Goal 9: Pt will state & adhere to precautions & TTWB RLE with all ADLs, IADLs, transfers & mobility with a

## 2024-08-27 NOTE — PROGRESS NOTES
[facility]  Comprehensive Progress Note    Subjective:      Rosas Corbin is a 74 y.o. male admitted to inpatient rehabilitation for impairments and activities limitations in ADLs and mobility secondary to pelvic fractures.      No acute events overnight. No CP, SOB, N/V. Pain is now well controlled. No new complaints. He is looking forward to discharge.     The patient's medical records have been reviewed.    Scheduled Meds:lidocaine, 1 patch, Q12H  melatonin, 5 mg, Nightly  acetaminophen, 650 mg, 4 times per day  buPROPion, 150 mg, QAM  carvedilol, 12.5 mg, BID  losartan, 100 mg, Daily  pantoprazole, 40 mg, QAM AC  enoxaparin, 30 mg, BID  Vitamin D, 1,000 Units, Daily      Continuous Infusions:   sodium chloride       PRN Meds:tiZANidine, 4 mg, Q6H PRN  sodium chloride flush, 5-40 mL, PRN  sodium chloride, , PRN  ondansetron, 4 mg, Q8H PRN  acetaminophen, 650 mg, Q4H PRN  polyethylene glycol, 17 g, Daily PRN  traMADol, 50 mg, Q4H PRN         Objective:      Vitals:    08/27/24 0247 08/27/24 0608 08/27/24 0638 08/27/24 0830   BP:    (!) 151/78   Pulse:    70   Resp: 18 18 18 20   Temp:    97.4 °F (36.3 °C)   TempSrc:    Temporal   SpO2:    99%   Weight:       Height:         General appearance: alert, appears stated age, and cooperative, NAD  Head: Normocephalic, without obvious abnormality, atraumatic  Eyes: conjunctivae/corneas clear. PERRL, EOM's intact.   Neck: supple, symmetrical, trachea midline  Lungs: clear to auscultation bilaterally  Heart: regular rate and rhythm, S1, S2 normal  Abdomen: soft, non-tender, normal bowel sounds  Musculoskeletal: Range of motion abnormal right hip, hip precautions. ROM otherwise wnl BUE and LLE.   Skin: No rashes. Incision is healing, c/d/I.   Psych: Appropriate mood and affect   Neurologic: Awake, alert, SILT BUE and BLE. Strength 5/5 in BUE; 4/5 throughout LLE; 2/5 right HF and KE; 4/5 right ankle DF and PF.       Laboratory:    Lab

## 2024-08-27 NOTE — PROGRESS NOTES
Physical Therapy  Treatment     Evaluating Therapist: Ibeth Tubbs, PT, DPT [id number]    ROOM: [address] [place] [address]  DIAGNOSIS: Multiple Trauma   PRECAUTIONS: Falls, TTWB R LE, spinal neutrality (L5 TP fx), R rib fx, adult diet 2000 ml  HPI: Patient is a 74 y.o. male who presents on 08/06/2024 as a trauma with a right acetabulum fracture, right L5 TP fracture and right 6th rib fracture after a fall from 8 to 10 feet off of a ladder.  Patient stated he was cutting tree limbs when he fell off of a ladder directly onto his right side.  Orthopedic was consulted. On 08/07/2024 patient underwent a right anterior column posterior hemitransverse acetabular fracture open reduction internal fixation.  Hospital course has been complicated by the development of post op ileus.  Patient had a bowel movement 08/12/2024.       PMH of GERD, BPH, HTN, mitral valve disorder, skin cancer, arthritis, L TKA 22'.     Social:  Pt lives in split level home with wife (unable to assist at [place], L brachial plexus injury per pt) and daughter (has MS, unable to assist per pt) with 3 DANITZA with 2 rails to living room, kitchen, full bathroom. Pt then has 7 steps up with 2 rails to main bedroom. Pt also reports he may borrow a ramp for entry steps from friend at [place].   Prior to admission: Pt was independent with no AD. Pt is retired from [employer]. Pt enjoys attending grandchildren's sporting events.      Initial Evaluation  Date: 8/13/24 AM     PM Short Term Goals Long Term Goals    Was pt agreeable to Eval/treatment? Yes  Yes  Yes      Does pt have pain? 3-4/10 R hip  Mild R hip pain ( no complaints with activity)  Back pain      Bed Mobility  Rolling: ModA  Supine to sit: ModA  Sit to supine: ModA  Scooting: ModA  (Assistance with B LE) Sit to supine mod I   Supine to sit mod I   Scooting mod I     With leg   NT SBA Modified Independent     Transfers Sit to stand: ModA  Stand to sit: ModA  Stand pivot: ModA with WW     Sit to stand mod I   Stand to sit  mod I   Stand pivot:  mod I   with FWW Sit to stand mod I  Stand to sit mod I   Stand pivot with ww with mod I  SBA with WW   Modified Independent  With WW     Ambulation    20 feet x2 reps, 10 feet x1 rep with WW with ModA       WC follow     10mWT: NT  6mWT: NT 10 feet -15 feet x 2 with ww with supervision  20 feet x 2 with ww with supervision  150 feet with WW with SBA    10mWT: TBD  6mWT: TBD >250 feet with WW Modified Independent    10mWT: TBD  6mWT: TBD   Walking 10 feet on uneven surface NT NT NT 10 feet with WW with CGA 10 feet with WW Modified Independent   Wheel Chair Mobility 100 feet with B UE and L LE  feet x 2 with B UE and L LE with mod I  150 feet and 200 feet x 2 with B UE and L LE mod I   > 300 feet Modified Independent     Car Transfers NT Min A with R LE management  NT CGA Modified Independent     Stair negotiation: ascended and descended  NT NT NT 4-8 steps with 2 rails with Tanvir 8-12 steps with 2 rails Modified Independent   Curb Step:   ascended and descended NT NT  4 inch step with WW and CGA 4 inch step with WW Modified Independent     Picking up object off the floor NT NT  Will  cone/shoe with reacher with CG assist Will  cone/shoe with reacher with no assist   BLE ROM WFL, R LE limited by pain  NT      BLE Strength R LE limited by pain, precautions     L LE: 4+/5  NT   Improve 1 MMT   Balance  Dynamic sitting: supervision at EOB    Dynamic standing: ModA at WW    BBS: NT   NT  CG/SBA with WW for dynamic mobility     BBS: TBD   Modified Independent with WW for dynamic mobility     BBS: TBD     Date Family Teach Completed None to date  None to date       Is additional Family Teaching Needed?  Y or N Y  Y       Hindering Progress Pain, WB restrictions  Pain, WB restrictions      PT recommended ELOS 2 weeks TBD      Team's Discharge Plan        Therapist at Team Meeting              Therapeutic Activity:   AM  Standing exercises at ww  Toes raises( L LE 2x10)   Hamstring

## 2024-08-28 PROCEDURE — 97110 THERAPEUTIC EXERCISES: CPT

## 2024-08-28 PROCEDURE — 97535 SELF CARE MNGMENT TRAINING: CPT

## 2024-08-28 PROCEDURE — 97530 THERAPEUTIC ACTIVITIES: CPT

## 2024-08-28 PROCEDURE — 6370000000 HC RX 637 (ALT 250 FOR IP)

## 2024-08-28 PROCEDURE — 99232 SBSQ HOSP IP/OBS MODERATE 35: CPT | Performed by: PHYSICAL MEDICINE & REHABILITATION

## 2024-08-28 PROCEDURE — 6370000000 HC RX 637 (ALT 250 FOR IP): Performed by: PHYSICAL MEDICINE & REHABILITATION

## 2024-08-28 PROCEDURE — 6360000002 HC RX W HCPCS

## 2024-08-28 PROCEDURE — 1280000000 HC REHAB R&B

## 2024-08-28 RX ORDER — CALCIUM CARBONATE 500 MG/1
500 TABLET, CHEWABLE ORAL 3 TIMES DAILY PRN
Status: DISCONTINUED | OUTPATIENT
Start: 2024-08-28 | End: 2024-08-29 | Stop reason: HOSPADM

## 2024-08-28 RX ADMIN — Medication 5 MG: at 22:03

## 2024-08-28 RX ADMIN — LOSARTAN POTASSIUM 100 MG: 50 TABLET, FILM COATED ORAL at 08:14

## 2024-08-28 RX ADMIN — TRAMADOL HYDROCHLORIDE 50 MG: 50 TABLET, FILM COATED ORAL at 22:08

## 2024-08-28 RX ADMIN — Medication 1000 UNITS: at 08:14

## 2024-08-28 RX ADMIN — TIZANIDINE 4 MG: 4 TABLET ORAL at 06:21

## 2024-08-28 RX ADMIN — TRAMADOL HYDROCHLORIDE 50 MG: 50 TABLET, FILM COATED ORAL at 06:21

## 2024-08-28 RX ADMIN — POLYETHYLENE GLYCOL 3350 17 G: 17 POWDER, FOR SOLUTION ORAL at 11:33

## 2024-08-28 RX ADMIN — ACETAMINOPHEN 650 MG: 325 TABLET ORAL at 22:03

## 2024-08-28 RX ADMIN — ACETAMINOPHEN 650 MG: 325 TABLET ORAL at 06:21

## 2024-08-28 RX ADMIN — CALCIUM CARBONATE 500 MG: 500 TABLET, CHEWABLE ORAL at 09:16

## 2024-08-28 RX ADMIN — CARVEDILOL 12.5 MG: 6.25 TABLET, FILM COATED ORAL at 22:03

## 2024-08-28 RX ADMIN — TIZANIDINE 4 MG: 4 TABLET ORAL at 22:08

## 2024-08-28 RX ADMIN — PANTOPRAZOLE SODIUM 40 MG: 40 TABLET, DELAYED RELEASE ORAL at 06:21

## 2024-08-28 RX ADMIN — CARVEDILOL 12.5 MG: 6.25 TABLET, FILM COATED ORAL at 08:14

## 2024-08-28 RX ADMIN — ENOXAPARIN SODIUM 30 MG: 100 INJECTION SUBCUTANEOUS at 22:03

## 2024-08-28 RX ADMIN — ENOXAPARIN SODIUM 30 MG: 100 INJECTION SUBCUTANEOUS at 08:13

## 2024-08-28 RX ADMIN — TRAMADOL HYDROCHLORIDE 50 MG: 50 TABLET, FILM COATED ORAL at 15:53

## 2024-08-28 RX ADMIN — ACETAMINOPHEN 650 MG: 325 TABLET ORAL at 11:33

## 2024-08-28 RX ADMIN — TRAMADOL HYDROCHLORIDE 50 MG: 50 TABLET, FILM COATED ORAL at 01:29

## 2024-08-28 RX ADMIN — TIZANIDINE 4 MG: 4 TABLET ORAL at 01:29

## 2024-08-28 RX ADMIN — TIZANIDINE 4 MG: 4 TABLET ORAL at 15:53

## 2024-08-28 RX ADMIN — ACETAMINOPHEN 650 MG: 325 TABLET ORAL at 01:29

## 2024-08-28 RX ADMIN — BUPROPION HYDROCHLORIDE 150 MG: 150 TABLET, EXTENDED RELEASE ORAL at 08:14

## 2024-08-28 ASSESSMENT — PAIN DESCRIPTION - ORIENTATION
ORIENTATION: RIGHT

## 2024-08-28 ASSESSMENT — PAIN SCALES - GENERAL
PAINLEVEL_OUTOF10: 2
PAINLEVEL_OUTOF10: 0
PAINLEVEL_OUTOF10: 6
PAINLEVEL_OUTOF10: 2
PAINLEVEL_OUTOF10: 0
PAINLEVEL_OUTOF10: 0
PAINLEVEL_OUTOF10: 6
PAINLEVEL_OUTOF10: 6
PAINLEVEL_OUTOF10: 2

## 2024-08-28 ASSESSMENT — PAIN DESCRIPTION - LOCATION
LOCATION: LEG;HIP
LOCATION: HIP
LOCATION: LEG;HIP
LOCATION: GENERALIZED
LOCATION: GENERALIZED

## 2024-08-28 ASSESSMENT — PAIN DESCRIPTION - DESCRIPTORS
DESCRIPTORS: ACHING
DESCRIPTORS: ACHING
DESCRIPTORS: ACHING;DISCOMFORT;SORE

## 2024-08-28 ASSESSMENT — PAIN SCALES - WONG BAKER: WONGBAKER_NUMERICALRESPONSE: NO HURT

## 2024-08-28 NOTE — PLAN OF CARE
Problem: Discharge Planning  Goal: Discharge to home or other facility with appropriate resources  8/28/2024 1337 by Kingston Seals RN  Outcome: Progressing  8/28/2024 1102 by Diamond Cisneros LPN  Outcome: Progressing     Problem: Pain  Goal: Verbalizes/displays adequate comfort level or baseline comfort level  8/28/2024 1337 by Kingston Seals RN  Outcome: Progressing  8/28/2024 1102 by Diamond Cisneros LPN  Outcome: Progressing     Problem: Safety - Adult  Goal: Free from fall injury  8/28/2024 1337 by Kingston Seals RN  Outcome: Progressing  8/28/2024 1102 by Daimond Cisneros LPN  Outcome: Progressing     Problem: ABCDS Injury Assessment  Goal: Absence of physical injury  8/28/2024 1337 by Kingston Seals RN  Outcome: Progressing  8/28/2024 1102 by Diamond Cisneros LPN  Outcome: Progressing  Flowsheets (Taken 8/28/2024 1101)  Absence of Physical Injury: Implement safety measures based on patient assessment     Problem: Nutrition Deficit:  Goal: Optimize nutritional status  8/28/2024 1337 by Kingston Seals RN  Outcome: Progressing  8/28/2024 1102 by Diamond Cisneros LPN  Outcome: Progressing     Problem: Skin/Tissue Integrity  Goal: Absence of new skin breakdown  Description: 1.  Monitor for areas of redness and/or skin breakdown  2.  Assess vascular access sites hourly  3.  Every 4-6 hours minimum:  Change oxygen saturation probe site  4.  Every 4-6 hours:  If on nasal continuous positive airway pressure, respiratory therapy assess nares and determine need for appliance change or resting period.  8/28/2024 1337 by Kingston Seals RN  Outcome: Progressing  8/28/2024 1102 by Diamond Cisneros LPN  Outcome: Progressing

## 2024-08-28 NOTE — PROGRESS NOTES
[facility]  Comprehensive Progress Note    Subjective:      Rosas Corbin is a 74 y.o. male admitted to inpatient rehabilitation for impairments and activities limitations in ADLs and mobility secondary to pelvic fractures.      No acute events overnight. No CP, SOB, N/V. Pain is controlled. No new complaints. Tolerating therapy. VSS.     The patient's medical records have been reviewed.    Scheduled Meds:lidocaine, 1 patch, Q12H  melatonin, 5 mg, Nightly  acetaminophen, 650 mg, 4 times per day  buPROPion, 150 mg, QAM  carvedilol, 12.5 mg, BID  losartan, 100 mg, Daily  pantoprazole, 40 mg, QAM AC  enoxaparin, 30 mg, BID  Vitamin D, 1,000 Units, Daily      Continuous Infusions:   sodium chloride       PRN Meds:calcium carbonate, 500 mg, TID PRN  tiZANidine, 4 mg, Q6H PRN  sodium chloride flush, 5-40 mL, PRN  sodium chloride, , PRN  ondansetron, 4 mg, Q8H PRN  acetaminophen, 650 mg, Q4H PRN  polyethylene glycol, 17 g, Daily PRN  traMADol, 50 mg, Q4H PRN         Objective:      Vitals:    08/27/24 2147 08/28/24 0159 08/28/24 0621 08/28/24 0745   BP:    138/73   Pulse:    67   Resp: 18 18 18 17   Temp:    98.4 °F (36.9 °C)   TempSrc:    Temporal   SpO2:    100%   Weight:       Height:         General appearance: alert, appears stated age, and cooperative, NAD  Head: Normocephalic, without obvious abnormality, atraumatic  Eyes: conjunctivae/corneas clear. PERRL, EOM's intact.   Neck: supple, symmetrical, trachea midline  Lungs: clear to auscultation bilaterally  Heart: regular rate and rhythm, S1, S2 normal  Abdomen: soft, non-tender, normal bowel sounds  Musculoskeletal: Range of motion abnormal right hip, hip precautions. ROM otherwise wnl BUE and LLE.   Skin: No rashes. Incision is healing, c/d/I.   Psych: Appropriate mood and affect   Neurologic: Awake, alert, SILT BUE and BLE. Strength 5/5 in BUE; 4/5 throughout LLE; 2/5 right HF and KE; 4/5 right ankle DF and PF.  improving  -GERD: Continue Protonix   -DVT prophylaxis: Lovenox x 1 month per Ortho, inpatient and outpatient.     Team conference tomorrow  Anticipate discharge tomorrow       Electronically signed by Lin Paulino MD on 8/28/2024 at 12:40 PM

## 2024-08-28 NOTE — PROGRESS NOTES
Occupational Therapy  OCCUPATIONAL THERAPY DAILY NOTE    Date:2024  Patient Name: Rosas Corbin  MRN: [id number]  : 1950  Room: [address] [place], [place] [address]     Referring Practitioner: MD Jefferson  Diagnosis: Fell off ladder 8-10 feet 24; R acetabular fx, R L5 TP fx, 6th rib fx, retroperitoneal hematoma 24 ORIF R acetabulum &post op ileus  Additional Pertinent Hx: arthritis, acid reflux, BPH, hx COVID, mitral valve disorder  Restrictions/Precautions: Fall Risk, Spinal neutrality, TTWB RLE, , acetabular precautions ,2000 fluid restriction  GREEN DOT for room issued on .    Functional Assessment:   Date Status AE  Comments   Feeding 24  Independent      Grooming 24  Mod I      Standing           Oral Care 24   Mod I  W/c    Bathing 24  SBA      UB Dressing 24  Mod I  seated    LB Dressing 24  Mod I  Reacher     Footwear 24  Mod I  Sock aid, long shoe horn     Toileting 24    Mod I      Homemaking 24   Mod I/Sup Ww  Pt completed item retrieval in kitchen setting at w/c level at Mod I      Functional Transfers / Balance:   Date Status DME  Comments   Sit Balance 24   Independent      Stand Balance 24 Mod I/Sup Ww       [x] Tub  [x] Shower   Transfer 24  CGA/min A            S/SBA Leg    ETB         Ww,ETB,leg    Grab bar     Commode   Transfer 24  Mod I  Ww, 3 in 1 commode placed over standard commode    Functional   Mobility 24   Mod I/Sup    Mod I  Ww    W/c     Other: sit to stand from  w/c <> ww       Bed mobility sit <> supine         On/off firm recliner  24  Mod I         Mod I           Mod I  Ww        Ww  Leg         Ww       Functional Exercises / Activity:  BUE strength exercises : arm bike 3 sets x 5 mins                                            Large yellow can do bar 3 sets 10 reps                                            5 # weighted ball 3

## 2024-08-28 NOTE — PLAN OF CARE
Problem: Discharge Planning  Goal: Discharge to home or other facility with appropriate resources  Outcome: Progressing     Problem: Pain  Goal: Verbalizes/displays adequate comfort level or baseline comfort level  Outcome: Progressing     Problem: Safety - Adult  Goal: Free from fall injury  Outcome: Progressing     Problem: ABCDS Injury Assessment  Goal: Absence of physical injury  Outcome: Progressing  Flowsheets (Taken 8/28/2024 1101)  Absence of Physical Injury: Implement safety measures based on patient assessment     Problem: Nutrition Deficit:  Goal: Optimize nutritional status  Outcome: Progressing     Problem: Skin/Tissue Integrity  Goal: Absence of new skin breakdown  Description: 1.  Monitor for areas of redness and/or skin breakdown  2.  Assess vascular access sites hourly  3.  Every 4-6 hours minimum:  Change oxygen saturation probe site  4.  Every 4-6 hours:  If on nasal continuous positive airway pressure, respiratory therapy assess nares and determine need for appliance change or resting period.  Outcome: Progressing

## 2024-08-28 NOTE — PROGRESS NOTES
Physical Therapy  Treatment     Evaluating Therapist: Ibeth Tubbs, PT, DPT [id number]    ROOM: [address] [place] [address]  DIAGNOSIS: Multiple Trauma   PRECAUTIONS: Falls, TTWB R LE, spinal neutrality (L5 TP fx), R rib fx, adult diet 2000 ml  HPI: Patient is a 74 y.o. male who presents on 08/06/2024 as a trauma with a right acetabulum fracture, right L5 TP fracture and right 6th rib fracture after a fall from 8 to 10 feet off of a ladder.  Patient stated he was cutting tree limbs when he fell off of a ladder directly onto his right side.  Orthopedic was consulted. On 08/07/2024 patient underwent a right anterior column posterior hemitransverse acetabular fracture open reduction internal fixation.  Hospital course has been complicated by the development of post op ileus.  Patient had a bowel movement 08/12/2024.       PMH of GERD, BPH, HTN, mitral valve disorder, skin cancer, arthritis, L TKA 22'.     Social:  Pt lives in split level home with wife (unable to assist at [place], L brachial plexus injury per pt) and daughter (has MS, unable to assist per pt) with 3 DANITZA with 2 rails to living room, kitchen, full bathroom. Pt then has 7 steps up with 2 rails to main bedroom. Pt also reports he may borrow a ramp for entry steps from friend at [place].   Prior to admission: Pt was independent with no AD. Pt is retired from [employer]. Pt enjoys attending grandchildren's sporting events.      Initial Evaluation  Date: 8/13/24 AM     PM Short Term Goals Long Term Goals    Was pt agreeable to Eval/treatment? Yes  Yes  Yes      Does pt have pain? 3-4/10 R hip  Mild R hip pain ( no complaints with activity)  Mild R hip pain     Bed Mobility  Rolling: ModA  Supine to sit: ModA  Sit to supine: ModA  Scooting: ModA  (Assistance with B LE) NT Mod I with all aspects of bed mobility using leg  for R LE management  SBA Modified Independent     Transfers Sit to stand: ModA  Stand to sit: ModA  Stand pivot: ModA with WW     Sit to stand mod I   Stand to

## 2024-08-28 NOTE — PROGRESS NOTES
Physical Therapy  Weekly Note   Evaluating Therapist: Ibeth Tubbs, PT, DPT [id number]    ROOM: [address] [place] [address]  DIAGNOSIS: Multiple Trauma   PRECAUTIONS: Falls, TTWB R LE, spinal neutrality (L5 TP fx), R rib fx, adult diet 2000 ml  HPI: Patient is a 74 y.o. male who presents on 08/06/2024 as a trauma with a right acetabulum fracture, right L5 TP fracture and right 6th rib fracture after a fall from 8 to 10 feet off of a ladder.  Patient stated he was cutting tree limbs when he fell off of a ladder directly onto his right side.  Orthopedic was consulted. On 08/07/2024 patient underwent a right anterior column posterior hemitransverse acetabular fracture open reduction internal fixation.  Hospital course has been complicated by the development of post op ileus.  Patient had a bowel movement 08/12/2024.       PMH of GERD, BPH, HTN, mitral valve disorder, skin cancer, arthritis, L TKA 22'.     Social:  Pt lives in split level home with [place] ([place] to assist [place], L brachial plexus injury per pt) and daughter (has MS, unable to assist per pt) with 3 DANITZA with 2 rails to living room, kitchen, full bathroom. Pt then has 7 steps up with 2 rails to main bedroom. Pt also reports he may borrow a ramp for entry steps from friend at [place].   Prior to admission: Pt was independent with no AD. Pt is retired from [employer] Pt enjoys attending grandchildren's sporting events.      Initial Evaluation  Date: 8/13/24 8/21/24 8/28/24 Comments   Short Term Goals Long Term Goals    Was pt agreeable to Eval/treatment? Yes  Yes  Yes       Does pt have pain? 3-4/10 R hip  R gluteal pain (5/10 )  Mild R hip and gluteal pain       Bed Mobility  Rolling: ModA  Supine to sit: ModA  Sit to supine: ModA  Scooting: ModA  (Assistance with B LE) Sit to supine SBA  Supine to sit SBA  Scooting SBA    ( Using a leg )  Sit to supine mod Independent   Supine to sit mod independent   Scooting mod independent  Using leg  for R LE management  SBA  schedule  Wife to attend  8/29       Is additional Family Teaching Needed?  Y or N Y  Y       Hindering Progress Pain, WB restrictions         PT recommended ELOS 2 weeks 1 week  Thursday 8/29      Team's Discharge Plan  Discharge Thursday 8/29/24 Discharge today 8/29/24      Therapist at Team Meeting  Edgar Heredia, PT, DPT [id number]   Edgar Heredia PT, DPT [id number]            Date:  8/28/24  Supporting factors:  PLOF, motivated   Barriers to discharge:  WB restriction and minimal pain   Additional comments:  Pt is independent with transfers and wc level mobility.  Pt demonstrating good safety and balance with short gait distance. Discussed the importance of safety awareness at this time for proper healing.  Pt with good understanding.    DME:  reshma fletcher ( pt reporting owning both ) hospital bed   After Care:  home PT with supervision as needed.    Beth Lombardo [id number]          Date:  8/21/24  Supporting factors:  PLOF, motivated   Barriers to discharge:  pain and WB restrictions   Additional comments:  Recommend wc level and short distance ambulation upon discharge due to lack of support and increased safety at this wc level at this time.  Pt would benefit from hospital bed due to pressure and proper positioning due to multiple fractures and restrictions.    DME:  justine ROBLEDO ( pt reports owning both , may need to get a walker for pt as pt reports equipment is his wife)   After Care:  home PT with supervision as needed     Beth Lombardo [id number]

## 2024-08-29 VITALS
HEART RATE: 74 BPM | TEMPERATURE: 98 F | OXYGEN SATURATION: 98 % | WEIGHT: 240.52 LBS | RESPIRATION RATE: 18 BRPM | BODY MASS INDEX: 32.58 KG/M2 | SYSTOLIC BLOOD PRESSURE: 138 MMHG | HEIGHT: 72 IN | DIASTOLIC BLOOD PRESSURE: 75 MMHG

## 2024-08-29 PROCEDURE — 6360000002 HC RX W HCPCS

## 2024-08-29 PROCEDURE — 97530 THERAPEUTIC ACTIVITIES: CPT

## 2024-08-29 PROCEDURE — 6370000000 HC RX 637 (ALT 250 FOR IP): Performed by: PHYSICAL MEDICINE & REHABILITATION

## 2024-08-29 PROCEDURE — 99239 HOSP IP/OBS DSCHRG MGMT >30: CPT | Performed by: PHYSICAL MEDICINE & REHABILITATION

## 2024-08-29 PROCEDURE — 97110 THERAPEUTIC EXERCISES: CPT

## 2024-08-29 PROCEDURE — 6370000000 HC RX 637 (ALT 250 FOR IP)

## 2024-08-29 PROCEDURE — 97535 SELF CARE MNGMENT TRAINING: CPT

## 2024-08-29 RX ORDER — ENOXAPARIN SODIUM 100 MG/ML
30 INJECTION SUBCUTANEOUS 2 TIMES DAILY
Qty: 18 ML | Refills: 0 | Status: SHIPPED | OUTPATIENT
Start: 2024-08-29 | End: 2024-09-28

## 2024-08-29 RX ORDER — LIDOCAINE 4 G/G
1 PATCH TOPICAL DAILY
Qty: 30 PATCH | Refills: 0 | Status: SHIPPED | OUTPATIENT
Start: 2024-08-29

## 2024-08-29 RX ORDER — CHOLECALCIFEROL (VITAMIN D3) 25 MCG
1000 TABLET ORAL DAILY
Qty: 30 TABLET | Refills: 0 | Status: SHIPPED | OUTPATIENT
Start: 2024-08-30

## 2024-08-29 RX ORDER — BUPROPION HYDROCHLORIDE 150 MG/1
150 TABLET ORAL EVERY MORNING
Qty: 30 TABLET | Refills: 0 | Status: SHIPPED | OUTPATIENT
Start: 2024-08-30

## 2024-08-29 RX ORDER — TRAMADOL HYDROCHLORIDE 50 MG/1
50 TABLET ORAL EVERY 6 HOURS PRN
Qty: 28 TABLET | Refills: 0 | Status: SHIPPED | OUTPATIENT
Start: 2024-08-29 | End: 2024-09-05

## 2024-08-29 RX ADMIN — TRAMADOL HYDROCHLORIDE 50 MG: 50 TABLET, FILM COATED ORAL at 06:03

## 2024-08-29 RX ADMIN — TIZANIDINE 4 MG: 4 TABLET ORAL at 06:03

## 2024-08-29 RX ADMIN — CARVEDILOL 12.5 MG: 6.25 TABLET, FILM COATED ORAL at 09:22

## 2024-08-29 RX ADMIN — LOSARTAN POTASSIUM 100 MG: 50 TABLET, FILM COATED ORAL at 09:22

## 2024-08-29 RX ADMIN — ACETAMINOPHEN 650 MG: 325 TABLET ORAL at 09:29

## 2024-08-29 RX ADMIN — ACETAMINOPHEN 650 MG: 325 TABLET ORAL at 06:02

## 2024-08-29 RX ADMIN — ACETAMINOPHEN 650 MG: 325 TABLET ORAL at 12:39

## 2024-08-29 RX ADMIN — BUPROPION HYDROCHLORIDE 150 MG: 150 TABLET, EXTENDED RELEASE ORAL at 09:22

## 2024-08-29 RX ADMIN — Medication 1000 UNITS: at 09:22

## 2024-08-29 RX ADMIN — ENOXAPARIN SODIUM 30 MG: 100 INJECTION SUBCUTANEOUS at 09:22

## 2024-08-29 RX ADMIN — PANTOPRAZOLE SODIUM 40 MG: 40 TABLET, DELAYED RELEASE ORAL at 06:22

## 2024-08-29 ASSESSMENT — PAIN SCALES - GENERAL
PAINLEVEL_OUTOF10: 3
PAINLEVEL_OUTOF10: 2

## 2024-08-29 ASSESSMENT — PAIN SCALES - WONG BAKER: WONGBAKER_NUMERICALRESPONSE: NO HURT

## 2024-08-29 NOTE — DISCHARGE SUMMARY
tablet  Commonly known as: WELLBUTRIN XL  Take 1 tablet by mouth every morning  Start taking on: August 30, 2024     lidocaine 4 % external patch  Place 1 patch onto the skin daily     tiZANidine 4 MG tablet  Commonly known as: ZANAFLEX  Take 1 tablet by mouth every 6 hours as needed (muscle spasms)     traMADol 50 MG tablet  Commonly known as: ULTRAM  Take 1 tablet by mouth every 6 hours as needed for Pain for up to 7 days. Max Daily Amount: 200 mg     Vitamin D3 25 MCG Tabs  Take 1 tablet by mouth daily  Start taking on: August 30, 2024            CONTINUE taking these medications      carvedilol 12.5 MG tablet  Commonly known as: COREG  take 1 tablet by mouth every morning then take 1 tablet every evening     cetirizine 10 MG tablet  Commonly known as: ZYRTEC  Take 1 tablet by mouth daily     enoxaparin Sodium 30 MG/0.3ML injection  Commonly known as: LOVENOX  Inject 0.3 mLs into the skin in the morning and at bedtime     FISH OIL PO     MULTIVITAMIN GUMMIES ADULT PO     olmesartan 40 MG tablet  Commonly known as: BENICAR  take 1 tablet by mouth once daily     omeprazole 40 MG delayed release capsule  Commonly known as: PRILOSEC  Take 1 capsule by mouth daily            STOP taking these medications      oxyCODONE-acetaminophen 5-325 MG per tablet  Commonly known as: Percocet               Where to Get Your Medications        These medications were sent to [id number] - [place] [address] [facility] -  [phone] - F [phone]  [address] [place] [id number]      Phone: [phone]   buPROPion 150 MG extended release tablet  enoxaparin Sodium 30 MG/0.3ML injection  lidocaine 4 % external patch  tiZANidine 4 MG tablet  traMADol 50 MG tablet  Vitamin D3 25 MCG Tabs         Allergies  Augmentin [amoxicillin-pot clavulanate] and Clindamycin/lincomycin      Recommended Therapies at Discharge: Home health PT, OT, Nursing    Follow-Up  -Primary care: Dr. Claudio  -Ortho: Dr. Alcocer - appt  scheduled 9/23/2024  -Podiatry: Dr. Alvares    Information provided to the patient and appropriate family members regarding discharge medications and follow up     More than 30 minutes was spent in preparation of this patient's discharge including, but not limited to, examination, preparation of documents, prescription preparation, counseling and coordination.       Electronically signed by Lin Paulino MD on 8/29/2024 at 12:12 PM

## 2024-08-29 NOTE — PATIENT CARE CONFERENCE
QAM AC  enoxaparin, 30 mg, BID  Vitamin D, 1,000 Units, Daily     NURSING :  Bowel:   Always Continent  [x]   Occasionally incontinent  []   Frequently incontinent  []   Always incontinent  []   Not occurred  []   Bladder:   Always Continent  [x]    Incontinent less than daily[]   Incontinent  daily []   Always incontinent  []   No urine output    []   Indwelling catheter []     Toilet Hygiene:   Current level : Modified independent  Short term Toilet hygiene goal: met  Long term toilet hygiene goal:  met    Skin integrity: right hip incision  Pain: hip pain controlled with Tramadol    NUTRITION    Diet  Regular diet   Liquid consistency   thin    SOCIAL INFORMATION:  Lives with: Spouse and adult daughter-both are disabled, they are able to do own self care. Other family members live close  Prior community services:  None  Home Architecture:  split level, 3 steps to enter with 2  rails. 7 steps up to bedroom  Prior Level of function:  Independent  DME:  wheeled walker, wheelchair, tub seat. (Patient did not use any of these)    FAMILY / PATIENT EDUCATION:  ongoing with patient. Will be scheduled as needed with family    PHYSICAL THERAPY    Bed mobility:   Current level: Modified independent  Short term bed mobility goal: met  Long term bed mobility goal: met    Chair/bed transfers:  Current level: Modified independent with wheeled walker  Short term Chair/bed transfers goal: met  Long term Chair/bed transfers goal: met    Ambulation:   Current level: short distance per his re 25 feet supervidsoiom  Short term ambulation goal: 150 feet standby assist  Long term ambulation goal: 150 Modified independent    Wheelchair Mobility:  Current level: 250 Modified independent  Short term wheelchair goal: met  Long term wheelchair goal: 300 Modified independent    Car transfers:   Current level: Minimal assist  Short term car transfers goal: Contact guard assist  Long term car transfers goal:Modified independent    Stairs:  within the medical record of Rosas Corbin.      Electronically signed by Leny Guerra RN on 8/29/2024 at 8:44 AM  The following interdisciplinary team members were present:   HUMBERTO Crawford,LSW  Eduarda Glass RN  Edgar Heredia, PT, DPT  Ellen Ty, OTR  Jael Banks, SLP

## 2024-08-29 NOTE — PLAN OF CARE
Problem: Discharge Planning  Goal: Discharge to home or other facility with appropriate resources  Outcome: Completed     Problem: Pain  Goal: Verbalizes/displays adequate comfort level or baseline comfort level  8/29/2024 1310 by Sergei Rivera RN  Outcome: Completed  8/28/2024 2358 by Cristal Limon RN  Outcome: Progressing     Problem: Safety - Adult  Goal: Free from fall injury  8/29/2024 1310 by Sergei Rivera RN  Outcome: Completed  8/28/2024 2358 by Cristal Limon RN  Outcome: Progressing     Problem: ABCDS Injury Assessment  Goal: Absence of physical injury  8/29/2024 1310 by Sergei Rivera RN  Outcome: Completed  8/28/2024 2358 by Cristal Limon RN  Outcome: Progressing     Problem: Nutrition Deficit:  Goal: Optimize nutritional status  Outcome: Completed     Problem: Skin/Tissue Integrity  Goal: Absence of new skin breakdown  Description: 1.  Monitor for areas of redness and/or skin breakdown  2.  Assess vascular access sites hourly  3.  Every 4-6 hours minimum:  Change oxygen saturation probe site  4.  Every 4-6 hours:  If on nasal continuous positive airway pressure, respiratory therapy assess nares and determine need for appliance change or resting period.  8/29/2024 1310 by Sergei Rivera RN  Outcome: Completed  8/28/2024 2358 by Cristal Limon RN  Outcome: Progressing

## 2024-08-29 NOTE — PLAN OF CARE
Problem: Pain  Goal: Verbalizes/displays adequate comfort level or baseline comfort level  8/28/2024 2358 by Cristal Limon RN  Outcome: Progressing  8/28/2024 1337 by Kingston Seals RN  Outcome: Progressing  8/28/2024 1102 by Diamond Cisneros LPN  Outcome: Progressing     Problem: Safety - Adult  Goal: Free from fall injury  8/28/2024 2358 by Cristal Limon RN  Outcome: Progressing  8/28/2024 1337 by Kingston Seals RN  Outcome: Progressing  8/28/2024 1102 by Diamond Cisneros LPN  Outcome: Progressing     Problem: ABCDS Injury Assessment  Goal: Absence of physical injury  8/28/2024 2358 by Cristal Limon RN  Outcome: Progressing  8/28/2024 1337 by Kingston Seals RN  Outcome: Progressing  8/28/2024 1102 by Diamond Cisneros LPN  Outcome: Progressing  Flowsheets (Taken 8/28/2024 1101)  Absence of Physical Injury: Implement safety measures based on patient assessment

## 2024-08-29 NOTE — HOME CARE
[facility] following. Will need homecare orders placed prior to discharge.  Sharmila Butterfield LPN [facility]

## 2024-08-29 NOTE — PROGRESS NOTES
Physical Therapy  Discharge Report    Evaluating Therapist: Ibeth Tubbs, PT, DPT [id number]    ROOM: [address] [place] [address]  DIAGNOSIS: Multiple Trauma   PRECAUTIONS: Falls, TTWB R LE, spinal neutrality (L5 TP fx), R rib fx, adult diet 2000 ml  HPI: Patient is a 74 y.o. male who presents on 08/06/2024 as a trauma with a right acetabulum fracture, right L5 TP fracture and right 6th rib fracture after a fall from 8 to 10 feet off of a ladder.  Patient stated he was cutting tree limbs when he fell off of a ladder directly onto his right side.  Orthopedic was consulted. On 08/07/2024 patient underwent a right anterior column posterior hemitransverse acetabular fracture open reduction internal fixation.  Hospital course has been complicated by the development of post op ileus.  Patient had a bowel movement 08/12/2024.       PMH of GERD, BPH, HTN, mitral valve disorder, skin cancer, arthritis, L TKA 22'.     Social:  Pt lives in split level home with wife (unable to assist at [place], L brachial plexus injury per pt) and daughter (has MS, unable to assist per pt) with 3 DANITZA with 2 rails to living room, kitchen, full bathroom. Pt then has 7 steps up with 2 rails to main bedroom. Pt also reports he may borrow a ramp for entry steps from friend at [place].   Prior to admission: Pt was independent with no AD. Pt is retired from [employer]. Pt enjoys attending grandchildren's sporting events.      Initial Evaluation  Date: 8/13/24 AR 8/29   Short Term Goals Long Term Goals    Was pt agreeable to Eval/treatment? Yes  Yes      Does pt have pain? 3-4/10 R hip  Mild R hip pain ( no complaints with activity)      Bed Mobility  Rolling: ModA  Supine to sit: ModA  Sit to supine: ModA  Scooting: ModA  (Assistance with B LE) Mod I with all aspects of bed mobility using leg  for R LE management  SBA Modified Independent     Transfers Sit to stand: ModA  Stand to sit: ModA  Stand pivot: ModA with WW     Sit to stand mod I   Stand to sit mod I   Stand

## 2024-08-29 NOTE — PROGRESS NOTES
Occupational Therapy  OCCUPATIONAL THERAPY DAILY NOTE/DISCHARGE SUMMARY     Date:2024  Patient Name: Rosas Corbin  MRN: [id number]  : 1950  Room: [address] [place] [address]     Referring Practitioner: MD Jefferson  Diagnosis: Fell off ladder 8-10 feet 24; R acetabular fx, R L5 TP fx, 6th rib fx, retroperitoneal hematoma 24 ORIF R acetabulum &post op ileus  Additional Pertinent Hx: arthritis, acid reflux, BPH, hx COVID, mitral valve disorder  Restrictions/Precautions: Fall Risk, Spinal neutrality, TTWB RLE, , acetabular precautions ,2000 fluid restriction  GREEN DOT for room issued on .    Functional Assessment:   Date Status AE  Comments   Feeding 24  Independent      Grooming 24  Mod I      Standing  Pt washed hair and combed hair seated at sink          Oral Care 24   Mod I  W/c    Bathing 24  SBA      UB Dressing 24  Mod I  seated    LB Dressing 24  Mod I  Reacher     Footwear 24  Mod I  Sock aid, long shoe horn     Toileting 24    Mod I      Homemaking 24   Mod I/Sup Ww       Functional Transfers / Balance:   Date Status DME  Comments   Sit Balance 24   Independent      Stand Balance 24 Mod I/Sup Ww       [x] Tub  [x] Shower   Transfer 24  CGA/min A            S/SBA Leg    ETB         Ww,ETB,   Grab bar            Transfer on/off extended tub bench placed in walk in shower    Commode   Transfer 24  Mod I  Ww, 3 in 1 commode placed over standard commode    Functional   Mobility 24   Mod I/Sup    Mod I  Ww    W/c  Pt ambulated back and forth to the bathroom with ww    Other: sit to stand from  w/c <> ww       Bed mobility sit <> supine         On/off firm recliner  24  Mod I         Mod I           Mod I  Ww        Ww  Leg         Ww       Functional Exercises / Activity:      Sensory / Neuromuscular Re-Education:      Cognitive Skills:   Status Comments

## 2024-08-30 ENCOUNTER — TELEPHONE (OUTPATIENT)
Dept: FAMILY MEDICINE CLINIC | Age: 74
End: 2024-08-30

## 2024-08-30 ENCOUNTER — CARE COORDINATION (OUTPATIENT)
Dept: CARE COORDINATION | Age: 74
End: 2024-08-30

## 2024-08-30 DIAGNOSIS — Z87.81 STATUS POST FRACTURE OF PELVIS: Primary | ICD-10-CM

## 2024-08-30 PROCEDURE — 1111F DSCHRG MED/CURRENT MED MERGE: CPT | Performed by: FAMILY MEDICINE

## 2024-08-30 NOTE — CARE COORDINATION
follow up. CTN will continue to follow for Care Transition.    Care Transition Nurse reviewed discharge instructions, medical action plan, and red flags with patient. The patient was given an opportunity to ask questions; all questions answered at this time.. The patient verbalized understanding.   Were discharge instructions available to patient? Yes.   Reviewed appropriate site of care based on symptoms and resources available to patient including: PCP  Specialist  Home health  When to call 911  Condition related references. The patient agrees to contact the primary care provider and/or specialist office for questions related to their healthcare.      Advance Care Planning:   Does patient have an Advance Directive: reviewed and current.    Medication Reconciliation:  Medication reconciliation was performed with patient,1111F entered: yes.     Remote Patient Monitoring:  Offered patient enrollment in the Remote Patient Monitoring (RPM) program for in-home monitoring: Yes, but did not enroll at this time: controlled chronic disease management.    Assessments:  Care Transitions 24 Hour Call    Schedule Follow Up Appointment with PCP: Declined  Do you have a copy of your discharge instructions?: Yes  Do you have all of your prescriptions and are they filled?: No  Have you been contacted by a [facility] Pharmacist?: No  Have you scheduled your follow up appointment?: Yes  How are you going to get to your appointment?: Car - family or friend to transport  Do you have support at home?: Partner/Spouse/SO  Do you feel like you have everything you need to keep you well at home?: No  Care Transitions Interventions          Follow Up Appointment:   Discussed follow up appointments. Patient has hospital follow up appointment scheduled greater than 14 days after discharge; Post op with ortho/trauma scheduled for 9/23/24.   Future Appointments         Provider Specialty Dept Phone    9/23/2024 10:15 AM SCHEDULE, [facility]  Orthopedic Surgery [phone]            Care Transition Nurse provided contact information.  Plan for follow-up call in 6-10 days based on severity of symptoms and risk factors.  Plan for next call: follow-up appointment-Did patient get scheduled for f/u appts with PCP and podiatry?      Melinda Hartley, APRN

## 2024-08-30 NOTE — TELEPHONE ENCOUNTER
[facility] calling in to let us know that they saw Rosas today, and will be faxing orders over for Dr Claudio.    She did not provide any contact information.

## 2024-09-03 ENCOUNTER — OFFICE VISIT (OUTPATIENT)
Dept: FAMILY MEDICINE CLINIC | Age: 74
End: 2024-09-03

## 2024-09-03 VITALS
SYSTOLIC BLOOD PRESSURE: 138 MMHG | HEIGHT: 72 IN | TEMPERATURE: 97.1 F | OXYGEN SATURATION: 98 % | HEART RATE: 72 BPM | RESPIRATION RATE: 18 BRPM | WEIGHT: 238 LBS | BODY MASS INDEX: 32.23 KG/M2 | DIASTOLIC BLOOD PRESSURE: 80 MMHG

## 2024-09-03 DIAGNOSIS — Z87.81 STATUS POST FRACTURE OF PELVIS: ICD-10-CM

## 2024-09-03 DIAGNOSIS — G89.18 POST-OP PAIN: ICD-10-CM

## 2024-09-03 DIAGNOSIS — Z09 HOSPITAL DISCHARGE FOLLOW-UP: Primary | ICD-10-CM

## 2024-09-03 RX ORDER — TRAMADOL HYDROCHLORIDE 50 MG/1
50 TABLET ORAL EVERY 6 HOURS PRN
Qty: 30 TABLET | Refills: 2 | Status: SHIPPED | OUTPATIENT
Start: 2024-09-03 | End: 2024-12-02

## 2024-09-03 ASSESSMENT — ENCOUNTER SYMPTOMS
SORE THROAT: 0
EYE REDNESS: 0
COLOR CHANGE: 0
WHEEZING: 0
VOMITING: 0
COUGH: 0
CONSTIPATION: 0
SINUS PAIN: 0
TROUBLE SWALLOWING: 0
SHORTNESS OF BREATH: 0
BACK PAIN: 1
PHOTOPHOBIA: 0
ABDOMINAL PAIN: 0
BLOOD IN STOOL: 0
DIARRHEA: 0

## 2024-09-03 NOTE — PROGRESS NOTES
Musculoskeletal:      Cervical back: No rigidity or tenderness.      Right lower leg: No edema.      Left lower leg: No edema.      Comments: Left ilium fracture healing normally   Lymphadenopathy:      Cervical: No cervical adenopathy.   Skin:     Coloration: Skin is not jaundiced or pale.      Findings: No bruising or rash.   Neurological:      General: No focal deficit present.      Mental Status: He is alert and oriented to person, place, and time.   Psychiatric:         Mood and Affect: Mood normal.         Behavior: Behavior normal.           Rosas was seen today for follow-up from hospital.    Diagnoses and all orders for this visit:    Hospital discharge follow-up  -     KS DISCHARGE MEDS RECONCILED W/ CURRENT OUTPATIENT MED LIST  Doing surprisingly well.   Post-op pain  Will reorder Tramadol takes 2 on most days. Off muscle relaxants  Status post fracture of pelvis  NWB for a mos or more. Has forced wife and daughter to do more. He seems pretty good considering. No indication of depression or worsening ot other problems        Return in 3 months (on 12/3/2024).    Electronically signed by Monroe Claudio MD on 9/3/24 at 3:48 PM EDT

## 2024-09-06 NOTE — CONSULTS
Consult reviewed with Acute rehab nurse screener. Agree with ARU screener notes as documented, please refer to notes for further details.     Thank you for the consult.    Lin Paulino MD  Physical Medicine and Rehabilitation

## 2024-09-10 ENCOUNTER — CARE COORDINATION (OUTPATIENT)
Dept: CARE COORDINATION | Age: 74
End: 2024-09-10

## 2024-09-17 ENCOUNTER — CARE COORDINATION (OUTPATIENT)
Dept: CARE COORDINATION | Age: 74
End: 2024-09-17

## 2024-09-23 ENCOUNTER — HOSPITAL ENCOUNTER (OUTPATIENT)
Dept: GENERAL RADIOLOGY | Age: 74
Discharge: HOME | End: 2024-09-25
Payer: MEDICARE

## 2024-09-23 ENCOUNTER — OFFICE VISIT (OUTPATIENT)
Dept: ORTHOPEDIC SURGERY | Age: 74
End: 2024-09-23
Payer: MEDICARE

## 2024-09-23 DIAGNOSIS — T14.8XXA FRACTURE: ICD-10-CM

## 2024-09-23 DIAGNOSIS — S32.434A: Primary | ICD-10-CM

## 2024-09-23 PROCEDURE — 72190 X-RAY EXAM OF PELVIS: CPT

## 2024-09-23 PROCEDURE — 99024 POSTOP FOLLOW-UP VISIT: CPT | Performed by: PHYSICIAN ASSISTANT

## 2024-09-23 PROCEDURE — 99212 OFFICE O/P EST SF 10 MIN: CPT

## 2024-09-23 RX ORDER — ASPIRIN 325 MG
325 TABLET ORAL 2 TIMES DAILY
Qty: 60 TABLET | Refills: 3 | Status: SHIPPED | OUTPATIENT
Start: 2024-09-23

## 2024-09-24 ENCOUNTER — CARE COORDINATION (OUTPATIENT)
Dept: CARE COORDINATION | Age: 74
End: 2024-09-24

## 2024-09-26 ENCOUNTER — TELEPHONE (OUTPATIENT)
Dept: FAMILY MEDICINE CLINIC | Age: 74
End: 2024-09-26

## 2024-10-23 ENCOUNTER — OFFICE VISIT (OUTPATIENT)
Dept: ORTHOPEDIC SURGERY | Age: 74
End: 2024-10-23
Payer: MEDICARE

## 2024-10-23 ENCOUNTER — HOSPITAL ENCOUNTER (OUTPATIENT)
Dept: GENERAL RADIOLOGY | Age: 74
Discharge: HOME | End: 2024-10-25
Payer: MEDICARE

## 2024-10-23 DIAGNOSIS — S32.434A: ICD-10-CM

## 2024-10-23 DIAGNOSIS — S32.434A: Primary | ICD-10-CM

## 2024-10-23 PROCEDURE — 99212 OFFICE O/P EST SF 10 MIN: CPT

## 2024-10-23 PROCEDURE — 72190 X-RAY EXAM OF PELVIS: CPT

## 2024-10-23 PROCEDURE — 99024 POSTOP FOLLOW-UP VISIT: CPT | Performed by: PHYSICIAN ASSISTANT

## 2024-10-23 RX ORDER — PAROXETINE 20 MG/1
20 TABLET, FILM COATED ORAL DAILY
COMMUNITY
Start: 2024-09-07

## 2024-10-23 NOTE — PROGRESS NOTES
Chief Complaint   Patient presents with    Post-Op Check     10 week P/o R Anterior column Posterior hemitransverse Acetabular fracture ORIF 8/7/24. Ambulating in wheelchair. States no pain.       OP:SURGEON: Dr. Perry Alcocer MD  DATE OF PROCEDURE: 8/7/24  PROCEDURE:Open reduction internal fixation right both column acetabulum fracture    Subjective:  Rosas Corbin is approximately 10 weeks follow-up from the above surgery. He is NWB B LE.  Not currently in physical therapy.  Taking aspirin twice daily for DVT prophylaxis.  States he has minimal to no discomfort in the hip or groin.  He does have some intermittent gravity-dependent edema throughout the foot and ankle.  Denies calf pain, CP, SOB, fever, chills, myalgias.    Review of Systems -  all pertinent positives and negatives in HPI.      Objective:    General: Alert and oriented X 3, normocephalic atraumatic, external ears and eye normal, sclera clear, no acute distress, respirations easy and unlabored with no audible wheezes, skin warm and dry, speech and dress appropriate for noted age, affect euthymic.    Extremity:  Right Lower Extremity  Skin clean dry and intact, without signs of infection  Mild TTP about the R SI joint, but otherwise hip is nontender and nontender with PROM of the hip including ER and IR   Compartments supple throughout thigh and leg  Calf supple and nontender  Demonstrates active knee flexion/extension, ankle plantar/dorsiflexion/great toe extension.   States sensation intact to touch in sural/deep peroneal/superficial peroneal/saphenous/posterior tibial nerve distributions to foot/ankle.   Palpable dorsalis pedis and posterior tibialis pulses, cap refill brisk in toes, foot warm/perfused.           XR:   3 views of pelvis demonstrating interval healing of R iliac wing . Hardware remains intact without interval displacement, loosening, or failure. No significant change in alignment. No acute fractures or dislocations or any other

## 2024-10-24 ENCOUNTER — TELEPHONE (OUTPATIENT)
Dept: PHYSICAL THERAPY | Age: 74
End: 2024-10-24

## 2024-10-31 ENCOUNTER — TELEPHONE (OUTPATIENT)
Dept: PODIATRY | Age: 74
End: 2024-10-31

## 2024-10-31 ENCOUNTER — EVALUATION (OUTPATIENT)
Dept: PHYSICAL THERAPY | Age: 74
End: 2024-10-31

## 2024-10-31 DIAGNOSIS — S32.434D CLOSED NONDISPLACED FRACTURE OF ANTERIOR COLUMN OF RIGHT ACETABULUM WITH ROUTINE HEALING, SUBSEQUENT ENCOUNTER: Primary | ICD-10-CM

## 2024-10-31 NOTE — PROGRESS NOTES
[facility]   Phone: [phone]   Fax: [phone]      Physical Therapy Daily Treatment Note    Date: 10/31/2024  Patient Name: Rosas Corbin  : 1950   MRN: [id number]  DOInjury: 2024    DOSx: 24   PROCEDURE:Open reduction internal fixation right both column acetabulum fracture   Referring Provider: Tl Khan PA-C  [address] [place] [address]     Medical Diagnosis:   S32.434D (ICD-10-CM) - Nondisplaced fracture of anterior column (iliopubic) of right acetabulum, subsequent encounter for closed fracture (HCC)       Precautions:  per order 10/23/24:  Start progressive weightbearing to the right lower extremity with assistive device, advancing weight gradually over the next 3 to 4 weeks until weightbearing as tolerated.       Outcome Measure:  LEFS 35/80     S: See eval.   O:   Time 1300- 1339     Visit 1/10 Repeat outcome measure at mid point and end.    Pain 0-1/10     ROM Hip:  Right:   AROM: 95° Flexion,  NT° Extension, 30° Abduction,  30° ER, 30° IR     Left:   AROM: 90° Flexion,  NT° Extension, 35° Abduction,  30° ER, 30° IR        Knee:  Right:   AROM: 120° Flexion,  0° Extension     Left:   AROM: 112° Flexion,  0° Extension     Modalities            Manual            Stretch                  Exercise      Bike      Heel slides      QS      SLR      SAQ      LAQ      Hamstring Curl       TG squats      TG calf raises      Step-ups - FWD      Step-ups - LAT      Step-ups - BWD        NMR To improve balance for safe community and home ambulation    Resisted walk      FWD      BKWD      lat      March      Side stepping      Retro walk      Heel to toe      A:  Tolerated well.  Pt reports saw doc last week. Per pt, doctor stated pt could begin 25% WB and progress weekly. Pt reports walked with 25% WB last week.  Pt reports just started trying 50%.   Pt ambulates in session approximately 30 ft with 50%WB with cues for technique.

## 2024-10-31 NOTE — TELEPHONE ENCOUNTER
Rosas would like to establish with you to have his nails cut.  When would be a good time/date to schedule him?

## 2024-10-31 NOTE — PROGRESS NOTES
[facility]   Phone: [phone]   Fax: [phone]           Date:  10/31/2024   Patient: Rosas Corbin  : 1950  MRN: [id number]  Referring Provider: Tl Khan PA-C  [address] [facility] [place] [address]     Medical Diagnosis:     S32.434D (ICD-10-CM) - Nondisplaced fracture of anterior column (iliopubic) of right acetabulum, subsequent encounter for closed fracture (HCC)     DATE OF PROCEDURE: 24   PROCEDURE:Open reduction internal fixation right both column acetabulum fracture       SUBJECTIVE:     Onset date: 2024     Mechanism of Injury: Pt reports fell from a ladder.    Had hospital stay, then acute rehab - 24 days total.  No home PT.    Saw doc last week. Per pt, doctor stated pt could begin 25% WB and progress weekly. Pt reports walked with 25% WB last week.  Pt reports just started trying 50%.  Reports gets sore on R lateral hip.    Was in WC mostly until most recent doctor visit.  Not using w/c except for getting up/down ramp currently. Has everything on first floor currently.        Medical Management for Current Problem: tylenol prn     Chief complaint: sore     Behavior: condition is getting better    Pain:   Current: 0/10     Worst:1/10      Location:: Hip: R lateral        Provoking Activities/Positions: standing, walking                  Relieving Activitie/Positions: tylenol prn     Disturbed Sleep: no     Imaging results: XR PELVIS (MIN 3 VIEWS)    Result Date: 10/23/2024  EXAMINATION: ONE XRAY VIEW OF THE QIGYMW86/ 9:57 am COMPARISON: 2024. HISTORY: ORDERING SYSTEM PROVIDED HISTORY: Nondisplaced fracture of anterior column (iliopubic) of right acetabulum, initial encounter for closed fracture (HCC) TECHNOLOGIST PROVIDED HISTORY: WITH JUDET VIEWS Reason for exam:->Acetabular fracture FINDINGS: There is stable appearance of a single threaded screw traversing a vertical fracture in the superior right iliac bone.

## 2024-11-05 ENCOUNTER — TREATMENT (OUTPATIENT)
Dept: PHYSICAL THERAPY | Age: 74
End: 2024-11-05
Payer: MEDICARE

## 2024-11-05 DIAGNOSIS — S32.434D CLOSED NONDISPLACED FRACTURE OF ANTERIOR COLUMN OF RIGHT ACETABULUM WITH ROUTINE HEALING, SUBSEQUENT ENCOUNTER: Primary | ICD-10-CM

## 2024-11-05 PROCEDURE — 97110 THERAPEUTIC EXERCISES: CPT | Performed by: PHYSICAL THERAPIST

## 2024-11-05 NOTE — PROGRESS NOTES
[facility]   Phone: [phone]   Fax: [phone]      Physical Therapy Daily Treatment Note    Date: 2024  Patient Name: Rosas Corbin  : 1950   MRN: [id number]  DOInjury: 2024    DOSx: 24   PROCEDURE:Open reduction internal fixation right both column acetabulum fracture   Referring Provider: No referring provider defined for this encounter.     Medical Diagnosis:   S32.434D (ICD-10-CM) - Nondisplaced fracture of anterior column (iliopubic) of right acetabulum, subsequent encounter for closed fracture (HCC)       Precautions:  per order 10/23/24:  Start progressive weightbearing to the right lower extremity with assistive device, advancing weight gradually over the next 3 to 4 weeks until weightbearing as tolerated.       Outcome Measure:  LEFS 35/80     S: See eval.   O:   Time 1300- 1339     Visit 1/10 Repeat outcome measure at mid point and end.    Pain 0-/10     ROM Hip:  Right:   AROM: 95° Flexion,  NT° Extension, 30° Abduction,  30° ER, 30° IR     Left:   AROM: 90° Flexion,  NT° Extension, 35° Abduction,  30° ER, 30° IR        Knee:  Right:   AROM: 120° Flexion,  0° Extension     Left:   AROM: 112° Flexion,  0° Extension     Modalities            Manual            Stretch                  Exercise      Bike      Heel slides      QS      SLR      SAQ      LAQ      Hamstring Curl       TG squats      TG calf raises      Step-ups - FWD      Step-ups - LAT      Step-ups - BWD        NMR To improve balance for safe community and home ambulation    Resisted walk      FWD      BKWD      lat      March      Side stepping      Retro walk      Heel to toe      A:  Tolerated well.  Pt reports saw doc last week. Per pt, doctor stated pt could begin 25% WB and progress weekly. Pt reports walked with 25% WB last week.  Pt reports just started trying 50%.   Pt ambulates in session approximately 30 ft with 50%WB with cues for technique.  Good follow through.

## 2024-11-05 NOTE — PROGRESS NOTES
[facility]   Phone: [phone]   Fax: [phone]      Physical Therapy Daily Treatment Note    Date: 2024  Patient Name: Rosas Corbin  : 1950   MRN: [id number]  DOInjury: 2024    DOSx: 24   PROCEDURE:Open reduction internal fixation right both column acetabulum fracture   Referring Provider: Tl Khan PA-C  [address] [place] [address]        Medical Diagnosis:   S32.434D (ICD-10-CM) - Nondisplaced fracture of anterior column (iliopubic) of right acetabulum, subsequent encounter for closed fracture (HCC)       Precautions:  per order 10/23/24:  Start progressive weightbearing to the right lower extremity with assistive device, advancing weight gradually over the next 3 to 4 weeks until weightbearing as tolerated.       Outcome Measure:  LEFS 35/80     S: pt reports no pain just soreness in lateral hip and low back/ SI area B.    O:   Time 1337  - 1407      Visit 2/10 Repeat outcome measure at mid point and end.    Pain See above      ROM Hip:  Right:   AROM: 95° Flexion,  NT° Extension, 30° Abduction,  30° ER, 30° IR     Left:   AROM: 90° Flexion,  NT° Extension, 35° Abduction,  30° ER, 30° IR        Knee:  Right:   AROM: 120° Flexion,  0° Extension     Left:   AROM: 112° Flexion,  0° Extension     Modalities            Manual            Stretch                  Exercise      Bike 5 minutes Gently / slow revolutions     GS 20x      Heel slides 20x R      QS 20x     SLR      SAQ 2 x 10 R      LAQ 2 x 10 R     Hip adduction  2x 10  Ball     Sit to stands  2 x 10  With 1 UE support , with blue foam on seat    Hamstring Curl  2 x 10 R GTB     Standing hip extension       TG squats      TG calf raises 20x B     Step-ups - FWD      Step-ups - LAT      Step-ups - BWD        NMR To improve balance for safe community and home ambulation    Resisted walk      FWD      BKWD      lat      March      Side stepping      Retro walk      Heel

## 2024-11-07 ENCOUNTER — TREATMENT (OUTPATIENT)
Dept: PHYSICAL THERAPY | Age: 74
End: 2024-11-07

## 2024-11-07 DIAGNOSIS — S32.434D CLOSED NONDISPLACED FRACTURE OF ANTERIOR COLUMN OF RIGHT ACETABULUM WITH ROUTINE HEALING, SUBSEQUENT ENCOUNTER: Primary | ICD-10-CM

## 2024-11-07 NOTE — PROGRESS NOTES
[facility]   Phone: [phone]   Fax: [phone]      Physical Therapy Daily Treatment Note    Date: 2024  Patient Name: Rosas Corbin  : 1950   MRN: [id number]  DOInjury: 2024    DOSx: 24   PROCEDURE:Open reduction internal fixation right both column acetabulum fracture   Referring Provider: Tl Khan PA-C  [address] [place] [address]        Medical Diagnosis:   S32.434D (ICD-10-CM) - Nondisplaced fracture of anterior column (iliopubic) of right acetabulum, subsequent encounter for closed fracture (HCC)       Precautions:  per order 10/23/24:  Start progressive weightbearing to the right lower extremity with assistive device, advancing weight gradually over the next 3 to 4 weeks until weightbearing as tolerated.       Outcome Measure:  LEFS 35/80     S: pt reports 3/10 ache at times today but not currently. Reports got some soreness after last session in R low back.   O:   Time 1124 - 1148     Visit 310 Repeat outcome measure at mid point and end.    Pain See above      ROM Hip:  Right:   AROM: 95° Flexion,  NT° Extension, 30° Abduction,  30° ER, 30° IR     Left:   AROM: 90° Flexion,  NT° Extension, 35° Abduction,  30° ER, 30° IR        Knee:  Right:   AROM: 120° Flexion,  0° Extension     Left:   AROM: 112° Flexion,  0° Extension     Modalities            Manual            Stretch                  Exercise      Bike 5 minutes Gently / slow revolutions     GS 20x      Heel slides 20x R      QS 20x     SLR      SAQ     LAQ 2 x 10 B     Hip adduction  2x 10  Ball     Sit to stands   x 10  With 1 UE support , with blue foam on seat    Hamstring Curl  2 x 10 R GTB     Standing hip extension       TG squats      TG calf raises 20x B     Step-ups - FWD      Step-ups - LAT      Step-ups - BWD        NMR To improve balance for safe community and home ambulation    Resisted walk      FWD      BKWD      lat

## 2024-11-12 ENCOUNTER — TREATMENT (OUTPATIENT)
Dept: PHYSICAL THERAPY | Age: 74
End: 2024-11-12
Payer: MEDICARE

## 2024-11-12 DIAGNOSIS — S32.434D CLOSED NONDISPLACED FRACTURE OF ANTERIOR COLUMN OF RIGHT ACETABULUM WITH ROUTINE HEALING, SUBSEQUENT ENCOUNTER: Primary | ICD-10-CM

## 2024-11-12 PROCEDURE — 97110 THERAPEUTIC EXERCISES: CPT

## 2024-11-12 NOTE — PROGRESS NOTES
[facility]   Phone: [phone]   Fax: [phone]      Physical Therapy Daily Treatment Note    Date: 2024  Patient Name: Rosas Corbin  : 1950   MRN: [id number]  DOInjury: 2024    DOSx: 24   PROCEDURE:Open reduction internal fixation right both column acetabulum fracture   Referring Provider: Tl Khan PA-C  [address] [place] [address]        Medical Diagnosis:   S32.434D (ICD-10-CM) - Nondisplaced fracture of anterior column (iliopubic) of right acetabulum, subsequent encounter for closed fracture (HCC)       Precautions:  per order 10/23/24:  Start progressive weightbearing to the right lower extremity with assistive device, advancing weight gradually over the next 3 to 4 weeks until weightbearing as tolerated.       Outcome Measure:  LEFS 35/80     S: pt reports pain in R side of lower back, no rating given. Pt has been 75% WBing since last session tolerating well.     O:   Time 1287-1965     Visit 4/10 Repeat outcome measure at mid point and end.    Pain See above      ROM Hip:  Right:   AROM: 95° Flexion,  NT° Extension, 30° Abduction,  30° ER, 30° IR     Left:   AROM: 90° Flexion,  NT° Extension, 35° Abduction,  30° ER, 30° IR        Knee:  Right:   AROM: 120° Flexion,  0° Extension     Left:   AROM: 112° Flexion,  0° Extension     Modalities            Manual            Stretch                  Exercise      Bike 8 minutes Gently / slow revolutions     GS 20x      Heel slides 20x R      QS 20x     SLR 2 x 10     SAQ 2 x 10 R      Bridges 2 x 10     LAQ 2 x 10 B     Hip adduction  2x 10  Ball     Sit to stands  2 x 10  No UE support, with blue foam on seat    Hamstring Curl  2 x 10 R GTB     HIp abd 2 x 10  OTB    Standing hip extension       TG squats      TG calf raises 20x B     Step-ups - FWD      Step-ups - LAT      Step-ups - BWD        NMR To improve balance for safe community and home ambulation    Resisted

## 2024-11-14 ENCOUNTER — TREATMENT (OUTPATIENT)
Dept: PHYSICAL THERAPY | Age: 74
End: 2024-11-14

## 2024-11-14 DIAGNOSIS — S32.434D CLOSED NONDISPLACED FRACTURE OF ANTERIOR COLUMN OF RIGHT ACETABULUM WITH ROUTINE HEALING, SUBSEQUENT ENCOUNTER: Primary | ICD-10-CM

## 2024-11-14 NOTE — PROGRESS NOTES
community and home ambulation    Resisted walk      FWD      BKWD      lat      March      Side stepping      Retro walk      Heel to toe      A:  Tolerated well. May begin WBing as tolerated, recommend continue to use WW for balance. Ambulated 100 feet with light touch on ww. Also performed 7 steps in stairwell using 2 rails with step to pattern. Pt reports no pain. Will continue to use ww for balance only. Pt given above for HEP     P: Continue with rehab plan  ALONA SCHWAB, PTA [id number]    Treatment Charges: Mins Units   Initial Evaluation     Re-Evaluation     Ther Exercise         TE 35 2   Manual Therapy     MT     Ther Activities        TA     Gait Training          GT     Neuro Re-education NR     Modalities     Non-Billable Service Time     Other     Total Time/Units 35 2

## 2024-11-19 ENCOUNTER — TREATMENT (OUTPATIENT)
Dept: PHYSICAL THERAPY | Age: 74
End: 2024-11-19

## 2024-11-19 DIAGNOSIS — S32.434D CLOSED NONDISPLACED FRACTURE OF ANTERIOR COLUMN OF RIGHT ACETABULUM WITH ROUTINE HEALING, SUBSEQUENT ENCOUNTER: Primary | ICD-10-CM

## 2024-11-19 NOTE — PROGRESS NOTES
[facility]   Phone: [phone]   Fax: [phone]      Physical Therapy Daily Treatment Note    Date: 2024  Patient Name: Rosas Corbin  : 1950   MRN: [id number]  DOInjury: 2024    DOSx: 24   PROCEDURE:Open reduction internal fixation right both column acetabulum fracture   Referring Provider: Tl Khan PA-C  [address] [place] [address]        Medical Diagnosis:   S32.434D (ICD-10-CM) - Nondisplaced fracture of anterior column (iliopubic) of right acetabulum, subsequent encounter for closed fracture (HCC)       Precautions:  per order 10/23/24:  Start progressive weightbearing to the right lower extremity with assistive device, advancing weight gradually over the next 3 to 4 weeks until weightbearing as tolerated.       Outcome Measure:  LEFS 35/80     S: pt reports no pain today. Reports he is sleeping in his regular bed now and going up the steps just to go to bed at night.     O:   Time 1094-5246     Visit 6/10 Repeat outcome measure at mid point and end.    Pain See above      ROM Hip:  Right:   AROM: 95° Flexion,  NT° Extension, 30° Abduction,  30° ER, 30° IR     Left:   AROM: 90° Flexion,  NT° Extension, 35° Abduction,  30° ER, 30° IR        Knee:  Right:   AROM: 120° Flexion,  0° Extension     Left:   AROM: 112° Flexion,  0° Extension     Modalities            Manual            Stretch                  Exercise      Bike 10 minutes G   GS HEP    Heel slides     QS     SLR 2 x 10 HEP    SAQ     Bridges 2 x 10 HEP    LAQ 2 x 10 B     Hip adduction  Ball     Sit to stands  2 x 10  No UE support, with blue foam on seat    Hamstring Curl  2 x 10 R GTB     HIp abd 2 x 10  GTB    Hip abd standing 2 x 10 HEP    Standing hip extension  2 x 10     TG squats      TG calf raises 20x B     Step-ups - FWD 2 x 10 each  6 inch     Step-ups - LAT      Step-ups - BWD        NMR To improve balance for safe community and home ambulation

## 2024-11-21 ENCOUNTER — TREATMENT (OUTPATIENT)
Dept: PHYSICAL THERAPY | Age: 74
End: 2024-11-21

## 2024-11-21 ENCOUNTER — NURSE ONLY (OUTPATIENT)
Dept: FAMILY MEDICINE CLINIC | Age: 74
End: 2024-11-21
Payer: MEDICARE

## 2024-11-21 DIAGNOSIS — Z23 NEED FOR INFLUENZA VACCINATION: Primary | ICD-10-CM

## 2024-11-21 DIAGNOSIS — S32.434D CLOSED NONDISPLACED FRACTURE OF ANTERIOR COLUMN OF RIGHT ACETABULUM WITH ROUTINE HEALING, SUBSEQUENT ENCOUNTER: Primary | ICD-10-CM

## 2024-11-21 PROCEDURE — G0008 ADMIN INFLUENZA VIRUS VAC: HCPCS | Performed by: FAMILY MEDICINE

## 2024-11-21 PROCEDURE — 90653 IIV ADJUVANT VACCINE IM: CPT | Performed by: FAMILY MEDICINE

## 2024-11-21 NOTE — PROGRESS NOTES
BWD        NMR To improve balance for safe community and home ambulation    Resisted walk      FWD      BKWD      lat      March      Side stepping      Retro walk      Heel to toe      A:  Tolerated well. Pt is still making good progress. Recommended that he avoid over doing things at home as he reports he's had soreness over the last few days. Pt agreeable.     P: Continue with rehab plan  ALONA SCHWAB, PTA [id number]    Treatment Charges: Mins Units   Initial Evaluation     Re-Evaluation     Ther Exercise         TE 30 2   Manual Therapy     MT     Ther Activities        TA     Gait Training          GT     Neuro Re-education NR     Modalities     Non-Billable Service Time     Other     Total Time/Units 30 2

## 2024-11-26 ENCOUNTER — TREATMENT (OUTPATIENT)
Dept: PHYSICAL THERAPY | Age: 74
End: 2024-11-26
Payer: MEDICARE

## 2024-11-26 DIAGNOSIS — S32.434D CLOSED NONDISPLACED FRACTURE OF ANTERIOR COLUMN OF RIGHT ACETABULUM WITH ROUTINE HEALING, SUBSEQUENT ENCOUNTER: Primary | ICD-10-CM

## 2024-11-26 PROCEDURE — 97110 THERAPEUTIC EXERCISES: CPT

## 2024-11-26 PROCEDURE — 97530 THERAPEUTIC ACTIVITIES: CPT

## 2024-11-26 NOTE — PROGRESS NOTES
[facility]   Phone: [phone]   Fax: [phone]      Physical Therapy Daily Treatment Note    Date: 2024  Patient Name: Rosas Corbin  : 1950   MRN: [id number]  DOInjury: 2024    DOSx: 24   PROCEDURE:Open reduction internal fixation right both column acetabulum fracture   Referring Provider: Tl Khan PA-C  [address] [place] [address]        Medical Diagnosis:   S32.434D (ICD-10-CM) - Nondisplaced fracture of anterior column (iliopubic) of right acetabulum, subsequent encounter for closed fracture (HCC)       Precautions:  per order 10/23/24:  Start progressive weightbearing to the right lower extremity with assistive device, advancing weight gradually over the next 3 to 4 weeks until weightbearing as tolerated.       Outcome Measure:  LEFS 35/80     S: pt reports no pain today. Reports no pain standing, however reports slight soreness when WBing. Pt reports he has been very active lately and also contributes slight pain from laying in his regular bed.   O:   Time 915-955     Visit 8/10 Repeat outcome measure at mid point and end.    Pain See above      ROM Hip:  Right:   AROM: 95° Flexion,  NT° Extension, 30° Abduction,  30° ER, 30° IR     Left:   AROM: 90° Flexion,  NT° Extension, 35° Abduction,  30° ER, 30° IR        Knee:  Right:   AROM: 120° Flexion,  0° Extension     Left:   AROM: 112° Flexion,  0° Extension     Modalities            Manual            Stretch                  Exercise      Bike 10 minutes G   GS 20x  HEP    Heel slides     QS     SLR 2 x 10 HEP    SAQ     Bridges 2 x 10 HEP    LAQ 2 x 10 B     Hip adduction  2x 10  Ball     Sit to stands  2 x 10  No UE support, with blue foam on seat    Hamstring Curl  2 x 10 R GTB     HIp abd 2 x 10  GTB    Hip abd standing 2 x 10 HEP    Standing hip extension  2 x 10     TG squats      TG calf raises 20x B     Step-ups - FWD 2 x 10 each  6 inch     Step-ups - LAT up

## 2024-12-03 ENCOUNTER — OFFICE VISIT (OUTPATIENT)
Dept: FAMILY MEDICINE CLINIC | Age: 74
End: 2024-12-03

## 2024-12-03 ENCOUNTER — TREATMENT (OUTPATIENT)
Dept: PHYSICAL THERAPY | Age: 74
End: 2024-12-03
Payer: MEDICARE

## 2024-12-03 VITALS
TEMPERATURE: 97 F | RESPIRATION RATE: 18 BRPM | OXYGEN SATURATION: 98 % | SYSTOLIC BLOOD PRESSURE: 128 MMHG | HEART RATE: 89 BPM | BODY MASS INDEX: 34.4 KG/M2 | DIASTOLIC BLOOD PRESSURE: 80 MMHG | WEIGHT: 254 LBS | HEIGHT: 72 IN

## 2024-12-03 DIAGNOSIS — Z12.5 SCREENING FOR PROSTATE CANCER: ICD-10-CM

## 2024-12-03 DIAGNOSIS — R60.0 LOCALIZED EDEMA: ICD-10-CM

## 2024-12-03 DIAGNOSIS — E78.49 OTHER HYPERLIPIDEMIA: ICD-10-CM

## 2024-12-03 DIAGNOSIS — S32.434D CLOSED NONDISPLACED FRACTURE OF ANTERIOR COLUMN OF RIGHT ACETABULUM WITH ROUTINE HEALING, SUBSEQUENT ENCOUNTER: Primary | ICD-10-CM

## 2024-12-03 DIAGNOSIS — I10 PRIMARY HYPERTENSION: Primary | ICD-10-CM

## 2024-12-03 DIAGNOSIS — I10 PRIMARY HYPERTENSION: ICD-10-CM

## 2024-12-03 LAB
BASOPHILS ABSOLUTE: 0.05 K/UL (ref 0–0.2)
BASOPHILS RELATIVE PERCENT: 1 % (ref 0–2)
BILIRUBIN, URINE: NEGATIVE
COLOR, UA: YELLOW
COMMENT: NORMAL
EOSINOPHILS ABSOLUTE: 0.27 K/UL (ref 0.05–0.5)
EOSINOPHILS RELATIVE PERCENT: 6 % (ref 0–6)
GLUCOSE URINE: NEGATIVE MG/DL
HCT VFR BLD CALC: 38.5 % (ref 37–54)
HEMOGLOBIN: 13.1 G/DL (ref 12.5–16.5)
IMMATURE GRANULOCYTES %: 0 % (ref 0–5)
IMMATURE GRANULOCYTES ABSOLUTE: <0.03 K/UL (ref 0–0.58)
KETONES, URINE: NEGATIVE MG/DL
LEUKOCYTE ESTERASE, URINE: NEGATIVE
LYMPHOCYTES ABSOLUTE: 1.2 K/UL (ref 1.5–4)
LYMPHOCYTES RELATIVE PERCENT: 24 % (ref 20–42)
MCH RBC QN AUTO: 31.5 PG (ref 26–35)
MCHC RBC AUTO-ENTMCNC: 34 G/DL (ref 32–34.5)
MCV RBC AUTO: 92.5 FL (ref 80–99.9)
MONOCYTES ABSOLUTE: 0.65 K/UL (ref 0.1–0.95)
MONOCYTES RELATIVE PERCENT: 13 % (ref 2–12)
NEUTROPHILS ABSOLUTE: 2.75 K/UL (ref 1.8–7.3)
NEUTROPHILS RELATIVE PERCENT: 56 % (ref 43–80)
NITRITE, URINE: NEGATIVE
PDW BLD-RTO: 13.8 % (ref 11.5–15)
PH, URINE: 7.5 (ref 5–9)
PLATELET # BLD: 192 K/UL (ref 130–450)
PMV BLD AUTO: 9.1 FL (ref 7–12)
PROTEIN UA: NEGATIVE MG/DL
RBC # BLD: 4.16 M/UL (ref 3.8–5.8)
SPECIFIC GRAVITY UA: 1.01 (ref 1–1.03)
TURBIDITY: CLEAR
URINE HGB: NEGATIVE
UROBILINOGEN, URINE: 0.2 EU/DL (ref 0–1)
WBC # BLD: 4.9 K/UL (ref 4.5–11.5)

## 2024-12-03 PROCEDURE — 97110 THERAPEUTIC EXERCISES: CPT | Performed by: PHYSICAL THERAPIST

## 2024-12-03 PROCEDURE — 97530 THERAPEUTIC ACTIVITIES: CPT | Performed by: PHYSICAL THERAPIST

## 2024-12-03 RX ORDER — PAROXETINE 20 MG/1
20 TABLET, FILM COATED ORAL DAILY
Qty: 90 TABLET | Refills: 1 | Status: CANCELLED | OUTPATIENT
Start: 2024-12-03

## 2024-12-03 RX ORDER — PAROXETINE 10 MG/1
10 TABLET, FILM COATED ORAL DAILY
Qty: 90 TABLET | Refills: 3 | Status: SHIPPED | OUTPATIENT
Start: 2024-12-03

## 2024-12-03 RX ORDER — BUPROPION HYDROCHLORIDE 150 MG/1
150 TABLET ORAL EVERY MORNING
Qty: 90 TABLET | Refills: 1 | Status: SHIPPED | OUTPATIENT
Start: 2024-12-03

## 2024-12-03 SDOH — ECONOMIC STABILITY: FOOD INSECURITY: WITHIN THE PAST 12 MONTHS, THE FOOD YOU BOUGHT JUST DIDN'T LAST AND YOU DIDN'T HAVE MONEY TO GET MORE.: NEVER TRUE

## 2024-12-03 SDOH — ECONOMIC STABILITY: FOOD INSECURITY: WITHIN THE PAST 12 MONTHS, YOU WORRIED THAT YOUR FOOD WOULD RUN OUT BEFORE YOU GOT MONEY TO BUY MORE.: NEVER TRUE

## 2024-12-03 SDOH — ECONOMIC STABILITY: INCOME INSECURITY: HOW HARD IS IT FOR YOU TO PAY FOR THE VERY BASICS LIKE FOOD, HOUSING, MEDICAL CARE, AND HEATING?: NOT HARD AT ALL

## 2024-12-03 ASSESSMENT — ENCOUNTER SYMPTOMS
DIARRHEA: 0
COUGH: 0
SINUS PAIN: 0
EYE REDNESS: 0
SORE THROAT: 0
TROUBLE SWALLOWING: 0
SHORTNESS OF BREATH: 0
RHINORRHEA: 1
CONSTIPATION: 0
BACK PAIN: 0
BLOOD IN STOOL: 0
VOMITING: 0
PHOTOPHOBIA: 0
WHEEZING: 0
COLOR CHANGE: 0
ABDOMINAL PAIN: 0

## 2024-12-03 NOTE — PROGRESS NOTES
[facility]   Phone: [phone]   Fax: [phone]      Discharge Summary        REASON FOR DISCHARGE: pt able to schedule 1 more appointment.  Pt choosing to discharge today.  Pt reports doing well at home.      PATIENT EDUCATION/INSTRUCTIONS: continue HEP as instructed    RECOMMENDATIONS: call c questions or if additional services are warranted.      Thank you for the opportunity to work with your patient. If you have questions or comments, please contact me at numbers listed above.      Ryann Larios, PT PT , DPT PT [id number] 12/3/2024

## 2024-12-03 NOTE — PROGRESS NOTES
[facility]   Phone: [phone]   Fax: [phone]      Physical Therapy Daily Treatment Note    Date: 12/3/2024  Patient Name: Rosas Corbin  : 1950   MRN: [id number]  DOInjury: 2024    DOSx: 24   PROCEDURE:Open reduction internal fixation right both column acetabulum fracture   Referring Provider: Tl Khan PA-C  [address] [place] [address]        Medical Diagnosis:   S32.434D (ICD-10-CM) - Nondisplaced fracture of anterior column (iliopubic) of right acetabulum, subsequent encounter for closed fracture (HCC)       Precautions:  per order 10/23/24:  Start progressive weightbearing to the right lower extremity with assistive device, advancing weight gradually over the next 3 to 4 weeks until weightbearing as tolerated.       Outcome Measure:  LEFS 35/80     S: pt reports no pain just stiff if sits awhile or getting out of bed.  Pt able to schedule another appointment. Pt reports doing well and choosing to discharge today.        O:   Time 7811-6487     Visit 9/10 Repeat outcome measure at mid point and end.    Pain See above      ROM Hip:  Right:   AROM: 95° Flexion,  NT° Extension, 30° Abduction,  30° ER, 30° IR     Left:   AROM: 90° Flexion,  NT° Extension, 35° Abduction,  30° ER, 30° IR        Knee:  Right:   AROM: 120° Flexion,  0° Extension     Left:   AROM: 112° Flexion,  0° Extension     Modalities            Manual            Stretch                  Exercise      Bike 10 minutes G   GS 20x  HEP    Heel slides     QS 20x     SLR 2 x 10 HEP    SAQ     Bridges 2 x 10 HEP    LAQ 2 x 10 B     Hip adduction  2x 10  Ball     Sit to stands  No UE support, with blue foam on seat    Hamstring Curl  2 x 10 R GTB     HIp abd 2 x 10  GTB    Hip abd standing 2 x 10 HEP    Standing hip extension  2 x 10     TG squats      TG calf raises 20x B     Step-ups - FWD 2 x 10 each  8 inch     Step-ups - LAT up and over 6 inch     Step-ups - BWD

## 2024-12-03 NOTE — PROGRESS NOTES
OFFICE NOTE    12/3/24  Name: Rosas Corbin  :1950   Sex:male   Age:74 y.o.      SUBJECTIVE  Chief Complaint   Patient presents with    Fatigue    brain foggy      Short term ( feel more forgetful, kind of foggy feeling)        HPI comes in for f/u exam. Fell off ladder sustained unstable pelvic fracture, now walking and back to doing much of what he did before. Wife and daughter doing more which he feels is probably a good thing    Review of Systems   Constitutional:  Positive for activity change and fatigue. Negative for appetite change, fever and unexpected weight change.   HENT:  Positive for congestion and rhinorrhea. Negative for ear pain, hearing loss, sinus pain, sore throat and trouble swallowing.    Eyes:  Negative for photophobia, redness and visual disturbance.   Respiratory:  Negative for cough, shortness of breath and wheezing.    Cardiovascular:  Negative for chest pain, palpitations and leg swelling.   Gastrointestinal:  Negative for abdominal pain, blood in stool, constipation, diarrhea and vomiting.   Endocrine: Negative for cold intolerance, polydipsia and polyuria.   Genitourinary:  Positive for frequency and urgency. Negative for difficulty urinating, dysuria, genital sores and hematuria.   Musculoskeletal:  Positive for arthralgias and gait problem. Negative for back pain and joint swelling.        Feels back still needs to \"even out\" says is improving   Skin:  Negative for color change, pallor and rash.   Allergic/Immunologic: Negative for food allergies.   Neurological:  Positive for weakness. Negative for dizziness, tremors, seizures, syncope, numbness and headaches.   Hematological:  Negative for adenopathy. Does not bruise/bleed easily.   Psychiatric/Behavioral:  Negative for agitation, confusion, dysphoric mood, hallucinations and sleep disturbance. The patient is nervous/anxious.             Current Outpatient Medications:     buPROPion (WELLBUTRIN XL) 150 MG extended release

## 2024-12-04 ENCOUNTER — HOSPITAL ENCOUNTER (OUTPATIENT)
Dept: GENERAL RADIOLOGY | Age: 74
Discharge: HOME | End: 2024-12-06
Payer: MEDICARE

## 2024-12-04 ENCOUNTER — OFFICE VISIT (OUTPATIENT)
Dept: ORTHOPEDIC SURGERY | Age: 74
End: 2024-12-04
Payer: MEDICARE

## 2024-12-04 VITALS
HEIGHT: 72 IN | DIASTOLIC BLOOD PRESSURE: 85 MMHG | WEIGHT: 253.97 LBS | HEART RATE: 63 BPM | RESPIRATION RATE: 20 BRPM | SYSTOLIC BLOOD PRESSURE: 139 MMHG | OXYGEN SATURATION: 97 % | BODY MASS INDEX: 34.4 KG/M2 | TEMPERATURE: 97.4 F

## 2024-12-04 DIAGNOSIS — S32.434A: ICD-10-CM

## 2024-12-04 DIAGNOSIS — S32.434A: Primary | ICD-10-CM

## 2024-12-04 LAB
ALBUMIN: 4.4 G/DL (ref 3.5–5.2)
ALP BLD-CCNC: 93 U/L (ref 40–129)
ALT SERPL-CCNC: 14 U/L (ref 0–40)
ANION GAP SERPL CALCULATED.3IONS-SCNC: 14 MMOL/L (ref 7–16)
AST SERPL-CCNC: 21 U/L (ref 0–39)
BILIRUB SERPL-MCNC: 0.6 MG/DL (ref 0–1.2)
BUN BLDV-MCNC: 16 MG/DL (ref 6–23)
CALCIUM SERPL-MCNC: 9.9 MG/DL (ref 8.6–10.2)
CHLORIDE BLD-SCNC: 99 MMOL/L (ref 98–107)
CHOLESTEROL, TOTAL: 131 MG/DL
CO2: 21 MMOL/L (ref 22–29)
CREAT SERPL-MCNC: 0.9 MG/DL (ref 0.7–1.2)
GFR, ESTIMATED: 90 ML/MIN/1.73M2
GLUCOSE BLD-MCNC: 95 MG/DL (ref 74–99)
HDLC SERPL-MCNC: 53 MG/DL
LDL CHOLESTEROL: 70 MG/DL
POTASSIUM SERPL-SCNC: 4.8 MMOL/L (ref 3.5–5)
PROSTATE SPECIFIC ANTIGEN: 0.32 NG/ML (ref 0–4)
SODIUM BLD-SCNC: 134 MMOL/L (ref 132–146)
TOTAL PROTEIN: 7 G/DL (ref 6.4–8.3)
TRIGL SERPL-MCNC: 40 MG/DL
TSH SERPL DL<=0.05 MIU/L-ACNC: 5.03 UIU/ML (ref 0.27–4.2)
VLDLC SERPL CALC-MCNC: 8 MG/DL

## 2024-12-04 PROCEDURE — 99213 OFFICE O/P EST LOW 20 MIN: CPT | Performed by: PHYSICIAN ASSISTANT

## 2024-12-04 PROCEDURE — 3075F SYST BP GE 130 - 139MM HG: CPT | Performed by: PHYSICIAN ASSISTANT

## 2024-12-04 PROCEDURE — 72190 X-RAY EXAM OF PELVIS: CPT

## 2024-12-04 PROCEDURE — G8417 CALC BMI ABV UP PARAM F/U: HCPCS | Performed by: PHYSICIAN ASSISTANT

## 2024-12-04 PROCEDURE — 1036F TOBACCO NON-USER: CPT | Performed by: PHYSICIAN ASSISTANT

## 2024-12-04 PROCEDURE — 3017F COLORECTAL CA SCREEN DOC REV: CPT | Performed by: PHYSICIAN ASSISTANT

## 2024-12-04 PROCEDURE — 99212 OFFICE O/P EST SF 10 MIN: CPT | Performed by: PHYSICIAN ASSISTANT

## 2024-12-04 PROCEDURE — 1123F ACP DISCUSS/DSCN MKR DOCD: CPT | Performed by: PHYSICIAN ASSISTANT

## 2024-12-04 PROCEDURE — 3079F DIAST BP 80-89 MM HG: CPT | Performed by: PHYSICIAN ASSISTANT

## 2024-12-04 PROCEDURE — G8427 DOCREV CUR MEDS BY ELIG CLIN: HCPCS | Performed by: PHYSICIAN ASSISTANT

## 2024-12-04 PROCEDURE — 1160F RVW MEDS BY RX/DR IN RCRD: CPT | Performed by: PHYSICIAN ASSISTANT

## 2024-12-04 PROCEDURE — 1159F MED LIST DOCD IN RCRD: CPT | Performed by: PHYSICIAN ASSISTANT

## 2024-12-04 PROCEDURE — G8482 FLU IMMUNIZE ORDER/ADMIN: HCPCS | Performed by: PHYSICIAN ASSISTANT

## 2024-12-04 NOTE — PROGRESS NOTES
Chief Complaint   Patient presents with    Follow-up      16 week P/o R Anterior column Posterior hemitransverse Acetabular fracture ORIF 8/7/24; TTS. Patient states pain lvl 0         OP:SURGEON: Dr. Perry Alcocer MD  DATE OF PROCEDURE: 8/7/24  PROCEDURE:Open reduction internal fixation right both column acetabulum fracture    Subjective:  Rosas Corbin is approximately 4 months follow-up from the above surgery. He is WBAT with mild, tolerable soreness throughout the right glute and lateral hip sometimes radiating to the right inguinal region although this is improving.  He has finished physical therapy and is active with HEP does not use any assistive devices although states that he still has an antalgic gait.     Review of Systems -  all pertinent positives and negatives in HPI.      Objective:    General: Alert and oriented X 3, normocephalic atraumatic, external ears and eye normal, sclera clear, no acute distress, respirations easy and unlabored with no audible wheezes, skin warm and dry, speech and dress appropriate for noted age, affect euthymic.    Extremity:  Right Lower Extremity  Skin clean dry and intact, without signs of infection  Mild lateral hip and groin discomfort with active internal/external rotation of the right hip and with weightbearing.  Ambulates with antalgic gait with mild soreness to the lateral hip and glute  Compartments supple throughout thigh and leg  Calf supple and nontender  Demonstrates active knee flexion/extension, ankle plantar/dorsiflexion/great toe extension.   States sensation intact to touch in sural/deep peroneal/superficial peroneal/saphenous/posterior tibial nerve distributions to foot/ankle.   Palpable dorsalis pedis and posterior tibialis pulses, cap refill brisk in toes, foot warm/perfused.             Vitals:    12/04/24 1027   BP: 139/85   Site: Left Upper Arm   Position: Sitting   Cuff Size: Medium Adult   Pulse: 63   Resp: 20   Temp: 97.4 °F (36.3 °C)

## 2024-12-26 ENCOUNTER — OFFICE VISIT (OUTPATIENT)
Dept: FAMILY MEDICINE CLINIC | Age: 74
End: 2024-12-26

## 2024-12-26 VITALS
TEMPERATURE: 97.8 F | HEIGHT: 72 IN | HEART RATE: 74 BPM | WEIGHT: 254 LBS | DIASTOLIC BLOOD PRESSURE: 68 MMHG | SYSTOLIC BLOOD PRESSURE: 124 MMHG | OXYGEN SATURATION: 96 % | BODY MASS INDEX: 34.4 KG/M2 | RESPIRATION RATE: 18 BRPM

## 2024-12-26 DIAGNOSIS — J32.9 SINOBRONCHITIS: Primary | ICD-10-CM

## 2024-12-26 DIAGNOSIS — R05.1 ACUTE COUGH: ICD-10-CM

## 2024-12-26 DIAGNOSIS — J40 SINOBRONCHITIS: Primary | ICD-10-CM

## 2024-12-26 RX ORDER — DOXYCYCLINE HYCLATE 100 MG
100 TABLET ORAL 2 TIMES DAILY
Qty: 20 TABLET | Refills: 0 | Status: SHIPPED | OUTPATIENT
Start: 2024-12-26 | End: 2025-01-05

## 2024-12-26 RX ORDER — BENZONATATE 100 MG/1
100 CAPSULE ORAL 3 TIMES DAILY PRN
Qty: 30 CAPSULE | Refills: 0 | Status: SHIPPED | OUTPATIENT
Start: 2024-12-26 | End: 2025-01-05

## 2024-12-26 RX ORDER — ASPIRIN 81 MG/1
81 TABLET ORAL DAILY
COMMUNITY

## 2024-12-27 ASSESSMENT — ENCOUNTER SYMPTOMS
SHORTNESS OF BREATH: 0
WHEEZING: 0
ABDOMINAL PAIN: 0
DIARRHEA: 0
EYES NEGATIVE: 1
NAUSEA: 0
SORE THROAT: 0
SINUS PRESSURE: 0
VOMITING: 0
COUGH: 1
CHEST TIGHTNESS: 0

## 2024-12-27 NOTE — PROGRESS NOTES
[facility]     24  Rosas Corbin : 1950 Sex: male  Age: 74 y.o.    Chief Complaint   Patient presents with    Cough     Cough and congestion x 6 days bringing up yellow green phlegm.        HPI  Patient presents to express care today complaining of cough along with head and chest congestion, sinus drainage, fatigue, green-yellow mucus production all going on for 5+ days.  Denies fever or chills.  Denies shortness of breath.          Review of Systems   Constitutional:  Positive for fatigue. Negative for chills and fever.   HENT:  Positive for postnasal drip. Negative for congestion, ear pain, sinus pressure and sore throat.    Eyes: Negative.    Respiratory:  Positive for cough. Negative for chest tightness, shortness of breath and wheezing.    Cardiovascular:  Negative for chest pain.   Gastrointestinal:  Negative for abdominal pain, diarrhea, nausea and vomiting.   Musculoskeletal:  Negative for myalgias.   Neurological:  Negative for headaches.               REST OF PERTINENT ROS GONE OVER AND WAS NEGATIVE.                 Current Outpatient Medications:     aspirin 81 MG EC tablet, Take 1 tablet by mouth daily, Disp: , Rfl:     doxycycline hyclate (VIBRA-TABS) 100 MG tablet, Take 1 tablet by mouth 2 times daily for 10 days, Disp: 20 tablet, Rfl: 0    benzonatate (TESSALON) 100 MG capsule, Take 1 capsule by mouth 3 times daily as needed for Cough, Disp: 30 capsule, Rfl: 0    buPROPion (WELLBUTRIN XL) 150 MG extended release tablet, Take 1 tablet by mouth every morning, Disp: 90 tablet, Rfl: 1    PARoxetine (PAXIL) 10 MG tablet, Take 1 tablet by mouth daily, Disp: 90 tablet, Rfl: 3    Cholecalciferol (VITAMIN D) 25 MCG TABS, Take 1 tablet by mouth daily, Disp: 30 tablet, Rfl: 0    Multiple Vitamins-Minerals (MULTIVITAMIN GUMMIES ADULT PO), Take by mouth daily 1 gummy/daily, Disp: , Rfl:     omeprazole (PRILOSEC) 40 MG delayed release capsule, Take 1 capsule by mouth daily, Disp: 90

## 2025-01-07 ENCOUNTER — OFFICE VISIT (OUTPATIENT)
Dept: PODIATRY | Age: 75
End: 2025-01-07
Payer: MEDICARE

## 2025-01-07 VITALS — BODY MASS INDEX: 34.4 KG/M2 | WEIGHT: 254 LBS | HEIGHT: 72 IN

## 2025-01-07 DIAGNOSIS — G60.8 HEREDITARY SENSORY NEUROPATHY: ICD-10-CM

## 2025-01-07 DIAGNOSIS — B35.1 TINEA UNGUIUM: Primary | ICD-10-CM

## 2025-01-07 PROCEDURE — 99203 OFFICE O/P NEW LOW 30 MIN: CPT | Performed by: PODIATRIST

## 2025-01-07 PROCEDURE — 3017F COLORECTAL CA SCREEN DOC REV: CPT | Performed by: PODIATRIST

## 2025-01-07 PROCEDURE — M1299 PR FLU IMMUNIZE ORDER/ADMIN: HCPCS | Performed by: PODIATRIST

## 2025-01-07 PROCEDURE — 1159F MED LIST DOCD IN RCRD: CPT | Performed by: PODIATRIST

## 2025-01-07 PROCEDURE — G8427 DOCREV CUR MEDS BY ELIG CLIN: HCPCS | Performed by: PODIATRIST

## 2025-01-07 PROCEDURE — 11721 DEBRIDE NAIL 6 OR MORE: CPT | Performed by: PODIATRIST

## 2025-01-07 PROCEDURE — 1123F ACP DISCUSS/DSCN MKR DOCD: CPT | Performed by: PODIATRIST

## 2025-01-07 PROCEDURE — 1036F TOBACCO NON-USER: CPT | Performed by: PODIATRIST

## 2025-01-07 PROCEDURE — G8417 CALC BMI ABV UP PARAM F/U: HCPCS | Performed by: PODIATRIST

## 2025-01-07 NOTE — PROGRESS NOTES
Patient presents today as a new patient referred by his sister for general foot care and nail care.  They were last seen by PCP, Monroe Claudio MD, on 2024.    Electronically signed by Lin Frederick MA on 2025 at 8:34 AM      25  Rosas Corbin : 1950 Sex: male  Age: 74 y.o.    Patient was referred by Monroe Claudio MD    CC:   Painful elongated toenails 1-5 right and left    HPI  This pleasant 74-year-old male patient referred me today foot and ankle exam.  History of aspirin 81 mg daily.  Difficulty managing toenails.  States last year he did have a fall from a ladder and did have a pelvic fracture.  Does state that still difficulty managing toenails.  Denies any history of diabetes.  Denies any open wounds.  Here today for painful elongated toenails 1-5 right and left.    No additional pedal complaints at this time.    ROS:  Const: Denies constitutional symptoms  Musculo: Denies symptoms other than stated above  Skin: Denies symptoms other than stated above      Current Outpatient Medications:     aspirin 81 MG EC tablet, Take 1 tablet by mouth daily, Disp: , Rfl:     buPROPion (WELLBUTRIN XL) 150 MG extended release tablet, Take 1 tablet by mouth every morning, Disp: 90 tablet, Rfl: 1    PARoxetine (PAXIL) 10 MG tablet, Take 1 tablet by mouth daily, Disp: 90 tablet, Rfl: 3    Cholecalciferol (VITAMIN D) 25 MCG TABS, Take 1 tablet by mouth daily, Disp: 30 tablet, Rfl: 0    Multiple Vitamins-Minerals (MULTIVITAMIN GUMMIES ADULT PO), Take by mouth daily 1 gummy/daily, Disp: , Rfl:     omeprazole (PRILOSEC) 40 MG delayed release capsule, Take 1 capsule by mouth daily, Disp: 90 capsule, Rfl: 3    olmesartan (BENICAR) 40 MG tablet, take 1 tablet by mouth once daily, Disp: 90 tablet, Rfl: 3    carvedilol (COREG) 12.5 MG tablet, take 1 tablet by mouth every morning then take 1 tablet every evening, Disp: 180 tablet, Rfl: 3    cetirizine (ZYRTEC) 10 MG tablet, Take 1 tablet by mouth daily, Disp: 30

## 2025-01-23 NOTE — PROGRESS NOTES
Chief Complaint   Patient presents with    Chest Pain       Patient Active Problem List    Diagnosis Date Noted    Status post total knee replacement, left 11/10/2022     Priority: Medium    RBBB 07/09/2024    H/O colonoscopy with polypectomy 06/15/2021    Disorder of prostate 09/21/2006    Essential hypertension 09/21/2006       Current Outpatient Medications   Medication Sig Dispense Refill    omeprazole (PRILOSEC) 40 MG delayed release capsule Take 1 capsule by mouth daily 90 capsule 3    olmesartan (BENICAR) 40 MG tablet take 1 tablet by mouth once daily 90 tablet 3    carvedilol (COREG) 12.5 MG tablet take 1 tablet by mouth every morning then take 1 tablet every evening 180 tablet 3    buPROPion (WELLBUTRIN XL) 150 MG extended release tablet TAKE ONE TABLET BY MOUTH EVERY MORNING 90 tablet 1    ASPIRIN 81 PO Take by mouth      ibuprofen (ADVIL;MOTRIN) 200 MG tablet Take 1 tablet by mouth every 6 hours as needed for Pain      cetirizine (ZYRTEC) 10 MG tablet Take 1 tablet by mouth daily 30 tablet 1    Omega-3 Fatty Acids (FISH OIL PO) Take by mouth daily       No current facility-administered medications for this visit.        Allergies   Allergen Reactions    Augmentin [Amoxicillin-Pot Clavulanate] Diarrhea    Clindamycin/Lincomycin Hives and Rash       Vitals:    07/10/24 0953   BP: (!) 131/90   Pulse: 60   Resp: 18   Weight: 106.7 kg (235 lb 3.2 oz)   Height: 1.829 m (6')                 SUBJECTIVE: Rosas Corbin presents to the office today for consult - dr jazlyn ramos     He complains of  variety of vague complaints, but goes on to tell me how he cares for a large property, carries heavy loads, takes care of horses and chickens, etc  and denies   exertional chest pressure/discomfort, orthopnea, palpitations, and syncope.    He mentions various times his family hx of CAD and his worry that he is not the same since COVID last fall  Last LDL 81 and recent BCDs without disease      Physical Exam   BP (!) 131/90  
F075 to learn more about \"A Healthy Heart: Care Instructions.\"  Current as of: July 31, 2024  Content Version: 14.3  © 2024 [employer].   Care instructions adapted under license by [employer]. If you have questions about a medical condition or this instruction, always ask your healthcare professional. [employer] disclaims any warranty or liability for your use of this information.    Personalized Preventive Plan for Vinnie Elizabeth - 1/23/2025  Medicare offers a range of preventive health benefits. Some of the tests and screenings are paid in full while other may be subject to a deductible, co-insurance, and/or copay.  Some of these benefits include a comprehensive review of your medical history including lifestyle, illnesses that may run in your family, and various assessments and screenings as appropriate.  After reviewing your medical record and screening and assessments performed today your provider may have ordered immunizations, labs, imaging, and/or referrals for you.  A list of these orders (if applicable) as well as your Preventive Care list are included within your After Visit Summary for your review.

## 2025-02-07 ENCOUNTER — OFFICE VISIT (OUTPATIENT)
Dept: FAMILY MEDICINE CLINIC | Age: 75
End: 2025-02-07

## 2025-02-07 VITALS
DIASTOLIC BLOOD PRESSURE: 86 MMHG | TEMPERATURE: 98.3 F | HEART RATE: 71 BPM | HEIGHT: 72 IN | SYSTOLIC BLOOD PRESSURE: 134 MMHG | BODY MASS INDEX: 34.95 KG/M2 | OXYGEN SATURATION: 95 % | WEIGHT: 258 LBS

## 2025-02-07 DIAGNOSIS — J01.40 ACUTE NON-RECURRENT PANSINUSITIS: Primary | ICD-10-CM

## 2025-02-07 RX ORDER — METHYLPREDNISOLONE 4 MG/1
TABLET ORAL
Qty: 1 KIT | Refills: 0 | Status: SHIPPED | OUTPATIENT
Start: 2025-02-07 | End: 2025-02-13

## 2025-02-07 RX ORDER — CEFDINIR 300 MG/1
300 CAPSULE ORAL 2 TIMES DAILY
Qty: 20 CAPSULE | Refills: 0 | Status: SHIPPED | OUTPATIENT
Start: 2025-02-07 | End: 2025-02-17

## 2025-02-07 NOTE — PROGRESS NOTES
Chief Complaint       Congestion (X1 week /Sinus pressure and congestion /)      History of Present Illness   Source of history provided by: patient.    Rosas Corbin is a 74 y.o. old male presenting to the walk in clinic for evaluation of sinus pressure, discolored nasal drainage, nasal congestion, and sinus headaches for the past week. Denies any fever, chills, CP, dyspnea, LE edema, abdominal pain, vomiting, rash, or lethargy. Denies any hx of asthma or COPD. Patient denies recent sick exposures.     ROS    Unless otherwise stated in this report or unable to obtain because of the patient's clinical or mental status as evidenced by the medical record, this patients's positive and negative responses for Review of Systems, constitutional, psych, eyes, ENT, cardiovascular, respiratory, gastrointestinal, neurological, genitourinary, musculoskeletal, integument systems and systems related to the presenting problem are either stated in the preceding or were not pertinent or were negative for the symptoms and/or complaints related to the medical problem.    Past Medical History:  has a past medical history of Acid reflux, Arthritis, BPH (benign prostatic hyperplasia), Cancer (HCC), COVID-19, Heart murmur, Hypertension, Left knee pain, Mitral valve disorder, and Palpitations.  Past Surgical History:  has a past surgical history that includes back surgery; hernia repair; skin biopsy (11/2018); Colonoscopy (2018); Upper gastrointestinal endoscopy (2018); Knee arthroscopy (Left, 5/2/2019); Total knee arthroplasty (Left, 11/10/2022); and Hip fracture surgery (Right, 8/7/2024).  Social History:  reports that he has never smoked. He has never used smokeless tobacco. He reports current alcohol use of about 12.0 standard drinks of alcohol per week. He reports that he does not use drugs.  Family History: family history includes Breast Cancer in his maternal aunt; Coronary Art Dis in his father; Heart Disease in his father;

## 2025-02-26 NOTE — PROGRESS NOTES
[facility]   Phone: [phone]   Fax: [phone]      Physical Therapy Daily Treatment Note    Date: 2022  Patient Name: Georgina Canela  : 1950   MRN: [id number]  DOInjury: years   DOSx: 11/10/2022 - Seth Robot Assisted Left total knee arthroplasty   Referring Provider: Martin Kirkpatrick MD  [address]     Medical Diagnosis:     Y66.372 (ICD-10-CM) - S/P total knee arthroplasty, left   M17.12 (ICD-10-CM) - Primary osteoarthritis of left knee       Outcome Measure:  LEFS      S: See eval  O: Mod edema  Time  1545 -  1630       Visit    Repeat outcome measure at mid point and end. Pain    5/10     ROM  Knee:  Right:   AROM: 125° Flexion,  0° Extension     Left:   AROM: 85° Flexion,  lacks 7 ° Extension        Modalities       Ice, compression, elevation      Stretching       Patella mobs      Prone hangs      Heel props      Knee flex stretch-seated      Prone self flexion stretch      Exercise       Bike Next     Quad sets 5 sec hold x 20 reps HEP te   Heel slides X 10 in between exercises  HEP te   SLR Attempted but pt unable. SAQ  X 10  HEP te   Sidekicks      Squat       Step-ups - FWD       Step-ups - LAT      Step-ups - BWD       Step up and over reciprocally       TG squats      TG Calf raises       LAQ             NMR For safe ambulation in community and home    Marching gait      Side stepping      Retrowalk      Heel to toe      A: Tolerated fairly well. Above added to written HEP along with instructions for ice and elevation.     P: Continue with rehab plan  Georgina Almodovar, PT DPT, PT [id number]    Treatment Charges: Mins Units   Initial Evaluation 30  1   Re-Evaluation     Ther Exercise         TE 15 1   Manual Therapy     MT     Ther Activities        TA     Gait Training          GT     Neuro Re-education NR     Modalities     Non-Billable Service Time     Other     Total Time/Units 45 2 No difficulties

## 2025-03-05 ENCOUNTER — OFFICE VISIT (OUTPATIENT)
Dept: ORTHOPEDIC SURGERY | Age: 75
End: 2025-03-05
Payer: MEDICARE

## 2025-03-05 ENCOUNTER — HOSPITAL ENCOUNTER (OUTPATIENT)
Dept: GENERAL RADIOLOGY | Age: 75
Discharge: HOME | End: 2025-03-07
Payer: MEDICARE

## 2025-03-05 DIAGNOSIS — S32.434A: ICD-10-CM

## 2025-03-05 DIAGNOSIS — S32.434A: Primary | ICD-10-CM

## 2025-03-05 PROCEDURE — 1036F TOBACCO NON-USER: CPT | Performed by: PHYSICIAN ASSISTANT

## 2025-03-05 PROCEDURE — 3017F COLORECTAL CA SCREEN DOC REV: CPT | Performed by: PHYSICIAN ASSISTANT

## 2025-03-05 PROCEDURE — 72190 X-RAY EXAM OF PELVIS: CPT

## 2025-03-05 PROCEDURE — 1123F ACP DISCUSS/DSCN MKR DOCD: CPT | Performed by: PHYSICIAN ASSISTANT

## 2025-03-05 PROCEDURE — G8427 DOCREV CUR MEDS BY ELIG CLIN: HCPCS | Performed by: PHYSICIAN ASSISTANT

## 2025-03-05 PROCEDURE — 1159F MED LIST DOCD IN RCRD: CPT | Performed by: PHYSICIAN ASSISTANT

## 2025-03-05 PROCEDURE — G8417 CALC BMI ABV UP PARAM F/U: HCPCS | Performed by: PHYSICIAN ASSISTANT

## 2025-03-05 PROCEDURE — 99213 OFFICE O/P EST LOW 20 MIN: CPT | Performed by: PHYSICIAN ASSISTANT

## 2025-03-05 PROCEDURE — 1160F RVW MEDS BY RX/DR IN RCRD: CPT | Performed by: PHYSICIAN ASSISTANT

## 2025-03-05 PROCEDURE — 99212 OFFICE O/P EST SF 10 MIN: CPT | Performed by: PHYSICIAN ASSISTANT

## 2025-03-05 NOTE — PROGRESS NOTES
changes in hardware or fracture alignment.     Assessment:   Diagnosis Orders   1. Nondisplaced fracture of anterior column (iliopubic) of right acetabulum, initial encounter for closed fracture (HCC)            Plan:  Xrays reviewed with patient  Plan of care discussed in detail, all questions sought and answered to patients satisfaction at this time.   WBAT to bilateral lower extremities.   No restrictions.   Follow up in 3 months with repeat xrays.     Electronically signed by Kay Root PA-C on 3/5/2025 at 11:47 AM  Note: This report was completed using [employer] voiced recognition software.  Every effort has been made to ensure accuracy; however, inadvertent computerized transcription errors may be present.

## 2025-06-03 ENCOUNTER — OFFICE VISIT (OUTPATIENT)
Dept: FAMILY MEDICINE CLINIC | Age: 75
End: 2025-06-03

## 2025-06-03 VITALS
OXYGEN SATURATION: 98 % | HEIGHT: 72 IN | DIASTOLIC BLOOD PRESSURE: 84 MMHG | RESPIRATION RATE: 18 BRPM | HEART RATE: 79 BPM | SYSTOLIC BLOOD PRESSURE: 128 MMHG | WEIGHT: 271 LBS | BODY MASS INDEX: 36.7 KG/M2 | TEMPERATURE: 98.1 F

## 2025-06-03 DIAGNOSIS — F32.9 REACTIVE DEPRESSION: ICD-10-CM

## 2025-06-03 DIAGNOSIS — N42.9 DISORDER OF PROSTATE: ICD-10-CM

## 2025-06-03 DIAGNOSIS — I10 ESSENTIAL HYPERTENSION: Primary | ICD-10-CM

## 2025-06-03 DIAGNOSIS — S32.9XXS CLOSED NONDISPLACED FRACTURE OF PELVIS, UNSPECIFIED PART OF PELVIS, SEQUELA: ICD-10-CM

## 2025-06-03 DIAGNOSIS — I10 ESSENTIAL HYPERTENSION: ICD-10-CM

## 2025-06-03 LAB
ALBUMIN: 4.3 G/DL (ref 3.5–5.2)
ALP BLD-CCNC: 69 U/L (ref 40–129)
ALT SERPL-CCNC: 25 U/L (ref 0–50)
ANION GAP SERPL CALCULATED.3IONS-SCNC: 9 MMOL/L (ref 7–16)
AST SERPL-CCNC: 37 U/L (ref 0–50)
BASOPHILS ABSOLUTE: 0.04 K/UL (ref 0–0.2)
BASOPHILS RELATIVE PERCENT: 1 % (ref 0–2)
BILIRUB SERPL-MCNC: 0.4 MG/DL (ref 0–1.2)
BUN BLDV-MCNC: 22 MG/DL (ref 8–23)
CALCIUM SERPL-MCNC: 9.5 MG/DL (ref 8.8–10.2)
CHLORIDE BLD-SCNC: 103 MMOL/L (ref 98–107)
CO2: 23 MMOL/L (ref 22–29)
CREAT SERPL-MCNC: 0.9 MG/DL (ref 0.7–1.2)
EOSINOPHILS ABSOLUTE: 0.28 K/UL (ref 0.05–0.5)
EOSINOPHILS RELATIVE PERCENT: 7 % (ref 0–6)
GFR, ESTIMATED: 89 ML/MIN/1.73M2
GLUCOSE BLD-MCNC: 106 MG/DL (ref 74–99)
HCT VFR BLD CALC: 38.4 % (ref 37–54)
HEMOGLOBIN: 13.1 G/DL (ref 12.5–16.5)
IMMATURE GRANULOCYTES %: 0 % (ref 0–5)
IMMATURE GRANULOCYTES ABSOLUTE: <0.03 K/UL (ref 0–0.58)
LYMPHOCYTES ABSOLUTE: 1.19 K/UL (ref 1.5–4)
LYMPHOCYTES RELATIVE PERCENT: 30 % (ref 20–42)
MCH RBC QN AUTO: 33.6 PG (ref 26–35)
MCHC RBC AUTO-ENTMCNC: 34.1 G/DL (ref 32–34.5)
MCV RBC AUTO: 98.5 FL (ref 80–99.9)
MONOCYTES ABSOLUTE: 0.51 K/UL (ref 0.1–0.95)
MONOCYTES RELATIVE PERCENT: 13 % (ref 2–12)
NEUTROPHILS ABSOLUTE: 2.01 K/UL (ref 1.8–7.3)
NEUTROPHILS RELATIVE PERCENT: 50 % (ref 43–80)
PDW BLD-RTO: 13.7 % (ref 11.5–15)
PLATELET # BLD: 194 K/UL (ref 130–450)
PMV BLD AUTO: 9 FL (ref 7–12)
POTASSIUM SERPL-SCNC: 4.7 MMOL/L (ref 3.5–5.1)
RBC # BLD: 3.9 M/UL (ref 3.8–5.8)
SODIUM BLD-SCNC: 135 MMOL/L (ref 136–145)
TOTAL PROTEIN: 7 G/DL (ref 6.4–8.3)
WBC # BLD: 4 K/UL (ref 4.5–11.5)

## 2025-06-03 RX ORDER — BUPROPION HYDROCHLORIDE 150 MG/1
TABLET ORAL
Qty: 60 TABLET | Refills: 3 | Status: SHIPPED | OUTPATIENT
Start: 2025-06-03

## 2025-06-03 ASSESSMENT — PATIENT HEALTH QUESTIONNAIRE - PHQ9
8. MOVING OR SPEAKING SO SLOWLY THAT OTHER PEOPLE COULD HAVE NOTICED. OR THE OPPOSITE, BEING SO FIGETY OR RESTLESS THAT YOU HAVE BEEN MOVING AROUND A LOT MORE THAN USUAL: NOT AT ALL
1. LITTLE INTEREST OR PLEASURE IN DOING THINGS: NOT AT ALL
2. FEELING DOWN, DEPRESSED OR HOPELESS: NOT AT ALL
3. TROUBLE FALLING OR STAYING ASLEEP: NOT AT ALL
4. FEELING TIRED OR HAVING LITTLE ENERGY: NOT AT ALL
7. TROUBLE CONCENTRATING ON THINGS, SUCH AS READING THE NEWSPAPER OR WATCHING TELEVISION: NOT AT ALL
SUM OF ALL RESPONSES TO PHQ QUESTIONS 1-9: 0
SUM OF ALL RESPONSES TO PHQ QUESTIONS 1-9: 0
6. FEELING BAD ABOUT YOURSELF - OR THAT YOU ARE A FAILURE OR HAVE LET YOURSELF OR YOUR FAMILY DOWN: NOT AT ALL
SUM OF ALL RESPONSES TO PHQ QUESTIONS 1-9: 0
5. POOR APPETITE OR OVEREATING: NOT AT ALL
SUM OF ALL RESPONSES TO PHQ QUESTIONS 1-9: 0
9. THOUGHTS THAT YOU WOULD BE BETTER OFF DEAD, OR OF HURTING YOURSELF: NOT AT ALL
10. IF YOU CHECKED OFF ANY PROBLEMS, HOW DIFFICULT HAVE THESE PROBLEMS MADE IT FOR YOU TO DO YOUR WORK, TAKE CARE OF THINGS AT HOME, OR GET ALONG WITH OTHER PEOPLE: NOT DIFFICULT AT ALL

## 2025-06-03 ASSESSMENT — ENCOUNTER SYMPTOMS
EYE REDNESS: 0
BLOOD IN STOOL: 0
RHINORRHEA: 1
COLOR CHANGE: 0
TROUBLE SWALLOWING: 0
SORE THROAT: 0
COUGH: 0
WHEEZING: 0
PHOTOPHOBIA: 0
SHORTNESS OF BREATH: 0
ABDOMINAL PAIN: 0

## 2025-06-03 NOTE — PROGRESS NOTES
OFFICE NOTE    6/3/25  Name: Rosas Corbin  :1950   Sex:male   Age:75 y.o.      SUBJECTIVE  Chief Complaint   Patient presents with    Hypertension       History of Present Illness  The patient presents for evaluation of weight gain, depression, and rhinorrhea.    The primary concern is a significant weight gain of 33 pounds since a fall in 2024, which he attributes to increased food intake and decreased physical activity. He was previously on a weight loss program and had achieved a weight of 238 pounds before his fall. Despite engaging in outdoor activities such as gardening, he acknowledges a decrease in stamina. He consumes 1 to 2 beers daily and sometimes skips meals. He reports frequent falls due to difficulty lifting his foot.    He also reports feelings of fatigue and irritability when attempting to engage in activities. He discontinued his Wellbutrin medication several weeks ago due to perceived ineffectiveness and concerns about lethargy. He has been experiencing forgetfulness and occasional road rage sensation without incidents.   He has a history of internal bleeding, with a hemoglobin level of 7 at that time.    He experiences rhinorrhea in the mornings, which persists until noon. He is currently taking cetirizine and using Flonase, but these treatments have not been effective.    He has a history of knee replacement surgery, which only causes discomfort during stair climbing. He also reports difficulty standing up from a kneeling position due to pressure on his replaced knee.    PAST SURGICAL HISTORY:  - Knee replacement surgery  - Pelvic surgery following a fall in 2024    SOCIAL HISTORY  The patient drinks at least 1 or 2 beers a day.       Review of Systems   Constitutional:  Positive for activity change, fatigue and unexpected weight change.   HENT:  Positive for congestion, hearing loss and rhinorrhea. Negative for sore throat and trouble swallowing.    Eyes:  Negative for

## 2025-06-04 ENCOUNTER — RESULTS FOLLOW-UP (OUTPATIENT)
Dept: FAMILY MEDICINE CLINIC | Age: 75
End: 2025-06-04

## 2025-06-16 DIAGNOSIS — I10 ESSENTIAL HYPERTENSION: ICD-10-CM

## 2025-06-16 DIAGNOSIS — K21.00 GASTROESOPHAGEAL REFLUX DISEASE WITH ESOPHAGITIS WITHOUT HEMORRHAGE: ICD-10-CM

## 2025-06-16 RX ORDER — CARVEDILOL 12.5 MG/1
TABLET ORAL
Qty: 180 TABLET | Refills: 1 | Status: SHIPPED | OUTPATIENT
Start: 2025-06-16

## 2025-06-16 RX ORDER — OMEPRAZOLE 40 MG/1
40 CAPSULE, DELAYED RELEASE ORAL DAILY
Qty: 90 CAPSULE | Refills: 1 | Status: SHIPPED | OUTPATIENT
Start: 2025-06-16

## 2025-06-16 RX ORDER — OLMESARTAN MEDOXOMIL 40 MG/1
40 TABLET ORAL DAILY
Qty: 90 TABLET | Refills: 1 | Status: SHIPPED | OUTPATIENT
Start: 2025-06-16

## 2025-06-16 NOTE — TELEPHONE ENCOUNTER
Last Appointment:  6/3/2025  Future Appointments   Date Time Provider Department Center   8/6/2025 10:30 AM Perry Alcocer MD  [facility]   9/11/2025  1:00 PM Monroe Claudio MD [facility]

## 2025-07-29 ENCOUNTER — OFFICE VISIT (OUTPATIENT)
Dept: FAMILY MEDICINE CLINIC | Age: 75
End: 2025-07-29

## 2025-07-29 VITALS
HEIGHT: 72 IN | DIASTOLIC BLOOD PRESSURE: 74 MMHG | OXYGEN SATURATION: 95 % | WEIGHT: 260 LBS | SYSTOLIC BLOOD PRESSURE: 150 MMHG | BODY MASS INDEX: 35.21 KG/M2 | TEMPERATURE: 98.4 F | HEART RATE: 71 BPM
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
## 2025-07-29 DIAGNOSIS — R51.9 ACUTE NONINTRACTABLE HEADACHE, UNSPECIFIED HEADACHE TYPE: ICD-10-CM

## 2025-07-29 DIAGNOSIS — R52 BODY ACHES: ICD-10-CM

## 2025-07-29 DIAGNOSIS — S50.861A TICK BITE OF RIGHT FOREARM, INITIAL ENCOUNTER: ICD-10-CM
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
## 2025-07-29 DIAGNOSIS — S50.861A TICK BITE OF RIGHT FOREARM, INITIAL ENCOUNTER: Primary | ICD-10-CM

## 2025-07-29 DIAGNOSIS — M25.50 ARTHRALGIA, UNSPECIFIED JOINT: ICD-10-CM

## 2025-07-29 RX ORDER — DOXYCYCLINE HYCLATE 100 MG
100 TABLET ORAL 2 TIMES DAILY
Qty: 28 TABLET | Refills: 0 | Status: SHIPPED | OUTPATIENT
Start: 2025-07-29 | End: 2025-08-12

## 2025-07-29 NOTE — PROGRESS NOTES
Chief Complaint       Discuss Labs (Concern for Lyme disease./Hx of lyme disease 3 years ago./Was working in garden last week./Wonders about possible bite on right forearm./Describes headache, neck and shoulder aches worsening in last 3 days./)      History of Present Illness   Source of history provided by:  patient.    History of Present Illness  The patient is a 75-year-old male who presents for evaluation of possible Lyme disease.    He was diagnosed with Lyme disease 3 to 4 years ago, which was a challenging experience for him. Currently, he has been experiencing headaches that have persisted for 3 days, accompanied by pain in his neck and shoulders. He also reports feeling flushed and having difficulty eating. He has been working outdoors frequently and recalls an incident last week where he was covered in mud while pruning. He is unsure if he was bitten by a tick at that time.  He does report a suspicious bull's-eye appearing lesion over his right inner forearm.  He has been taking Tylenol and Excedrin for pain relief. His daughter, who lives with him, recently recovered from a similar illness. During his previous episode of Lyme disease, he had a fever, sweats, and aches for 3 days. He was treated with doxycycline for 28 days by Dr. Claudio.    ALLERGIES  The patient is allergic to AUGMENTIN and CLINDAMYCIN.    MEDICATIONS  Current: Tylenol, Excedrin      ROS    Unless otherwise stated in this report or unable to obtain because of the patient's clinical or mental status as evidenced by the medical record, this patients's positive and negative responses for Review of Systems, constitutional, psych, eyes, ENT, cardiovascular, respiratory, gastrointestinal, neurological, genitourinary, musculoskeletal, integument systems and systems related to the presenting problem are either stated in the preceding or were not pertinent or were negative for the symptoms and/or complaints related to the medical

## 2025-08-01 LAB
BORRELIA BURGDORFERI ABS TOTAL: 0.66 IV
LYME IGM WB: NEGATIVE
LYME WESTERN BLOT IGG: NEGATIVE

## 2025-08-05 DIAGNOSIS — S32.434A: Primary | ICD-10-CM

## 2025-08-06 ENCOUNTER — OFFICE VISIT (OUTPATIENT)
Age: 75
End: 2025-08-06
Payer: MEDICARE

## 2025-08-06 ENCOUNTER — HOSPITAL ENCOUNTER (OUTPATIENT)
Dept: GENERAL RADIOLOGY | Age: 75
Discharge: HOME | End: 2025-08-08
Payer: MEDICARE

## 2025-08-06 VITALS — SYSTOLIC BLOOD PRESSURE: 150 MMHG | OXYGEN SATURATION: 100 % | DIASTOLIC BLOOD PRESSURE: 76 MMHG | HEART RATE: 69 BPM

## 2025-08-06 DIAGNOSIS — S32.434A: ICD-10-CM

## 2025-08-06 DIAGNOSIS — S32.434A: Primary | ICD-10-CM

## 2025-08-06 DIAGNOSIS — R52 PAIN: ICD-10-CM

## 2025-08-06 PROCEDURE — G8427 DOCREV CUR MEDS BY ELIG CLIN: HCPCS | Performed by: PHYSICIAN ASSISTANT

## 2025-08-06 PROCEDURE — 1123F ACP DISCUSS/DSCN MKR DOCD: CPT | Performed by: PHYSICIAN ASSISTANT

## 2025-08-06 PROCEDURE — 72190 X-RAY EXAM OF PELVIS: CPT

## 2025-08-06 PROCEDURE — 99212 OFFICE O/P EST SF 10 MIN: CPT | Performed by: PHYSICIAN ASSISTANT

## 2025-08-06 PROCEDURE — 1159F MED LIST DOCD IN RCRD: CPT | Performed by: PHYSICIAN ASSISTANT

## 2025-08-06 PROCEDURE — 73560 X-RAY EXAM OF KNEE 1 OR 2: CPT

## 2025-08-06 PROCEDURE — 1036F TOBACCO NON-USER: CPT | Performed by: PHYSICIAN ASSISTANT

## 2025-08-06 PROCEDURE — 3077F SYST BP >= 140 MM HG: CPT | Performed by: PHYSICIAN ASSISTANT

## 2025-08-06 PROCEDURE — G8417 CALC BMI ABV UP PARAM F/U: HCPCS | Performed by: PHYSICIAN ASSISTANT

## 2025-08-06 PROCEDURE — 99213 OFFICE O/P EST LOW 20 MIN: CPT | Performed by: PHYSICIAN ASSISTANT

## 2025-08-06 PROCEDURE — 3078F DIAST BP <80 MM HG: CPT | Performed by: PHYSICIAN ASSISTANT

## (undated) DEVICE — BNDG,ELSTC,MATRIX,STRL,6"X5YD,LF,HOOK&LP: Brand: MEDLINE

## (undated) DEVICE — DRESSING,GAUZE,XEROFORM,CURAD,1"X8",ST: Brand: CURAD

## (undated) DEVICE — GLOVE SURG SZ 8 L12IN FNGR THK79MIL GRN LTX FREE

## (undated) DEVICE — TOWEL,OR,DSP,ST,BLUE,DLX,10/PK,8PK/CS: Brand: MEDLINE

## (undated) DEVICE — INTENDED FOR TISSUE SEPARATION, AND OTHER PROCEDURES THAT REQUIRE A SHARP SURGICAL BLADE TO PUNCTURE OR CUT.: Brand: BARD-PARKER ® STAINLESS STEEL BLADES

## (undated) DEVICE — Device

## (undated) DEVICE — PIN BNE FIX TEMP L140MM DIA4MM MAKO

## (undated) DEVICE — SOLUTION IV IRRIG POUR BRL 0.9% SODIUM CHL 2F7124

## (undated) DEVICE — NEEDLE FLTR 18GA L1.5IN MEM THK5UM BLNT DISP

## (undated) DEVICE — GOWN,SIRUS,POLYRNF,BRTHSLV,XLN/XL,20/CS: Brand: MEDLINE

## (undated) DEVICE — GOWN,SIRUS,FABRNF,XL,20/CS: Brand: MEDLINE

## (undated) DEVICE — SPONGE LAP W18XL18IN WHT COT 4 PLY FLD STRUNG RADPQ DISP ST

## (undated) DEVICE — BLADE CLIPPER GEN PURP NS

## (undated) DEVICE — DBD-PACK,ARTHROSCOPY II: Brand: MEDLINE

## (undated) DEVICE — CORD RETRCT SIL

## (undated) DEVICE — GLOVE SURG SZ 8 CRM LTX FREE POLYISOPRENE POLYMER BEAD ANTI

## (undated) DEVICE — STANDARD HYPODERMIC NEEDLE,POLYPROPYLENE HUB: Brand: MONOJECT

## (undated) DEVICE — DOUBLE BASIN SET: Brand: MEDLINE INDUSTRIES, INC.

## (undated) DEVICE — GLOVE ORTHO 8   MSG9480

## (undated) DEVICE — KIT INT FIX FEM TIB CKPT MAKOPLASTY

## (undated) DEVICE — STRIP,CLOSURE,WOUND,MEDI-STRIP,1/2X4: Brand: MEDLINE

## (undated) DEVICE — TUBING PMP L16FT MAIN DISP FOR AR-6400 AR-6475

## (undated) DEVICE — KIT DRP FOR RIO ROBOTIC ARM ASST SYS

## (undated) DEVICE — SHEET,DRAPE,53X77,STERILE: Brand: MEDLINE

## (undated) DEVICE — TOWEL,OR,DSP,ST,BLUE,STD,6/PK,12PK/CS: Brand: MEDLINE

## (undated) DEVICE — GOWN,SIRUS,POLYRNF,BRTHSLV,XL,30/CS: Brand: MEDLINE

## (undated) DEVICE — TUBE IRRIG HNDPC HI FLO TP INTRPULS W/SUCTION TUBE

## (undated) DEVICE — CONVERTORS STOCKINETTE: Brand: CONVERTORS

## (undated) DEVICE — DECANTER: Brand: UNBRANDED

## (undated) DEVICE — COUNTER NDL 30 COUNT DBL MAG

## (undated) DEVICE — 3M™ COBAN™ NL STERILE NON-LATEX SELF-ADHERENT WRAP, 2084S, 4 IN X 5 YD (10 CM X 4,5 M), 18 ROLLS/CASE: Brand: 3M™ COBAN™

## (undated) DEVICE — DRAPE,TOP,102X53,STERILE: Brand: MEDLINE

## (undated) DEVICE — 3M™ IOBAN™ 2 ANTIMICROBIAL INCISE DRAPE 6650EZ: Brand: IOBAN™ 2

## (undated) DEVICE — DRAPE EQUIP CARM 72X42 IN RUBBER BND CLP

## (undated) DEVICE — KIT TRK KNEE PROC VIZADISC

## (undated) DEVICE — DRAPE,REIN 53X77,STERILE: Brand: MEDLINE

## (undated) DEVICE — DRESSING FOAM ADH POLYUR W/ SIL WND SHT 4IN LEN 4IN W

## (undated) DEVICE — BOOT POS LEG DEMAYO

## (undated) DEVICE — SYRINGE MED 30ML STD CLR PLAS LUERLOCK TIP N CTRL DISP

## (undated) DEVICE — PADDING UNDERCAST W6INXL4YD WYTEX 6 PER BG

## (undated) DEVICE — SOLUTION IV IRRIG LACTATED RINGERS 3000ML 2B7487

## (undated) DEVICE — CHLORAPREP 26ML ORANGE

## (undated) DEVICE — PACK PROCEDURE SURG GEN CUST

## (undated) DEVICE — GLOVE ORANGE PI 8   MSG9080

## (undated) DEVICE — BANDAGE COMPR W6INXL12FT SMOOTH FOR LIMB EXSANG ESMARCH

## (undated) DEVICE — BLADE SURG SAW STD S STL OSC W/ SERR EDGE DISP

## (undated) DEVICE — DRESSING HYDROFIBER AQUACEL AG ADVANTAGE 3.5X12 IN

## (undated) DEVICE — SOLUTION IRRIG 3000ML 0.9% SOD CHL USP UROMATIC PLAS CONT

## (undated) DEVICE — NEEDLE SPNL L3.5IN PNK HUB S STL REG WALL FIT STYL W/ QNCKE

## (undated) DEVICE — SOLUTION IV 500ML 0.9% SOD CHL PH 5 INJ USP VIAFLX PLAS

## (undated) DEVICE — PATIENT RETURN ELECTRODE, SINGLE-USE, CONTACT QUALITY MONITORING, ADULT, WITH 9FT CORD, FOR PATIENTS WEIGING OVER 33LBS. (15KG): Brand: MEGADYNE

## (undated) DEVICE — BASIC SINGLE BASIN 1-LF: Brand: MEDLINE INDUSTRIES, INC.

## (undated) DEVICE — 3M™ IOBAN™ 2 ANTIMICROBIAL INCISE DRAPE 6651EZ: Brand: IOBAN™ 2

## (undated) DEVICE — GAUZE,SPONGE,4"X4",8PLY,STRL,LF,10/TRAY: Brand: MEDLINE

## (undated) DEVICE — MAT SURG W36XL56IN GRN FOAM ANTIFATIGUE NONSLIP DRISAFE

## (undated) DEVICE — MARKER,SKIN,WI/RULER AND LABELS: Brand: MEDLINE

## (undated) DEVICE — STRAP POS MP 30X3 IN HK LOOP CLOSURE FOAM DISP

## (undated) DEVICE — TUBING, SUCTION, 9/32" X 10', STRAIGHT: Brand: MEDLINE

## (undated) DEVICE — LOWER EXT HIP DRAPE: Brand: MEDLINE INDUSTRIES, INC.

## (undated) DEVICE — STERILE PVP: Brand: MEDLINE INDUSTRIES, INC.

## (undated) DEVICE — 4-PORT MANIFOLD: Brand: NEPTUNE 2

## (undated) DEVICE — PIN BNE FIX TEMP L110MM DIA4MM MAKO

## (undated) DEVICE — ZIMMER® STERILE DISPOSABLE TOURNIQUET CUFF WITH PLC, DUAL PORT, SINGLE BLADDER, 30 IN. (76 CM)

## (undated) DEVICE — SOLUTION IV IRRIG WATER 1000ML POUR BRL 2F7114

## (undated) DEVICE — SYRINGE MED 10ML TRNSLUC BRL PLUNG BLK MRK POLYPR CTRL

## (undated) DEVICE — TAPE ADH W3INXL10YD WHT COT WVN BK POWERFUL RUB BASE HIGHLY

## (undated) DEVICE — ADHESIVE SKIN CLSR 0.7ML TOP DERMBND ADV

## (undated) DEVICE — DRESSING HYDROFIBER AQUACEL AG ADVANTAGE 3.5X6 IN

## (undated) DEVICE — SUTURE STRATAFIX SYMMETRIC SZ 1 L18IN ABSRB VLT CT1 L36CM SXPP1A404

## (undated) DEVICE — LIQUIBAND RAPID ADHESIVE 36/CS 0.8ML: Brand: MEDLINE

## (undated) DEVICE — HANDPIECE SET WITH BONE CLEANING TIP AND SUCTION TUBE: Brand: INTERPULSE

## (undated) DEVICE — AGGRESSIVE PLUS, ANGLED CUTTER: Brand: FORMULA

## (undated) DEVICE — ELECTRODE PT RET AD L9FT HI MOIST COND ADH HYDRGEL CORDED

## (undated) DEVICE — TOTAL KNEE PK

## (undated) DEVICE — SUPER TURBOVAC 90 INTEGRATED CABLE WAND ICW: Brand: COBLATION

## (undated) DEVICE — SYRINGE, LUER LOCK, 5ML: Brand: MEDLINE

## (undated) DEVICE — PAD POS ARTHSCP DISP PIVOTPOST

## (undated) DEVICE — ZIMMER® STERILE DISPOSABLE TOURNIQUET CUFF WITH PLC, DUAL PORT, SINGLE BLADDER, 34 IN. (86 CM)

## (undated) DEVICE — BIT DRL L230MM DIA2.5MM ST 3 FLUT QUIK CPL NONRADIOPAQUE

## (undated) DEVICE — STRYKER PERFORMANCE SERIES SAGITTAL BLADE: Brand: STRYKER PERFORMANCE SERIES

## (undated) DEVICE — 3M™ STERI-DRAPE™ U-DRAPE 1015: Brand: STERI-DRAPE™

## (undated) DEVICE — DRESSING HYDROFIBER AQUACEL AG ADVANTAGE 3.5X10 IN

## (undated) DEVICE — 1LYRTR 16FR10ML 100%SILI SNAP: Brand: MEDLINE INDUSTRIES, INC.